# Patient Record
Sex: FEMALE | Race: WHITE | Employment: OTHER | URBAN - METROPOLITAN AREA
[De-identification: names, ages, dates, MRNs, and addresses within clinical notes are randomized per-mention and may not be internally consistent; named-entity substitution may affect disease eponyms.]

---

## 2022-07-14 ENCOUNTER — HOSPITAL ENCOUNTER (EMERGENCY)
Age: 72
Discharge: HOME | End: 2022-07-15
Payer: MEDICARE

## 2022-07-14 DIAGNOSIS — R73.9 HYPERGLYCEMIA: Primary | ICD-10-CM

## 2022-07-14 DIAGNOSIS — N39.0 URINARY TRACT INFECTION WITHOUT HEMATURIA, SITE UNSPECIFIED: ICD-10-CM

## 2022-07-14 LAB
A/G RATIO: 1.3 (ref 1.1–2.2)
ALBUMIN SERPL-MCNC: 4.5 G/DL (ref 3.4–5)
ALP BLD-CCNC: 124 U/L (ref 40–129)
ALT SERPL-CCNC: 13 U/L (ref 10–40)
ANION GAP SERPL CALCULATED.3IONS-SCNC: 14 MMOL/L (ref 3–16)
AST SERPL-CCNC: 17 U/L (ref 15–37)
BACTERIA: ABNORMAL /HPF
BASOPHILS ABSOLUTE: 0 K/UL (ref 0–0.2)
BASOPHILS RELATIVE PERCENT: 0.4 %
BILIRUB SERPL-MCNC: <0.2 MG/DL (ref 0–1)
BILIRUBIN URINE: NEGATIVE
BLOOD, URINE: NEGATIVE
BUN BLDV-MCNC: 36 MG/DL (ref 7–20)
CALCIUM SERPL-MCNC: 9.8 MG/DL (ref 8.3–10.6)
CHLORIDE BLD-SCNC: 93 MMOL/L (ref 99–110)
CLARITY: CLEAR
CO2: 22 MMOL/L (ref 21–32)
COLOR: YELLOW
CREAT SERPL-MCNC: 1.5 MG/DL (ref 0.6–1.2)
EOSINOPHILS ABSOLUTE: 0.2 K/UL (ref 0–0.6)
EOSINOPHILS RELATIVE PERCENT: 1.9 %
EPITHELIAL CELLS, UA: ABNORMAL /HPF (ref 0–5)
GFR AFRICAN AMERICAN: 41
GFR NON-AFRICAN AMERICAN: 34
GLUCOSE BLD-MCNC: 253 MG/DL (ref 70–99)
GLUCOSE BLD-MCNC: 330 MG/DL (ref 70–99)
GLUCOSE BLD-MCNC: 367 MG/DL (ref 70–99)
GLUCOSE URINE: 500 MG/DL
HCT VFR BLD CALC: 32.1 % (ref 36–48)
HEMOGLOBIN: 10.8 G/DL (ref 12–16)
KETONES, URINE: NEGATIVE MG/DL
LEUKOCYTE ESTERASE, URINE: ABNORMAL
LYMPHOCYTES ABSOLUTE: 3.9 K/UL (ref 1–5.1)
LYMPHOCYTES RELATIVE PERCENT: 39.1 %
MCH RBC QN AUTO: 28.4 PG (ref 26–34)
MCHC RBC AUTO-ENTMCNC: 33.7 G/DL (ref 31–36)
MCV RBC AUTO: 84.4 FL (ref 80–100)
MICROSCOPIC EXAMINATION: YES
MONOCYTES ABSOLUTE: 0.6 K/UL (ref 0–1.3)
MONOCYTES RELATIVE PERCENT: 6 %
NEUTROPHILS ABSOLUTE: 5.3 K/UL (ref 1.7–7.7)
NEUTROPHILS RELATIVE PERCENT: 52.6 %
NITRITE, URINE: POSITIVE
PDW BLD-RTO: 13 % (ref 12.4–15.4)
PERFORMED ON: ABNORMAL
PERFORMED ON: ABNORMAL
PH UA: 6 (ref 5–8)
PLATELET # BLD: 297 K/UL (ref 135–450)
PMV BLD AUTO: 8.8 FL (ref 5–10.5)
POTASSIUM SERPL-SCNC: 4.8 MMOL/L (ref 3.5–5.1)
PROTEIN UA: NEGATIVE MG/DL
RBC # BLD: 3.81 M/UL (ref 4–5.2)
RBC UA: ABNORMAL /HPF (ref 0–4)
SODIUM BLD-SCNC: 129 MMOL/L (ref 136–145)
SPECIFIC GRAVITY UA: 1.02 (ref 1–1.03)
TOTAL PROTEIN: 7.9 G/DL (ref 6.4–8.2)
URINE TYPE: ABNORMAL
UROBILINOGEN, URINE: 0.2 E.U./DL
WBC # BLD: 10 K/UL (ref 4–11)
WBC UA: ABNORMAL /HPF (ref 0–5)

## 2022-07-14 PROCEDURE — 85025 COMPLETE CBC W/AUTO DIFF WBC: CPT

## 2022-07-14 PROCEDURE — 81001 URINALYSIS AUTO W/SCOPE: CPT

## 2022-07-14 PROCEDURE — 96365 THER/PROPH/DIAG IV INF INIT: CPT

## 2022-07-14 PROCEDURE — 80053 COMPREHEN METABOLIC PANEL: CPT

## 2022-07-14 PROCEDURE — 99284 EMERGENCY DEPT VISIT MOD MDM: CPT

## 2022-07-14 PROCEDURE — 2580000003 HC RX 258: Performed by: NURSE PRACTITIONER

## 2022-07-14 PROCEDURE — 96372 THER/PROPH/DIAG INJ SC/IM: CPT

## 2022-07-14 RX ORDER — 0.9 % SODIUM CHLORIDE 0.9 %
1000 INTRAVENOUS SOLUTION INTRAVENOUS ONCE
Status: COMPLETED | OUTPATIENT
Start: 2022-07-14 | End: 2022-07-14

## 2022-07-14 RX ORDER — LISINOPRIL 40 MG/1
TABLET ORAL
COMMUNITY
Start: 2022-04-25

## 2022-07-14 RX ORDER — DULAGLUTIDE 3 MG/.5ML
3 INJECTION, SOLUTION SUBCUTANEOUS
COMMUNITY
Start: 2022-07-14

## 2022-07-14 RX ORDER — FUROSEMIDE 40 MG/1
20 TABLET ORAL SEE ADMIN INSTRUCTIONS
COMMUNITY
Start: 2022-06-01

## 2022-07-14 RX ORDER — GLYBURIDE 5 MG/1
TABLET ORAL
COMMUNITY
Start: 2022-05-23

## 2022-07-14 RX ORDER — GABAPENTIN 100 MG/1
CAPSULE ORAL
COMMUNITY
Start: 2022-06-25

## 2022-07-14 RX ORDER — CHLORTHALIDONE 25 MG/1
TABLET ORAL
COMMUNITY
Start: 2022-06-01

## 2022-07-14 RX ORDER — ROSUVASTATIN CALCIUM 5 MG/1
TABLET, COATED ORAL
Status: ON HOLD | COMMUNITY
Start: 2022-06-01 | End: 2022-10-06 | Stop reason: HOSPADM

## 2022-07-14 RX ORDER — CEPHALEXIN 500 MG/1
500 CAPSULE ORAL 4 TIMES DAILY
Qty: 28 CAPSULE | Refills: 0 | Status: SHIPPED | OUTPATIENT
Start: 2022-07-14 | End: 2022-07-21

## 2022-07-14 RX ORDER — INSULIN LISPRO 100 [IU]/ML
5 INJECTION, SOLUTION INTRAVENOUS; SUBCUTANEOUS ONCE
Status: COMPLETED | OUTPATIENT
Start: 2022-07-14 | End: 2022-07-15

## 2022-07-14 RX ADMIN — SODIUM CHLORIDE 1000 ML: 9 INJECTION, SOLUTION INTRAVENOUS at 21:45

## 2022-07-14 ASSESSMENT — PAIN - FUNCTIONAL ASSESSMENT: PAIN_FUNCTIONAL_ASSESSMENT: NONE - DENIES PAIN

## 2022-07-15 VITALS
BODY MASS INDEX: 23.85 KG/M2 | TEMPERATURE: 98.4 F | SYSTOLIC BLOOD PRESSURE: 144 MMHG | WEIGHT: 161 LBS | RESPIRATION RATE: 10 BRPM | OXYGEN SATURATION: 99 % | HEIGHT: 69 IN | DIASTOLIC BLOOD PRESSURE: 60 MMHG | HEART RATE: 62 BPM

## 2022-07-15 LAB
GLUCOSE BLD-MCNC: 279 MG/DL (ref 70–99)
PERFORMED ON: ABNORMAL

## 2022-07-15 PROCEDURE — 6360000002 HC RX W HCPCS: Performed by: NURSE PRACTITIONER

## 2022-07-15 PROCEDURE — 6370000000 HC RX 637 (ALT 250 FOR IP): Performed by: NURSE PRACTITIONER

## 2022-07-15 PROCEDURE — 2580000003 HC RX 258: Performed by: NURSE PRACTITIONER

## 2022-07-15 RX ADMIN — CEFTRIAXONE SODIUM 1000 MG: 1 INJECTION, POWDER, FOR SOLUTION INTRAMUSCULAR; INTRAVENOUS at 00:04

## 2022-07-15 RX ADMIN — INSULIN LISPRO 5 UNITS: 100 INJECTION, SOLUTION INTRAVENOUS; SUBCUTANEOUS at 00:04

## 2022-07-15 ASSESSMENT — PAIN - FUNCTIONAL ASSESSMENT: PAIN_FUNCTIONAL_ASSESSMENT: NONE - DENIES PAIN

## 2022-07-16 NOTE — ED PROVIDER NOTES
[address] [place] [address]  ED  EMERGENCY DEPARTMENT ENCOUNTER      This patient was not seen and evaluated by the attending physician. Pt Name: Zaina Pop  MRN: [id number]  [place] 1950  Date of evaluation: 7/14/2022  Provider: BRAEDEN Hayden CNP-C  PCP: No primary care provider on file. History provided by the patient. CHIEFCOMPLAINT:     Chief Complaint   Patient presents with    Hyperglycemia     states was in the 500s       HISTORY OF PRESENT ILLNESS:      Zaina Pop is a 67 y.o. female who presents to [address] [place] [address]  ED with complaints of elevated glucose. Patient states that she was having laboratory studies done as an outpatient when they called her and told her that her glucose levels were high, patient has a history of type 2 diabetes, states that she does not really ever check her glucose but this is as high as it ever been. She states that she otherwise feels fine, denies any chest pain or difficulty breathing, denies any fevers, she is here for further evaluation. LOCATION:-  QUALITY:-  SEVERITY:-  DURATION:-  MODIFYING FACTORS:-    Nursing Notes were reviewed     REVIEW OF SYSTEMS:     Review of Systems  All systems, a total of 10, are reviewed and negative except for those that were just noted in history present illness. PAST MEDICAL HISTORY:     Past Medical History:   Diagnosis Date    Chronic kidney disease     Diabetes mellitus ([address]     Hyperlipidemia     Hypertension     Neuropathy          SURGICAL HISTORY:    History reviewed. No pertinent surgical history.       CURRENT MEDICATIONS:       Discharge Medication List as of 7/15/2022 12:21 AM        CONTINUE these medications which have NOT CHANGED    Details   metFORMIN (GLUCOPHAGE) 500 MG tablet Take 500 mg by mouth 2 times dailyHistorical Med      lisinopril (PRINIVIL;ZESTRIL) 40 MG tablet Take 1 tablet by mouth once dailyHistorical Med      Dulaglutide (TRULICITY) 3 KP/2.7MP SOPN Inject 3 mg into the skin every 7 daysHistorical Med      gabapentin (NEURONTIN) 100 MG capsule TAKE 2 CAPSULES BY MOUTH THREE TIMES DAILYHistorical Med      chlorthalidone (HYGROTON) 25 MG tablet TAKE 1 TABLET BY MOUTH ONCE DAILYHistorical Med      furosemide (LASIX) 40 MG tablet TAKE 1/2 (ONE-HALF) TABLET BY MOUTH ONCE DAILY ON MONDAY, WEDNESDAY AND FRIDAY AS NEEDED FOR SWELLINGHistorical Med      glyBURIDE (DIABETA) 5 MG tablet TAKE 2 TABLETS BY MOUTH TWICE DAILY WITH MEALSHistorical Med      rosuvastatin (CRESTOR) 5 MG tablet TAKE 1 TABLET BY MOUTH ONCE DAILYHistorical Med               ALLERGIES:    Latex, Lovastatin, and Penicillins    FAMILY HISTORY:     History reviewed. No pertinent family history. SOCIAL HISTORY:     Social History     Socioeconomic History    Marital status:      Spouse name: None    Number of children: None    Years of education: None    Highest education level: None   Tobacco Use    Smoking status: Never    Smokeless tobacco: Never   Substance and Sexual Activity    Alcohol use: Not Currently    Drug use: Never       SCREENINGS:             PHYSICAL EXAM:       ED Triage Vitals   BP Temp Temp Source Heart Rate Resp SpO2 Height Weight   07/14/22 1912 07/14/22 1911 07/14/22 1911 07/14/22 1911 07/14/22 1911 07/14/22 1911 07/14/22 1911 07/14/22 1911   (!) 173/82 98.4 °F (36.9 °C) Oral 69 16 99 % 5' 9\" (1.753 m) 161 lb (73 kg)       Physical Exam    CONSTITUTIONAL: Awake and alert. Cooperative. Well-developed. Well-nourished. Vitals:    07/14/22 2300 07/14/22 2330 07/14/22 2345 07/15/22 0000   BP: (!) 148/58 (!) 141/87  (!) 144/60   Pulse: 66 64 63 62   Resp: 14 15 11 10   Temp:       TempSrc:       SpO2: 100% 100% 100% 99%   Weight:       Height:         HENT: Normocephalic. Atraumatic. External ears normal, without discharge. TMs clear bilaterally. Nonasal discharge. Oropharynx clear, no erythema.  Mucous membranes moist.  EYES: Conjunctiva non-injected, nolid abnormalities noted. No scleral icterus. PERRL. EOM's grossly intact. Anterior chambers clear. NECK: Supple. Normal ROM. No meningismus. No thyroid tenderness or swelling noted. CARDIOVASCULAR: RRR. No Murmer. No carotid bruits. PULMONARY/CHEST WALL: Effort normal. No tachypnea. Lungs clear to ausculation. ABDOMEN: Normal BS. Soft. Nondistended. No tenderness to palpation. No guarding. No hernias noted. No splenomegaly. Back: Spine is midline. No ecchymosis. No crepituson palpation. No obvious subluxation of vertebral column. No saddle anesthesia or evidence of cauda equina. /ANORECTAL: Not assessed  MUSKULOSKELETAL: Normal ROM. No acute deformities. No edema. No tenderness to palpate. SKIN: Warm and dry. NEUROLOGICAL:  GCS 15. CN II-XII grossly intact. Strength is 5/5 in all extremities and sensation is intact. PSYCHIATRIC: Normal affect, normal insight and judgement. Alert and oriented x 3.         DIAGNOSTIC RESULTS:     LABS:    Results for orders placed or performed during the hospital encounter of 07/14/22   Comprehensive Metabolic Panel   Result Value Ref Range    Sodium 129 (L) 136 - 145 mmol/L    Potassium 4.8 3.5 - 5.1 mmol/L    Chloride 93 (L) 99 - 110 mmol/L    CO2 22 21 - 32 mmol/L    Anion Gap 14 3 - 16    Glucose 367 (H) 70 - 99 mg/dL    BUN 36 (H) 7 - 20 mg/dL    CREATININE 1.5 (H) 0.6 - 1.2 mg/dL    GFR Non- 34 (A) >60    GFR  41 (A) >60    Calcium 9.8 8.3 - 10.6 mg/dL    Total Protein 7.9 6.4 - 8.2 g/dL    Albumin 4.5 3.4 - 5.0 g/dL    Albumin/Globulin Ratio 1.3 1.1 - 2.2    Total Bilirubin <0.2 0.0 - 1.0 mg/dL    Alkaline Phosphatase 124 40 - 129 U/L    ALT 13 10 - 40 U/L    AST 17 15 - 37 U/L   CBC with Auto Differential   Result Value Ref Range    WBC 10.0 4.0 - 11.0 K/uL    RBC 3.81 (L) 4.00 - 5.20 M/uL    Hemoglobin 10.8 (L) 12.0 - 16.0 g/dL    Hematocrit 32.1 (L) 36.0 - 48.0 %    MCV 84.4 80.0 - 100.0 fL    MCH 28.4 26.0 - 34.0 pg    MCHC 33.7 31.0 - 36.0 g/dL    RDW 13.0 12.4 - 15.4 %    Platelets 542 300 - 396 K/uL    MPV 8.8 5.0 - 10.5 fL    Neutrophils % 52.6 %    Lymphocytes % 39.1 %    Monocytes % 6.0 %    Eosinophils % 1.9 %    Basophils % 0.4 %    Neutrophils Absolute 5.3 1.7 - 7.7 K/uL    Lymphocytes Absolute 3.9 1.0 - 5.1 K/uL    Monocytes Absolute 0.6 0.0 - 1.3 K/uL    Eosinophils Absolute 0.2 0.0 - 0.6 K/uL    Basophils Absolute 0.0 0.0 - 0.2 K/uL   Urinalysis   Result Value Ref Range    Color, UA Yellow Straw/Yellow    Clarity, UA Clear Clear    Glucose, Ur 500 (A) Negative mg/dL    Bilirubin Urine Negative Negative    Ketones, Urine Negative Negative mg/dL    Specific Gravity, UA 1.020 1.005 - 1.030    Blood, Urine Negative Negative    pH, UA 6.0 5.0 - 8.0    Protein, UA Negative Negative mg/dL    Urobilinogen, Urine 0.2 <2.0 E.U./dL    Nitrite, Urine POSITIVE (A) Negative    Leukocyte Esterase, Urine SMALL (A) Negative    Microscopic Examination YES     Urine Type NotGiven    Microscopic Urinalysis   Result Value Ref Range    WBC, UA 21-50 (A) 0 - 5 /HPF    RBC, UA 11-20 (A) 0 - 4 /HPF    Epithelial Cells, UA 11-20 (A) 0 - 5 /HPF    Bacteria, UA 2+ (A) None Seen /HPF   POCT Glucose   Result Value Ref Range    POC Glucose 330 (H) 70 - 99 mg/dl    Performed on ACCU-CHEK    POCT Glucose   Result Value Ref Range    POC Glucose 253 (H) 70 - 99 mg/dl    Performed on ACCU-CHEK    POCT Glucose   Result Value Ref Range    POC Glucose 279 (H) 70 - 99 mg/dl    Performed on ACCU-CHEK          RADIOLOGY:  All x-ray studies are viewed/reviewed by me. Formal interpretations per the radiologist are as follows: No orders to display           EKG:  See EKG interpretation by an attending physician.       PROCEDURES:   N/A    CRITICAL CARE TIME:   N/A    CONSULTS:  None      EMERGENCY DEPARTMENT COURSE andDIFFERENTIAL DIAGNOSIS/MDM:   Vitals:    Vitals:    07/14/22 2300 07/14/22 2330 07/14/22 2345 07/15/22 0000   BP: (!) 148/58 (!) 141/87  (!) 144/60 Pulse: 66 64 63 62   Resp: 14 15 11 10   Temp:       TempSrc:       SpO2: 100% 100% 100% 99%   Weight:       Height:           Patient wasgiven the following medications:  Medications   0.9 % sodium chloride bolus (0 mLs IntraVENous Stopped 7/14/22 2251)   insulin lispro (HUMALOG) injection vial 5 Units (5 Units SubCUTAneous Given 7/15/22 0004)   cefTRIAXone (ROCEPHIN) 1000 mg IVPB in 50 mL D5W minibag (0 mg IntraVENous Stopped 7/15/22 0036)         Patient was evaluated independently by myself with the attending physician available for consultation. Patient presented to the emergency department today with complaints of elevated blood glucose. Patient was hyperglycemic at 367, patient was given IV fluids, has a history of some renal dysfunction. Creatinine level 1.5. Patient urinalysis was concerning for acute infection, which could account for the patient's elevated glucose. Patient given small amount of insulin, instructed to follow-up with PCP, no evidence of DKA, I did not feel the patient required further intervention, she was discharged home in good condition with instructions to monitor glucose levels. Patient laboratory studies, radiographic imaging, and assessment were all discussed with the patient and/orpatient family. There was shared decision-making between myself as well as the patient and/or their surrogate and we are all in agreement with discharge home. There was an opportunity for questions and all questions were answered tothe best of my ability and to the satisfaction of the patient and/or patient family. FINAL IMPRESSION:      1. Hyperglycemia    2.  Urinary tract infection without hematuria, site unspecified          DISPOSITION/PLAN:   DISPOSITION Decision To Discharge      PATIENT REFERRED TO:  [facility]  ED  [address]  Go to   If symptoms worsen      DISCHARGE MEDICATIONS:  Discharge Medication List as of 7/15/2022 12:21 AM        START taking these medications    Details   cephALEXin (KEFLEX) 500 MG capsule Take 1 capsule by mouth 4 times daily for 7 days, Disp-28 capsule, R-0Normal                        (Please note thatportions of this note were completed with a voice recognition program.  Efforts were made to edit the dictations, but occasionally words are mis-transcribed.)    BRAEDEN Álvarez CNP-C (electronicallysigned)        BRAEDEN Álvarez CNP  07/16/22 3154

## 2022-09-14 ENCOUNTER — APPOINTMENT (OUTPATIENT)
Dept: CT IMAGING | Age: 72
DRG: 853 | End: 2022-09-14
Payer: MEDICARE

## 2022-09-14 ENCOUNTER — APPOINTMENT (OUTPATIENT)
Dept: GENERAL RADIOLOGY | Age: 72
DRG: 853 | End: 2022-09-14
Payer: MEDICARE

## 2022-09-14 ENCOUNTER — HOSPITAL ENCOUNTER (INPATIENT)
Age: 72
LOS: 22 days | Discharge: HOSPICE | DRG: 853 | End: 2022-10-06
Attending: EMERGENCY MEDICINE | Admitting: INTERNAL MEDICINE
Payer: MEDICARE

## 2022-09-14 DIAGNOSIS — R10.84 GENERALIZED ABDOMINAL PAIN: ICD-10-CM

## 2022-09-14 DIAGNOSIS — N30.01 ACUTE CYSTITIS WITH HEMATURIA: ICD-10-CM

## 2022-09-14 DIAGNOSIS — N17.9 AKI (ACUTE KIDNEY INJURY) (HCC): ICD-10-CM

## 2022-09-14 DIAGNOSIS — E87.20 LACTIC ACIDOSIS: Primary | ICD-10-CM

## 2022-09-14 DIAGNOSIS — A41.9 SEPTICEMIA (HCC): ICD-10-CM

## 2022-09-14 DIAGNOSIS — K52.89 STERCORAL COLITIS: ICD-10-CM

## 2022-09-14 DIAGNOSIS — R11.2 NON-INTRACTABLE VOMITING WITH NAUSEA, UNSPECIFIED VOMITING TYPE: ICD-10-CM

## 2022-09-14 DIAGNOSIS — K63.1 PERFORATED BOWEL (HCC): ICD-10-CM

## 2022-09-14 PROBLEM — D72.829 LEUKOCYTOSIS: Status: ACTIVE | Noted: 2022-09-14

## 2022-09-14 PROBLEM — R82.81 PYURIA: Status: ACTIVE | Noted: 2022-09-14

## 2022-09-14 PROBLEM — I95.9 HYPOTENSION: Status: ACTIVE | Noted: 2022-09-14

## 2022-09-14 PROBLEM — K52.9 ENTERITIS: Status: ACTIVE | Noted: 2022-09-14

## 2022-09-14 PROBLEM — R55 SYNCOPE: Status: ACTIVE | Noted: 2022-09-14

## 2022-09-14 PROBLEM — E87.1 HYPONATREMIA: Status: ACTIVE | Noted: 2022-09-14

## 2022-09-14 PROBLEM — R10.9 ABDOMINAL PAIN: Status: ACTIVE | Noted: 2022-09-14

## 2022-09-14 PROBLEM — R77.8 ELEVATED TROPONIN: Status: ACTIVE | Noted: 2022-09-14

## 2022-09-14 PROBLEM — E11.10 DKA (DIABETIC KETOACIDOSIS) (HCC): Status: ACTIVE | Noted: 2022-09-14

## 2022-09-14 PROBLEM — R65.21 SEPTIC SHOCK (HCC): Status: ACTIVE | Noted: 2022-09-14

## 2022-09-14 LAB
A/G RATIO: 1.3 (ref 1.1–2.2)
A/G RATIO: 1.4 (ref 1.1–2.2)
ALBUMIN SERPL-MCNC: 3.3 G/DL (ref 3.4–5)
ALBUMIN SERPL-MCNC: 4.2 G/DL (ref 3.4–5)
ALP BLD-CCNC: 142 U/L (ref 40–129)
ALP BLD-CCNC: 274 U/L (ref 40–129)
ALT SERPL-CCNC: 12 U/L (ref 10–40)
ALT SERPL-CCNC: 13 U/L (ref 10–40)
ANION GAP SERPL CALCULATED.3IONS-SCNC: 21 MMOL/L (ref 3–16)
ANION GAP SERPL CALCULATED.3IONS-SCNC: 29 MMOL/L (ref 3–16)
AST SERPL-CCNC: 32 U/L (ref 15–37)
AST SERPL-CCNC: 35 U/L (ref 15–37)
BACTERIA: ABNORMAL /HPF
BASE EXCESS VENOUS: -17.8 MMOL/L (ref -3–3)
BASOPHILS ABSOLUTE: 0 K/UL (ref 0–0.2)
BASOPHILS RELATIVE PERCENT: 0.2 %
BILIRUB SERPL-MCNC: 0.3 MG/DL (ref 0–1)
BILIRUB SERPL-MCNC: 0.4 MG/DL (ref 0–1)
BILIRUBIN URINE: NEGATIVE
BLOOD, URINE: NEGATIVE
BUN BLDV-MCNC: 32 MG/DL (ref 7–20)
BUN BLDV-MCNC: 33 MG/DL (ref 7–20)
CALCIUM SERPL-MCNC: 10.2 MG/DL (ref 8.3–10.6)
CALCIUM SERPL-MCNC: 8.3 MG/DL (ref 8.3–10.6)
CARBOXYHEMOGLOBIN: 1.7 % (ref 0–1.5)
CHLORIDE BLD-SCNC: 82 MMOL/L (ref 99–110)
CHLORIDE BLD-SCNC: 90 MMOL/L (ref 99–110)
CLARITY: CLEAR
CO2: 10 MMOL/L (ref 21–32)
CO2: 11 MMOL/L (ref 21–32)
COLOR: YELLOW
CREAT SERPL-MCNC: 1.7 MG/DL (ref 0.6–1.2)
CREAT SERPL-MCNC: 1.7 MG/DL (ref 0.6–1.2)
EKG ATRIAL RATE: 90 BPM
EKG DIAGNOSIS: NORMAL
EKG P AXIS: 52 DEGREES
EKG P-R INTERVAL: 192 MS
EKG Q-T INTERVAL: 432 MS
EKG QRS DURATION: 158 MS
EKG QTC CALCULATION (BAZETT): 528 MS
EKG R AXIS: -28 DEGREES
EKG T AXIS: 100 DEGREES
EKG VENTRICULAR RATE: 90 BPM
EOSINOPHILS ABSOLUTE: 0 K/UL (ref 0–0.6)
EOSINOPHILS RELATIVE PERCENT: 0 %
GFR AFRICAN AMERICAN: 36
GFR AFRICAN AMERICAN: 36
GFR NON-AFRICAN AMERICAN: 30
GFR NON-AFRICAN AMERICAN: 30
GLUCOSE BLD-MCNC: 235 MG/DL (ref 70–99)
GLUCOSE BLD-MCNC: 334 MG/DL (ref 70–99)
GLUCOSE BLD-MCNC: 347 MG/DL (ref 70–99)
GLUCOSE BLD-MCNC: 358 MG/DL (ref 70–99)
GLUCOSE BLD-MCNC: 379 MG/DL (ref 70–99)
GLUCOSE URINE: NEGATIVE MG/DL
HCO3 VENOUS: 10.6 MMOL/L (ref 23–29)
HCT VFR BLD CALC: 36.7 % (ref 36–48)
HEMOGLOBIN: 12.1 G/DL (ref 12–16)
KETONES, URINE: ABNORMAL MG/DL
LACTIC ACID, SEPSIS: 11.3 MMOL/L (ref 0.4–1.9)
LACTIC ACID, SEPSIS: 12.6 MMOL/L (ref 0.4–1.9)
LACTIC ACID, SEPSIS: 7.6 MMOL/L (ref 0.4–1.9)
LEUKOCYTE ESTERASE, URINE: ABNORMAL
LIPASE: 36 U/L (ref 13–60)
LYMPHOCYTES ABSOLUTE: 2.5 K/UL (ref 1–5.1)
LYMPHOCYTES RELATIVE PERCENT: 10.1 %
MCH RBC QN AUTO: 27.9 PG (ref 26–34)
MCHC RBC AUTO-ENTMCNC: 32.9 G/DL (ref 31–36)
MCV RBC AUTO: 84.9 FL (ref 80–100)
METHEMOGLOBIN VENOUS: 0.4 %
MICROSCOPIC EXAMINATION: YES
MONOCYTES ABSOLUTE: 1.2 K/UL (ref 0–1.3)
MONOCYTES RELATIVE PERCENT: 4.8 %
NEUTROPHILS ABSOLUTE: 21 K/UL (ref 1.7–7.7)
NEUTROPHILS RELATIVE PERCENT: 84.9 %
NITRITE, URINE: NEGATIVE
O2 SAT, VEN: 87 %
O2 THERAPY: ABNORMAL
PCO2, VEN: 33.7 MMHG (ref 40–50)
PDW BLD-RTO: 12.9 % (ref 12.4–15.4)
PERFORMED ON: ABNORMAL
PH UA: 6 (ref 5–8)
PH VENOUS: 7.11 (ref 7.35–7.45)
PLATELET # BLD: 259 K/UL (ref 135–450)
PMV BLD AUTO: 8.2 FL (ref 5–10.5)
PO2, VEN: 65.7 MMHG (ref 25–40)
POTASSIUM REFLEX MAGNESIUM: 4.4 MMOL/L (ref 3.5–5.1)
POTASSIUM SERPL-SCNC: 4.2 MMOL/L (ref 3.5–5.1)
PROCALCITONIN: 0.06 NG/ML (ref 0–0.15)
PROTEIN UA: NEGATIVE MG/DL
RBC # BLD: 4.32 M/UL (ref 4–5.2)
RBC UA: ABNORMAL /HPF (ref 0–4)
SODIUM BLD-SCNC: 121 MMOL/L (ref 136–145)
SODIUM BLD-SCNC: 122 MMOL/L (ref 136–145)
SPECIFIC GRAVITY UA: <=1.005 (ref 1–1.03)
SPECIMEN STATUS: NORMAL
TCO2 CALC VENOUS: 12 MMOL/L
TOTAL PROTEIN: 5.9 G/DL (ref 6.4–8.2)
TOTAL PROTEIN: 7.2 G/DL (ref 6.4–8.2)
TROPONIN: 0.07 NG/ML
TROPONIN: 0.08 NG/ML
URINE TYPE: ABNORMAL
UROBILINOGEN, URINE: 0.2 E.U./DL
WBC # BLD: 24.7 K/UL (ref 4–11)
WBC UA: ABNORMAL /HPF (ref 0–5)

## 2022-09-14 PROCEDURE — 96366 THER/PROPH/DIAG IV INF ADDON: CPT

## 2022-09-14 PROCEDURE — 82803 BLOOD GASES ANY COMBINATION: CPT

## 2022-09-14 PROCEDURE — 99291 CRITICAL CARE FIRST HOUR: CPT | Performed by: INTERNAL MEDICINE

## 2022-09-14 PROCEDURE — 2580000003 HC RX 258: Performed by: INTERNAL MEDICINE

## 2022-09-14 PROCEDURE — 85025 COMPLETE CBC W/AUTO DIFF WBC: CPT

## 2022-09-14 PROCEDURE — 37799 UNLISTED PX VASCULAR SURGERY: CPT

## 2022-09-14 PROCEDURE — 99285 EMERGENCY DEPT VISIT HI MDM: CPT

## 2022-09-14 PROCEDURE — 83605 ASSAY OF LACTIC ACID: CPT

## 2022-09-14 PROCEDURE — 83690 ASSAY OF LIPASE: CPT

## 2022-09-14 PROCEDURE — 36556 INSERT NON-TUNNEL CV CATH: CPT

## 2022-09-14 PROCEDURE — 96365 THER/PROPH/DIAG IV INF INIT: CPT

## 2022-09-14 PROCEDURE — 6360000002 HC RX W HCPCS: Performed by: INTERNAL MEDICINE

## 2022-09-14 PROCEDURE — 84484 ASSAY OF TROPONIN QUANT: CPT

## 2022-09-14 PROCEDURE — 6360000002 HC RX W HCPCS: Performed by: EMERGENCY MEDICINE

## 2022-09-14 PROCEDURE — 83036 HEMOGLOBIN GLYCOSYLATED A1C: CPT

## 2022-09-14 PROCEDURE — 2500000003 HC RX 250 WO HCPCS: Performed by: INTERNAL MEDICINE

## 2022-09-14 PROCEDURE — 82010 KETONE BODYS QUAN: CPT

## 2022-09-14 PROCEDURE — 84145 PROCALCITONIN (PCT): CPT

## 2022-09-14 PROCEDURE — 93005 ELECTROCARDIOGRAM TRACING: CPT | Performed by: EMERGENCY MEDICINE

## 2022-09-14 PROCEDURE — 2500000003 HC RX 250 WO HCPCS: Performed by: EMERGENCY MEDICINE

## 2022-09-14 PROCEDURE — 87449 NOS EACH ORGANISM AG IA: CPT

## 2022-09-14 PROCEDURE — 81001 URINALYSIS AUTO W/SCOPE: CPT

## 2022-09-14 PROCEDURE — 80053 COMPREHEN METABOLIC PANEL: CPT

## 2022-09-14 PROCEDURE — 82330 ASSAY OF CALCIUM: CPT

## 2022-09-14 PROCEDURE — 6370000000 HC RX 637 (ALT 250 FOR IP): Performed by: INTERNAL MEDICINE

## 2022-09-14 PROCEDURE — 87324 CLOSTRIDIUM AG IA: CPT

## 2022-09-14 PROCEDURE — 2000000000 HC ICU R&B

## 2022-09-14 PROCEDURE — 96375 TX/PRO/DX INJ NEW DRUG ADDON: CPT

## 2022-09-14 PROCEDURE — 71045 X-RAY EXAM CHEST 1 VIEW: CPT

## 2022-09-14 PROCEDURE — 2580000003 HC RX 258: Performed by: EMERGENCY MEDICINE

## 2022-09-14 PROCEDURE — 74176 CT ABD & PELVIS W/O CONTRAST: CPT

## 2022-09-14 PROCEDURE — 36620 INSERTION CATHETER ARTERY: CPT

## 2022-09-14 PROCEDURE — C9113 INJ PANTOPRAZOLE SODIUM, VIA: HCPCS | Performed by: INTERNAL MEDICINE

## 2022-09-14 PROCEDURE — 93010 ELECTROCARDIOGRAM REPORT: CPT | Performed by: INTERNAL MEDICINE

## 2022-09-14 PROCEDURE — 6360000002 HC RX W HCPCS

## 2022-09-14 PROCEDURE — 02HV33Z INSERTION OF INFUSION DEVICE INTO SUPERIOR VENA CAVA, PERCUTANEOUS APPROACH: ICD-10-PCS | Performed by: EMERGENCY MEDICINE

## 2022-09-14 RX ORDER — 0.9 % SODIUM CHLORIDE 0.9 %
30 INTRAVENOUS SOLUTION INTRAVENOUS ONCE
Status: DISCONTINUED | OUTPATIENT
Start: 2022-09-14 | End: 2022-09-14

## 2022-09-14 RX ORDER — METRONIDAZOLE 500 MG/100ML
500 INJECTION, SOLUTION INTRAVENOUS EVERY 8 HOURS
Status: DISCONTINUED | OUTPATIENT
Start: 2022-09-14 | End: 2022-09-16

## 2022-09-14 RX ORDER — ENOXAPARIN SODIUM 100 MG/ML
40 INJECTION SUBCUTANEOUS DAILY
Status: DISCONTINUED | OUTPATIENT
Start: 2022-09-15 | End: 2022-09-15

## 2022-09-14 RX ORDER — POLYETHYLENE GLYCOL 3350 17 G/17G
17 POWDER, FOR SOLUTION ORAL DAILY PRN
Status: DISCONTINUED | OUTPATIENT
Start: 2022-09-14 | End: 2022-10-06 | Stop reason: HOSPADM

## 2022-09-14 RX ORDER — INSULIN GLARGINE 100 [IU]/ML
15 INJECTION, SOLUTION SUBCUTANEOUS NIGHTLY
Status: DISCONTINUED | OUTPATIENT
Start: 2022-09-14 | End: 2022-09-19

## 2022-09-14 RX ORDER — INSULIN LISPRO 100 [IU]/ML
0-16 INJECTION, SOLUTION INTRAVENOUS; SUBCUTANEOUS
Status: DISCONTINUED | OUTPATIENT
Start: 2022-09-14 | End: 2022-09-14

## 2022-09-14 RX ORDER — INSULIN LISPRO 100 [IU]/ML
0-4 INJECTION, SOLUTION INTRAVENOUS; SUBCUTANEOUS NIGHTLY
Status: DISCONTINUED | OUTPATIENT
Start: 2022-09-14 | End: 2022-09-14

## 2022-09-14 RX ORDER — DEXTROSE MONOHYDRATE 100 MG/ML
INJECTION, SOLUTION INTRAVENOUS CONTINUOUS PRN
Status: DISCONTINUED | OUTPATIENT
Start: 2022-09-14 | End: 2022-10-06 | Stop reason: HOSPADM

## 2022-09-14 RX ORDER — SODIUM CHLORIDE 0.9 % (FLUSH) 0.9 %
5-40 SYRINGE (ML) INJECTION EVERY 12 HOURS SCHEDULED
Status: DISCONTINUED | OUTPATIENT
Start: 2022-09-14 | End: 2022-09-19 | Stop reason: SDUPTHER

## 2022-09-14 RX ORDER — SODIUM CHLORIDE 9 MG/ML
1000 INJECTION, SOLUTION INTRAVENOUS CONTINUOUS
Status: DISCONTINUED | OUTPATIENT
Start: 2022-09-14 | End: 2022-09-14

## 2022-09-14 RX ORDER — SODIUM CHLORIDE 9 MG/ML
INJECTION, SOLUTION INTRAVENOUS CONTINUOUS
Status: DISCONTINUED | OUTPATIENT
Start: 2022-09-14 | End: 2022-09-14

## 2022-09-14 RX ORDER — SODIUM CHLORIDE 9 MG/ML
1000 INJECTION, SOLUTION INTRAVENOUS ONCE
Status: COMPLETED | OUTPATIENT
Start: 2022-09-14 | End: 2022-09-14

## 2022-09-14 RX ORDER — ONDANSETRON 2 MG/ML
4 INJECTION INTRAMUSCULAR; INTRAVENOUS EVERY 4 HOURS PRN
Status: DISCONTINUED | OUTPATIENT
Start: 2022-09-14 | End: 2022-09-15

## 2022-09-14 RX ORDER — SODIUM CHLORIDE 0.9 % (FLUSH) 0.9 %
5-40 SYRINGE (ML) INJECTION PRN
Status: DISCONTINUED | OUTPATIENT
Start: 2022-09-14 | End: 2022-10-06 | Stop reason: HOSPADM

## 2022-09-14 RX ORDER — ONDANSETRON 2 MG/ML
4 INJECTION INTRAMUSCULAR; INTRAVENOUS EVERY 6 HOURS PRN
Status: DISCONTINUED | OUTPATIENT
Start: 2022-09-14 | End: 2022-09-15

## 2022-09-14 RX ORDER — PANTOPRAZOLE SODIUM 40 MG/10ML
40 INJECTION, POWDER, LYOPHILIZED, FOR SOLUTION INTRAVENOUS DAILY
Status: DISCONTINUED | OUTPATIENT
Start: 2022-09-14 | End: 2022-10-06 | Stop reason: HOSPADM

## 2022-09-14 RX ORDER — ACETAMINOPHEN 325 MG/1
650 TABLET ORAL EVERY 6 HOURS PRN
Status: DISCONTINUED | OUTPATIENT
Start: 2022-09-14 | End: 2022-09-25

## 2022-09-14 RX ORDER — ONDANSETRON 4 MG/1
4 TABLET, ORALLY DISINTEGRATING ORAL EVERY 8 HOURS PRN
Status: DISCONTINUED | OUTPATIENT
Start: 2022-09-14 | End: 2022-09-15

## 2022-09-14 RX ORDER — INSULIN LISPRO 100 [IU]/ML
0-4 INJECTION, SOLUTION INTRAVENOUS; SUBCUTANEOUS EVERY 4 HOURS
Status: DISCONTINUED | OUTPATIENT
Start: 2022-09-15 | End: 2022-09-21

## 2022-09-14 RX ORDER — ACETAMINOPHEN 650 MG/1
650 SUPPOSITORY RECTAL EVERY 6 HOURS PRN
Status: DISCONTINUED | OUTPATIENT
Start: 2022-09-14 | End: 2022-09-25

## 2022-09-14 RX ORDER — PROMETHAZINE HYDROCHLORIDE 25 MG/ML
12.5 INJECTION, SOLUTION INTRAMUSCULAR; INTRAVENOUS EVERY 4 HOURS PRN
Status: DISCONTINUED | OUTPATIENT
Start: 2022-09-14 | End: 2022-09-15

## 2022-09-14 RX ORDER — SODIUM CHLORIDE 9 MG/ML
INJECTION, SOLUTION INTRAVENOUS PRN
Status: DISCONTINUED | OUTPATIENT
Start: 2022-09-14 | End: 2022-10-06 | Stop reason: HOSPADM

## 2022-09-14 RX ORDER — ONDANSETRON 2 MG/ML
INJECTION INTRAMUSCULAR; INTRAVENOUS
Status: COMPLETED
Start: 2022-09-14 | End: 2022-09-14

## 2022-09-14 RX ORDER — ONDANSETRON 2 MG/ML
4 INJECTION INTRAMUSCULAR; INTRAVENOUS ONCE
Status: COMPLETED | OUTPATIENT
Start: 2022-09-14 | End: 2022-09-14

## 2022-09-14 RX ADMIN — DEXTROSE MONOHYDRATE 5 MCG/MIN: 50 INJECTION, SOLUTION INTRAVENOUS at 13:24

## 2022-09-14 RX ADMIN — INSULIN LISPRO 12 UNITS: 100 INJECTION, SOLUTION INTRAVENOUS; SUBCUTANEOUS at 18:29

## 2022-09-14 RX ADMIN — PIPERACILLIN AND TAZOBACTAM 3375 MG: 3; .375 INJECTION, POWDER, FOR SOLUTION INTRAVENOUS at 14:22

## 2022-09-14 RX ADMIN — SODIUM BICARBONATE: 84 INJECTION, SOLUTION INTRAVENOUS at 22:53

## 2022-09-14 RX ADMIN — PIPERACILLIN AND TAZOBACTAM 3375 MG: 3; .375 INJECTION, POWDER, LYOPHILIZED, FOR SOLUTION INTRAVENOUS at 21:18

## 2022-09-14 RX ADMIN — PANTOPRAZOLE SODIUM 40 MG: 40 INJECTION, POWDER, FOR SOLUTION INTRAVENOUS at 18:29

## 2022-09-14 RX ADMIN — VASOPRESSIN 0.04 UNITS/MIN: 20 INJECTION INTRAVENOUS at 16:47

## 2022-09-14 RX ADMIN — VASOPRESSIN 0.03 UNITS/MIN: 20 INJECTION INTRAVENOUS at 22:56

## 2022-09-14 RX ADMIN — SODIUM BICARBONATE 50 MEQ: 84 INJECTION INTRAVENOUS at 22:18

## 2022-09-14 RX ADMIN — METRONIDAZOLE 500 MG: 500 INJECTION, SOLUTION INTRAVENOUS at 18:06

## 2022-09-14 RX ADMIN — SODIUM CHLORIDE 1000 ML: 9 INJECTION, SOLUTION INTRAVENOUS at 12:30

## 2022-09-14 RX ADMIN — Medication 10 ML: at 21:22

## 2022-09-14 RX ADMIN — ONDANSETRON 4 MG: 2 INJECTION INTRAMUSCULAR; INTRAVENOUS at 22:34

## 2022-09-14 RX ADMIN — METRONIDAZOLE 500 MG: 500 INJECTION, SOLUTION INTRAVENOUS at 23:54

## 2022-09-14 RX ADMIN — ONDANSETRON 4 MG: 2 INJECTION INTRAMUSCULAR; INTRAVENOUS at 18:26

## 2022-09-14 RX ADMIN — SODIUM CHLORIDE 1000 ML: 9 INJECTION, SOLUTION INTRAVENOUS at 12:46

## 2022-09-14 RX ADMIN — INSULIN GLARGINE 15 UNITS: 100 INJECTION, SOLUTION SUBCUTANEOUS at 21:23

## 2022-09-14 RX ADMIN — SODIUM BICARBONATE 50 MEQ: 84 INJECTION, SOLUTION INTRAVENOUS at 18:28

## 2022-09-14 RX ADMIN — ONDANSETRON 4 MG: 2 INJECTION INTRAMUSCULAR; INTRAVENOUS at 15:00

## 2022-09-14 RX ADMIN — DEXTROSE MONOHYDRATE 32 MCG/MIN: 50 INJECTION, SOLUTION INTRAVENOUS at 21:44

## 2022-09-14 RX ADMIN — SODIUM CHLORIDE: 9 INJECTION, SOLUTION INTRAVENOUS at 18:32

## 2022-09-14 ASSESSMENT — PAIN - FUNCTIONAL ASSESSMENT: PAIN_FUNCTIONAL_ASSESSMENT: 0-10

## 2022-09-14 ASSESSMENT — PAIN SCALES - GENERAL
PAINLEVEL_OUTOF10: 6
PAINLEVEL_OUTOF10: 7

## 2022-09-14 ASSESSMENT — LIFESTYLE VARIABLES: HOW OFTEN DO YOU HAVE A DRINK CONTAINING ALCOHOL: NEVER

## 2022-09-14 ASSESSMENT — PAIN DESCRIPTION - LOCATION: LOCATION: ABDOMEN

## 2022-09-14 NOTE — CARE COORDINATION
CASE MANAGEMENT INITIAL ASSESSMENT      Reviewed chart and completed assessment with patient and   Family present:   Explained Case Management role/services. yes    Primary contact information:, [address] :   Primary Decision Maker: Cece Guzman Spouse - [phone]          Can this person be reached and be able to respond quickly, such as within a few minutes or hours? Yes      Admit date/status:9/14/22  Diagnosis:abd. pain   Is this a Readmission?:  No      Insurance:medicare   Precert required for SNF: No       3 night stay required: waived due to [address] arrangements, Adls, care needs, prior to admission:from home with  and granddaughter, independent in ADLs, works part time usually    [address] at home:  Walker__Cane__RTS__ BSC__Shower Chair__  02__ HHN__ CPAP__  [facility] ED Bed__ W/C___ Other_____    Services in the home and/or outpatient, prior to admission:none    Current PCP:Dr. Leon                                Medications:yes Prescription coverage? No Will pt require financial assistance with medications No     Transportation needs: car     Dialysis Facility (if applicable)   Name:  Address:  Dialysis Schedule:  Phone:  Fax:    PT/OT recs:    Hospital Exemption Notification (HEN):    Barriers to discharge:medical complications    Plan/comments:Referred to patient for d/c planning. Spoke to patient and . Patient is a 67year old female admitted for abdominal pain. Patient lives at home with  and granddaughter. Patient is usually independent in ADLs. Patient usually works part time at [employer]. Case management to follow for d/c needs.   Electronically signed by GEORGE Moise on 9/14/2022 at 4:21 PM     ECOC on chart for MD signature

## 2022-09-14 NOTE — ED NOTES
Pt arouses to verbal stimuli, requesting some water for her dry mouth, having abd pain at 7/10     Claudette Grim, RN  09/14/22 8456

## 2022-09-14 NOTE — ED PROVIDER NOTES
CHIEF COMPLAINT  Abdominal Pain (Constipated past 2 days, had a large BM at home, medics found patient on floor in hallway at home, pt was hypotensive in the 50's, blood sugar was 60, oral glucose , and fluids, given by medics, )      [facility]  Yesi Duran is a 67 y.o. female with a history of CKD, diabetes, hyperlipidemia, hypertension, and neuropathy who presents to the ED complaining of abdominal pain/syncope. By EMS report, patient has had constipation over the last several days. .  Patient reportedly had a large bowel movement and shortly thereafter had a syncopal/near syncopal event where she was helped to the floor. EMS was called and arrived to find patient with blood pressure of 50/30.  300 cc of normal saline were infused during truck ride to the ED. Blood pressure shortly after arrival was 134/94. Patient reports lower abdominal discomfort. No additional complaints. No other complaints, modifying factors or associated symptoms. I have reviewed the following from the nursing documentation. Past Medical History:   Diagnosis Date    Chronic kidney disease     Diabetes mellitus ([address]     Hyperlipidemia     Hypertension     Neuropathy      History reviewed. No pertinent surgical history. History reviewed. No pertinent family history.   Social History     Socioeconomic History    Marital status:      Spouse name: Not on file    Number of children: Not on file    Years of education: Not on file    Highest education level: Not on file   Occupational History    Not on file   Tobacco Use    Smoking status: Never    Smokeless tobacco: Never   Substance and Sexual Activity    Alcohol use: Not Currently    Drug use: Never    Sexual activity: Not on file   Other Topics Concern    Not on file   Social History Narrative    Not on file     Social Determinants of Health     Financial Resource Strain: Not on file   Food Insecurity: Not on file   Transportation Needs: Not on file Physical Activity: Not on file   Stress: Not on file   Social Connections: Not on file   Intimate Partner Violence: Not on file   Housing Stability: Not on file     Current Facility-Administered Medications   Medication Dose Route Frequency Provider Last Rate Last Admin    norepinephrine (LEVOPHED) 16 mg in dextrose 5 % 250 mL infusion  1-100 mcg/min IntraVENous Continuous Allyne Mars, DO 28.1 mL/hr at 09/14/22 1402 30 mcg/min at 09/14/22 1402    0.9 % sodium chloride infusion  1,000 mL IntraVENous Continuous Allyne Mars,  mL/hr at 09/14/22 1246 1,000 mL at 09/14/22 1246    piperacillin-tazobactam (ZOSYN) 3,375 mg in dextrose 5 % 50 mL IVPB (mini-bag)  3,375 mg IntraVENous Once Allyne Mars, DO         Current Outpatient Medications   Medication Sig Dispense Refill    metFORMIN (GLUCOPHAGE) 500 MG tablet Take 500 mg by mouth 2 times daily      gabapentin (NEURONTIN) 100 MG capsule TAKE 2 CAPSULES BY MOUTH THREE TIMES DAILY      lisinopril (PRINIVIL;ZESTRIL) 40 MG tablet Take 1 tablet by mouth once daily      Dulaglutide (TRULICITY) 3 BI/1.2EQ SOPN Inject 3 mg into the skin every 7 days      chlorthalidone (HYGROTON) 25 MG tablet TAKE 1 TABLET BY MOUTH ONCE DAILY      furosemide (LASIX) 40 MG tablet TAKE 1/2 (ONE-HALF) TABLET BY MOUTH ONCE DAILY ON MONDAY, WEDNESDAY AND FRIDAY AS NEEDED FOR SWELLING      glyBURIDE (DIABETA) 5 MG tablet TAKE 2 TABLETS BY MOUTH TWICE DAILY WITH MEALS      rosuvastatin (CRESTOR) 5 MG tablet TAKE 1 TABLET BY MOUTH ONCE DAILY       Allergies   Allergen Reactions    Latex     Lovastatin      Indigestion and Feels bad  Indigestion and Feels bad      Penicillins        REVIEW OF SYSTEMS  10 systems reviewed, pertinent positives per HPI otherwise noted to be negative. PHYSICAL EXAM  BP (!) 78/54   Pulse 93   Temp 97.4 °F (36.3 °C) (Oral)   Resp 18   Ht 5' 9\" (1.753 m)   Wt 165 lb (74.8 kg)   SpO2 99%   BMI 24.37 kg/m²   GENERAL APPEARANCE: Somnolent but arousable. Cooperative. Acute distress. HEAD: Normocephalic. Atraumatic. EYES: PERRL. EOM's grossly intact. ENT: Mucous membranes are moist.   NECK: Supple, trachea midline. HEART: RRR. Normal S1, S2. No murmurs, rubs or gallops. LUNGS: Respirations unlabored. CTAB. Good air exchange. No wheezes, rales, or rhonchi. Speaking comfortably in full sentences. ABDOMEN: Soft. Non-distended. Diffuse lower abdominal tenderness to palpation. No guarding or rebound. Normal Bowel sounds. EXTREMITIES: No peripheral edema. MAEE. No acute deformities. SKIN: Pale warm and dry. No acute rashes. NEUROLOGICAL: Alert and oriented X 3. CN II-XII intact. No gross facial drooping. Strength 5/5, sensation intact. No pronator drift. Normal coordination. Gait not tested. PSYCHIATRIC: Normal mood and affect. LABS  I have reviewed all labs for this visit.    Results for orders placed or performed during the hospital encounter of 09/14/22   Lactate, Sepsis   Result Value Ref Range    Lactic Acid, Sepsis 12.6 (HH) 0.4 - 1.9 mmol/L   Procalcitonin   Result Value Ref Range    Procalcitonin 0.06 0.00 - 0.15 ng/mL   Lipase   Result Value Ref Range    Lipase 36.0 13.0 - 60.0 U/L   Comprehensive Metabolic Panel   Result Value Ref Range    Sodium 121 (L) 136 - 145 mmol/L    Potassium 4.2 3.5 - 5.1 mmol/L    Chloride 82 (L) 99 - 110 mmol/L    CO2 10 (LL) 21 - 32 mmol/L    Anion Gap 29 (H) 3 - 16    Glucose 358 (H) 70 - 99 mg/dL    BUN 32 (H) 7 - 20 mg/dL    Creatinine 1.7 (H) 0.6 - 1.2 mg/dL    GFR Non-African American 30 (A) >60    GFR  36 (A) >60    Calcium 10.2 8.3 - 10.6 mg/dL    Total Protein 7.2 6.4 - 8.2 g/dL    Albumin 4.2 3.4 - 5.0 g/dL    Albumin/Globulin Ratio 1.4 1.1 - 2.2    Total Bilirubin 0.3 0.0 - 1.0 mg/dL    Alkaline Phosphatase 142 (H) 40 - 129 U/L    ALT 12 10 - 40 U/L    AST 32 15 - 37 U/L   Urinalysis   Result Value Ref Range    Color, UA Yellow Straw/Yellow    Clarity, UA Clear Clear    Glucose, Ur Negative Negative mg/dL    Bilirubin Urine Negative Negative    Ketones, Urine TRACE (A) Negative mg/dL    Specific Gravity, UA <=1.005 1.005 - 1.030    Blood, Urine Negative Negative    pH, UA 6.0 5.0 - 8.0    Protein, UA Negative Negative mg/dL    Urobilinogen, Urine 0.2 <2.0 E.U./dL    Nitrite, Urine Negative Negative    Leukocyte Esterase, Urine SMALL (A) Negative    Microscopic Examination YES     Urine Type NotGiven    Troponin   Result Value Ref Range    Troponin 0.07 (H) <0.01 ng/mL   Microscopic Urinalysis   Result Value Ref Range    WBC, UA 21-50 (A) 0 - 5 /HPF    RBC, UA None seen 0 - 4 /HPF    Bacteria, UA Rare (A) None Seen /HPF   POCT Glucose   Result Value Ref Range    POC Glucose 334 (H) 70 - 99 mg/dl    Performed on [employer]    EKG 12 Lead   Result Value Ref Range    Ventricular Rate 90 BPM    Atrial Rate 90 BPM    P-R Interval 192 ms    QRS Duration 158 ms    Q-T Interval 432 ms    QTc Calculation (Bazett) 528 ms    P Axis 52 degrees    R Axis -28 degrees    T Axis 100 degrees    Diagnosis       Sinus rhythm with Premature atrial complexesLeft bundle branch blockAbnormal ECGNo previous ECGs available       EKG  The Ekg interpreted by myself    Sinus rhythm with PACs. Rate of 90. Normal axis. . Left bundle branch block noted. No previous EKG. RADIOLOGY  X-RAYS:  I have reviewed radiologic plain film image(s). ALL OTHER NON-PLAIN FILM IMAGES SUCH AS CT, ULTRASOUND AND MRI HAVE BEEN READ BY THE RADIOLOGIST. XR CHEST PORTABLE   Final Result   Placement of a right internal jugular central venous catheter without evident   complication. The tip is at approximately the cavoatrial junction. No acute findings in the chest.         CT ABDOMEN PELVIS WO CONTRAST Additional Contrast? None    (Results Pending)              Rechecks: Physical assessment performed. 1235: Patient hypotensive. Normal saline bolus initiated. 1330: Blood pressure 55/38.   Continuing saline infusion. 1400: SBP 78. Levophed infusing. Critical Care: I personally saw the patient and independently provided 45 minutes of non-concurrent critical care out of the total shared critical care time provided. Is this patient to be included in the SEP-1 Core Measure due to severe sepsis or septic shock? Yes   SEP-1 CORE MEASURE DATA      Sepsis Criteria   Severe Sepsis Criteria   Septic Shock Criteria     Must be confirmed or suspected to move forward with diagnosis of sepsis. Must meet 2:    [] Temperature > 100.9 F (38.3 C)        or < 96.8 F (36 C)  [] HR > 90  [x] RR > 20  [x] WBC > 12 or < 4 or 10% bands      AND:      [x] Infection Confirmed or        Suspected. Must meet 1:    [] Lactate > 2       or   [] Signs of Organ Dysfunction:    - SBP < 90 or MAP < 65  - Altered mental status  - Creatinine > 2 or increased from      baseline  - Urine Output < 0.5 ml/kg/hr  - Bilirubin > 2  - INR > 1.5 (not anticoagulated)  - Platelets < 755,638  - Acute Respiratory Failure as     evidenced by new need for NIPPV     or mechanical ventilation      [] No criteria met for Severe Sepsis. Must meet 1:    [x] Lactate > 4        or   [] SBP < 90 or MAP < 65 for at        least two readings in the first        hour after fluid bolus        administration      [x] Vasopressors initiated (if hypotension persists after fluid resuscitation)        [] No criteria met for Septic Shock.    Patient Vitals for the past 6 hrs:   BP Temp Pulse Resp SpO2 Height Weight Weight Method Percent Weight Change   09/14/22 1148 (!) 134/94 97.4 °F (36.3 °C) 90 15 100 % -- 165 lb (74.8 kg) Stated 0   09/14/22 1209 -- -- -- -- -- 5' 9\" (1.753 m) 165 lb (74.8 kg) Stated 0   09/14/22 1215 (!) 57/28 -- 84 21 97 % -- -- -- --   09/14/22 1230 (!) 64/40 -- 75 22 99 % -- -- -- --   09/14/22 1245 83/62 -- 91 19 100 % -- -- -- --   09/14/22 1300 (!) 55/40 -- 86 16 99 % -- -- -- --   09/14/22 1310 -- -- 88 16 98 % -- -- -- -- 09/14/22 1320 (!) 58/30 -- 84 18 99 % -- -- -- --   09/14/22 1330 (!) 55/38 -- 81 17 100 % -- -- -- --   09/14/22 1355 (!) 65/50 -- 87 15 99 % -- -- -- --   09/14/22 1400 (!) 78/54 -- 93 18 99 % -- -- -- --   09/14/22 1430 (!) 75/50 -- (!) 109 26 98 % -- -- -- --   09/14/22 1440 (!) 76/52 -- (!) 107 26 99 % -- -- -- --   09/14/22 1450 (!) 64/49 -- (!) 104 17 98 % -- -- -- --   09/14/22 1500 -- (!) 96 °F (35.6 °C) 100 23 99 % -- -- -- --   09/14/22 1535 107/81 -- 99 18 98 % -- -- -- --   09/14/22 1540 (!) 84/40 -- 100 17 98 % -- -- -- --      Recent Labs     09/14/22  1150 09/14/22  1337 09/14/22  1514   WBC  --  24.7*  --    CREATININE 1.7*  --  1.7*   BILITOT 0.3  --  0.4   PLT  --  259  --          Time: Identified: 1426: Septic shock identified. Fluid Resuscitation Rational: Patient was provided 30 mL/kg of normal saline. Repeat lactate level: improving    Reassessment Exam:       1500: Patient's capillary refill has improved somewhat. Patient Name: Yon Pearce  Medical Record Number: mitral regurgitation          Central Line Placement Procedure Note  Indication: central venous monitoring and centrally administered medications    Consent: The patient provided verbal consent for this procedure. Procedure: The patient was positioned appropriately and the skin over the right internal jugular vein was prepped with betadine and draped in a sterile fashion. Local anesthesia was obtained by infiltration using 1% Lidocaine without epinephrine. A large bore needle was used to identify the vein. A guide wire was then inserted into the vein through the needle. A triple lumen catheter was then inserted into the vessel over the guide wire using the Seldinger technique. All ports showed good, free flowing blood return and were flushed with saline solution. The catheter was then securely fastened to the skin with sutures and covered with a sterile dressing.   A post procedure X-ray was ordered and showed no evidence of pneumothorax. The patient tolerated the procedure well. Complications: None       ED COURSE/MDM  Patient seen and evaluated. Old records reviewed. Labs and imaging reviewed and results discussed with patient. Patient was given broad-spectrum antibiotics, normal saline, and multiple pressors. Central line was placed. Improvement of symptoms throughout patient's stay in the emergency department. Labs reveal significant leukocytosis with elevated lactic and concern for UTI. Mild DEJAN noted. Imaging shows nonspecific enteritis. Patient will be admitted for further evaluation and treatment. . Patient was reassessed as noted above . [facility] Plan of care discussed with patient and family. Patient and family in agreement with plan. Patient was given scripts for the following medications. I counseled patient how to take these medications. New Prescriptions    No medications on file       CLINICAL IMPRESSION  1. Generalized abdominal pain    2. Non-intractable vomiting with nausea, unspecified vomiting type    3. Acute cystitis with hematuria    4. Septicemia ([facility] [address]        Blood pressure (!) 78/54, pulse 93, temperature 97.4 °F (36.3 °C), temperature source Oral, resp. rate 18, height 5' 9\" (1.753 m), weight 165 lb (74.8 kg), SpO2 99 %. Sarthak Rodriguez was admitted in critical condition.        Brenda Encinas,   09/14/22 1600

## 2022-09-14 NOTE — [facility] REMOTE MONITORING
Attempted to contact primary RN regarding this patient. Left message with ED Regan Perry.      Thanks,  Viviane Bertrand RN, [address] [place] [address]  [phone]

## 2022-09-14 NOTE — ED NOTES
Patient noted to be hypotensive after receiving 30ml/kg MD aware. Central line placed and confirmed by Dr. Neris Madison.       Megan Gaffney RN  09/14/22 2891

## 2022-09-14 NOTE — CONSULTS
patient's systolic blood pressure was 52 mmHg when seen, patient's blood sugars were still on the higher side now which was lower as per EMT, patient was given dextrose 50 IV push in the EMS as per documentation, patient was having sinus rhythm on the monitor which was ranging from normal sinus rhythm to sinus tachycardia, patient was given another fluid bolus and also vasopressin infusion was started on the patient, no other pertinent review of system of concern       Patient Active Problem List    Diagnosis Date Noted    DKA (diabetic ketoacidosis) ([facility] 09/14/2022    Hypotension 09/14/2022    Syncope 09/14/2022    Hyponatremia 09/14/2022    DEJAN (acute kidney injury) ([facility] 09/14/2022    Abdominal pain 09/14/2022    Enteritis 09/14/2022    Septic shock ([facility] 09/14/2022    Elevated troponin 09/14/2022    Leukocytosis 09/14/2022    Pyuria 09/14/2022    Lactic acidosis 09/14/2022    DM (diabetes mellitus), secondary uncontrolled ([facility] 09/14/2022       Past Medical History:   Diagnosis Date    Chronic kidney disease     Diabetes mellitus ([facility]     Hyperlipidemia     Hypertension     Neuropathy         History reviewed. No pertinent surgical history. History reviewed. No pertinent family history. Social History     Tobacco Use    Smoking status: Never    Smokeless tobacco: Never   Substance Use Topics    Alcohol use: Not Currently        Allergies   Allergen Reactions    Latex     Lovastatin      Indigestion and Feels bad  Indigestion and Feels bad      Penicillins                Physical Exam:  Blood pressure (!) 63/54, pulse (!) 101, temperature 97 °F (36.1 °C), temperature source Bladder, resp. rate 18, height 5' 9\" (1.753 m), weight 165 lb (74.8 kg), SpO2 98 %.'   Constitutional:  No acute distress. HENT:  Oropharynx is clear and moist. No thyromegaly. Eyes:  Conjunctivae are normal. Pupils equal, round, and reactive to light. No scleral icterus. Neck: . No tracheal deviation present.  No obvious thyroid mass.   Cardiovascular: Sinus tachycardia normal heart sounds. No right ventricular heave. No lower extremity edema. Pulmonary/Chest: No wheezes. No rales. Chest wall is not dull to percussion. No accessory muscle usage or stridor. Abdominal: Soft. Bowel sounds present. No distension or hernia. No lower abdominal tenderness. With mild rebound present  Musculoskeletal: No cyanosis. No clubbing. No obvious joint deformity. Lymphadenopathy: No cervical or supraclavicular adenopathy. Skin: Skin is warm and dry. No rash or nodules on the exposed extremities. Neurologic: Lethargic but communicative to the simple questions        Results:  CBC:   Recent Labs     09/14/22  1337   WBC 24.7*   HGB 12.1   HCT 36.7   MCV 84.9        BMP:   Recent Labs     09/14/22  1150 09/14/22  1514   * 122*   K 4.2 4.4   CL 82* 90*   CO2 10* 11*   BUN 32* 33*   CREATININE 1.7* 1.7*     LIVER PROFILE:   Recent Labs     09/14/22  1150 09/14/22  1514   AST 32 35   ALT 12 13   LIPASE 36.0  --    BILITOT 0.3 0.4   ALKPHOS 142* 274*     PT/INR: No results for input(s): PROTIME, INR in the last 72 hours. APTT: No results for input(s): APTT in the last 72 hours. UA:  Recent Labs     09/14/22  1208   COLORU Yellow   PHUR 6.0   WBCUA 21-50*   RBCUA None seen   BACTERIA Rare*   CLARITYU Clear   SPECGRAV <=1.005   LEUKOCYTESUR SMALL*   UROBILINOGEN 0.2   BILIRUBINUR Negative   BLOODU Negative   GLUCOSEU Negative       Imaging:  I have reviewed radiology images personally. CT ABDOMEN PELVIS WO CONTRAST Additional Contrast? None   Final Result   1. Acute enteritis involving the duodenum and jejunal loops with thickening   of the loops and edema. No evidence of bowel obstruction. 2. Constipation with significant stool impaction in the rectum. 3. Mild ascites mostly around the liver and spleen. 4. Adequate position of Osorio catheter in the urinary bladder.          XR CHEST PORTABLE   Final Result   Placement of a right internal jugular central venous catheter without evident   complication. The tip is at approximately the cavoatrial junction. No acute findings in the chest.           CT ABDOMEN PELVIS WO CONTRAST Additional Contrast? None    Result Date: 9/14/2022  EXAMINATION: CT OF THE ABDOMEN AND PELVIS WITHOUT CONTRAST, 9/14/2022 2:46 pm TECHNIQUE: CT of the abdomen and pelvis was performed without the administration of intravenous contrast. Multiplanar reformatted images are provided for review. Automated exposure control, iterative reconstruction, and/or weight based adjustment of the mA/kV was utilized to reduce the radiation dose to as low as reasonably achievable. COMPARISON: None HISTORY: ORDERING SYSTEM PROVIDED HISTORY:  Abdominal pain/ near syncope TECHNOLOGIST PROVIDED HISTORY: Additional Contrast?  None Reason for Exam:  Abdominal pain/ near syncope Decision Support Exception - unselect if not a suspected or confirmed emergency medical condition->Emergency Medical Condition (MA) Reason for Exam:  Vomiting and pain FINDINGS: Lower Chest: No active disease. Organs: The liver appears unremarkable. Status post cholecystectomy. Pancreas and spleen, adrenals, kidneys, aorta and IVC appear stable. GI/Bowel: There is evidence of thickening of the duodenum as well as jejunal loops with perijejunal inflammatory changes. These changes are compatible with acute enteritis. No evidence of bowel obstruction or perforation. Evidence of constipation and significant stool impaction in the rectum. Pelvis: Urinary bladder contains Osorio catheter. The uterus and adnexa demonstrate no acute abnormality. Peritoneum/Retroperitoneum: No evidence of retroperitoneal lymphadenopathy or acute peritoneal findings. Mild ascites mostly around the liver and spleen. Bones/Soft Tissues: No acute abnormality. Osteopenia. Moderate multilevel degenerative disc disease.      1. Acute enteritis involving the duodenum and jejunal loops with thickening of the loops and edema. No evidence of bowel obstruction. 2. Constipation with significant stool impaction in the rectum. 3. Mild ascites mostly around the liver and spleen. 4. Adequate position of Osorio catheter in the urinary bladder. XR CHEST PORTABLE    Result Date: 9/14/2022  EXAMINATION: ONE XRAY VIEW OF THE CHEST 9/14/2022 10:22 am COMPARISON: None. HISTORY: ORDERING SYSTEM PROVIDED HISTORY: confirm central line TECHNOLOGIST PROVIDED HISTORY: Reason for exam:->confirm central line Reason for Exam: confirm central line FINDINGS: A right internal jugular venous catheter is been placed with the tip at the cavoatrial junction. No pneumothorax is identified. The lungs and costophrenic angles are clear. The cardiomediastinal silhouette and pulmonary vessels appear normal.     Placement of a right internal jugular central venous catheter without evident complication. The tip is at approximately the cavoatrial junction. No acute findings in the chest.        Latest Reference Range & Units 9/14/22 13:50 9/14/22 15:14   Sodium 136 - 145 mmol/L  122 (L)   Potassium 3.5 - 5.1 mmol/L  4.4   Chloride 99 - 110 mmol/L  90 (L)   CO2 21 - 32 mmol/L  11 (LL)   BUN,BUNPL 7 - 20 mg/dL  33 (H)   Creatinine 0.6 - 1.2 mg/dL  1.7 (H)   Anion Gap 3 - 16   21 (H)   GFR Non- >60   30 ! GFR  >60   36 !    Lactic Acid, Sepsis 0.4 - 1.9 mmol/L 7.6 (HH)    Glucose, Random 70 - 99 mg/dL  379 (H)   CALCIUM, SERUM, 169632 8.3 - 10.6 mg/dL  8.3   Total Protein 6.4 - 8.2 g/dL  5.9 (L)   Albumin 3.4 - 5.0 g/dL  3.3 (L)   Albumin/Globulin Ratio 1.1 - 2.2   1.3   Alk Phos 40 - 129 U/L  274 (H)   ALT 10 - 40 U/L  13   AST 15 - 37 U/L  35   Bilirubin 0.0 - 1.0 mg/dL  0.4      Latest Reference Range & Units 7/14/22 19:20 9/14/22 13:37   WBC 4.0 - 11.0 K/uL 10.0 24.7 (H)   RBC 4.00 - 5.20 M/uL 3.81 (L) 4.32   Hemoglobin Quant 12.0 - 16.0 g/dL 10.8 (L) 12.1   Hematocrit 36.0 - 48.0 % 32.1 (L) 36.7 MCV 80.0 - 100.0 fL 84.4 84.9   MCH 26.0 - 34.0 pg 28.4 27.9   MCHC 31.0 - 36.0 g/dL 33.7 32.9   MPV 5.0 - 10.5 fL 8.8 8.2   RDW 12.4 - 15.4 % 13.0 12.9   Platelet Count 584 - 450 K/uL 297 259   Neutrophils % % 52.6 84.9   Lymphocyte % % 39.1 10.1   Monocytes % % 6.0 4.8   Eosinophils % % 1.9 0.0   Basophils % % 0.4 0.2   Neutrophils Absolute 1.7 - 7.7 K/uL 5.3 21.0 (H)   Lymphocytes Absolute 1.0 - 5.1 K/uL 3.9 2.5   Monocytes Absolute 0.0 - 1.3 K/uL 0.6 1.2   Eosinophils Absolute 0.0 - 0.6 K/uL 0.2 0.0   Basophils Absolute 0.0 - 0.2 K/uL 0.0 0.0         Echocardiogram:2019-Summary:   Overall left ventricular ejection fraction is estimated to be 60-65%. The left ventricular wall motion is normal.   The left atrium is mildly dilated. There is mild mitral annular calcification present. Latest Reference Range & Units 9/14/22 12:08   Color, UA Straw/Yellow  Yellow   Clarity, UA Clear  Clear   Glucose, UA Negative mg/dL Negative   Bilirubin, Urine Negative  Negative   Ketones, Urine Negative mg/dL TRACE ! Specific Gravity, UA 1.005 - 1.030  <=1.005   Blood, Urine Negative  Negative   pH, UA 5.0 - 8.0  6.0   Protein, UA Negative mg/dL Negative   Urobilinogen, Urine <2.0 E.U./dL 0.2   Nitrite, Urine Negative  Negative   Leukocyte Esterase, Urine Negative  SMALL ! Urine Type  NotGiven   WBC, UA 0 - 5 /HPF 21-50 ! RBC, UA 0 - 4 /HPF None seen   Bacteria, UA None Seen /HPF Rare !    Microscopic Examination  YES      Latest Reference Range & Units 9/14/22 15:14   O2 Therapy  Unknown   pH, Cedric 7.350 - 7.450  7.114 (LL)   pCO2, Cedric 40.0 - 50.0 mmHg 33.7 (L)   pO2, Cedric 25.0 - 40.0 mmHg 65.7 (H)   HCO3, Venous 23.0 - 29.0 mmol/L 10.6 (L)   TC02 (Calc), Cedric Not Established mmol/L 12   Base Excess, Cedric -3.0 - 3.0 mmol/L -17.8 (L)   MetHgb, Cedric <1.5 % 0.4   O2 Sat, Cedric Not Established % 87         Assessment:  Principal Problem:    Enteritis  Active Problems:    Syncope    Hyponatremia    DEJAN (acute kidney injury) ([facility]    Abdominal pain    Septic shock (HCC)    Elevated troponin    Leukocytosis    Pyuria    Lactic acidosis    DM (diabetes mellitus), secondary uncontrolled ([facility]  Resolved Problems:    * No resolved hospital problems.  *          Plan:   Oxygen supplementation to keep saturation more than 92%  Please titrate the oxygen as per the above parameters  Pulmonary toilet  Patient was given fluid boluses in the ER as per sepsis protocol  Patient was also given 1 dose of IV Zosyn in the ER  Patient has been recently treated as an outpatient with antibiotics for UTI as per information available  There is a distinct possibility that patient may have C. difficile colitis  Stool for C. difficile antigen ordered  Patient to be started on IV Flagyl empirically and depending on patient stool for C. difficile patient may require p.o. vancomycin once patient is more alert and communicative and less lethargic  Patient's lactic acid levels to be trended  IV pressors to keep mean arterial pressure more than 65 mm Hg  Patient blood pressure was not controlled with only Levophed infusion and for that reason vasopressin was added to the regimen  Monitor input output and BMP  Correct electrolytes on whenever necessary basis  Were informed that patient was hypoglycemic prior to come to the hospital and patient had to be given dextrose in the ambulance but patient's blood sugars are elevated on a persistent basis  Patient also has some ketonuria along with an anion gap metabolic acidosis which can be secondary to sepsis and lactic acidosis but also there may be a competent of hyperglycemia, causing that to happen  Beta hydroxy butyrate ordered on a stat basis to assess if patient needs to be given insulin infusion or not  In the meantime patient to be started on Lantus insulin along with blood glucose monitoring  BGM with SSI  Monitor for any worsening hypoglycemia  Patient also has pyuria at this time and patient has been started on Zosyn empirically by the admitting provider-titration as per clinical status and cultures  Monitor for any worsening mentation  Trend the WBC count and sodium levels  PUD and DVT prophylaxis     Case d/w ICU team     Patient's clinical status is precarious and critical with potential for further decompensation       Critical care time spent -40 minutes(exclusive of any procedures)        Electronically signed by:  Michelle Apple MD    9/14/2022    6:20 PM.

## 2022-09-15 ENCOUNTER — APPOINTMENT (OUTPATIENT)
Dept: GENERAL RADIOLOGY | Age: 72
DRG: 853 | End: 2022-09-15
Payer: MEDICARE

## 2022-09-15 ENCOUNTER — APPOINTMENT (OUTPATIENT)
Dept: CT IMAGING | Age: 72
DRG: 853 | End: 2022-09-15
Payer: MEDICARE

## 2022-09-15 ENCOUNTER — ANESTHESIA EVENT (OUTPATIENT)
Dept: OPERATING ROOM | Age: 72
DRG: 853 | End: 2022-09-15
Payer: MEDICARE

## 2022-09-15 ENCOUNTER — ANESTHESIA (OUTPATIENT)
Dept: OPERATING ROOM | Age: 72
DRG: 853 | End: 2022-09-15
Payer: MEDICARE

## 2022-09-15 LAB
A/G RATIO: 1.3 (ref 1.1–2.2)
ABO/RH: NORMAL
ACANTHOCYTES: ABNORMAL
ALBUMIN SERPL-MCNC: 2.4 G/DL (ref 3.4–5)
ALBUMIN SERPL-MCNC: 3.2 G/DL (ref 3.4–5)
ALP BLD-CCNC: 383 U/L (ref 40–129)
ALP BLD-CCNC: 566 U/L (ref 40–129)
ALT SERPL-CCNC: 16 U/L (ref 10–40)
ALT SERPL-CCNC: 18 U/L (ref 10–40)
ANION GAP SERPL CALCULATED.3IONS-SCNC: 24 MMOL/L (ref 3–16)
ANION GAP SERPL CALCULATED.3IONS-SCNC: 28 MMOL/L (ref 3–16)
ANION GAP SERPL CALCULATED.3IONS-SCNC: 28 MMOL/L (ref 3–16)
ANTIBODY SCREEN: NORMAL
AST SERPL-CCNC: 43 U/L (ref 15–37)
AST SERPL-CCNC: 50 U/L (ref 15–37)
BANDED NEUTROPHILS RELATIVE PERCENT: 34 % (ref 0–7)
BANDED NEUTROPHILS RELATIVE PERCENT: 4 % (ref 0–7)
BASE EXCESS ARTERIAL: -11.9 MMOL/L (ref -3–3)
BASE EXCESS ARTERIAL: -12 (ref -3–3)
BASE EXCESS ARTERIAL: -17 (ref -3–3)
BASE EXCESS ARTERIAL: -17.7 MMOL/L (ref -3–3)
BASE EXCESS ARTERIAL: -6 MMOL/L (ref -3–3)
BASE EXCESS ARTERIAL: -8.4 MMOL/L (ref -3–3)
BASOPHILS ABSOLUTE: 0 K/UL (ref 0–0.2)
BASOPHILS ABSOLUTE: 0 K/UL (ref 0–0.2)
BASOPHILS RELATIVE PERCENT: 0 %
BASOPHILS RELATIVE PERCENT: 0 %
BETA-HYDROXYBUTYRATE: 0.9 MMOL/L (ref 0–0.27)
BETA-HYDROXYBUTYRATE: 1.1 MMOL/L (ref 0–0.27)
BILIRUB SERPL-MCNC: 0.3 MG/DL (ref 0–1)
BILIRUB SERPL-MCNC: 0.3 MG/DL (ref 0–1)
BILIRUBIN DIRECT: <0.2 MG/DL (ref 0–0.3)
BILIRUBIN, INDIRECT: ABNORMAL MG/DL (ref 0–1)
BUN BLDV-MCNC: 27 MG/DL (ref 7–20)
BUN BLDV-MCNC: 34 MG/DL (ref 7–20)
BUN BLDV-MCNC: 35 MG/DL (ref 7–20)
BURR CELLS: ABNORMAL
C DIFF TOXIN/ANTIGEN: NORMAL
CALCIUM IONIZED: 1.01 MMOL/L (ref 1.12–1.32)
CALCIUM IONIZED: 1.03 MMOL/L (ref 1.12–1.32)
CALCIUM IONIZED: 1.05 MMOL/L (ref 1.12–1.32)
CALCIUM IONIZED: 1.1 MMOL/L (ref 1.12–1.32)
CALCIUM SERPL-MCNC: 8 MG/DL (ref 8.3–10.6)
CALCIUM SERPL-MCNC: 8 MG/DL (ref 8.3–10.6)
CALCIUM SERPL-MCNC: 9.1 MG/DL (ref 8.3–10.6)
CARBOXYHEMOGLOBIN ARTERIAL: 0.3 % (ref 0–1.5)
CARBOXYHEMOGLOBIN ARTERIAL: 0.3 % (ref 0–1.5)
CARBOXYHEMOGLOBIN ARTERIAL: 0.6 % (ref 0–1.5)
CARBOXYHEMOGLOBIN ARTERIAL: 0.8 % (ref 0–1.5)
CHLORIDE BLD-SCNC: 90 MMOL/L (ref 99–110)
CHLORIDE BLD-SCNC: 90 MMOL/L (ref 99–110)
CHLORIDE BLD-SCNC: 92 MMOL/L (ref 99–110)
CO2: 15 MMOL/L (ref 21–32)
CO2: 8 MMOL/L (ref 21–32)
CO2: 8 MMOL/L (ref 21–32)
CREAT SERPL-MCNC: 1.7 MG/DL (ref 0.6–1.2)
CREAT SERPL-MCNC: 2.1 MG/DL (ref 0.6–1.2)
CREAT SERPL-MCNC: 2.1 MG/DL (ref 0.6–1.2)
EOSINOPHILS ABSOLUTE: 0 K/UL (ref 0–0.6)
EOSINOPHILS ABSOLUTE: 0 K/UL (ref 0–0.6)
EOSINOPHILS RELATIVE PERCENT: 0 %
EOSINOPHILS RELATIVE PERCENT: 0 %
ESTIMATED AVERAGE GLUCOSE: 217.3 MG/DL
GFR AFRICAN AMERICAN: 28
GFR AFRICAN AMERICAN: 28
GFR AFRICAN AMERICAN: 36
GFR NON-AFRICAN AMERICAN: 23
GFR NON-AFRICAN AMERICAN: 23
GFR NON-AFRICAN AMERICAN: 30
GLUCOSE BLD-MCNC: 103 MG/DL (ref 70–99)
GLUCOSE BLD-MCNC: 104 MG/DL (ref 70–99)
GLUCOSE BLD-MCNC: 104 MG/DL (ref 70–99)
GLUCOSE BLD-MCNC: 107 MG/DL (ref 70–99)
GLUCOSE BLD-MCNC: 131 MG/DL (ref 70–99)
GLUCOSE BLD-MCNC: 137 MG/DL (ref 70–99)
GLUCOSE BLD-MCNC: 170 MG/DL (ref 70–99)
GLUCOSE BLD-MCNC: 201 MG/DL (ref 70–99)
GLUCOSE BLD-MCNC: 57 MG/DL (ref 70–99)
GLUCOSE BLD-MCNC: 70 MG/DL (ref 70–99)
GLUCOSE BLD-MCNC: 74 MG/DL (ref 70–99)
GLUCOSE BLD-MCNC: 76 MG/DL (ref 70–99)
GLUCOSE BLD-MCNC: 97 MG/DL (ref 70–99)
HBA1C MFR BLD: 9.2 %
HCO3 ARTERIAL: 12.5 MMOL/L (ref 21–29)
HCO3 ARTERIAL: 15.2 MMOL/L (ref 21–29)
HCO3 ARTERIAL: 16.3 MMOL/L (ref 21–29)
HCO3 ARTERIAL: 18.4 MMOL/L (ref 21–29)
HCO3 ARTERIAL: 9.4 MMOL/L (ref 21–29)
HCO3 ARTERIAL: 9.9 MMOL/L (ref 21–29)
HCT VFR BLD CALC: 30.5 % (ref 36–48)
HCT VFR BLD CALC: 36 % (ref 36–48)
HEMATOLOGY PATH CONSULT: ABNORMAL
HEMOGLOBIN, ART, EXTENDED: 10.9 G/DL (ref 12–16)
HEMOGLOBIN, ART, EXTENDED: 10.9 G/DL (ref 12–16)
HEMOGLOBIN, ART, EXTENDED: 11.5 G/DL (ref 12–16)
HEMOGLOBIN, ART, EXTENDED: 13.1 G/DL (ref 12–16)
HEMOGLOBIN: 10.2 GM/DL (ref 12–16)
HEMOGLOBIN: 11.7 G/DL (ref 12–16)
HEMOGLOBIN: 8.8 GM/DL (ref 12–16)
HEMOGLOBIN: 9.9 G/DL (ref 12–16)
INR BLD: 1.78 (ref 0.87–1.14)
LACTATE: 13.86 MMOL/L (ref 0.4–2)
LACTATE: 17.17 MMOL/L (ref 0.4–2)
LACTIC ACID: 13.7 MMOL/L (ref 0.4–2)
LACTIC ACID: 14.1 MMOL/L (ref 0.4–2)
LACTIC ACID: 16.1 MMOL/L (ref 0.4–2)
LACTIC ACID: 18 MMOL/L (ref 0.4–2)
LACTIC ACID: 19.3 MMOL/L (ref 0.4–2)
LYMPHOCYTES ABSOLUTE: 1 K/UL (ref 1–5.1)
LYMPHOCYTES ABSOLUTE: 2.7 K/UL (ref 1–5.1)
LYMPHOCYTES RELATIVE PERCENT: 5 %
LYMPHOCYTES RELATIVE PERCENT: 7 %
MAGNESIUM: 1.5 MG/DL (ref 1.8–2.4)
MAGNESIUM: 1.8 MG/DL (ref 1.8–2.4)
MCH RBC QN AUTO: 27.3 PG (ref 26–34)
MCH RBC QN AUTO: 27.9 PG (ref 26–34)
MCHC RBC AUTO-ENTMCNC: 32.3 G/DL (ref 31–36)
MCHC RBC AUTO-ENTMCNC: 32.5 G/DL (ref 31–36)
MCV RBC AUTO: 84.8 FL (ref 80–100)
MCV RBC AUTO: 85.9 FL (ref 80–100)
METAMYELOCYTES RELATIVE PERCENT: 7 %
METHEMOGLOBIN ARTERIAL: 0.1 %
METHEMOGLOBIN ARTERIAL: 0.4 %
METHEMOGLOBIN ARTERIAL: 0.5 %
METHEMOGLOBIN ARTERIAL: 0.6 %
MONOCYTES ABSOLUTE: 1.6 K/UL (ref 0–1.3)
MONOCYTES ABSOLUTE: 2 K/UL (ref 0–1.3)
MONOCYTES RELATIVE PERCENT: 10 %
MONOCYTES RELATIVE PERCENT: 4 %
MYELOCYTE PERCENT: 4 %
NEUTROPHILS ABSOLUTE: 17.3 K/UL (ref 1.7–7.7)
NEUTROPHILS ABSOLUTE: 34.6 K/UL (ref 1.7–7.7)
NEUTROPHILS RELATIVE PERCENT: 40 %
NEUTROPHILS RELATIVE PERCENT: 85 %
O2 SAT, ARTERIAL: 100 % (ref 93–100)
O2 SAT, ARTERIAL: 96 % (ref 93–100)
O2 SAT, ARTERIAL: 96.4 %
O2 SAT, ARTERIAL: 97.6 %
O2 SAT, ARTERIAL: 97.7 %
O2 SAT, ARTERIAL: 99.3 %
O2 THERAPY: ABNORMAL
PCO2 ARTERIAL: 21.2 MM HG (ref 35–45)
PCO2 ARTERIAL: 24.8 MMHG (ref 35–45)
PCO2 ARTERIAL: 25.8 MMHG (ref 35–45)
PCO2 ARTERIAL: 26.5 MMHG (ref 35–45)
PCO2 ARTERIAL: 32.4 MMHG (ref 35–45)
PCO2 ARTERIAL: 42.7 MM HG (ref 35–45)
PDW BLD-RTO: 13.1 % (ref 12.4–15.4)
PDW BLD-RTO: 13.6 % (ref 12.4–15.4)
PERFORMED ON: ABNORMAL
PERFORMED ON: NORMAL
PH ARTERIAL: 7.17 (ref 7.35–7.45)
PH ARTERIAL: 7.19 (ref 7.35–7.45)
PH ARTERIAL: 7.28 (ref 7.35–7.45)
PH ARTERIAL: 7.32 (ref 7.35–7.45)
PH ARTERIAL: 7.37 (ref 7.35–7.45)
PH ARTERIAL: 7.39 (ref 7.35–7.45)
PH VENOUS: 7.17 (ref 7.35–7.45)
PH VENOUS: 7.33 (ref 7.35–7.45)
PHOSPHORUS: 4.8 MG/DL (ref 2.5–4.9)
PLATELET # BLD: 146 K/UL (ref 135–450)
PLATELET # BLD: 248 K/UL (ref 135–450)
PLATELET SLIDE REVIEW: ADEQUATE
PMV BLD AUTO: 7.6 FL (ref 5–10.5)
PMV BLD AUTO: 7.8 FL (ref 5–10.5)
PO2 ARTERIAL: 102.6 MMHG (ref 75–108)
PO2 ARTERIAL: 103.4 MMHG (ref 75–108)
PO2 ARTERIAL: 297.9 MM HG (ref 75–108)
PO2 ARTERIAL: 336.3 MMHG (ref 75–108)
PO2 ARTERIAL: 89.1 MM HG (ref 75–108)
PO2 ARTERIAL: 97.8 MMHG (ref 75–108)
POC HEMATOCRIT: 26 % (ref 36–48)
POC HEMATOCRIT: 30 % (ref 36–48)
POC POTASSIUM: 4.1 MMOL/L (ref 3.5–5.1)
POC POTASSIUM: 4.5 MMOL/L (ref 3.5–5.1)
POC SAMPLE TYPE: ABNORMAL
POC SAMPLE TYPE: ABNORMAL
POC SODIUM: 129 MMOL/L (ref 136–145)
POC SODIUM: 136 MMOL/L (ref 136–145)
POTASSIUM SERPL-SCNC: 4.2 MMOL/L (ref 3.5–5.1)
POTASSIUM SERPL-SCNC: 4.8 MMOL/L (ref 3.5–5.1)
POTASSIUM SERPL-SCNC: 4.9 MMOL/L (ref 3.5–5.1)
PROTHROMBIN TIME: 20.6 SEC (ref 11.7–14.5)
RBC # BLD: 3.6 M/UL (ref 4–5.2)
RBC # BLD: 4.2 M/UL (ref 4–5.2)
SLIDE REVIEW: ABNORMAL
SLIDE REVIEW: ABNORMAL
SODIUM BLD-SCNC: 126 MMOL/L (ref 136–145)
SODIUM BLD-SCNC: 126 MMOL/L (ref 136–145)
SODIUM BLD-SCNC: 131 MMOL/L (ref 136–145)
TCO2 ARTERIAL: 10.2 MMOL/L
TCO2 ARTERIAL: 11 MMOL/L
TCO2 ARTERIAL: 13.3 MMOL/L
TCO2 ARTERIAL: 16 MMOL/L
TCO2 ARTERIAL: 18 MMOL/L
TCO2 ARTERIAL: 19.3 MMOL/L
TOTAL PROTEIN: 4.2 G/DL (ref 6.4–8.2)
TOTAL PROTEIN: 5.6 G/DL (ref 6.4–8.2)
VACUOLATED NEUTROPHILS: PRESENT
VACUOLATED NEUTROPHILS: PRESENT
WBC # BLD: 20.3 K/UL (ref 4–11)
WBC # BLD: 38.9 K/UL (ref 4–11)

## 2022-09-15 PROCEDURE — 90945 DIALYSIS ONE EVALUATION: CPT

## 2022-09-15 PROCEDURE — 82803 BLOOD GASES ANY COMBINATION: CPT

## 2022-09-15 PROCEDURE — 88307 TISSUE EXAM BY PATHOLOGIST: CPT

## 2022-09-15 PROCEDURE — 86850 RBC ANTIBODY SCREEN: CPT

## 2022-09-15 PROCEDURE — 99223 1ST HOSP IP/OBS HIGH 75: CPT | Performed by: SURGERY

## 2022-09-15 PROCEDURE — A4217 STERILE WATER/SALINE, 500 ML: HCPCS | Performed by: SURGERY

## 2022-09-15 PROCEDURE — 2500000003 HC RX 250 WO HCPCS: Performed by: STUDENT IN AN ORGANIZED HEALTH CARE EDUCATION/TRAINING PROGRAM

## 2022-09-15 PROCEDURE — 2500000003 HC RX 250 WO HCPCS

## 2022-09-15 PROCEDURE — 94761 N-INVAS EAR/PLS OXIMETRY MLT: CPT

## 2022-09-15 PROCEDURE — 2500000003 HC RX 250 WO HCPCS: Performed by: NURSE PRACTITIONER

## 2022-09-15 PROCEDURE — 85014 HEMATOCRIT: CPT

## 2022-09-15 PROCEDURE — 6370000000 HC RX 637 (ALT 250 FOR IP): Performed by: INTERNAL MEDICINE

## 2022-09-15 PROCEDURE — 94002 VENT MGMT INPAT INIT DAY: CPT

## 2022-09-15 PROCEDURE — 86900 BLOOD TYPING SEROLOGIC ABO: CPT

## 2022-09-15 PROCEDURE — 2580000003 HC RX 258: Performed by: INTERNAL MEDICINE

## 2022-09-15 PROCEDURE — 6360000002 HC RX W HCPCS: Performed by: INTERNAL MEDICINE

## 2022-09-15 PROCEDURE — 82947 ASSAY GLUCOSE BLOOD QUANT: CPT

## 2022-09-15 PROCEDURE — 2500000003 HC RX 250 WO HCPCS: Performed by: INTERNAL MEDICINE

## 2022-09-15 PROCEDURE — 88342 IMHCHEM/IMCYTCHM 1ST ANTB: CPT

## 2022-09-15 PROCEDURE — 0BH18EZ INSERTION OF ENDOTRACHEAL AIRWAY INTO TRACHEA, VIA NATURAL OR ARTIFICIAL OPENING ENDOSCOPIC: ICD-10-PCS | Performed by: SURGERY

## 2022-09-15 PROCEDURE — 85025 COMPLETE CBC W/AUTO DIFF WBC: CPT

## 2022-09-15 PROCEDURE — 6360000002 HC RX W HCPCS: Performed by: NURSE PRACTITIONER

## 2022-09-15 PROCEDURE — 37799 UNLISTED PX VASCULAR SURGERY: CPT

## 2022-09-15 PROCEDURE — 6360000002 HC RX W HCPCS

## 2022-09-15 PROCEDURE — 6360000002 HC RX W HCPCS: Performed by: STUDENT IN AN ORGANIZED HEALTH CARE EDUCATION/TRAINING PROGRAM

## 2022-09-15 PROCEDURE — 36556 INSERT NON-TUNNEL CV CATH: CPT | Performed by: NURSE PRACTITIONER

## 2022-09-15 PROCEDURE — 83735 ASSAY OF MAGNESIUM: CPT

## 2022-09-15 PROCEDURE — 05HM33Z INSERTION OF INFUSION DEVICE INTO RIGHT INTERNAL JUGULAR VEIN, PERCUTANEOUS APPROACH: ICD-10-PCS | Performed by: INTERNAL MEDICINE

## 2022-09-15 PROCEDURE — 0WJG4ZZ INSPECTION OF PERITONEAL CAVITY, PERCUTANEOUS ENDOSCOPIC APPROACH: ICD-10-PCS | Performed by: SURGERY

## 2022-09-15 PROCEDURE — 2580000003 HC RX 258: Performed by: SURGERY

## 2022-09-15 PROCEDURE — 74176 CT ABD & PELVIS W/O CONTRAST: CPT

## 2022-09-15 PROCEDURE — 2580000003 HC RX 258

## 2022-09-15 PROCEDURE — 87506 IADNA-DNA/RNA PROBE TQ 6-11: CPT

## 2022-09-15 PROCEDURE — 2700000000 HC OXYGEN THERAPY PER DAY

## 2022-09-15 PROCEDURE — 99291 CRITICAL CARE FIRST HOUR: CPT | Performed by: INTERNAL MEDICINE

## 2022-09-15 PROCEDURE — 2580000003 HC RX 258: Performed by: STUDENT IN AN ORGANIZED HEALTH CARE EDUCATION/TRAINING PROGRAM

## 2022-09-15 PROCEDURE — 0D1M0Z4 BYPASS DESCENDING COLON TO CUTANEOUS, OPEN APPROACH: ICD-10-PCS | Performed by: SURGERY

## 2022-09-15 PROCEDURE — 86901 BLOOD TYPING SEROLOGIC RH(D): CPT

## 2022-09-15 PROCEDURE — 3700000000 HC ANESTHESIA ATTENDED CARE: Performed by: SURGERY

## 2022-09-15 PROCEDURE — 36556 INSERT NON-TUNNEL CV CATH: CPT

## 2022-09-15 PROCEDURE — 82330 ASSAY OF CALCIUM: CPT

## 2022-09-15 PROCEDURE — 5A1D90Z PERFORMANCE OF URINARY FILTRATION, CONTINUOUS, GREATER THAN 18 HOURS PER DAY: ICD-10-PCS | Performed by: INTERNAL MEDICINE

## 2022-09-15 PROCEDURE — 2720000010 HC SURG SUPPLY STERILE: Performed by: SURGERY

## 2022-09-15 PROCEDURE — 0DTN0ZZ RESECTION OF SIGMOID COLON, OPEN APPROACH: ICD-10-PCS | Performed by: SURGERY

## 2022-09-15 PROCEDURE — 3600000004 HC SURGERY LEVEL 4 BASE: Performed by: SURGERY

## 2022-09-15 PROCEDURE — 71045 X-RAY EXAM CHEST 1 VIEW: CPT

## 2022-09-15 PROCEDURE — 88305 TISSUE EXAM BY PATHOLOGIST: CPT

## 2022-09-15 PROCEDURE — P9047 ALBUMIN (HUMAN), 25%, 50ML: HCPCS

## 2022-09-15 PROCEDURE — 85610 PROTHROMBIN TIME: CPT

## 2022-09-15 PROCEDURE — 2500000003 HC RX 250 WO HCPCS: Performed by: EMERGENCY MEDICINE

## 2022-09-15 PROCEDURE — C9113 INJ PANTOPRAZOLE SODIUM, VIA: HCPCS | Performed by: INTERNAL MEDICINE

## 2022-09-15 PROCEDURE — 84295 ASSAY OF SERUM SODIUM: CPT

## 2022-09-15 PROCEDURE — 6360000002 HC RX W HCPCS: Performed by: NURSE ANESTHETIST, CERTIFIED REGISTERED

## 2022-09-15 PROCEDURE — 2000000000 HC ICU R&B

## 2022-09-15 PROCEDURE — 83605 ASSAY OF LACTIC ACID: CPT

## 2022-09-15 PROCEDURE — 5A1955Z RESPIRATORY VENTILATION, GREATER THAN 96 CONSECUTIVE HOURS: ICD-10-PCS | Performed by: INTERNAL MEDICINE

## 2022-09-15 PROCEDURE — 3600000014 HC SURGERY LEVEL 4 ADDTL 15MIN: Performed by: SURGERY

## 2022-09-15 PROCEDURE — 2580000003 HC RX 258: Performed by: NURSE PRACTITIONER

## 2022-09-15 PROCEDURE — 6370000000 HC RX 637 (ALT 250 FOR IP): Performed by: STUDENT IN AN ORGANIZED HEALTH CARE EDUCATION/TRAINING PROGRAM

## 2022-09-15 PROCEDURE — 2580000003 HC RX 258: Performed by: EMERGENCY MEDICINE

## 2022-09-15 PROCEDURE — 2500000003 HC RX 250 WO HCPCS: Performed by: NURSE ANESTHETIST, CERTIFIED REGISTERED

## 2022-09-15 PROCEDURE — 3700000001 HC ADD 15 MINUTES (ANESTHESIA): Performed by: SURGERY

## 2022-09-15 PROCEDURE — 2709999900 HC NON-CHARGEABLE SUPPLY: Performed by: SURGERY

## 2022-09-15 PROCEDURE — C1765 ADHESION BARRIER: HCPCS | Performed by: SURGERY

## 2022-09-15 PROCEDURE — 84132 ASSAY OF SERUM POTASSIUM: CPT

## 2022-09-15 PROCEDURE — 44143 PARTIAL REMOVAL OF COLON: CPT | Performed by: SURGERY

## 2022-09-15 PROCEDURE — 80053 COMPREHEN METABOLIC PANEL: CPT

## 2022-09-15 PROCEDURE — 02HV33Z INSERTION OF INFUSION DEVICE INTO SUPERIOR VENA CAVA, PERCUTANEOUS APPROACH: ICD-10-PCS | Performed by: INTERNAL MEDICINE

## 2022-09-15 RX ORDER — ONDANSETRON 2 MG/ML
4 INJECTION INTRAMUSCULAR; INTRAVENOUS EVERY 10 MIN PRN
Status: DISCONTINUED | OUTPATIENT
Start: 2022-09-15 | End: 2022-10-06 | Stop reason: HOSPADM

## 2022-09-15 RX ORDER — SODIUM CHLORIDE, SODIUM LACTATE, POTASSIUM CHLORIDE, CALCIUM CHLORIDE 600; 310; 30; 20 MG/100ML; MG/100ML; MG/100ML; MG/100ML
INJECTION, SOLUTION INTRAVENOUS CONTINUOUS PRN
Status: DISCONTINUED | OUTPATIENT
Start: 2022-09-15 | End: 2022-09-15 | Stop reason: SDUPTHER

## 2022-09-15 RX ORDER — DIPHENHYDRAMINE HYDROCHLORIDE 50 MG/ML
12.5 INJECTION INTRAMUSCULAR; INTRAVENOUS
Status: ACTIVE | OUTPATIENT
Start: 2022-09-15 | End: 2022-09-15

## 2022-09-15 RX ORDER — ROCURONIUM BROMIDE 10 MG/ML
INJECTION, SOLUTION INTRAVENOUS PRN
Status: DISCONTINUED | OUTPATIENT
Start: 2022-09-15 | End: 2022-09-15 | Stop reason: SDUPTHER

## 2022-09-15 RX ORDER — PROCHLORPERAZINE EDISYLATE 5 MG/ML
5 INJECTION INTRAMUSCULAR; INTRAVENOUS EVERY 6 HOURS PRN
Status: DISCONTINUED | OUTPATIENT
Start: 2022-09-15 | End: 2022-10-06 | Stop reason: HOSPADM

## 2022-09-15 RX ORDER — MAGNESIUM HYDROXIDE 1200 MG/15ML
LIQUID ORAL CONTINUOUS PRN
Status: COMPLETED | OUTPATIENT
Start: 2022-09-15 | End: 2022-09-15

## 2022-09-15 RX ORDER — ASPIRIN 81 MG/1
81 TABLET, CHEWABLE ORAL DAILY
Status: ON HOLD | COMMUNITY
End: 2022-10-06 | Stop reason: HOSPADM

## 2022-09-15 RX ORDER — MAGNESIUM SULFATE 1 G/100ML
1000 INJECTION INTRAVENOUS PRN
Status: DISCONTINUED | OUTPATIENT
Start: 2022-09-15 | End: 2022-10-06 | Stop reason: HOSPADM

## 2022-09-15 RX ORDER — ENOXAPARIN SODIUM 100 MG/ML
30 INJECTION SUBCUTANEOUS DAILY
Status: DISCONTINUED | OUTPATIENT
Start: 2022-09-16 | End: 2022-09-15

## 2022-09-15 RX ORDER — PROPOFOL 10 MG/ML
INJECTION, EMULSION INTRAVENOUS CONTINUOUS PRN
Status: DISCONTINUED | OUTPATIENT
Start: 2022-09-15 | End: 2022-09-15 | Stop reason: SDUPTHER

## 2022-09-15 RX ORDER — MIDAZOLAM HYDROCHLORIDE 1 MG/ML
2 INJECTION INTRAMUSCULAR; INTRAVENOUS ONCE
Status: COMPLETED | OUTPATIENT
Start: 2022-09-15 | End: 2022-09-15

## 2022-09-15 RX ORDER — HYDROMORPHONE HCL 110MG/55ML
0.25 PATIENT CONTROLLED ANALGESIA SYRINGE INTRAVENOUS EVERY 5 MIN PRN
Status: DISCONTINUED | OUTPATIENT
Start: 2022-09-15 | End: 2022-09-24

## 2022-09-15 RX ORDER — HYDRALAZINE HYDROCHLORIDE 20 MG/ML
5 INJECTION INTRAMUSCULAR; INTRAVENOUS
Status: COMPLETED | OUTPATIENT
Start: 2022-09-15 | End: 2022-09-17

## 2022-09-15 RX ORDER — ONDANSETRON 2 MG/ML
INJECTION INTRAMUSCULAR; INTRAVENOUS PRN
Status: DISCONTINUED | OUTPATIENT
Start: 2022-09-15 | End: 2022-09-15 | Stop reason: SDUPTHER

## 2022-09-15 RX ORDER — SODIUM CHLORIDE, SODIUM LACTATE, POTASSIUM CHLORIDE, AND CALCIUM CHLORIDE .6; .31; .03; .02 G/100ML; G/100ML; G/100ML; G/100ML
2000 INJECTION, SOLUTION INTRAVENOUS ONCE
Status: COMPLETED | OUTPATIENT
Start: 2022-09-15 | End: 2022-09-15

## 2022-09-15 RX ORDER — PHENYLEPHRINE HCL IN 0.9% NACL 1 MG/10 ML
SYRINGE (ML) INTRAVENOUS PRN
Status: DISCONTINUED | OUTPATIENT
Start: 2022-09-15 | End: 2022-09-15 | Stop reason: SDUPTHER

## 2022-09-15 RX ORDER — SODIUM CHLORIDE 9 MG/ML
25 INJECTION, SOLUTION INTRAVENOUS PRN
Status: DISCONTINUED | OUTPATIENT
Start: 2022-09-15 | End: 2022-10-06 | Stop reason: HOSPADM

## 2022-09-15 RX ORDER — SODIUM CHLORIDE 0.9 % (FLUSH) 0.9 %
5-40 SYRINGE (ML) INJECTION EVERY 12 HOURS SCHEDULED
Status: DISCONTINUED | OUTPATIENT
Start: 2022-09-15 | End: 2022-09-19 | Stop reason: SDUPTHER

## 2022-09-15 RX ORDER — OXYCODONE HYDROCHLORIDE 5 MG/1
10 TABLET ORAL PRN
Status: ACTIVE | OUTPATIENT
Start: 2022-09-15 | End: 2022-09-15

## 2022-09-15 RX ORDER — SODIUM CHLORIDE 0.9 % (FLUSH) 0.9 %
5-40 SYRINGE (ML) INJECTION PRN
Status: DISCONTINUED | OUTPATIENT
Start: 2022-09-15 | End: 2022-10-06 | Stop reason: HOSPADM

## 2022-09-15 RX ORDER — PROPOFOL 10 MG/ML
5-50 INJECTION, EMULSION INTRAVENOUS CONTINUOUS
Status: DISCONTINUED | OUTPATIENT
Start: 2022-09-15 | End: 2022-09-17

## 2022-09-15 RX ORDER — MIDAZOLAM HYDROCHLORIDE 1 MG/ML
INJECTION INTRAMUSCULAR; INTRAVENOUS
Status: COMPLETED
Start: 2022-09-15 | End: 2022-09-15

## 2022-09-15 RX ORDER — INSULIN GLARGINE 100 [IU]/ML
15 INJECTION, SOLUTION SUBCUTANEOUS NIGHTLY
COMMUNITY

## 2022-09-15 RX ORDER — SODIUM CHLORIDE, SODIUM LACTATE, POTASSIUM CHLORIDE, AND CALCIUM CHLORIDE .6; .31; .03; .02 G/100ML; G/100ML; G/100ML; G/100ML
1000 INJECTION, SOLUTION INTRAVENOUS ONCE
Status: COMPLETED | OUTPATIENT
Start: 2022-09-15 | End: 2022-09-15

## 2022-09-15 RX ORDER — CALCIUM CHLORIDE 100 MG/ML
INJECTION INTRAVENOUS; INTRAVENTRICULAR PRN
Status: DISCONTINUED | OUTPATIENT
Start: 2022-09-15 | End: 2022-09-15 | Stop reason: SDUPTHER

## 2022-09-15 RX ORDER — FENTANYL CITRATE 50 UG/ML
25 INJECTION, SOLUTION INTRAMUSCULAR; INTRAVENOUS
Status: DISCONTINUED | OUTPATIENT
Start: 2022-09-15 | End: 2022-09-24

## 2022-09-15 RX ORDER — SODIUM CHLORIDE 9 MG/ML
INJECTION, SOLUTION INTRAVENOUS CONTINUOUS PRN
Status: DISCONTINUED | OUTPATIENT
Start: 2022-09-15 | End: 2022-09-15 | Stop reason: SDUPTHER

## 2022-09-15 RX ORDER — POTASSIUM CHLORIDE 29.8 MG/ML
20 INJECTION INTRAVENOUS PRN
Status: DISCONTINUED | OUTPATIENT
Start: 2022-09-15 | End: 2022-10-06 | Stop reason: HOSPADM

## 2022-09-15 RX ORDER — CALCIUM GLUCONATE 20 MG/ML
1000 INJECTION, SOLUTION INTRAVENOUS PRN
Status: DISCONTINUED | OUTPATIENT
Start: 2022-09-15 | End: 2022-09-24

## 2022-09-15 RX ORDER — ALBUMIN (HUMAN) 12.5 G/50ML
SOLUTION INTRAVENOUS PRN
Status: DISCONTINUED | OUTPATIENT
Start: 2022-09-15 | End: 2022-09-15 | Stop reason: SDUPTHER

## 2022-09-15 RX ORDER — MIDAZOLAM HYDROCHLORIDE 1 MG/ML
1 INJECTION INTRAMUSCULAR; INTRAVENOUS EVERY 5 MIN PRN
Status: DISCONTINUED | OUTPATIENT
Start: 2022-09-15 | End: 2022-09-21

## 2022-09-15 RX ORDER — HEPARIN SODIUM 5000 [USP'U]/ML
5000 INJECTION, SOLUTION INTRAVENOUS; SUBCUTANEOUS EVERY 8 HOURS SCHEDULED
Status: DISCONTINUED | OUTPATIENT
Start: 2022-09-16 | End: 2022-09-18

## 2022-09-15 RX ORDER — OXYCODONE HYDROCHLORIDE 5 MG/1
5 TABLET ORAL PRN
Status: ACTIVE | OUTPATIENT
Start: 2022-09-15 | End: 2022-09-15

## 2022-09-15 RX ORDER — HYDROMORPHONE HCL 110MG/55ML
0.5 PATIENT CONTROLLED ANALGESIA SYRINGE INTRAVENOUS EVERY 5 MIN PRN
Status: DISCONTINUED | OUTPATIENT
Start: 2022-09-15 | End: 2022-09-24

## 2022-09-15 RX ORDER — NITROFURANTOIN 25; 75 MG/1; MG/1
100 CAPSULE ORAL 2 TIMES DAILY
Status: ON HOLD | COMMUNITY
Start: 2022-09-13 | End: 2022-10-06 | Stop reason: HOSPADM

## 2022-09-15 RX ADMIN — DEXTROSE MONOHYDRATE 30 MCG/MIN: 50 INJECTION, SOLUTION INTRAVENOUS at 14:21

## 2022-09-15 RX ADMIN — DEXTROSE MONOHYDRATE 40 MCG/MIN: 50 INJECTION, SOLUTION INTRAVENOUS at 05:18

## 2022-09-15 RX ADMIN — VASOPRESSIN 0.03 UNITS/MIN: 20 INJECTION INTRAVENOUS at 12:34

## 2022-09-15 RX ADMIN — SODIUM CHLORIDE: 9 INJECTION, SOLUTION INTRAVENOUS at 19:31

## 2022-09-15 RX ADMIN — HYDROCORTISONE SODIUM SUCCINATE 50 MG: 100 INJECTION, POWDER, FOR SOLUTION INTRAMUSCULAR; INTRAVENOUS at 20:59

## 2022-09-15 RX ADMIN — SODIUM BICARBONATE: 84 INJECTION, SOLUTION INTRAVENOUS at 10:18

## 2022-09-15 RX ADMIN — ALBUMIN (HUMAN) 12.5 G: 0.25 INJECTION, SOLUTION INTRAVENOUS at 17:49

## 2022-09-15 RX ADMIN — Medication 10 ML: at 20:59

## 2022-09-15 RX ADMIN — SODIUM CHLORIDE, POTASSIUM CHLORIDE, SODIUM LACTATE AND CALCIUM CHLORIDE 2000 ML: 600; 310; 30; 20 INJECTION, SOLUTION INTRAVENOUS at 00:52

## 2022-09-15 RX ADMIN — SODIUM CHLORIDE: 9 INJECTION, SOLUTION INTRAVENOUS at 18:39

## 2022-09-15 RX ADMIN — MUPIROCIN: 20 OINTMENT TOPICAL at 21:54

## 2022-09-15 RX ADMIN — PIPERACILLIN AND TAZOBACTAM 3375 MG: 3; .375 INJECTION, POWDER, LYOPHILIZED, FOR SOLUTION INTRAVENOUS at 14:23

## 2022-09-15 RX ADMIN — MIDAZOLAM HYDROCHLORIDE 2 MG: 1 INJECTION INTRAMUSCULAR; INTRAVENOUS at 11:38

## 2022-09-15 RX ADMIN — PROPOFOL 30 MCG/KG/MIN: 10 INJECTION, EMULSION INTRAVENOUS at 20:15

## 2022-09-15 RX ADMIN — PANTOPRAZOLE SODIUM 40 MG: 40 INJECTION, POWDER, FOR SOLUTION INTRAVENOUS at 08:09

## 2022-09-15 RX ADMIN — SODIUM BICARBONATE 100 MEQ: 84 INJECTION, SOLUTION INTRAVENOUS at 10:56

## 2022-09-15 RX ADMIN — Medication 1000 ML/HR: at 23:00

## 2022-09-15 RX ADMIN — MIDAZOLAM HYDROCHLORIDE 2 MG: 1 INJECTION INTRAMUSCULAR; INTRAVENOUS at 11:10

## 2022-09-15 RX ADMIN — ROCURONIUM BROMIDE 20 MG: 10 SOLUTION INTRAVENOUS at 17:39

## 2022-09-15 RX ADMIN — HYDROCORTISONE SODIUM SUCCINATE 50 MG: 100 INJECTION, POWDER, FOR SOLUTION INTRAMUSCULAR; INTRAVENOUS at 02:08

## 2022-09-15 RX ADMIN — ROCURONIUM BROMIDE 40 MG: 10 SOLUTION INTRAVENOUS at 17:17

## 2022-09-15 RX ADMIN — SODIUM BICARBONATE: 84 INJECTION, SOLUTION INTRAVENOUS at 08:07

## 2022-09-15 RX ADMIN — Medication 1000 ML/HR: at 13:06

## 2022-09-15 RX ADMIN — PIPERACILLIN AND TAZOBACTAM 3375 MG: 3; .375 INJECTION, POWDER, LYOPHILIZED, FOR SOLUTION INTRAVENOUS at 21:06

## 2022-09-15 RX ADMIN — Medication 100 MCG: at 17:43

## 2022-09-15 RX ADMIN — SODIUM CHLORIDE, POTASSIUM CHLORIDE, SODIUM LACTATE AND CALCIUM CHLORIDE 1000 ML: 600; 310; 30; 20 INJECTION, SOLUTION INTRAVENOUS at 06:21

## 2022-09-15 RX ADMIN — METRONIDAZOLE 500 MG: 500 INJECTION, SOLUTION INTRAVENOUS at 10:19

## 2022-09-15 RX ADMIN — MIDAZOLAM 2 MG: 1 INJECTION INTRAMUSCULAR; INTRAVENOUS at 11:38

## 2022-09-15 RX ADMIN — ROCURONIUM BROMIDE 10 MG: 10 SOLUTION INTRAVENOUS at 18:48

## 2022-09-15 RX ADMIN — PROCHLORPERAZINE EDISYLATE 5 MG: 5 INJECTION INTRAMUSCULAR; INTRAVENOUS at 14:16

## 2022-09-15 RX ADMIN — VASOPRESSIN 0.03 UNITS/MIN: 20 INJECTION INTRAVENOUS at 00:04

## 2022-09-15 RX ADMIN — Medication 10 ML: at 08:10

## 2022-09-15 RX ADMIN — SODIUM BICARBONATE 50 MEQ: 84 INJECTION, SOLUTION INTRAVENOUS at 19:03

## 2022-09-15 RX ADMIN — Medication 100 MEQ: at 10:56

## 2022-09-15 RX ADMIN — SODIUM CHLORIDE, SODIUM LACTATE, POTASSIUM CHLORIDE, AND CALCIUM CHLORIDE: .6; .31; .03; .02 INJECTION, SOLUTION INTRAVENOUS at 16:41

## 2022-09-15 RX ADMIN — ROCURONIUM BROMIDE 10 MG: 10 SOLUTION INTRAVENOUS at 19:50

## 2022-09-15 RX ADMIN — PROPOFOL 50 MCG/KG/MIN: 10 INJECTION, EMULSION INTRAVENOUS at 19:50

## 2022-09-15 RX ADMIN — ENOXAPARIN SODIUM 40 MG: 100 INJECTION SUBCUTANEOUS at 08:09

## 2022-09-15 RX ADMIN — SODIUM BICARBONATE 100 MEQ: 84 INJECTION, SOLUTION INTRAVENOUS at 05:52

## 2022-09-15 RX ADMIN — Medication 100 MCG: at 17:21

## 2022-09-15 RX ADMIN — Medication 200 MCG: at 17:17

## 2022-09-15 RX ADMIN — ROCURONIUM BROMIDE 10 MG: 10 SOLUTION INTRAVENOUS at 19:08

## 2022-09-15 RX ADMIN — ONDANSETRON 4 MG: 2 INJECTION INTRAMUSCULAR; INTRAVENOUS at 19:13

## 2022-09-15 RX ADMIN — INSULIN GLARGINE 15 UNITS: 100 INJECTION, SOLUTION SUBCUTANEOUS at 21:53

## 2022-09-15 RX ADMIN — MUPIROCIN: 20 OINTMENT TOPICAL at 08:25

## 2022-09-15 RX ADMIN — Medication: at 13:06

## 2022-09-15 RX ADMIN — MIDAZOLAM 2 MG: 1 INJECTION INTRAMUSCULAR; INTRAVENOUS at 11:10

## 2022-09-15 RX ADMIN — HYDROCORTISONE SODIUM SUCCINATE 50 MG: 100 INJECTION, POWDER, FOR SOLUTION INTRAMUSCULAR; INTRAVENOUS at 08:09

## 2022-09-15 RX ADMIN — PIPERACILLIN AND TAZOBACTAM 3375 MG: 3; .375 INJECTION, POWDER, LYOPHILIZED, FOR SOLUTION INTRAVENOUS at 05:19

## 2022-09-15 RX ADMIN — CALCIUM CHLORIDE 1 G: 100 INJECTION, SOLUTION INTRAVENOUS at 19:15

## 2022-09-15 RX ADMIN — DEXTROSE MONOHYDRATE 125 ML: 100 INJECTION, SOLUTION INTRAVENOUS at 08:27

## 2022-09-15 RX ADMIN — SODIUM CHLORIDE: 9 INJECTION, SOLUTION INTRAVENOUS at 17:58

## 2022-09-15 RX ADMIN — CALCIUM GLUCONATE 1000 MG: 20 INJECTION, SOLUTION INTRAVENOUS at 22:42

## 2022-09-15 RX ADMIN — CALCIUM CHLORIDE 1 G: 100 INJECTION, SOLUTION INTRAVENOUS at 17:31

## 2022-09-15 RX ADMIN — HYDROCORTISONE SODIUM SUCCINATE 50 MG: 100 INJECTION, POWDER, FOR SOLUTION INTRAMUSCULAR; INTRAVENOUS at 14:16

## 2022-09-15 RX ADMIN — Medication 1500 ML/HR: at 13:06

## 2022-09-15 RX ADMIN — SODIUM BICARBONATE: 84 INJECTION, SOLUTION INTRAVENOUS at 21:33

## 2022-09-15 RX ADMIN — Medication 100 MCG: at 17:31

## 2022-09-15 RX ADMIN — DEXTROSE MONOHYDRATE 125 ML: 100 INJECTION, SOLUTION INTRAVENOUS at 08:59

## 2022-09-15 RX ADMIN — SODIUM CHLORIDE, SODIUM LACTATE, POTASSIUM CHLORIDE, AND CALCIUM CHLORIDE: .6; .31; .03; .02 INJECTION, SOLUTION INTRAVENOUS at 19:01

## 2022-09-15 ASSESSMENT — PULMONARY FUNCTION TESTS
PIF_VALUE: 19
PIF_VALUE: 20
PIF_VALUE: 19
PIF_VALUE: 21
PIF_VALUE: 22
PIF_VALUE: 20

## 2022-09-15 NOTE — BRIEF OP NOTE
Brief Postoperative Note      Patient: Gerald Baer  YOB: 1950  MRN: [id number]    Date of Procedure: 9/15/2022    Pre-Op Diagnosis: Stercoral Colitis    Post-Op Diagnosis:  Ischemic Colitis        Procedure(s):  DIAGNOSTIC LAPAROSCOPY, EXPLORATORY LAPAROTOMY, SIGMOID COLECTOMY AND COLOSTOMY    Surgeon(s):  MD Anastacio Warner DO    Assistant:  Surgical Assistant: Homer Carney    Anesthesia: General    Estimated Blood Loss (mL): 30    Complications: None    Specimens:   ID Type Source Tests Collected by Time Destination   A : SIGMOID COLON, STITCH AT PROXIMAL MARGIN Tissue Colon SURGICAL PATHOLOGY Larisa Ospina MD 9/15/2022 1835    B : LEFT PELVIC PERITONEAL IMPLANT Specimen Abdomen SURGICAL PATHOLOGY Larisa Ospina MD 9/15/2022 1842        Implants: None      Drains:   Closed/Suction Drain Right Abdomen Bulb (Active)       Colostomy LLQ (Active)       Urinary Catheter 09/14/22 Osorio (Active)   $ Urethral catheter insertion $ Not inserted for procedure 09/14/22 1335   Catheter Indications Need for fluid volume management of the critically ill patient in a critical care setting 09/15/22 1600   Site Assessment Pink; No urethral drainage 09/15/22 1600   Urine Color Yellow 09/15/22 1600   Urine Appearance Clear 09/15/22 1600   Collection Container Standard 09/15/22 1600   Securement Method Securing device (Describe) 09/15/22 1600   Catheter Care Completed Yes 09/14/22 2000   Catheter Best Practices  Drainage tube clipped to bed;Catheter secured to thigh; Tamper seal intact; Bag below bladder;Bag not on floor; Lack of dependent loop in tubing;Drainage bag less than half full 09/15/22 1600   Status Draining 09/15/22 1600   Output (mL) 40 mL 09/15/22 1656       [REMOVED] Rectal Tube (Removed)   Site Assessment Clean, dry & intact 09/15/22 1600   Stool Appearance Loose 09/15/22 1600   Stool Color Brown 09/15/22 1600   Stool Amount Medium 09/15/22 1600   Rectal Tube Output (mL) 50 ml 09/15/22 1600       Findings: Ischemic sigmoid colon with diffuse inflammatory ascites. Sigmoid colectomy, stapled with contour stapler. Rectal stump marked with prolene suture x2. Midline fascia closed. Colostomy in left lower quadrant, able to pass 2 fingers through fascia. Left pelvic sidewall implant resected and sent for pathology.     Electronically signed by Jacquelin Malin DO on 9/15/2022 at 7:53 PM

## 2022-09-15 NOTE — PROGRESS NOTES
Multidisciplinary rounds completed on pt with Dr. Glen Samson. Plan of care reviewed and updated. Critical Lactic of 19.3 relayed to MD. See orders for gen surg consult and stat CT. Call made to spouse Marshall Awan and briefly updated with changed.  Dirk plans to be at bedside this AM.

## 2022-09-15 NOTE — CONSULTS
Pharmacy Consult: Renal Dose Medications    CRRT initiated 9/15/22    Piperacillin/Tazobactam 3375mg IVPB q8h over 4 hours. Discontinue Enoxaparin. Heparin 5000 units subcut q8h. Fernandez Sow.  Hina Reyes, [facility]   9/15/2022 12:20 PM

## 2022-09-15 NOTE — CONSULTS
Interval History and plan:      Acidosis  pH latest was normalized with bicarbonate drip  Continues to be on bicarbonate drip  On two vasopressors  WBC very high at 38.9  CT scan shows colitis     Plan:    Agree with plan to rule out ischemic bowel  Also contribution from metformin is a possibility  pH is currently under control however likely that she will need CRRT started today  Will continue to follow closely                     Assessment :     Acidosis  Severe  Requiring 2 vasopressors  Blood pressure okay with the 2 vasopressors but lactic is a still going up to 19 concerning for ischemia   /Possible metformin induced lactic acidosis       CKD Stage IIIb with DEJAN  Creatinine 2.1 on consult  Creatinine 1.6 on 7/22  Likely due to diabetes, hypertension  Renal imaging-normal in the past      hypotension  BP: ()/(42-62)  Heart Rate:  []   BP goal inpatient [phone] systolic inpatient  Pressures at the time of consult  Normally hypertensive    Due the potential for life-threatening deterioration due to patient's acidosis I spent 45 minutes providing critical care for this individual, excluding procedures on 9/15/22 . [facility] would like to thank Jarad Canales MD   for opportunity to serve this patient      Please call with questions at-   24 Hrs Answering service [phone] or  7 am- 5 pm via Perfect serve or cell phone  Jorge Oconnell MD          CC/reason for consult :       DEJAN     HPI :     Anjel Cabrera is a 67 y.o. female presented to   the hospital on 9/14/2022 with lactic acidosis. She is known to have diabetes and on metformin to 2 days ago  Has constipation and with multiple bowel regimen has had good bowel movement yesterday following that she has syncope.   EMS was called and was found to have blood pressure of 50 systolic given IV fluids brought to the emergency room blood pressure was normal initially but later developed hypotension got 3 L of fluid and now on 2 vasopressors and in ICU. She also has severe acidosis with very high lactate. CT scan was normal initially. We are consulted for DEJAN on CKD and related issues. On CKD and also severe lactic acidosis    ROS:     Seen with-  No family  Discussed with RN discussed with Dr. Jennifer Arroyo and also NP for the ICU on 9/15/2020    positives in bold   Constitutional:  fever, chills, weakness, weight change, fatigue  Skin:  rash, pruritus, hair loss, bruising, dry skin, petechiae  Head, Face, Neck   headaches, swelling,  cervical adenopathy  Respiratory: shortness of breath, cough, or wheezing  Cardiovascular: chest pain, palpitations, dizzy, edema  Gastrointestinal: nausea, vomiting, diarrhea, constipation,belly pain    Yellow skin, blood in stool  Musculoskeletal:  back pain, muscle weakness, gait problems,       joint pain or swelling. Genitourinary:  dysuria, poor urine flow, flank pain, blood in urine  Neurologic:  vertigo, TIA'S, syncope, seizures, focal weakness  Psychosocial:  insomnia, anxiety, or depression. Additional positive findings:                    All other remaining systems are negative or unable to obtain        PMH/PSH/SH/Family History:     Past Medical History:   Diagnosis Date    Chronic kidney disease     Diabetes mellitus ([facility]     Hyperlipidemia     Hypertension     Neuropathy        History reviewed. No pertinent surgical history. reports that she has never smoked. She has never used smokeless tobacco. She reports that she does not currently use alcohol. She reports that she does not use drugs. family history is not on file.          Medication:     Current Facility-Administered Medications: hydrocortisone sodium succinate PF (SOLU-CORTEF) injection 50 mg, 50 mg, IntraVENous, Q6H  mupirocin (BACTROBAN) 2 % ointment, , Nasal, BID  [START ON 9/16/2022] enoxaparin Sodium (LOVENOX) injection 30 mg, 30 mg, SubCUTAneous, Daily  prochlorperazine (COMPAZINE) injection 5 mg, 5 mg, IntraVENous, Q6H PRN  sodium bicarbonate 150 mEq in dextrose 5 % 1,000 mL infusion, , IntraVENous, Continuous  norepinephrine (LEVOPHED) 16 mg in dextrose 5 % 250 mL infusion, 1-100 mcg/min, IntraVENous, Continuous  piperacillin-tazobactam (ZOSYN) 3,375 mg in dextrose 5 % 50 mL IVPB extended infusion (mini-bag), 3,375 mg, IntraVENous, Q8H  glucose chewable tablet 16 g, 4 tablet, Oral, PRN  dextrose bolus 10% 125 mL, 125 mL, IntraVENous, PRN **OR** dextrose bolus 10% 250 mL, 250 mL, IntraVENous, PRN  glucagon (rDNA) injection 1 mg, 1 mg, SubCUTAneous, PRN  dextrose 10 % infusion, , IntraVENous, Continuous PRN  sodium chloride flush 0.9 % injection 5-40 mL, 5-40 mL, IntraVENous, 2 times per day  sodium chloride flush 0.9 % injection 5-40 mL, 5-40 mL, IntraVENous, PRN  0.9 % sodium chloride infusion, , IntraVENous, PRN  polyethylene glycol (GLYCOLAX) packet 17 g, 17 g, Oral, Daily PRN  acetaminophen (TYLENOL) tablet 650 mg, 650 mg, Oral, Q6H PRN **OR** acetaminophen (TYLENOL) suppository 650 mg, 650 mg, Rectal, Q6H PRN  metronidazole (FLAGYL) 500 mg in 0.9% NaCl 100 mL IVPB premix, 500 mg, IntraVENous, Q8H  pantoprazole (PROTONIX) injection 40 mg, 40 mg, IntraVENous, Daily  insulin glargine (LANTUS) injection vial 15 Units, 15 Units, SubCUTAneous, Nightly  vasopressin 20 Units in dextrose 5 % 100 mL infusion, 0.03 Units/min, IntraVENous, Titrated  insulin lispro (HUMALOG) injection vial 0-4 Units, 0-4 Units, SubCUTAneous, Q4H       Vitals :     Vitals:    09/15/22 0900   BP: (!) 97/51   Pulse: 94   Resp: 17   Temp:    SpO2: 97%       I & O :       Intake/Output Summary (Last 24 hours) at 9/15/2022 1017  Last data filed at 9/15/2022 0800  Gross per 24 hour   Intake 7246.08 ml   Output 838 ml   Net 6408.08 ml        Physical Examination :     General appearance: Anxious- no, distressed- no, in good spirits-  No,lethartgic  HEENT: Lips- normal, teeth- ok , oral mucosa- moist  Neck : Mass- no, appears symmetrical, JVD- not visible  Respiratory: Respiratory effort-  normal, wheeze- no, crackles -   Cardiovascular:  Ausculation- No M/R/G, Edema none  Abdomen: visible mass- no, distention- no, scar- no, tenderness- no                            hepatosplenomegaly-  no  Musculoskeletal:  clubbing no,cyanosis- no , digital ischemia- no                           muscle strength- grossly normal , tone - grossly normal  Skin: rashes- no , ulcers- no, induration- no, tightening - no  Psychiatric:  Judgement and insight- normal           AAO X 3  Additional finding:      LABS:     Recent Labs     09/14/22  1337 09/15/22  0421   WBC 24.7* 38.9*   HGB 12.1 11.7*   HCT 36.7 36.0    248     Recent Labs     09/14/22  1514 09/14/22  2354 09/15/22  0421   * 126* 126*   K 4.4 4.8 4.9   CL 90* 90* 90*   CO2 11* 8* 8*   BUN 33* 35* 34*   CREATININE 1.7* 2.1* 2.1*   GLUCOSE 379* 201* 104*   MG  --   --  1.80

## 2022-09-15 NOTE — PROGRESS NOTES
Shift: [id number]     Admitting diagnosis: Sepsis, enteritis    Presentation to hospital: HOTN, syncopal episode in bathroom @ home. BS 60 EMS    Surgery: no     Nursing assessment at handoff  stable    Emergency Contact/POA: Franny Oneil, spouse [phone]  Family updated: no    Most recent vitals: BP (!) 89/56   Pulse (!) 102   Temp 97.1 °F (36.2 °C)   Resp 16   Ht 5' 9\" (1.753 m)   Wt 165 lb (74.8 kg)   SpO2 98%   BMI 24.37 kg/m²      Rhythm: Normal Sinus Rhythm HR 90 and Sinus Tachycardia 100s     NC/HFNC-  RA  Respiratory support: - No ventilator support    Vent days: Day 0    Increased O2 requirements: no    Admission weight Weight: 165 lb (74.8 kg)  Today's weight   Wt Readings from Last 1 Encounters:   09/14/22 165 lb (74.8 kg)         UOP >30ml/hr: no     Osorio need assessed each shift: yes, hemodynamically unstable    Restraints: no  Order current and documentation up to date?     Lines/Drains  LDA Insertion Date Discontinued Date Dressing Changes   PIV  9/14     TLC  9/14     Arterial  9/14     Osorio  9/14     Vas Cath      ETT       Surgical drains        Night Shift Hospitalist Interventions    Problem(Brief) Date Time Intervention Physician contacted   Increasing lactic, pressor requirements 9/14  2L bolus, 1 amp bicarb, bicarb gtt Rogers                                        Drip rates at handoff:    norepinephrine 40 mcg/min (09/15/22 1286)    dextrose      sodium chloride      vasopressin (Septic Shock) infusion 0.03 Units/min (09/15/22 4628)    IV infusion builder 150 mL/hr at 09/14/22 2100 [facility] Course Daily Updates:  Admit Day# 1  - RIJ CVC placed, levo & vaso infusion   -pH 7.1, 1 amp bicarb, NS @ 125  -R radial Sandee placed  -lactic 12.6 down 7.6    Admit Night #1 9/14/22  -Cdiff sample sent  -Lactic 11.3, 14.1  -critical CO2 8  -NS @ 125 changed to bicarb gtt @ 150  -1 amp bicarb given  -2L LR bolus  -2 amps bicarb per nephro, give 2 more amps if pH <7.2  -1L LR bolus  -urine output increasing, but less than <30 ml/hr        Lab Data:   CBC:   Recent Labs     09/14/22  1337 09/15/22  0421   WBC 24.7* 38.9*   HGB 12.1 11.7*   HCT 36.7 36.0   MCV 84.9 85.9    248     BMP:    Recent Labs     09/14/22  2354 09/15/22  0421   * 126*   K 4.8 4.9   CO2 8* 8*   BUN 35* 34*   CREATININE 2.1* 2.1*     LIVR:   Recent Labs     09/14/22  1514 09/15/22  0421   AST 35 50*   ALT 13 18     PT/INR:   Recent Labs     09/14/22  1514 09/15/22  0421   PROT 5.9* 5.6*   INR  --  1.78*     APTT: No results for input(s): APTT in the last 72 hours.   ABG:   Recent Labs     09/14/22  2354 09/15/22  0555   PHART 7.166* 7.387   JUY6LGC 26.5* 25.8*   PO2ART 97.8 102.6     Consults (if GI or Nephrology- which group?)-  nephro consult Mikala Lobo

## 2022-09-15 NOTE — H&P
Hospital Medicine History & Physical      PCP: Mani Pinto DO, DO    Date of Admission: 9/14/2022    Date of Service: Pt seen/examined on 15 Sept and Admitted to Inpatient with expected LOS greater than two midnights due to medical therapy. Chief Complaint:  Abdominal Pain      History Of Present Illness:        67 y.o. female who presented to [facility] with a several hx of constipation ultimately relieved prior to admission by large BM but subsequently had syncopal episode. Patient was found to be hypotensive and hypoglycemic, poorly responsive to initial tx. Suspicious developed for occult bowel perforation and General Surgery consulted. Past Medical History:          Diagnosis Date    Chronic kidney disease     Diabetes mellitus ([facility] [address]     Hyperlipidemia     Hypertension     Neuropathy        Past Surgical History:      History reviewed. No pertinent surgical history. Medications Prior to Admission:      Prior to Admission medications    Medication Sig Start Date End Date Taking?  Authorizing Provider   insulin glargine (LANTUS SOLOSTAR) 100 UNIT/ML injection pen Inject 15 Units into the skin nightly INJECT 15 UNITS SUBCUTANEOUSLY AT BEDTIME (MAY USE UP TO 50 UNITS DAILY IF DIRECTED BY DOCTOR)   Yes Historical Provider, MD   nitrofurantoin, macrocrystal-monohydrate, (MACROBID) 100 MG capsule Take 100 mg by mouth 2 times daily X 5 days 9/13/22 9/17/22 Yes Historical Provider, MD   aspirin 81 MG chewable tablet Take 81 mg by mouth daily   Yes Historical Provider, MD   metFORMIN (GLUCOPHAGE) 850 MG tablet Take 850 mg by mouth 2 times daily (with meals) 10/28/21  Yes Historical Provider, MD   gabapentin (NEURONTIN) 100 MG capsule TAKE 2 CAPSULES BY MOUTH THREE TIMES DAILY 6/25/22  Yes Historical Provider, MD   lisinopril (PRINIVIL;ZESTRIL) 40 MG tablet Take 1 tablet by mouth once daily 4/25/22  Yes Historical Provider, MD   Dulaglutide (TRULICITY) 3 BL/4.0TU SOPN Inject 3 mg into the skin every 7 days 7/14/22  Yes Historical Provider, MD   chlorthalidone (HYGROTON) 25 MG tablet TAKE 1 TABLET BY MOUTH ONCE DAILY 6/1/22  Yes Historical Provider, MD   furosemide (LASIX) 40 MG tablet Take 20 mg by mouth See Admin Instructions TAKE 1/2 (ONE-HALF) TABLET BY MOUTH ONCE DAILY ON MONDAY, WEDNESDAY, AND FRIDAY AS NEEDED FOR SWELLING 6/1/22  Yes Historical Provider, MD   glyBURIDE (DIABETA) 5 MG tablet TAKE 2 TABLETS BY MOUTH TWICE DAILY WITH MEALS 5/23/22  Yes Historical Provider, MD   rosuvastatin (CRESTOR) 5 MG tablet TAKE 1 TABLET BY MOUTH ONCE DAILY 6/1/22  Yes Historical Provider, MD       Allergies:  Latex, Lovastatin, and Penicillins    Social History:      The patient currently lives independently    TOBACCO:   reports that she has never smoked. She has never used smokeless tobacco.  ETOH:   reports that she does not currently use alcohol. Family History:      Reviewed in detail and negative for DM, CAD, Cancer, CVA. Positive as follows:    History reviewed. No pertinent family history. REVIEW OF SYSTEMS:   Pertinent positives as noted in the HPI. All other systems reviewed and negative. PHYSICAL EXAM:    BP (!) 97/51   Pulse 94   Temp 97.6 °F (36.4 °C) (Bladder)   Resp 17   Ht 5' 9\" (1.753 m)   Wt 165 lb (74.8 kg)   SpO2 97%   BMI 24.37 kg/m²     General appearance:  No apparent distress, appears stated age and cooperative. HEENT:  Normal cephalic, atraumatic without obvious deformity. Pupils equal, round, and reactive to light. Extra ocular muscles intact. Conjunctivae/corneas clear. Neck: Supple, with full range of motion. No jugular venous distention. Trachea midline. Respiratory:  Normal respiratory effort. Clear to auscultation, bilaterally without Rales/Wheezes/Rhonchi. Cardiovascular:  Regular rate and rhythm with normal S1/S2 without murmurs, rubs or gallops. Abdomen: TTP w/ hypoactive bowel sounds. Musculoskeletal:  No clubbing, cyanosis or edema bilaterally. Full range of motion without deformity. Skin: Skin color, texture, turgor normal.  No rashes or lesions. Neurologic:  Neurovascularly intact without any focal sensory/motor deficits.  Cranial nerves: II-XII intact, grossly non-focal.  Psychiatric:  Alert and oriented, thought content appropriate, normal insight  Capillary Refill: Brisk,< 3 seconds   Peripheral Pulses: +2 palpable, equal bilaterally       CXR:  I have reviewed the CXR with the following interpretation: No acute ASD  EKG:  I have reviewed the EKG with the following interpretation: No acute ischemic changes       Labs:     Recent Labs     09/14/22  1337 09/15/22  0421   WBC 24.7* 38.9*   HGB 12.1 11.7*   HCT 36.7 36.0    248     Recent Labs     09/14/22  1514 09/14/22  2354 09/15/22  0421   * 126* 126*   K 4.4 4.8 4.9   CL 90* 90* 90*   CO2 11* 8* 8*   BUN 33* 35* 34*   CREATININE 1.7* 2.1* 2.1*   CALCIUM 8.3 9.1 8.0*     Recent Labs     09/14/22  1150 09/14/22  1514 09/15/22  0421   AST 32 35 50*   ALT 12 13 18   BILITOT 0.3 0.4 0.3   ALKPHOS 142* 274* 566*     Recent Labs     09/15/22  0421   INR 1.78*     Recent Labs     09/14/22  1150 09/14/22  1514   TROPONINI 0.07* 0.08*       Urinalysis:      Lab Results   Component Value Date/Time    NITRU Negative 09/14/2022 12:08 PM    WBCUA 21-50 09/14/2022 12:08 PM    BACTERIA Rare 09/14/2022 12:08 PM    RBCUA None seen 09/14/2022 12:08 PM    BLOODU Negative 09/14/2022 12:08 PM    SPECGRAV <=1.005 09/14/2022 12:08 PM    GLUCOSEU Negative 09/14/2022 12:08 PM         Consults:    IP CONSULT TO HOSPITALIST  IP CONSULT TO NEPHROLOGY  IP CONSULT TO CRITICAL CARE      ASSESSMENT:    Active Hospital Problems    Diagnosis Date Noted    Syncope [R55] 09/14/2022     Priority: Medium    Hyponatremia [E87.1] 09/14/2022     Priority: Medium    Abdominal pain [R10.9] 09/14/2022     Priority: Medium    Enteritis [K52.9] 09/14/2022     Priority: Medium    Elevated troponin [R77.8] 09/14/2022     Priority: Medium    [facility] [N17.9] 09/14/2022     Priority: Medium    DM (diabetes mellitus), secondary uncontrolled ([facility] [E13.65] 09/14/2022     Priority: Medium       PLAN:      Enteritis - w/ possible bowel perforation. General Surgery consulted and appreciated w/ Ex-Lap/Colectomy/Colostomy planned. Sepsis - w/ Leukocytosis/Tachycardia/Fever/Elevated Lactate POArrival 2nd to above infection. Continue IVF as appropriate and monitor clinical response w/ ABX as written. ARF - w/ elevated BUN/Cr ratio c/w pre-renal azotemia. Given IVF hydration and follow serial labs. Reviewed and documented as above. Nephrology consulted and appreciated started on CRRT. HypoNatremia - etiology clinically unable to determine but likely hypovolemic. Follow serial labs on IVF. Reviewed and documented as above. Troponin elevation - of unclear clinical significance w/ etiology clinically unable to determine, w/out signs/sxs of active ischemia. Follow serial troponins. Reviewed and documented as above. DM2 - controlled on home oral antiGlycemics/Insulin - held/continued. Follow FSBS/SSI low regimen. Last HbA1c 9.2% dated this admission. Anticipate resuming/continuing home regimen at discharge. DVT Prophylaxis: LMWH  Diet: Diet NPO Exceptions are: Sips of Water with Meds  Code Status: Full Code      PT/OT Eval Status: not yet ordered. Dispo - Remains in ICU. Patient is likely to remain in-house at least for the foreseeable future pending clinical course, subspecialty recs and eventual PT/OT eval/recs. Ashley Lutz MD    Thank you Camila Gorman DO, DO for the opportunity to be involved in this patient's care. If you have any questions or concerns please feel free to contact me at [phone].

## 2022-09-15 NOTE — PLAN OF CARE
Problem: Skin/Tissue Integrity  Goal: Absence of new skin breakdown  Description: 1. Monitor for areas of redness and/or skin breakdown  2. Assess vascular access sites hourly  3. Every 4-6 hours minimum:  Change oxygen saturation probe site  4. Every 4-6 hours:  If on nasal continuous positive airway pressure, respiratory therapy assess nares and determine need for appliance change or resting period.   Outcome: Progressing     Problem: Pain  Goal: Verbalizes/displays adequate comfort level or baseline comfort level  Outcome: Not Progressing     Problem: Chronic Conditions and Co-morbidities  Goal: Patient's chronic conditions and co-morbidity symptoms are monitored and maintained or improved  Outcome: Not Progressing

## 2022-09-15 NOTE — CONSULTS
General Surgery   Resident Consult Note    Reason for Consult: Concern for colonic perforation    History of Present Illness:   Gerald Baer is a 67 y.o. female with Hx of HTN, HLD, DM, CKD, who presented to [facility] yesterday with abdominal pain and hypotension. Per  and daughter, she has been struggling with constipation, worse than usual, for the past three days. Yesterday she had a bowel movement, and immediately had a syncopal episode and EMS was called. She was noted to be profoundly hypotensive at that time and was transported to the hospital. Since admission, she has been admitted to the ICU and initiated on CRRT for DEJAN on CKD and elevated lactate. There is concern for metformin-induced lactic acidosis complicating this picture. The patient is now requiring pressor support with levo at 30 and as well as vasopressin. CT scan was performed yesterday, which was read as non-specific colitis with inflammatory changes in the right lower quadrant. General surgery was consulted to evaluate for possible perforation in the setting of worsening clinical status. Past Medical History:        Diagnosis Date    Chronic kidney disease     Diabetes mellitus ([facility] [address]     Hyperlipidemia     Hypertension     Neuropathy        Past Surgical History:    History reviewed. No pertinent surgical history. Allergies:  Latex, Lovastatin, and Penicillins    Medications:   Home Meds  No current facility-administered medications on file prior to encounter.      Current Outpatient Medications on File Prior to Encounter   Medication Sig Dispense Refill    insulin glargine (LANTUS SOLOSTAR) 100 UNIT/ML injection pen Inject 15 Units into the skin nightly INJECT 15 UNITS SUBCUTANEOUSLY AT BEDTIME (MAY USE UP TO 50 UNITS DAILY IF DIRECTED BY DOCTOR)      nitrofurantoin, macrocrystal-monohydrate, (MACROBID) 100 MG capsule Take 100 mg by mouth 2 times daily X 5 days      aspirin 81 MG chewable tablet Take 81 mg by mouth daily metFORMIN (GLUCOPHAGE) 850 MG tablet Take 850 mg by mouth 2 times daily (with meals)      gabapentin (NEURONTIN) 100 MG capsule TAKE 2 CAPSULES BY MOUTH THREE TIMES DAILY      lisinopril (PRINIVIL;ZESTRIL) 40 MG tablet Take 1 tablet by mouth once daily      Dulaglutide (TRULICITY) 3 NE/6.7KD SOPN Inject 3 mg into the skin every 7 days      chlorthalidone (HYGROTON) 25 MG tablet TAKE 1 TABLET BY MOUTH ONCE DAILY      furosemide (LASIX) 40 MG tablet Take 20 mg by mouth See Admin Instructions TAKE 1/2 (ONE-HALF) TABLET BY MOUTH ONCE DAILY ON MONDAY, WEDNESDAY, AND FRIDAY AS NEEDED FOR SWELLING      glyBURIDE (DIABETA) 5 MG tablet TAKE 2 TABLETS BY MOUTH TWICE DAILY WITH MEALS      rosuvastatin (CRESTOR) 5 MG tablet TAKE 1 TABLET BY MOUTH ONCE DAILY         Current Meds  hydrocortisone sodium succinate PF (SOLU-CORTEF) injection 50 mg, Q6H  mupirocin (BACTROBAN) 2 % ointment, BID  prochlorperazine (COMPAZINE) injection 5 mg, Q6H PRN  sodium bicarbonate 150 mEq in dextrose 5 % 1,000 mL infusion, Continuous  prismaSATE BGK 4/2.5 dialysis solution, Continuous  prismaSATE BGK 4/2.5 dialysis solution, Continuous  potassium chloride 20 mEq/50 mL IVPB (Central Line), PRN  magnesium sulfate 1000 mg in dextrose 5% 100 mL IVPB, PRN  sodium phosphate 6 mmol in sodium chloride 0.9 % 250 mL IVPB, PRN   Or  sodium phosphate 12 mmol in sodium chloride 0.9 % 250 mL IVPB, PRN   Or  sodium phosphate 18 mmol in sodium chloride 0.9 % 500 mL IVPB, PRN   Or  sodium phosphate 24 mmol in sodium chloride 0.9 % 500 mL IVPB, PRN  prismaSATE BGK 4/2.5 dialysis solution, Continuous  calcium gluconate 1000 mg in sodium chloride 50 mL, PRN   Or  calcium gluconate 2,000 mg in dextrose 5 % 100 mL IVPB, PRN   Or  calcium gluconate 3,000 mg in dextrose 5 % 100 mL IVPB, PRN   Or  calcium gluconate 4,000 mg in dextrose 5 % 100 mL IVPB, PRN  [START ON 9/16/2022] heparin (porcine) injection 5,000 Units, 3 times per day  norepinephrine (LEVOPHED) 16 mg in dextrose 5 % 250 mL infusion, Continuous  piperacillin-tazobactam (ZOSYN) 3,375 mg in dextrose 5 % 50 mL IVPB extended infusion (mini-bag), Q8H  glucose chewable tablet 16 g, PRN  dextrose bolus 10% 125 mL, PRN   Or  dextrose bolus 10% 250 mL, PRN  glucagon (rDNA) injection 1 mg, PRN  dextrose 10 % infusion, Continuous PRN  sodium chloride flush 0.9 % injection 5-40 mL, 2 times per day  sodium chloride flush 0.9 % injection 5-40 mL, PRN  0.9 % sodium chloride infusion, PRN  polyethylene glycol (GLYCOLAX) packet 17 g, Daily PRN  acetaminophen (TYLENOL) tablet 650 mg, Q6H PRN   Or  acetaminophen (TYLENOL) suppository 650 mg, Q6H PRN  metronidazole (FLAGYL) 500 mg in 0.9% NaCl 100 mL IVPB premix, Q8H  pantoprazole (PROTONIX) injection 40 mg, Daily  insulin glargine (LANTUS) injection vial 15 Units, Nightly  vasopressin 20 Units in dextrose 5 % 100 mL infusion, Titrated  insulin lispro (HUMALOG) injection vial 0-4 Units, Q4H        Family History:   History reviewed. No pertinent family history. Social History:   TOBACCO:   reports that she has never smoked. She has never used smokeless tobacco.  ETOH:   reports that she does not currently use alcohol. DRUGS:   reports no history of drug use. Review of Systems:     Constitutional: As above  HENT: Negative. Eyes: Negative. Respiratory: Negative. Cardiovascular: Negative. Gastrointestinal: as above  Genitourinary: Negative. Musculoskeletal: Negative. Skin: Negative. Endocrine: Negative. Allergic/Immunologic: Negative. Neurological: Negative. Hematological: Negative. Psychiatric/Behavioral: Negative.     Physical exam:    Vitals:    09/15/22 1400 09/15/22 1415 09/15/22 1430 09/15/22 1436   BP:       Pulse: 83 84 85 89   Resp: 20 18 18 20   Temp:       TempSrc:       SpO2: 100% 100% 100% 99%   Weight:       Height:           General appearance: elderly, ill-appearing female, pale, lying in bed, in no acute distress  Eyes: PERRL, no scleral icterus  Neck: trachea midline, no JVD  Chest/Lungs: normal effort, no adventitious breath sounds, on 2L NC  Cardiovascular: RRR  Abdomen: overall soft, distended, peritoneal, no visible masses or hernias  Skin: cool, pale, dry, no rashes  Extremities: no edema, no cyanosis  Neuro: awakens to voice, answers yes and no questions, confused, somnolent    Labs:    CBC:   Recent Labs     09/14/22  1337 09/15/22  0421 09/15/22  1037   WBC 24.7* 38.9*  --    HGB 12.1 11.7* 10.2*   HCT 36.7 36.0  --    MCV 84.9 85.9  --     248  --      BMP:   Recent Labs     09/14/22  1514 09/14/22  2354 09/15/22  0421   * 126* 126*   K 4.4 4.8 4.9   CL 90* 90* 90*   CO2 11* 8* 8*   BUN 33* 35* 34*   CREATININE 1.7* 2.1* 2.1*     PT/INR:   Recent Labs     09/15/22  0421   PROTIME 20.6*   INR 1.78*     APTT: No results for input(s): APTT in the last 72 hours. Liver Profile:   Lab Results   Component Value Date/Time    AST 50 09/15/2022 04:21 AM    ALT 18 09/15/2022 04:21 AM    BILITOT 0.3 09/15/2022 04:21 AM    ALKPHOS 766 09/15/2022 04:21 AM   No results found for: CHOL, HDL, TRIG  UA:   Lab Results   Component Value Date/Time    COLORU Yellow 09/14/2022 12:08 PM    PHUR 6.0 09/14/2022 12:08 PM    WBCUA 21-50 09/14/2022 12:08 PM    RBCUA None seen 09/14/2022 12:08 PM    BACTERIA Rare 09/14/2022 12:08 PM    CLARITYU Clear 09/14/2022 12:08 PM    SPECGRAV <=1.005 09/14/2022 12:08 PM    LEUKOCYTESUR SMALL 09/14/2022 12:08 PM    UROBILINOGEN 0.2 09/14/2022 12:08 PM    BILIRUBINUR Negative 09/14/2022 12:08 PM    BLOODU Negative 09/14/2022 12:08 PM    GLUCOSEU Negative 09/14/2022 12:08 PM       Imaging:   CT ABDOMEN PELVIS WO CONTRAST Additional Contrast? None   Final Result   1. Limited evaluation of the GI tract on this noncontrast exam.  Improved   mucosal changes of the duodenum and proximal jejunum that were described on   prior CT. 2. Severe inflammatory change in the right lower quadrant with etiology   uncertain.   This could correspond to enteritis of the distal ileum and   terminal ileum. Colitis may also be considered. Infectious or inflammatory   etiologies may be favored. 3. Dense stool and diffuse mucosal thickening of the distal colon which may   represent a pattern of colitis. 4. Small right pleural effusion with airspace changes in the right lower   lobe, new from prior exam.   5. Evidence of chronic liver disease with a small amount of ascites stable. CT ABDOMEN PELVIS WO CONTRAST Additional Contrast? None   Final Result   1. Acute enteritis involving the duodenum and jejunal loops with thickening   of the loops and edema. No evidence of bowel obstruction. 2. Constipation with significant stool impaction in the rectum. 3. Mild ascites mostly around the liver and spleen. 4. Adequate position of Osorio catheter in the urinary bladder. XR CHEST PORTABLE   Final Result   Placement of a right internal jugular central venous catheter without evident   complication. The tip is at approximately the cavoatrial junction. No acute findings in the chest.               Assessment/Plan: This is a 67 y.o. female with Hx of HTN, HLD, DM, CKD who presented to [facility] with abdominal pain and hypotension. In the ED she was noted to be profoundly hypotensive, with a leukocytosis to 24 and initial lactate of 12. Despite admission to the ICU and initiation of CRRT as well as multiple vasopressors, her clinical condition has failed to improve. Given CT findings of colitis, general surgery was consulted to evaluate for possible perforation. CT scan shows large rectal and distal colonic stool burden with inflammatory process stemming from area within the pelvis associated with this.      - Clinical picture is most consistent with perforated colon secondary to stercoral colitis  - recommend emergent operative intervention with exploratory laparotomy, possible bowel resection and ostomy formation  - discussed pathophysiology of above condition with family at bedside.  and daughters consent to surgical intervention. Indications, risks, benefits, and alternatives to surgery were discussed with the patient and family and informed consent was signed. - appreciate medical care per ICU and nephrology    Patient discussed and evaluated with Dr. Yuridia Shepherd DO  09/15/22  3:31 PM    Surgery Staff    I have examined this patient, and read and agree with the note by Janie Jonas DO from today; more than half of the total time was spent by me on the encounter. Will plan for urgent surgical exploration, beginning w/ diagnostic laparoscopy followed by likely laparotomy if bowel perforation is confirmed. Surgical options are described above by Dr Paris Bahena. I have discussed these issues in detail with patient's . He appears to understand, asks appropriate questions, and agrees to proceed.     Maral Pickett MD

## 2022-09-15 NOTE — PROCEDURES
Jason Olmedo is a 67 y.o. female patient. 1. Generalized abdominal pain    2. Non-intractable vomiting with nausea, unspecified vomiting type    3. Acute cystitis with hematuria    4. Septicemia [facility])      Past Medical History:   Diagnosis Date    Chronic kidney disease     Diabetes mellitus ([facility] [address]     Hyperlipidemia     Hypertension     Neuropathy      Blood pressure (!) 112/41, pulse 85, temperature 97.6 °F (36.4 °C), temperature source Bladder, resp. rate 20, height 5' 9\" (1.753 m), weight 165 lb (74.8 kg), SpO2 90 %. Central Line    Date/Time: 9/15/2022 1:51 PM  Performed by: BRAEDEN Ford CNP  Authorized by: BRAEDEN Ford CNP   Consent: Verbal consent obtained. Risks and benefits: risks, benefits and alternatives were discussed  Consent given by: spouse  Patient understanding: patient states understanding of the procedure being performed  Patient consent: the patient's understanding of the procedure matches consent given  Patient identity confirmed: verbally with patient and arm band  Indications: vascular access (Need for CRRT)  Anesthesia: local infiltration    Sedation:  Patient sedated: yes  Sedation type: moderate (conscious) sedation  Sedatives: midazolam  Vitals: Vital signs were monitored during sedation. Preparation: skin prepped with 2% chlorhexidine  Skin prep agent dried: skin prep agent completely dried prior to procedure  Sterile barriers: all five maximum sterile barriers used - cap, mask, sterile gown, sterile gloves, and large sterile sheet  Hand hygiene: hand hygiene performed prior to central venous catheter insertion  Location details: right femoral  Site selection rationale: existing right CVC, left IJ vessel poor target and patient hypotensive on 2 pressors  Procedural supplies: 24 cm trialysis catheter. Catheter size: 13Fr.   Ultrasound guidance: yes  Sterile ultrasound techniques: sterile gel and sterile probe covers were used  Number of attempts: 2  Successful placement: yes  Post-procedure: line sutured and dressing applied  Assessment: blood return through all ports  Patient tolerance: patient tolerated the procedure well with no immediate complications        BRAEDEN Butcher - VICENTE  9/15/2022

## 2022-09-15 NOTE — PROGRESS NOTES
Shift: [id number]    Admitting diagnosis: Sepsis, enteritis    Presentation to hospital: HOTN, syncopal episode in bathroom @ home. BS 60 EMS    Surgery: Yes; ex lap; bowel perf    Nursing assessment at handoff: In OR    Emergency Contact/POA: Candis Childers, spouse [phone]  Family updated: Yes    Most recent vitals: BP (!) 119/49   Pulse 94   Temp 98.1 °F (36.7 °C) (Bladder)   Resp 24   Ht 5' 9\" (1.753 m)   Wt 165 lb (74.8 kg)   SpO2 98%   BMI 24.37 kg/m²      Rhythm: Normal Sinus Rhythm HR 90 and Sinus Tachycardia 100s     NC/HFNC- 2L NC  Respiratory support: - No ventilator support    Vent days: Day 0    Increased O2 requirements: no    Admission weight Weight: 165 lb (74.8 kg)  Today's weight   Wt Readings from Last 1 Encounters:   09/14/22 165 lb (74.8 kg)         UOP >30ml/hr: no; nephrology aware    Osorio need assessed each shift: yes, hemodynamically unstable    Restraints: no  Order current and documentation up to date?     Lines/Drains  LDA Insertion Date Discontinued Date Dressing Changes   PIV  9/14     TLC  9/14     Arterial  9/14     Osorio  9/14     Vas Cath 9/15     ETT       Surgical drains        Night Shift Hospitalist Interventions    Problem(Brief) Date Time Intervention Physician contacted   Increasing lactic, pressor requirements 9/14  2L bolus, 1 amp bicarb, bicarb gtt Rogers                                        Drip rates at handoff:    sodium bicarbonate infusion 150 mL/hr at 09/15/22 1656    prismaSATE BGK 4/2.5 1,500 mL/hr (09/15/22 1306)    prismaSATE BGK 4/2.5 1,000 mL/hr (09/15/22 1306)    prismaSATE BGK 4/2.5 500 mL/hr at 09/15/22 1306    sodium chloride      norepinephrine 25 mcg/min (09/15/22 1830)    dextrose      sodium chloride      vasopressin (Septic Shock) infusion 0.03 Units/min (09/15/22 1705)       Hospital Course Daily Updates:  Admit Day# 1  - RIJ CVC placed, levo & vaso infusion   -pH 7.1, 1 amp bicarb, NS @ 125  -R radial Odin placed  -lactic 12.6 down 7.6    Admit Night #1 9/14/22  -Cdiff sample sent  -Lactic 11.3, 14.1  -critical CO2 8  -NS @ 125 changed to bicarb gtt @ 150  -1 amp bicarb given  -2L LR bolus  -2 amps bicarb per nephro, give 2 more amps if pH <7.2  -1L LR bolus  -urine output increasing, but less than <30 ml/hr    Admit Day #2 (9/15/2022)  -Cdiff Negative  -Lactic 19.3/18  -pH 7.27; 2 amps bicarb given  -BG 50s; x2 D10 boluses given, repeat BG 100s  -Repeat CT ABD; Severe inflammatory change in the right lower quadrant with etiology   uncertain. This could correspond to enteritis of the distal ileum and   terminal ileum. Colitis may also be considered. Infectious or inflammatory   etiologies may be favored     -Nephrology concerned for metformin contribution/DEJAN; Right fem vascath placed, CRRT started [id number]  -Consult General surgery; pt to OR at 1700 for concern for bowel perf      Lab Data:   CBC:   Recent Labs     09/14/22  1337 09/15/22  0421 09/15/22  1037 09/15/22  1855   WBC 24.7* 38.9*  --   --    HGB 12.1 11.7* 10.2* 8.8*   HCT 36.7 36.0  --   --    MCV 84.9 85.9  --   --     248  --   --        BMP:    Recent Labs     09/14/22  2354 09/15/22  0421   * 126*   K 4.8 4.9   CO2 8* 8*   BUN 35* 34*   CREATININE 2.1* 2.1*       LIVR:   Recent Labs     09/14/22  1514 09/15/22  0421   AST 35 50*   ALT 13 18       PT/INR:   Recent Labs     09/14/22  1514 09/15/22  0421   PROT 5.9* 5.6*   INR  --  1.78*       APTT: No results for input(s): APTT in the last 72 hours.   ABG:   Recent Labs     09/15/22  1338 09/15/22  1855   PHART 7.321* 7.189*   [id number] 24.8* 42.7   PO2ART 103.4 297.9*       Consults (if GI or Nephrology- which group?)-  nephro consult Bart Faria, General surgery/Dr. Rodriguez

## 2022-09-15 NOTE — PROGRESS NOTES
INPATIENT PULMONARY CRITICAL CARE PROGRESS NOTE      Reason for visit    septic shock     SUBJECTIVE: Patient when seen this morning continues to be critically ill on multiple pressors to maintain hemodynamics, patient was still complaining of abdominal pain, patient was afebrile, patient had sinus rhythm on the monitor, patient did not have any significant urine output overnight with cumulative fluid balance of +7.3 L, patient's blood sugars were dropping, patient was alert and communicative, no other pertinent review of system of concern             Physical Exam:  Blood pressure (!) 97/51, pulse 94, temperature 97.6 °F (36.4 °C), temperature source Bladder, resp. rate 17, height 5' 9\" (1.753 m), weight 165 lb (74.8 kg), SpO2 97 %.'     Constitutional:  No acute distress. HENT:  Oropharynx is clear and moist. No thyromegaly. Eyes:  Conjunctivae are normal. Pupils equal, round, and reactive to light. No scleral icterus. Neck: . No tracheal deviation present. No obvious thyroid mass. Cardiovascular: Sinus tachycardia normal heart sounds. No right ventricular heave. No lower extremity edema. Pulmonary/Chest: No wheezes. No rales. Chest wall is not dull to percussion. No accessory muscle usage or stridor. Abdominal: Soft. Bowel sounds present. No distension or hernia. lower abdominal tenderness. With mild rebound present  Musculoskeletal: No cyanosis. No clubbing. No obvious joint deformity. Lymphadenopathy: No cervical or supraclavicular adenopathy. Skin: Skin is warm and dry. No rash or nodules on the exposed extremities.   Just neurologic: Lethargic but communicative to the simple questions    Results:  CBC:   Recent Labs     09/14/22  1337 09/15/22  0421   WBC 24.7* 38.9*   HGB 12.1 11.7*   HCT 36.7 36.0   MCV 84.9 85.9    248     BMP:   Recent Labs     09/14/22  1514 09/14/22  2354 09/15/22  0421   * 126* 126*   K 4.4 4.8 4.9   CL 90* 90* 90*   CO2 11* 8* 8*   BUN 33* 35* 34* CREATININE 1.7* 2.1* 2.1*     LIVER PROFILE:   Recent Labs     09/14/22  1150 09/14/22  1514 09/15/22  0421   AST 32 35 50*   ALT 12 13 18   LIPASE 36.0  --   --    BILITOT 0.3 0.4 0.3   ALKPHOS 142* 274* 566*     PT/INR:   Recent Labs     09/15/22  0421   PROTIME 20.6*   INR 1.78*     APTT: No results for input(s): APTT in the last 72 hours. UA:  Recent Labs     09/14/22  1208   COLORU Yellow   PHUR 6.0   WBCUA 21-50*   RBCUA None seen   BACTERIA Rare*   CLARITYU Clear   SPECGRAV <=1.005   LEUKOCYTESUR SMALL*   UROBILINOGEN 0.2   BILIRUBINUR Negative   BLOODU Negative   GLUCOSEU Negative       Imaging:  I have reviewed radiology images personally. CT ABDOMEN PELVIS WO CONTRAST Additional Contrast? None   Final Result   1. Acute enteritis involving the duodenum and jejunal loops with thickening   of the loops and edema. No evidence of bowel obstruction. 2. Constipation with significant stool impaction in the rectum. 3. Mild ascites mostly around the liver and spleen. 4. Adequate position of Osorio catheter in the urinary bladder. XR CHEST PORTABLE   Final Result   Placement of a right internal jugular central venous catheter without evident   complication. The tip is at approximately the cavoatrial junction. No acute findings in the chest.         CT ABDOMEN PELVIS WO CONTRAST Additional Contrast? None    (Results Pending)     CT ABDOMEN PELVIS WO CONTRAST Additional Contrast? None    Result Date: 9/14/2022  EXAMINATION: CT OF THE ABDOMEN AND PELVIS WITHOUT CONTRAST, 9/14/2022 2:46 pm TECHNIQUE: CT of the abdomen and pelvis was performed without the administration of intravenous contrast. Multiplanar reformatted images are provided for review. Automated exposure control, iterative reconstruction, and/or weight based adjustment of the mA/kV was utilized to reduce the radiation dose to as low as reasonably achievable.  COMPARISON: None HISTORY: ORDERING SYSTEM PROVIDED HISTORY:  Abdominal pain/ near syncope TECHNOLOGIST PROVIDED HISTORY: Additional Contrast?  None Reason for Exam:  Abdominal pain/ near syncope Decision Support Exception - unselect if not a suspected or confirmed emergency medical condition->Emergency Medical Condition (MA) Reason for Exam:  Vomiting and pain FINDINGS: Lower Chest: No active disease. Organs: The liver appears unremarkable. Status post cholecystectomy. Pancreas and spleen, adrenals, kidneys, aorta and IVC appear stable. GI/Bowel: There is evidence of thickening of the duodenum as well as jejunal loops with perijejunal inflammatory changes. These changes are compatible with acute enteritis. No evidence of bowel obstruction or perforation. Evidence of constipation and significant stool impaction in the rectum. Pelvis: Urinary bladder contains Osorio catheter. The uterus and adnexa demonstrate no acute abnormality. Peritoneum/Retroperitoneum: No evidence of retroperitoneal lymphadenopathy or acute peritoneal findings. Mild ascites mostly around the liver and spleen. Bones/Soft Tissues: No acute abnormality. Osteopenia. Moderate multilevel degenerative disc disease. 1. Acute enteritis involving the duodenum and jejunal loops with thickening of the loops and edema. No evidence of bowel obstruction. 2. Constipation with significant stool impaction in the rectum. 3. Mild ascites mostly around the liver and spleen. 4. Adequate position of Osorio catheter in the urinary bladder. XR CHEST PORTABLE    Result Date: 9/14/2022  EXAMINATION: ONE XRAY VIEW OF THE CHEST 9/14/2022 10:22 am COMPARISON: None. HISTORY: ORDERING SYSTEM PROVIDED HISTORY: confirm central line TECHNOLOGIST PROVIDED HISTORY: Reason for exam:->confirm central line Reason for Exam: confirm central line FINDINGS: A right internal jugular venous catheter is been placed with the tip at the cavoatrial junction. No pneumothorax is identified. The lungs and costophrenic angles are clear.   The cardiomediastinal silhouette and pulmonary vessels appear normal.     Placement of a right internal jugular central venous catheter without evident complication. The tip is at approximately the cavoatrial junction. No acute findings in the chest.        Latest Reference Range & Units 9/15/22 10:37 9/15/22 13:38 9/15/22 14:24 9/15/22 15:37   Calcium, Ionized 1.12 - 1.32 mmol/L 1.01 (L)      Lactic Acid 0.4 - 2.0 mmol/L  18.0 (HH)     Lactate, ser/plas 0.40 - 2.00 mmol/L 17.17 (HH)      POC Glucose 70 - 99 mg/dl 103 (H)  76 74   POC Sodium 136 - 145 mmol/L 129 (L)      POC Potassium 3.5 - 5.1 mmol/L 4.5      POC Hematocrit 36.0 - 48.0 % 30.0 (L)           Latest Reference Range & Units 9/14/22 23:54 9/15/22 05:55 9/15/22 10:37   O2 Therapy  Unknown Unknown    Hemoglobin, Art, Extended 12.0 - 16.0 g/dL 13.1 10.9 (L)    pH, Arterial 7.350 - 7.450  7.166 (LL) 7.387 7.277 (L)   pCO2, Arterial 35.0 - 45.0 mm Hg 26.5 (L) 25.8 (L) 21.2 (L)   pO2, Arterial 75.0 - 108.0 mm Hg 97.8 102.6 89.1   HCO3, Arterial 21.0 - 29.0 mmol/L 9.4 (L) 15.2 (L) 9.9 (L)   TCO2 (calc), Art Not Established mmol/L 10.2 16.0 11   Base Excess, Arterial -3 - 3  -17.7 (L) -8.4 (L) -17 (L)   O2 Sat, Arterial 93 - 100 % 96.4 97.7 96   Methemoglobin, Arterial <1.5 % 0.1 0.5    Carboxyhgb, Arterial 0.0 - 1.5 % 0.8 0.3            Assessment:  Principal Problem:    Enteritis  Active Problems:    Syncope    Hyponatremia    DEJAN (acute kidney injury) ([facility])    Abdominal pain    Septic shock ([facility])    Elevated troponin    Leukocytosis    Pyuria    Lactic acidosis    DM (diabetes mellitus), secondary uncontrolled (HCC)  Resolved Problems:    * No resolved hospital problems.  *          Plan:   Oxygen supplementation to keep saturation more than 92%  Please titrate the oxygen as per the above parameters  Pulmonary toilet  Patient has worsening lactic acidosis with continued septic shock  Patient needs to be evaluated for any ischemic bowel/perforation given the clinical status and the data available  Patient needs to have a repeat CT of the abdomen which is being ordered  Patient has marked acidemia and may require renal replacement therapy  General surgery consult may be requested after the CT of the chest  Patient has been recently treated as an outpatient with antibiotics for UTI as per information available  There is a distinct possibility that patient may have C. difficile colitis  Stool for C. difficile antigen ordered  Patient to be started on IV Flagyl empirically and depending on patient stool for C. difficile patient may require p.o. vancomycin once patient is more alert and communicative and less lethargic  Patient's lactic acid levels to be trended  IV pressors to keep mean arterial pressure more than 65 mm Hg  Patient blood pressure was not controlled with only Levophed infusion and for that reason vasopressin was added to the regimen  Monitor input output and BMP  Correct electrolytes on whenever necessary basis  Were informed that patient was hypoglycemic prior to come to the hospital and patient had to be given dextrose in the ambulance but patient's blood sugars are elevated on a persistent basis  Patient also has some ketonuria along with an anion gap metabolic acidosis which can be secondary to sepsis and lactic acidosis but also there may be a competent of hyperglycemia, causing that to happen  Beta hydroxy butyrate ordered but the results are pending  Patient's blood sugars are dropping and Lantus insulin was discontinued  BGM with SSI  Monitor for any worsening hypoglycemia  Patient also has pyuria at this time and patient has been started on Zosyn empirically by the admitting provider-titration as per clinical status and cultures  Trend the WBC count and sodium levels which continue to be worse  PUD and DVT prophylaxis       Case d/w ICU team and family    Patient's clinical status is precarious and critical with potential for further decompensation Critical care time spent -35 minutes(exclusive of any procedures)         Electronically signed by:  Johana Case MD    9/15/2022    9:42 AM.

## 2022-09-15 NOTE — PROGRESS NOTES
Fatemeh Snell  Nocturnist / Cross-Cover Note  Dr. Shameka Harris MD    Date of Service:  09/15/22    SITUATION:  Patient remained hemodynamically unstable throughout the night. She did however continue to breathe spontaneously and was arousable to voice and would follow commands. Bedside echo was performed and c/w low RA pressure, volume depletion, fluid tolerance, and grossly normal cardiac function. She was given a 2L LR bolus to which she responded well. Urine output is picking up. A 3rd liter of LR was ordered around 6am.    She has developed a refractory lactic acidosis despite all the above measures and had to be started on a bicarbonate infusion in an effort to keep the arterial pH > 7.1. The acidosis seems to be progressing and CRRT might be needed. Norepinephine and vasopressin were continued throughout the night. Pressor requirements stabilized. Stress dose hydrocortisone was added empirically.

## 2022-09-15 NOTE — CONSULTS
Thanks for consulting [facility] for the care of [facility]. Consult was initially called to wrong nephrology group  Dr Sue Arana was called later on yesterday  On vasopressors  Making urine  Has persistent acidosis  On bicarb drip    May need CRRT  Will continue to monitor closely      Full consult will follow  Please call with questions at-  24 Hrs Answering service [phone]  Perfect serve, or cell phone 7 am - [address] [facility], [place]   [facility]  [facility]. [facility]

## 2022-09-15 NOTE — ANESTHESIA PRE PROCEDURE
Department of Anesthesiology  Preprocedure Note       Name:  Kit Hawkins   Age:  67 y.o.  :  1950                                          MRN:  [id number]         Date:  9/15/2022      Surgeon: Germain Farfan):  Ulysses Paganini, MD    Procedure: Procedure(s):  LAPAROTOMY EXPLORATORY COLECTOMY WITH COLOSTOMY    Medications prior to admission:   Prior to Admission medications    Medication Sig Start Date End Date Taking?  Authorizing Provider   insulin glargine (LANTUS SOLOSTAR) 100 UNIT/ML injection pen Inject 15 Units into the skin nightly INJECT 15 UNITS SUBCUTANEOUSLY AT BEDTIME (MAY USE UP TO 50 UNITS DAILY IF DIRECTED BY DOCTOR)   Yes Historical Provider, MD   nitrofurantoin, macrocrystal-monohydrate, (MACROBID) 100 MG capsule Take 100 mg by mouth 2 times daily X 5 days 22 Yes Historical Provider, MD   aspirin 81 MG chewable tablet Take 81 mg by mouth daily   Yes Historical Provider, MD   metFORMIN (GLUCOPHAGE) 850 MG tablet Take 850 mg by mouth 2 times daily (with meals) 10/28/21  Yes Historical Provider, MD   gabapentin (NEURONTIN) 100 MG capsule TAKE 2 CAPSULES BY MOUTH THREE TIMES DAILY 22  Yes Historical Provider, MD   lisinopril (PRINIVIL;ZESTRIL) 40 MG tablet Take 1 tablet by mouth once daily 22  Yes Historical Provider, MD   Dulaglutide (TRULICITY) 3 KO/2.2UW SOPN Inject 3 mg into the skin every 7 days 22  Yes Historical Provider, MD   chlorthalidone (HYGROTON) 25 MG tablet TAKE 1 TABLET BY MOUTH ONCE DAILY 22  Yes Historical Provider, MD   furosemide (LASIX) 40 MG tablet Take 20 mg by mouth See Admin Instructions TAKE 1/2 (ONE-HALF) TABLET BY MOUTH ONCE DAILY ON MONDAY, WEDNESDAY, AND FRIDAY AS NEEDED FOR SWELLING 22  Yes Historical Provider, MD   glyBURIDE (DIABETA) 5 MG tablet TAKE 2 TABLETS BY MOUTH TWICE DAILY WITH MEALS 22  Yes Historical Provider, MD   rosuvastatin (CRESTOR) 5 MG tablet TAKE 1 TABLET BY MOUTH ONCE DAILY 22  Yes Historical 2,000 mg in dextrose 5 % 100 mL IVPB  2,000 mg IntraVENous PRN Aranza Addison MD        Or    calcium gluconate 3,000 mg in dextrose 5 % 100 mL IVPB  3,000 mg IntraVENous PRN Aranza Addison MD        Or    calcium gluconate 4,000 mg in dextrose 5 % 100 mL IVPB  4,000 mg IntraVENous PRN MD Noemi Thornton [START ON 9/16/2022] heparin (porcine) injection 5,000 Units  5,000 Units SubCUTAneous 3 times per day Aranza Addison MD        norepinephrine (LEVOPHED) 16 mg in dextrose 5 % 250 mL infusion  1-100 mcg/min IntraVENous Continuous Amedeo Gonzalez, DO 28.1 mL/hr at 09/15/22 1459 30 mcg/min at 09/15/22 1459    piperacillin-tazobactam (ZOSYN) 3,375 mg in dextrose 5 % 50 mL IVPB extended infusion (mini-bag)  3,375 mg IntraVENous Q8H Rogelio Prater MD 12.5 mL/hr at 09/15/22 1459 Rate Verify at 09/15/22 1459    glucose chewable tablet 16 g  4 tablet Oral PRN Rogelio Prater MD        dextrose bolus 10% 125 mL  125 mL IntraVENous PRN Rogelio Prater MD   Stopped at 09/15/22 5544    Or    dextrose bolus 10% 250 mL  250 mL IntraVENous PRN Rogelio Prater MD        glucagon (rDNA) injection 1 mg  1 mg SubCUTAneous PRN Rogelio Prater MD        dextrose 10 % infusion   IntraVENous Continuous PRN Rogelio Prater MD        sodium chloride flush 0.9 % injection 5-40 mL  5-40 mL IntraVENous 2 times per day Rogelio Prater MD   10 mL at 09/15/22 0810    sodium chloride flush 0.9 % injection 5-40 mL  5-40 mL IntraVENous PRN Rogelio Prater MD        0.9 % sodium chloride infusion   IntraVENous PRN Rogelio Prater MD        polyethylene glycol [facility]) packet 17 g  17 g Oral Daily PRN Rogelio Prater MD        acetaminophen (TYLENOL) tablet 650 mg  650 mg Oral Q6H PRN Rogelio Prater MD        Or   Noemi Wheeler acetaminophen (TYLENOL) suppository 650 mg  650 mg Rectal Q6H PRN Rogelio Prater MD        metronidazole (FLAGYL) 500 mg in 0.9% NaCl 100 mL IVPB premix  500 mg IntraVENous Tia Leon MD   Stopped at 09/15/22 1123    pantoprazole (PROTONIX) injection 40 mg  40 mg IntraVENous Daily Radha Torres MD   40 mg at 09/15/22 0809    insulin glargine (LANTUS) injection vial 15 Units  15 Units SubCUTAneous Nightly Radha Torres MD   15 Units at 09/14/22 2123    vasopressin 20 Units in dextrose 5 % 100 mL infusion  0.03 Units/min IntraVENous Titrated Elisha Ferrer MD 9 mL/hr at 09/15/22 1459 0.03 Units/min at 09/15/22 1459    insulin lispro (HUMALOG) injection vial 0-4 Units  0-4 Units SubCUTAneous Q4H Elisha Ferrer MD           Allergies: Allergies   Allergen Reactions    Latex     Lovastatin      Indigestion and Feels bad  Indigestion and Feels bad      Penicillins        Problem List:    Patient Active Problem List   Diagnosis Code    DKA (diabetic ketoacidosis) ([facility] E11.10    Hypotension I95.9    Syncope R55    Hyponatremia E87.1    DEJAN (acute kidney injury) ([facility] N17.9    Abdominal pain R10.9    Enteritis K52.9    Septic shock ([facility] A41.9, R65.21    Elevated troponin R77.8    Leukocytosis D72.829    Pyuria R82.81    Lactic acidosis E87.2    DM (diabetes mellitus), secondary uncontrolled ([facility] E13.65       Past Medical History:        Diagnosis Date    Chronic kidney disease     Diabetes mellitus ([facility]     Hyperlipidemia     Hypertension     Neuropathy        Past Surgical History:  History reviewed. No pertinent surgical history.     Social History:    Social History     Tobacco Use    Smoking status: Never    Smokeless tobacco: Never   Substance Use Topics    Alcohol use: Not Currently                                Counseling given: Not Answered      Vital Signs (Current):   Vitals:    09/15/22 1400 09/15/22 1415 09/15/22 1430 09/15/22 1436   BP:       Pulse: 83 84 85 89   Resp: 20 18 18 20   Temp:       TempSrc:       SpO2: 100% 100% 100% 99%   Weight:       Height:                                                  BP Readings from Last 3 Encounters:   09/15/22 (!) 119/49 07/15/22 (!) 144/60       NPO Status:                                                                                 BMI:   Wt Readings from Last 3 Encounters:   09/14/22 165 lb (74.8 kg)   07/14/22 161 lb (73 kg)     Body mass index is 24.37 kg/m².     CBC:   Lab Results   Component Value Date/Time    WBC 38.9 09/15/2022 04:21 AM    RBC 4.20 09/15/2022 04:21 AM    HGB 10.2 09/15/2022 10:37 AM    HCT 36.0 09/15/2022 04:21 AM    MCV 85.9 09/15/2022 04:21 AM    RDW 13.1 09/15/2022 04:21 AM     09/15/2022 04:21 AM       CMP:   Lab Results   Component Value Date/Time     09/15/2022 04:21 AM    K 4.9 09/15/2022 04:21 AM    K 4.4 09/14/2022 03:14 PM    CL 90 09/15/2022 04:21 AM    CO2 8 09/15/2022 04:21 AM    BUN 34 09/15/2022 04:21 AM    CREATININE 2.1 09/15/2022 04:21 AM    GFRAA 28 09/15/2022 04:21 AM    AGRATIO 1.3 09/15/2022 04:21 AM    LABGLOM 23 09/15/2022 04:21 AM    GLUCOSE 104 09/15/2022 04:21 AM    PROT 5.6 09/15/2022 04:21 AM    CALCIUM 8.0 09/15/2022 04:21 AM    BILITOT 0.3 09/15/2022 04:21 AM    ALKPHOS 566 09/15/2022 04:21 AM    AST 50 09/15/2022 04:21 AM    ALT 18 09/15/2022 04:21 AM       POC Tests:   Recent Labs     09/15/22  1037 09/15/22  1424   POCGLU 103* 76   POCNA 129*  --    POCK 4.5  --    POCHCT 30.0*  --        Coags:   Lab Results   Component Value Date/Time    PROTIME 20.6 09/15/2022 04:21 AM    INR 1.78 09/15/2022 04:21 AM       HCG (If Applicable): No results found for: PREGTESTUR, PREGSERUM, HCG, HCGQUANT     ABGs:   Lab Results   Component Value Date/Time    PHART 7.321 09/15/2022 01:38 PM    PO2ART 103.4 09/15/2022 01:38 PM    XXY5HJT 24.8 09/15/2022 01:38 PM    GGX5CWI 12.5 09/15/2022 01:38 PM    BEART -11.9 09/15/2022 01:38 PM    R3HQISBX 97.6 09/15/2022 01:38 PM        Type & Screen (If Applicable):  No results found for: LABABO, LABRH    Drug/Infectious Status (If Applicable):  No results found for: HIV, HEPCAB    COVID-19 Screening (If Applicable): No results found for: COVID19        Anesthesia Evaluation  Patient summary reviewed and Nursing notes reviewed no history of anesthetic complications:   Airway: Mallampati: II  TM distance: >3 FB   Neck ROM: full  Mouth opening: > = 3 FB   Dental:          Pulmonary:Negative Pulmonary ROS                              Cardiovascular:    (+) hypertension:,                   Neuro/Psych:   (+) neuromuscular disease (neuropathy):,             GI/Hepatic/Renal:   (+) renal disease: CRI,      (-) GERD and liver disease       Endo/Other:    (+) DiabetesType II DM, using insulin, . Abdominal:             Vascular: negative vascular ROS. Other Findings:           Anesthesia Plan      general     ASA 3 - emergent     (I discussed with the patient the risks and benefits of PIV, general anesthesia, IV Narcotics, PACU. All questions were answered the patient agrees with the plan)  Induction: intravenous. MIPS: Prophylactic antiemetics administered. Anesthetic plan and risks discussed with patient and spouse. Plan discussed with CRNA.                     Tim Lindsay MD   9/15/2022

## 2022-09-16 ENCOUNTER — APPOINTMENT (OUTPATIENT)
Dept: GENERAL RADIOLOGY | Age: 72
DRG: 853 | End: 2022-09-16
Payer: MEDICARE

## 2022-09-16 PROBLEM — J95.2 ACUTE POSTOPERATIVE PULMONARY INSUFFICIENCY (HCC): Status: ACTIVE | Noted: 2022-09-16

## 2022-09-16 PROBLEM — Z98.890 S/P EXPLORATORY LAPAROTOMY: Status: ACTIVE | Noted: 2022-09-16

## 2022-09-16 PROBLEM — K55.9 ISCHEMIC COLITIS (HCC): Status: ACTIVE | Noted: 2022-09-16

## 2022-09-16 LAB
A/G RATIO: 1.2 (ref 1.1–2.2)
ALBUMIN SERPL-MCNC: 2.1 G/DL (ref 3.4–5)
ALBUMIN SERPL-MCNC: 2.3 G/DL (ref 3.4–5)
ALBUMIN SERPL-MCNC: 2.3 G/DL (ref 3.4–5)
ALBUMIN SERPL-MCNC: 2.6 G/DL (ref 3.4–5)
ALP BLD-CCNC: 339 U/L (ref 40–129)
ALT SERPL-CCNC: 16 U/L (ref 10–40)
ANION GAP SERPL CALCULATED.3IONS-SCNC: 12 MMOL/L (ref 3–16)
ANION GAP SERPL CALCULATED.3IONS-SCNC: 17 MMOL/L (ref 3–16)
ANION GAP SERPL CALCULATED.3IONS-SCNC: 17 MMOL/L (ref 3–16)
ANION GAP SERPL CALCULATED.3IONS-SCNC: 8 MMOL/L (ref 3–16)
AST SERPL-CCNC: 43 U/L (ref 15–37)
BANDED NEUTROPHILS RELATIVE PERCENT: 1 % (ref 0–7)
BASE EXCESS ARTERIAL: -0.6 MMOL/L (ref -3–3)
BASOPHILS ABSOLUTE: 0 K/UL (ref 0–0.2)
BASOPHILS RELATIVE PERCENT: 0 %
BILIRUB SERPL-MCNC: 0.3 MG/DL (ref 0–1)
BUN BLDV-MCNC: 13 MG/DL (ref 7–20)
BUN BLDV-MCNC: 15 MG/DL (ref 7–20)
BUN BLDV-MCNC: 19 MG/DL (ref 7–20)
BUN BLDV-MCNC: 19 MG/DL (ref 7–20)
CALCIUM IONIZED: 0.99 MMOL/L (ref 1.12–1.32)
CALCIUM IONIZED: 1 MMOL/L (ref 1.12–1.32)
CALCIUM IONIZED: 1.02 MMOL/L (ref 1.12–1.32)
CALCIUM SERPL-MCNC: 7.4 MG/DL (ref 8.3–10.6)
CALCIUM SERPL-MCNC: 7.8 MG/DL (ref 8.3–10.6)
CALCIUM SERPL-MCNC: 8.1 MG/DL (ref 8.3–10.6)
CALCIUM SERPL-MCNC: 8.1 MG/DL (ref 8.3–10.6)
CARBOXYHEMOGLOBIN ARTERIAL: 0.3 % (ref 0–1.5)
CHLORIDE BLD-SCNC: 100 MMOL/L (ref 99–110)
CHLORIDE BLD-SCNC: 94 MMOL/L (ref 99–110)
CHLORIDE BLD-SCNC: 94 MMOL/L (ref 99–110)
CHLORIDE BLD-SCNC: 97 MMOL/L (ref 99–110)
CO2: 20 MMOL/L (ref 21–32)
CO2: 20 MMOL/L (ref 21–32)
CO2: 23 MMOL/L (ref 21–32)
CO2: 26 MMOL/L (ref 21–32)
CREAT SERPL-MCNC: 1 MG/DL (ref 0.6–1.2)
CREAT SERPL-MCNC: 1 MG/DL (ref 0.6–1.2)
CREAT SERPL-MCNC: 1.2 MG/DL (ref 0.6–1.2)
CREAT SERPL-MCNC: 1.2 MG/DL (ref 0.6–1.2)
EOSINOPHILS ABSOLUTE: 0 K/UL (ref 0–0.6)
EOSINOPHILS RELATIVE PERCENT: 0 %
GFR AFRICAN AMERICAN: 53
GFR AFRICAN AMERICAN: 53
GFR AFRICAN AMERICAN: >60
GFR AFRICAN AMERICAN: >60
GFR NON-AFRICAN AMERICAN: 44
GFR NON-AFRICAN AMERICAN: 44
GFR NON-AFRICAN AMERICAN: 54
GFR NON-AFRICAN AMERICAN: 54
GI BACTERIAL PATHOGENS BY PCR: NORMAL
GLUCOSE BLD-MCNC: 100 MG/DL (ref 70–99)
GLUCOSE BLD-MCNC: 112 MG/DL (ref 70–99)
GLUCOSE BLD-MCNC: 130 MG/DL (ref 70–99)
GLUCOSE BLD-MCNC: 158 MG/DL (ref 70–99)
GLUCOSE BLD-MCNC: 160 MG/DL (ref 70–99)
GLUCOSE BLD-MCNC: 175 MG/DL (ref 70–99)
GLUCOSE BLD-MCNC: 187 MG/DL (ref 70–99)
GLUCOSE BLD-MCNC: 194 MG/DL (ref 70–99)
GLUCOSE BLD-MCNC: 210 MG/DL (ref 70–99)
GLUCOSE BLD-MCNC: 212 MG/DL (ref 70–99)
GLUCOSE BLD-MCNC: 90 MG/DL (ref 70–99)
HCO3 ARTERIAL: 22.7 MMOL/L (ref 21–29)
HCT VFR BLD CALC: 29.8 % (ref 36–48)
HEMOGLOBIN, ART, EXTENDED: 10.7 G/DL (ref 12–16)
HEMOGLOBIN: 10 G/DL (ref 12–16)
INR BLD: 1.54 (ref 0.87–1.14)
LACTIC ACID: 8.6 MMOL/L (ref 0.4–2)
LYMPHOCYTES ABSOLUTE: 0.8 K/UL (ref 1–5.1)
LYMPHOCYTES RELATIVE PERCENT: 4 %
MAGNESIUM: 2.1 MG/DL (ref 1.8–2.4)
MAGNESIUM: 2.2 MG/DL (ref 1.8–2.4)
MCH RBC QN AUTO: 27.8 PG (ref 26–34)
MCHC RBC AUTO-ENTMCNC: 33.5 G/DL (ref 31–36)
MCV RBC AUTO: 82.9 FL (ref 80–100)
METHEMOGLOBIN ARTERIAL: 0.2 %
MONOCYTES ABSOLUTE: 0 K/UL (ref 0–1.3)
MONOCYTES RELATIVE PERCENT: 0 %
NEUTROPHILS ABSOLUTE: 19.7 K/UL (ref 1.7–7.7)
NEUTROPHILS RELATIVE PERCENT: 95 %
O2 SAT, ARTERIAL: 96.3 %
O2 THERAPY: ABNORMAL
PCO2 ARTERIAL: 32.5 MMHG (ref 35–45)
PDW BLD-RTO: 13.8 % (ref 12.4–15.4)
PERFORMED ON: ABNORMAL
PERFORMED ON: NORMAL
PH ARTERIAL: 7.46 (ref 7.35–7.45)
PH VENOUS: 7.44 (ref 7.35–7.45)
PH VENOUS: 7.44 (ref 7.35–7.45)
PH VENOUS: 7.45 (ref 7.35–7.45)
PHOSPHORUS: 1.9 MG/DL (ref 2.5–4.9)
PHOSPHORUS: 2.1 MG/DL (ref 2.5–4.9)
PHOSPHORUS: 2.4 MG/DL (ref 2.5–4.9)
PHOSPHORUS: 2.4 MG/DL (ref 2.5–4.9)
PLATELET # BLD: 151 K/UL (ref 135–450)
PMV BLD AUTO: 8.1 FL (ref 5–10.5)
PO2 ARTERIAL: 84.2 MMHG (ref 75–108)
POTASSIUM SERPL-SCNC: 4.2 MMOL/L (ref 3.5–5.1)
POTASSIUM SERPL-SCNC: 4.3 MMOL/L (ref 3.5–5.1)
POTASSIUM SERPL-SCNC: 4.3 MMOL/L (ref 3.5–5.1)
POTASSIUM SERPL-SCNC: 4.4 MMOL/L (ref 3.5–5.1)
PROTHROMBIN TIME: 18.3 SEC (ref 11.7–14.5)
RBC # BLD: 3.59 M/UL (ref 4–5.2)
SLIDE REVIEW: ABNORMAL
SODIUM BLD-SCNC: 131 MMOL/L (ref 136–145)
SODIUM BLD-SCNC: 131 MMOL/L (ref 136–145)
SODIUM BLD-SCNC: 132 MMOL/L (ref 136–145)
SODIUM BLD-SCNC: 134 MMOL/L (ref 136–145)
TCO2 ARTERIAL: 23.7 MMOL/L
TOTAL PROTEIN: 4.3 G/DL (ref 6.4–8.2)
WBC # BLD: 20.5 K/UL (ref 4–11)

## 2022-09-16 PROCEDURE — 2500000003 HC RX 250 WO HCPCS: Performed by: STUDENT IN AN ORGANIZED HEALTH CARE EDUCATION/TRAINING PROGRAM

## 2022-09-16 PROCEDURE — 2580000003 HC RX 258: Performed by: STUDENT IN AN ORGANIZED HEALTH CARE EDUCATION/TRAINING PROGRAM

## 2022-09-16 PROCEDURE — 6360000002 HC RX W HCPCS: Performed by: STUDENT IN AN ORGANIZED HEALTH CARE EDUCATION/TRAINING PROGRAM

## 2022-09-16 PROCEDURE — 2000000000 HC ICU R&B

## 2022-09-16 PROCEDURE — 2580000003 HC RX 258: Performed by: ANESTHESIOLOGY

## 2022-09-16 PROCEDURE — 83605 ASSAY OF LACTIC ACID: CPT

## 2022-09-16 PROCEDURE — 85610 PROTHROMBIN TIME: CPT

## 2022-09-16 PROCEDURE — 83735 ASSAY OF MAGNESIUM: CPT

## 2022-09-16 PROCEDURE — 82803 BLOOD GASES ANY COMBINATION: CPT

## 2022-09-16 PROCEDURE — 84100 ASSAY OF PHOSPHORUS: CPT

## 2022-09-16 PROCEDURE — P9047 ALBUMIN (HUMAN), 25%, 50ML: HCPCS | Performed by: NURSE PRACTITIONER

## 2022-09-16 PROCEDURE — 90945 DIALYSIS ONE EVALUATION: CPT

## 2022-09-16 PROCEDURE — 74018 RADEX ABDOMEN 1 VIEW: CPT

## 2022-09-16 PROCEDURE — 80053 COMPREHEN METABOLIC PANEL: CPT

## 2022-09-16 PROCEDURE — 36556 INSERT NON-TUNNEL CV CATH: CPT

## 2022-09-16 PROCEDURE — 2580000003 HC RX 258: Performed by: INTERNAL MEDICINE

## 2022-09-16 PROCEDURE — 37799 UNLISTED PX VASCULAR SURGERY: CPT

## 2022-09-16 PROCEDURE — 6360000002 HC RX W HCPCS: Performed by: NURSE PRACTITIONER

## 2022-09-16 PROCEDURE — 2700000000 HC OXYGEN THERAPY PER DAY

## 2022-09-16 PROCEDURE — 99291 CRITICAL CARE FIRST HOUR: CPT | Performed by: INTERNAL MEDICINE

## 2022-09-16 PROCEDURE — 6370000000 HC RX 637 (ALT 250 FOR IP): Performed by: STUDENT IN AN ORGANIZED HEALTH CARE EDUCATION/TRAINING PROGRAM

## 2022-09-16 PROCEDURE — 82330 ASSAY OF CALCIUM: CPT

## 2022-09-16 PROCEDURE — 99024 POSTOP FOLLOW-UP VISIT: CPT | Performed by: SURGERY

## 2022-09-16 PROCEDURE — 85025 COMPLETE CBC W/AUTO DIFF WBC: CPT

## 2022-09-16 PROCEDURE — 94761 N-INVAS EAR/PLS OXIMETRY MLT: CPT

## 2022-09-16 PROCEDURE — 94003 VENT MGMT INPAT SUBQ DAY: CPT

## 2022-09-16 PROCEDURE — C9113 INJ PANTOPRAZOLE SODIUM, VIA: HCPCS | Performed by: STUDENT IN AN ORGANIZED HEALTH CARE EDUCATION/TRAINING PROGRAM

## 2022-09-16 RX ORDER — 0.9 % SODIUM CHLORIDE 0.9 %
1000 INTRAVENOUS SOLUTION INTRAVENOUS ONCE
Status: COMPLETED | OUTPATIENT
Start: 2022-09-16 | End: 2022-09-16

## 2022-09-16 RX ORDER — ALBUMIN (HUMAN) 12.5 G/50ML
25 SOLUTION INTRAVENOUS EVERY 6 HOURS
Status: COMPLETED | OUTPATIENT
Start: 2022-09-16 | End: 2022-09-17

## 2022-09-16 RX ADMIN — ALBUMIN (HUMAN) 25 G: 0.25 INJECTION, SOLUTION INTRAVENOUS at 14:18

## 2022-09-16 RX ADMIN — Medication: at 04:00

## 2022-09-16 RX ADMIN — PANTOPRAZOLE SODIUM 40 MG: 40 INJECTION, POWDER, FOR SOLUTION INTRAVENOUS at 08:16

## 2022-09-16 RX ADMIN — SODIUM CHLORIDE 1000 ML: 9 INJECTION, SOLUTION INTRAVENOUS at 10:24

## 2022-09-16 RX ADMIN — MUPIROCIN: 20 OINTMENT TOPICAL at 08:16

## 2022-09-16 RX ADMIN — Medication 1500 ML/HR: at 13:45

## 2022-09-16 RX ADMIN — HEPARIN SODIUM 5000 UNITS: 5000 INJECTION INTRAVENOUS; SUBCUTANEOUS at 05:52

## 2022-09-16 RX ADMIN — PIPERACILLIN AND TAZOBACTAM 3375 MG: 3; .375 INJECTION, POWDER, LYOPHILIZED, FOR SOLUTION INTRAVENOUS at 05:30

## 2022-09-16 RX ADMIN — Medication 1000 ML/HR: at 19:20

## 2022-09-16 RX ADMIN — HEPARIN SODIUM 5000 UNITS: 5000 INJECTION INTRAVENOUS; SUBCUTANEOUS at 14:08

## 2022-09-16 RX ADMIN — INSULIN GLARGINE 15 UNITS: 100 INJECTION, SOLUTION SUBCUTANEOUS at 20:14

## 2022-09-16 RX ADMIN — SODIUM BICARBONATE: 84 INJECTION, SOLUTION INTRAVENOUS at 05:31

## 2022-09-16 RX ADMIN — PROPOFOL 20 MCG/KG/MIN: 10 INJECTION, EMULSION INTRAVENOUS at 02:38

## 2022-09-16 RX ADMIN — ALBUMIN (HUMAN) 25 G: 0.25 INJECTION, SOLUTION INTRAVENOUS at 19:26

## 2022-09-16 RX ADMIN — CALCIUM GLUCONATE 1000 MG: 20 INJECTION, SOLUTION INTRAVENOUS at 18:54

## 2022-09-16 RX ADMIN — Medication 1000 ML/HR: at 09:00

## 2022-09-16 RX ADMIN — Medication 1500 ML/HR: at 03:43

## 2022-09-16 RX ADMIN — Medication 1000 ML/HR: at 03:55

## 2022-09-16 RX ADMIN — PIPERACILLIN AND TAZOBACTAM 3375 MG: 3; .375 INJECTION, POWDER, LYOPHILIZED, FOR SOLUTION INTRAVENOUS at 14:14

## 2022-09-16 RX ADMIN — HYDROCORTISONE SODIUM SUCCINATE 50 MG: 100 INJECTION, POWDER, FOR SOLUTION INTRAMUSCULAR; INTRAVENOUS at 02:06

## 2022-09-16 RX ADMIN — FENTANYL CITRATE 25 MCG/HR: 0.05 INJECTION, SOLUTION INTRAMUSCULAR; INTRAVENOUS at 00:25

## 2022-09-16 RX ADMIN — Medication 10 ML: at 19:29

## 2022-09-16 RX ADMIN — Medication 1500 ML/HR: at 10:33

## 2022-09-16 RX ADMIN — FENTANYL CITRATE 50 MCG/HR: 0.05 INJECTION, SOLUTION INTRAMUSCULAR; INTRAVENOUS at 17:53

## 2022-09-16 RX ADMIN — CALCIUM GLUCONATE 1000 MG: 20 INJECTION, SOLUTION INTRAVENOUS at 04:58

## 2022-09-16 RX ADMIN — VASOPRESSIN 0.03 UNITS/MIN: 20 INJECTION INTRAVENOUS at 00:24

## 2022-09-16 RX ADMIN — METRONIDAZOLE 500 MG: 500 INJECTION, SOLUTION INTRAVENOUS at 00:08

## 2022-09-16 RX ADMIN — HEPARIN SODIUM 5000 UNITS: 5000 INJECTION INTRAVENOUS; SUBCUTANEOUS at 21:44

## 2022-09-16 RX ADMIN — SODIUM PHOSPHATE, MONOBASIC, MONOHYDRATE 6 MMOL: 276; 142 INJECTION, SOLUTION INTRAVENOUS at 12:15

## 2022-09-16 RX ADMIN — Medication 1500 ML/HR: at 17:25

## 2022-09-16 RX ADMIN — CALCIUM GLUCONATE 1000 MG: 20 INJECTION, SOLUTION INTRAVENOUS at 11:54

## 2022-09-16 RX ADMIN — Medication 1500 ML/HR: at 20:51

## 2022-09-16 RX ADMIN — DEXTROSE MONOHYDRATE 25 MCG/MIN: 50 INJECTION, SOLUTION INTRAVENOUS at 00:10

## 2022-09-16 RX ADMIN — Medication 1500 ML/HR: at 00:23

## 2022-09-16 RX ADMIN — PROPOFOL 15 MCG/KG/MIN: 10 INJECTION, EMULSION INTRAVENOUS at 14:21

## 2022-09-16 RX ADMIN — HYDROCORTISONE SODIUM SUCCINATE 50 MG: 100 INJECTION, POWDER, FOR SOLUTION INTRAMUSCULAR; INTRAVENOUS at 08:16

## 2022-09-16 RX ADMIN — DEXTROSE MONOHYDRATE 20 MCG/MIN: 50 INJECTION, SOLUTION INTRAVENOUS at 11:09

## 2022-09-16 RX ADMIN — SODIUM PHOSPHATE, MONOBASIC, MONOHYDRATE 6 MMOL: 276; 142 INJECTION, SOLUTION INTRAVENOUS at 06:01

## 2022-09-16 RX ADMIN — PIPERACILLIN AND TAZOBACTAM 3375 MG: 3; .375 INJECTION, POWDER, LYOPHILIZED, FOR SOLUTION INTRAVENOUS at 22:06

## 2022-09-16 RX ADMIN — Medication 10 ML: at 08:17

## 2022-09-16 RX ADMIN — HYDROCORTISONE SODIUM SUCCINATE 50 MG: 100 INJECTION, POWDER, FOR SOLUTION INTRAMUSCULAR; INTRAVENOUS at 14:08

## 2022-09-16 RX ADMIN — HYDROCORTISONE SODIUM SUCCINATE 50 MG: 100 INJECTION, POWDER, FOR SOLUTION INTRAMUSCULAR; INTRAVENOUS at 19:27

## 2022-09-16 RX ADMIN — METRONIDAZOLE 500 MG: 500 INJECTION, SOLUTION INTRAVENOUS at 08:18

## 2022-09-16 RX ADMIN — SODIUM PHOSPHATE, MONOBASIC, MONOHYDRATE 12 MMOL: 276; 142 INJECTION, SOLUTION INTRAVENOUS at 19:25

## 2022-09-16 RX ADMIN — MUPIROCIN: 20 OINTMENT TOPICAL at 19:28

## 2022-09-16 RX ADMIN — Medication 1500 ML/HR: at 07:06

## 2022-09-16 RX ADMIN — MAGNESIUM SULFATE HEPTAHYDRATE 1000 MG: 1 INJECTION, SOLUTION INTRAVENOUS at 00:28

## 2022-09-16 RX ADMIN — Medication: at 13:30

## 2022-09-16 RX ADMIN — MAGNESIUM SULFATE HEPTAHYDRATE 1000 MG: 1 INJECTION, SOLUTION INTRAVENOUS at 01:29

## 2022-09-16 RX ADMIN — Medication 1000 ML/HR: at 13:45

## 2022-09-16 ASSESSMENT — PULMONARY FUNCTION TESTS
PIF_VALUE: 18
PIF_VALUE: 18
PIF_VALUE: 20
PIF_VALUE: 18
PIF_VALUE: 18
PIF_VALUE: 20
PIF_VALUE: 20
PIF_VALUE: 17
PIF_VALUE: 19
PIF_VALUE: 18
PIF_VALUE: 17
PIF_VALUE: 19
PIF_VALUE: 17
PIF_VALUE: 16
PIF_VALUE: 18
PIF_VALUE: 17
PIF_VALUE: 19
PIF_VALUE: 18
PIF_VALUE: 15
PIF_VALUE: 18
PIF_VALUE: 19
PIF_VALUE: 20
PIF_VALUE: 18
PIF_VALUE: 19
PIF_VALUE: 18
PIF_VALUE: 18
PIF_VALUE: 19
PIF_VALUE: 18
PIF_VALUE: 18
PIF_VALUE: 19
PIF_VALUE: 18
PIF_VALUE: 19
PIF_VALUE: 19
PIF_VALUE: 17
PIF_VALUE: 19
PIF_VALUE: 18
PIF_VALUE: 13
PIF_VALUE: 18
PIF_VALUE: 17
PIF_VALUE: 20
PIF_VALUE: 17
PIF_VALUE: 18
PIF_VALUE: 20
PIF_VALUE: 18
PIF_VALUE: 19
PIF_VALUE: 17
PIF_VALUE: 19
PIF_VALUE: 18
PIF_VALUE: 13
PIF_VALUE: 18
PIF_VALUE: 19

## 2022-09-16 ASSESSMENT — PAIN SCALES - GENERAL: PAINLEVEL_OUTOF10: 0

## 2022-09-16 NOTE — PROGRESS NOTES
09/16/22 1951   Patient Observation   Heart Rate 100   Resp 14   SpO2 99 %   Breath Sounds   Right Upper Lobe Diminished   Right Middle Lobe Diminished   Right Lower Lobe Diminished   Left Upper Lobe Diminished   Left Lower Lobe Diminished   Vent Information   Vent Mode AC/VC   Ventilator Settings   FiO2  30 %   Vt (Set, mL) 450 mL   Resp Rate (Set) 14 bmp   PEEP/CPAP (cmH2O) 5   Vent Patient Data (Readings)   Vt (Measured) 473 mL   Peak Inspiratory Pressure (cmH2O) 18 cmH2O   Rate Measured 14 br/min   Minute Volume (L/min) 6.6 Liters   Mean Airway Pressure (cmH2O) 8 cmH20   I:E Ratio 1:2.90   Vent Alarm Settings   Low Minute Volume (lpm) 2 L/min   High Minute Volumn (lpm) 20 L/min   Low Exhaled Vt (ml) 200 mL   RR High (bpm) 40 br/min   Apnea (secs) 20 secs   Additional Respiratoray Assessments   Humidification Source HME   Ambu Bag With Mask At Bedside Yes   Airway Clearance   Suction ET Tube   Subglottic Suction Done Yes   Suction Device Inline suction catheter   Sputum Method Obtained Endotracheal   Sputum Amount   (nothing)   ETT (adult)   Placement Date/Time: 09/15/22 1720   Present on Admission/Arrival: No  Placed By: In surgery; Licensed provider  Placement Verified By: Capnometry;Direct visualization; Auscultation  Preoxygenation: Yes  Mask Ventilation: Ventilated by mask with oral ai. ..    Secured At 22 cm   Measured From Lips   ETT Placement Right   Secured By Commercial tube wills   Site Assessment Dry   Cuff Pressure 30 cm H2O

## 2022-09-16 NOTE — ANESTHESIA POSTPROCEDURE EVALUATION
Department of Anesthesiology  Postprocedure Note    Patient: Jacque Rodriguez  MRN: [id number]  YOB: 1950  Date of evaluation: 9/15/2022      Procedure Summary     Date: 09/15/22 Room / Location: [address]    Anesthesia Start: 1705 Anesthesia Stop: 2034    Procedure: DIAGNOSTIC LAPAROSCOPY, EXPLORATORY LAPAROTOMY, SIGMOID COLECTOMY AND COLOSTOMY (Abdomen) Diagnosis:       Perforated bowel ([address]      Stercoral colitis      (Stercoral Colitis)    Surgeons: Shakila Carballo MD Responsible Provider: Kin Dodge MD    Anesthesia Type: general ASA Status: 3 - Emergent          Anesthesia Type: No value filed.     Tung Phase I:      Tung Phase II:        Anesthesia Post Evaluation    Patient location during evaluation: ICU  Level of consciousness: sedated and ventilated  Airway patency: patent  Nausea & Vomiting: no nausea  Complications: no  Cardiovascular status: blood pressure returned to baseline  Respiratory status: acceptable and ventilator  Hydration status: euvolemic

## 2022-09-16 NOTE — PROGRESS NOTES
09/16/22 1213   Patient Observation   Heart Rate 77   Resp 14   SpO2 100 %   Breath Sounds   Right Upper Lobe Diminished   Right Middle Lobe Diminished   Right Lower Lobe Diminished   Left Upper Lobe Diminished   Left Lower Lobe Diminished   Vent Information   Vent Mode AC/VC   Ventilator Settings   FiO2  30 %   Vt (Set, mL) 450 mL   Resp Rate (Set) 14 bmp   PEEP/CPAP (cmH2O) 5   Vent Patient Data (Readings)   Vt (Measured) 466 mL   Peak Inspiratory Pressure (cmH2O) 19 cmH2O   Rate Measured 14 br/min   Minute Volume (L/min) 6.51 Liters   Mean Airway Pressure (cmH2O) 8.5 cmH20   I:E Ratio 1:2.90   Flow Sensitivity 3 L/min   Backup Apnea On   Vent Alarm Settings   Low Minute Volume (lpm) 3 L/min   High Minute Volumn (lpm) 20 L/min   Low Exhaled Vt (ml) 200 mL   High Exhaled Vt (ml) 1000 mL   RR High (bpm) 40 br/min   Apnea (secs) 20 secs   Additional Respiratoray Assessments   Humidification Source HME   Circuit Condensation Drained   Ambu Bag With Mask At Bedside Yes   ETT (adult)   Placement Date/Time: 09/15/22 1720   Present on Admission/Arrival: No  Placed By: In surgery; Licensed provider  Placement Verified By: Capnometry;Direct visualization; Auscultation  Preoxygenation: Yes  Mask Ventilation: Ventilated by mask with oral ai. ..    Secured At 22 cm   Measured From Lips   ETT Placement (S)  Left   Secured By Commercial tube wills   Site Assessment Cool;Dry   Supplemental Gases   Supplemental Gases None   Ventilator Associated Pneumonia Bundle   Elevation of Head of Bed to 30-45 Degrees  Yes

## 2022-09-16 NOTE — PROGRESS NOTES
Patient's wedding ring removed due to swelling.  Placed in room lockbox with patient label.     -Phuc Carbajal RN

## 2022-09-16 NOTE — PLAN OF CARE
Problem: Pain  Goal: Verbalizes/displays adequate comfort level or baseline comfort level  Outcome: Progressing     Problem: Skin/Tissue Integrity  Goal: Absence of new skin breakdown  Description: 1. Monitor for areas of redness and/or skin breakdown  2. Assess vascular access sites hourly  3. Every 4-6 hours minimum:  Change oxygen saturation probe site  4. Every 4-6 hours:  If on nasal continuous positive airway pressure, respiratory therapy assess nares and determine need for appliance change or resting period. Outcome: Progressing     Problem: Safety - Medical Restraint  Goal: Remains free of injury from restraints (Restraint for Interference with Medical Device)  Description: INTERVENTIONS:  1. Determine that other, less restrictive measures have been tried or would not be effective before applying the restraint  2. Evaluate the patient's condition at the time of restraint application  3. Inform patient/family regarding the reason for restraint  4.  Q2H: Monitor safety, psychosocial status, comfort, nutrition and hydration  Outcome: Progressing     Problem: Discharge Planning  Goal: Discharge to home or other facility with appropriate resources  Outcome: Not Progressing     Problem: Nutrition Deficit:  Goal: Optimize nutritional status  Outcome: Not Progressing

## 2022-09-16 NOTE — PROGRESS NOTES
09/16/22 0018   Patient Observation   Heart Rate 80   Resp 16   SpO2 100 %   Vent Information   Vent Mode AC/VC   Ventilator Settings   FiO2  100 %   Vt (Set, mL) 500 mL   Resp Rate (Set) 14 bmp   PEEP/CPAP (cmH2O) 5   Vent Patient Data (Readings)   Vt (Measured) 522 mL   Peak Inspiratory Pressure (cmH2O) 15 cmH2O   Rate Measured 15 br/min   Minute Volume (L/min) 8.1 Liters   Mean Airway Pressure (cmH2O) 8.19 cmH20   I:E Ratio 1:2.10   Vent Alarm Settings   Low Minute Volume (lpm) 2 L/min   Low Exhaled Vt (ml) 200 mL   RR High (bpm) 40 br/min   Apnea (secs) 20 secs   Additional Respiratoray Assessments   Humidification Source HME   Ambu Bag With Mask At Bedside Yes   Airway Clearance   Suction ET Tube   Suction Device Inline suction catheter   Sputum Method Obtained Endotracheal   ETT (adult)   Placement Date/Time: 09/15/22 1720   Present on Admission/Arrival: No  Placed By: In surgery; Licensed provider  Placement Verified By: Capnometry;Direct visualization; Auscultation  Preoxygenation: Yes  Mask Ventilation: Ventilated by mask with oral ai. ..    Secured At 21 cm   Measured From Lips   ETT Placement Center   Secured By Commercial tube wills   Site Assessment Dry   Skin Assessment   Skin Assessment Clean, dry, & intact

## 2022-09-16 NOTE — PROGRESS NOTES
Shift: [id number]    Admitting diagnosis: Sepsis, enteritis    Presentation to hospital: HOTN, syncopal episode in bathroom @ home. BS [facility] EMS    Surgery: Yes; ex lap; ischemic sigmoid colon with resection and colostomy creation    Nursing assessment at handoff: Stable    Emergency Contact/POA: Anuja Gunter, spouse [phone]  Family updated: Yes    Most recent vitals: BP (!) 90/58   Pulse 95   Temp 99.5 °F (37.5 °C) (Bladder)   Resp 13   Ht 5' 9\" (1.753 m)   Wt 194 lb 0.1 oz (88 kg)   SpO2 99%   BMI 28.65 kg/m²      Rhythm: Normal Sinus Rhythm 70-80's    NC/HFNC-   Respiratory support: Ventilator    Vent days: Day 1    Increased O2 requirements: No    Admission weight Weight: 165 lb (74.8 kg)  Today's weight   Wt Readings from Last 1 Encounters:   09/16/22 194 lb 0.1 oz (88 kg)         UOP >30ml/hr: No, CRRT    Osorio need assessed each shift: yes, hemodynamically unstable    Restraints: yes Order current and documentation up to date?  Yes    Lines/Drains  LDA Insertion Date Discontinued Date Dressing Changes   PIV  9/14     TLC  9/16     Arterial  9/14     Osorio  9/14     Vas Cath 9/15     ETT  9/15     Surgical drains 9/15     Ostomy 9/15  9/16     Night Shift Hospitalist Interventions    Problem(Brief) Date Time Intervention Physician contacted       Viet Lomeli                                        Drip rates at handoff:    Liudmila Ores 4/2.5 1,500 mL/hr (09/16/22 1725)    prismaSATE BGK 4/2.5 1,000 mL/hr (09/16/22 1345)    prismaSATE BGK 4/2.5 500 mL/hr at 09/16/22 1330    sodium chloride      fentaNYL (SUBLIMAZE) infusion 50 mcg/hr (09/16/22 1800)    propofol 15 mcg/kg/min (09/16/22 1800)    norepinephrine 15 mcg/min (09/16/22 1800)    dextrose      sodium chloride      vasopressin (Septic Shock) infusion Stopped (09/16/22 1205)       Hospital Course Daily Updates:  Admit Day# 1  - RIJ CVC placed, levo & vaso infusion   -pH 7.1, 1 amp bicarb, NS @ 125  -R radial Sandee placed  -lactic 12.6 down 7.6    Admit Night #1 9/14/22  -Cdiff sample sent  -Lactic 11.3, 14.1  -critical CO2 8  -NS @ 125 changed to bicarb gtt @ 150  -1 amp bicarb given  -2L LR bolus  -2 amps bicarb per nephro, give 2 more amps if pH <7.2  -1L LR bolus  -urine output increasing, but less than <30 ml/hr    Admit Day #2 9/15/2022  -Cdiff Negative  -Lactic 19.3/18  -pH 7.27; 2 amps bicarb given  -BG 50s; x2 D10 boluses given, repeat BG 100s  -Repeat CT ABD; Severe inflammatory change in the right lower quadrant with etiology   uncertain. This could correspond to enteritis of the distal ileum and   terminal ileum. Colitis may also be considered. Infectious or inflammatory   etiologies may be favored     -Nephrology concerned for metformin contribution/DEJAN; Right fem vascath placed, CRRT started [id number]  -Consult General surgery; pt to OR at 1700 for concern for bowel perf    9/15 Nights:  -Back from OR at 2030.  Resection of ischemic sigmoid colon with colostomy placement  -new 2 piece appliance applied to colostomy; wound consult placed  -having BM's  -CRRT restarted at 2130  -electrolytes being replaced per protocall  -New Right IJ placed  previous was unusable (was pulled out at some point during surgery)    Admit Day #3 9/16/2022  -Lactic downtrending; 8.6 this AM  -Bicarb gtt d/c'ed  -Increase in pressors; 1L NS bolus given (not included in CRRT running total), albumin added  -Vaso gtt titrated off  -CRRT maintained, keeping net even  -Output noted from ostomy, wound care rounded; ostomy noted to be brown in color    Lab Data:   CBC:   Recent Labs     09/15/22  2115 09/16/22  0420   WBC 20.3* 20.5*   HGB 9.9* 10.0*   HCT 30.5* 29.8*   MCV 84.8 82.9    151       BMP:    Recent Labs     09/16/22 0420 09/16/22  1059   *  131* 132*   K 4.3  4.2 4.3   CO2 20*  20* 23   BUN 19  19 15   CREATININE 1.2  1.2 1.0       LIVR:   Recent Labs     09/15/22  2115 09/16/22  0420   AST 43* 43*   ALT 16 16       PT/INR:   Recent Labs     09/15/22  0421 09/15/22  2115 09/16/22  0420   PROT 5.6* 4.2* 4.3*   INR 1.78*  --  1.54*       APTT: No results for input(s): APTT in the last 72 hours.   ABG:   Recent Labs     09/15/22  2351 09/16/22  0524   PHART 7.371 7.462*   KDQ1OKM 32.4* 32.5*   PO2ART 336.3* 84.2       Consults (if GI or Nephrology- which group?)-  Nephro/Sarthak treviño, General surgery/Dr. Rodriguez

## 2022-09-16 NOTE — PROGRESS NOTES
09/16/22 0854   Patient Observation   Heart Rate 92   Resp 15   SpO2 99 %   Breath Sounds   Right Upper Lobe Diminished   Right Middle Lobe Diminished   Right Lower Lobe Diminished   Left Upper Lobe Diminished   Left Lower Lobe Diminished   Vent Information   Vent Mode AC/VC   Ventilator Settings   FiO2  30 %   Vt (Set, mL) (S)  450 mL   Resp Rate (Set) 14 bmp   PEEP/CPAP (cmH2O) 5   Vent Patient Data (Readings)   Vt (Measured) 516 mL   Peak Inspiratory Pressure (cmH2O) 19 cmH2O   Rate Measured 14 br/min   Minute Volume (L/min) 7.33 Liters   Peak Inspiratory Flow (lpm) 45 L/sec   Mean Airway Pressure (cmH2O) 9.19 cmH20   I:E Ratio 1:2.50   Flow Sensitivity 3 L/min   Backup Apnea On   Vent Alarm Settings   Low Minute Volume (lpm) 3 L/min   High Minute Volumn (lpm) 20 L/min   Low Exhaled Vt (ml) 200 mL   High Exhaled Vt (ml) 1000 mL   RR High (bpm) 40 br/min   Apnea (secs) 20 secs   Additional Respiratoray Assessments   Humidification Source HME   Circuit Condensation Drained   Ambu Bag With Mask At Bedside Yes   Airway Clearance   Suction ET Tube   Suction Device Inline suction catheter   Sputum Method Obtained Endotracheal   Sputum Amount Other (comment)  (none)   ETT (adult)   Placement Date/Time: 09/15/22 1720   Present on Admission/Arrival: No  Placed By: In surgery; Licensed provider  Placement Verified By: Capnometry;Direct visualization; Auscultation  Preoxygenation: Yes  Mask Ventilation: Ventilated by mask with oral ai. ..    Secured At 22 cm   Measured From Lips   ETT Placement (S)  Center   Secured By Commercial tube wills   Site Assessment Dry   Supplemental Gases   Supplemental Gases None

## 2022-09-16 NOTE — OP NOTE
Operative Note      Patient: Patricia Ibrahim  YOB: 1950  MRN: [id number]    Date of Procedure: 9/15/2022    Pre-Op Diagnosis: Stercoral Colitis    Post-Op Diagnosis:  Ischemic Colitis       Procedure(s):  DIAGNOSTIC LAPAROSCOPY, EXPLORATORY LAPAROTOMY, SIGMOID COLECTOMY AND COLOSTOMY    Surgeon(s):  MD Yusef Kline DO    Assistant:   Surgical Assistant: Florina Romo    Anesthesia: General    Estimated Blood Loss (mL): 30    Complications: None    Specimens:   ID Type Source Tests Collected by Time Destination   A : SIGMOID COLON, STITCH AT PROXIMAL MARGIN Tissue Colon SURGICAL PATHOLOGY Tam Mercer MD 9/15/2022 1835    B : LEFT PELVIC PERITONEAL IMPLANT Specimen Abdomen SURGICAL PATHOLOGY Tam Mercer MD 9/15/2022 1842        Implants: None      Drains:   Closed/Suction Drain Right Abdomen Bulb (Active)   Output (ml) 120 ml 09/15/22 2139       Colostomy LLQ (Active)       Urinary Catheter 09/14/22 Osorio (Active)   $ Urethral catheter insertion $ Not inserted for procedure 09/14/22 1335   Catheter Indications Need for fluid volume management of the critically ill patient in a critical care setting 09/15/22 1600   Site Assessment Pink; No urethral drainage 09/15/22 1600   Urine Color Yellow 09/15/22 1600   Urine Appearance Clear 09/15/22 1600   Collection Container Standard 09/15/22 1600   Securement Method Securing device (Describe) 09/15/22 1600   Catheter Care Completed Yes 09/14/22 2000   Catheter Best Practices  Drainage tube clipped to bed;Catheter secured to thigh; Tamper seal intact; Bag below bladder;Bag not on floor; Lack of dependent loop in tubing;Drainage bag less than half full 09/15/22 1600   Status Draining 09/15/22 1600   Output (mL) 147 mL 09/15/22 2139       [REMOVED] Rectal Tube (Removed)   Site Assessment Clean, dry & intact 09/15/22 1600   Stool Appearance Loose 09/15/22 1600   Stool Color Brown 09/15/22 1600   Stool Amount Medium 09/15/22 1600   Rectal Tube Output (mL) 50 ml 09/15/22 1600       Findings:  Ischemic sigmoid colon with diffuse inflammatory ascites. Sigmoid colectomy, stapled with contour stapler. Rectal stump marked with prolene suture x2. Midline fascia closed. Colostomy in left lower quadrant, able to pass 2 fingers through fascia. Left pelvic sidewall implant resected and sent for pathology. Indication: The patient is a 66-year-old female with a history of HTN, HLD, DM, CKD who presented to [facility] [place] with severe abdominal pain and hypotension after a syncopal episode. HPI significant for worsening constipation concerning for possible stercoral colitis. CT scan with extensive inflammatory changes within the colon. Patient continued to clinically deteriorate, requiring CRRT and multiple vasopressors. General surgery was consulted and the patient was noted to be peritoneal, and operative intervention was recommended. After discussion of indications, risks, benefits, and alternatives to surgery, the patient's  consented to surgery. Detailed Description of Procedure:   After informed consent was obtained, the patient was brought to the operating room and general anesthesia was induced while still on the ICU bed. After securement of the endotracheal tube, the patient was carefully moved to the operating table in the supine position and secured appropriately. SCDs were confirmed on and functioning. The abdomen was prepped and draped in the standard sterile fashion. A timeout was performed according to hospital protocol. A 5mm incision was made in the right upper quadrant and an attempt was made to enter the abdomen in direct Optiview fashion. When it was deemed not an adequate site for entry, a 5mm incision was then made in the left upper quadrant and entry into the abdomen was made safely and easily in the direct Optiview fashion. No injury was noted at either site of entry.  The previous entry site was used to place a 5mm trocar under direct vision. There were omental adhesions noted to the midline which were taken down with blunt dissection. The laparoscope was then used to survey the abdomen and pelvis. There was no lynn feculent material noted. Once ischemic colon was visualized in the deep pelvis, the decision was made to proceed with open laparotomy. Insufflation was discontinued and the trocars were removed. A midline laparotomy incision was then made with a 10 blade through the dermis. Electrocautery was then used to carry this incision down through the subcutaneous tissues. An area above the umbilicus was chosen for entry into the peritoneal cavity. Once the fascia was exposed, the abdominal wall was lifted and cautery was used to make a small hole in the peritoneum. A finger was inserted to ensure there were no adhesions or bowel above or below the site of entry. Using a finger as a guide, the laparotomy was completely opened. Retractors were then used to expose the large intestine and the pelvis. The entire sigmoid colon, up to the rectosigmoid junction was noted to be ischemic. There were dense inflammatory adhesions involving the distal sigmoid and the uterus. Using a combination of blunt dissection and electrocautery, the distal sigmoid was  from the uterus until an area of rectum was visualized that appeared viable. There was no area of perforation identified. There was extensive inflammatory ascites encountered in the pelvis. The sigmoid colo was then mobilized by opening the peritoneum along the white line of toldt , protecting structures in the retroperitoneum. The peritoneum was futher opened in the pelvis to allow for circumferential dissection of the planned distal resection site.  Once the sigmoid had been mobilized appropriately, an area of descending colon was chosen for the proximal resection location where the bowel appeared viable and mobile enough to bring to the abdominal wall for a colostomy. A window was created in the mesentery and the mesentery was divided using the vessel sealer to allow for passage of the stapler. The curved cutter contour stapler was then used to divide the colon at this proximal location. The mesentery was cleared in a similar fashion from the rectum in the pelvis and the contour was used to divide the rectum. The sigmoid colon was then removed from the patient and sent for pathological evaluation. The remainder of the colon and small intestine were gain inspected and found to be without perforation or ischemic changes. The cecum was noted to be injected and inflamed, but without signs of lynn ischemia. A large peritoneal implant previously encountered in the left pelvis was then removed and sent for pathological evaluation. The rectal stump was marked with 2-0 prolene at each corner of the staple line. Hemostasis was achieved with electrocautery. The abdomen was irrigated with a liter of warm saline and suctioned. Hemostasis was again confirmed. A 19 Fr drain was placed in the pelvis and draped around the rectal stump. This was sutured in place with a 2-0 silk. Attention was then turned toward creation of the colostomy. It was ensured that enough of the colon was mobilized to allow for easy passage of the colon to the abdominal wall. An area to the left of the umbilicus within the rectus muscle was chosen. A quarter-sized area of skin was removed using elecrocautery and this along with underlying subcutaneous fat and tissue was amputated using electrocautery. The anterior fascia was then opened with cautery in a cruciate fashion. The underlying muscle was spread using a hemostat. The peritoneum was also opened with cautery in a cruciate fashion. Two fingers were inserted and used to stretch the opening to accommodate the colon. The colon was then easily passed through the opening and confirmed to be without twisting or tension.  A garry was placed on the staple line and attention was turned to closing the abdomen. The midline fascia was closed using looped 0-PDS x2 in a running fashion with good approximation. The skin of the midline and the trocar sites was then approximated with staples. The midline was dressed with a Silver impregnated mepilex dressing, the trocar sites dressed with sterile mepilex squares, and the drain with gauze and Tegaderm. The colostomy was matured in standard fashion using 2-0 vicryl, brooking the ostomy with the initial four sutures placed at 12, 3, 6, and 9 o'clock. It was confirmed that two fingers could easily pass through the fascia after maturation was complete. The colostomy was then dressed with an ostomy appliance. Given initial hemodynamic instability, the decision was made to leave the patient intubated. Upon transfer back to the ICU bed, the R IJ CVC did back out of the patient's neck. Necessary drips were re-located to run through the right femoral vas cath for transport. The patient was then personally escorted to the ICU with the anesthesiologist and OR team. A CXR was ordered upon arrival to confirm positioning of the ET tube and evaluate the right IJ CVC. The patient remained in critical condition, stable from pre-op, and there were no immediate complications. All counts were confirmed correct prior to closure. Dr. Callie Mccracken was present and scrubbed for the entirety of the operation, and directed its course from beginning to end. Electronically signed by Aretha Jenkins DO on 9/15/2022 at 10:43 PM    Surgery Staff    I have examined this patient, and read and agree with the note by Aretha Jenkins DO  from today; more than half of the total time was spent by me on the encounter.      Robert Dennis MD

## 2022-09-16 NOTE — PROGRESS NOTES
Surgery Daily Progress Note      CC: Ischemic Colitis    SUBJECTIVE:  Patient remained intubated/sedated in the ICU post-operatively. R IJ CVC replaced overnight. Levo weaned down to 15, remains on vaso at 0.03. Lactate has trended down to 8.6 from 13 post-operatively. Remains on CRRT and bicarb per nephrology. Ostomy with function overnight. ROS:   A 14 point review of systems was conducted, significant findings as noted above. All other systems negative. OBJECTIVE:    PHYSICAL EXAM:  Vitals:    09/16/22 0400 09/16/22 0427 09/16/22 0500 09/16/22 0600   BP: (!) 100/49  116/70 (!) 104/49   Pulse: 70 68 73 80   Resp: 14 14 14 14   Temp: 98.4 °F (36.9 °C)  98.8 °F (37.1 °C)    TempSrc: Bladder      SpO2: 99% 98% 98% 98%   Weight:       Height:           General appearance: elderly, ill-appearing female, intubated, sedated  Neuro: sedated  HEENT: ET tube in place, R IJ CVC with dressing c/d/I,  Chest/Lungs: equal chest rise, mechanical breath sounds  Cardiovascular: RRR  Abdomen: soft, non-distended, midline dressing intact without drainage , colostomy viable with stool in back, right groin vas cath with dressing c/d/I no erythema  : poe in place, clear yellow urine  Extremities: mild edema in all extremities, no cyanosis      ASSESSMENT & PLAN:   This is a 67 y.o. female with a diagnosis of ischemic sigmoid colon s/p exploratory laparotomy, sigmoid colectomy, colostomy formation POD1    - continue to wean pressors as able. Would prefer vaso be stopped as soon as possible to allow for better mesenteric blood flow in the setting of colonic ischemia of unknown origin  - ensure NG/OG for decompression from above while intubated  - continue antibiotics given ischemic bowel and likely translocation of GI bacteria - may not require Flagyl with Zosyn   - monitor stoma and record output  - continue to trend lactate  - wound/ostomy RN for family education and evaluation today.  Stoma is close to midline and will need special attention to make dressings seal well and avoid spillage to midline incision  - extubation per pulm/critical care  - medical management per primary team and nephrology  - will continue to follow closely    Annemarie Dhillon DO  09/16/22  6:27 AM  [phone]

## 2022-09-16 NOTE — PROGRESS NOTES
INPATIENT PULMONARY CRITICAL CARE PROGRESS NOTE      Reason for visit    septic shock     SUBJECTIVE: Patient was taken to the OR by general surgery and patient underwent diagnostic laparoscopy along with that patient underwent exploratory laparotomy with sigmoid colectomy and colostomy and was found to have ischemic colitis, patient was transferred back to the ICU on mechanical vent to support, patient when seen this morning continues to be critically ill on mechanical vent support, patient was on 30% oxygen PEEP of 5 to maintain oxygen saturation, patient had a T-max of 99.5 °F, patient required IV pressors in the form of Levophed and vasopressin when seen this morning, patient has been started on CRRT by nephrology yesterday which was continuing, patient blood pressure was borderline in spite of being on IV pressors and patient was given a fluid bolus this morning, patient has a sinus rhythm on the monitor, patient is on IV sedation to maintain patient ventilator synchrony, patient's urine output is adequate, patient also has a cumulative fluid balance of +9.2 L, patient's blood sugars were slightly on the higher side when seen this morning, no other pertinent review of system could be obtained           Physical Exam:  Blood pressure (!) 90/58, pulse 98, temperature 99.5 °F (37.5 °C), temperature source Bladder, resp. rate 14, height 5' 9\" (1.753 m), weight 194 lb 0.1 oz (88 kg), SpO2 99 %.'     Constitutional:  No acute distress. On mechanical vent support  HENT: Endotracheal tube present no thyromegaly. Eyes:  Conjunctivae are normal. Pupils equal, round, and reactive to light. No scleral icterus. Neck: . No tracheal deviation present. No obvious thyroid mass. Cardiovascular: Sinus rhythm normal heart sounds. No right ventricular heave. No lower extremity edema. Pulmonary/Chest: No wheezes. No rales. Chest wall is not dull to percussion. No accessory muscle usage or stridor.   Decreased breath sound intensity  Abdominal: Soft. No significant bowel sounds present. No distension or hernia. Ostomy present  Musculoskeletal: No cyanosis. No clubbing. No obvious joint deformity. Lymphadenopathy: No cervical or supraclavicular adenopathy. Skin: Skin is warm and dry. No rash or nodules on the exposed extremities. Just neurologic: Intubated and sedated    Results:  CBC:   Recent Labs     09/15/22  0421 09/15/22  1037 09/15/22  1855 09/15/22  2115 09/16/22  0420   WBC 38.9*  --   --  20.3* 20.5*   HGB 11.7*   < > 8.8* 9.9* 10.0*   HCT 36.0  --   --  30.5* 29.8*   MCV 85.9  --   --  84.8 82.9     --   --  146 151    < > = values in this interval not displayed. BMP:   Recent Labs     09/15/22  2115 09/16/22  0420 09/16/22  1059   * 131*  131* 132*   K 4.2 4.3  4.2 4.3   CL 92* 94*  94* 97*   CO2 15* 20*  20* 23   PHOS 4.8 2.4*  2.4* 2.1*   BUN 27* 19  19 15   CREATININE 1.7* 1.2  1.2 1.0     LIVER PROFILE:   Recent Labs     09/14/22  1150 09/14/22  1514 09/15/22  0421 09/15/22  2115 09/16/22  0420   AST 32   < > 50* 43* 43*   ALT 12   < > 18 16 16   LIPASE 36.0  --   --   --   --    BILIDIR  --   --   --  <0.2  --    BILITOT 0.3   < > 0.3 0.3 0.3   ALKPHOS 142*   < > 566* 383* 339*    < > = values in this interval not displayed. PT/INR:   Recent Labs     09/15/22  0421 09/16/22  0420   PROTIME 20.6* 18.3*   INR 1.78* 1.54*     APTT: No results for input(s): APTT in the last 72 hours. UA:  Recent Labs     09/14/22  1208   COLORU Yellow   PHUR 6.0   WBCUA 21-50*   RBCUA None seen   BACTERIA Rare*   CLARITYU Clear   SPECGRAV <=1.005   LEUKOCYTESUR SMALL*   UROBILINOGEN 0.2   BILIRUBINUR Negative   BLOODU Negative   GLUCOSEU Negative       Imaging:  I have reviewed radiology images personally.     XR ABDOMEN FOR NG/OG/NE TUBE PLACEMENT   Final Result   Tip of NG tube projects in the left upper quadrant in the region of the   gastric fundus         XR CHEST PORTABLE   Final Result   Interval placement of a right-sided central venous catheter which extends   into the inferior aspect of the right atrium, approximately 5 cm beyond the   cavoatrial junction. XR CHEST PORTABLE   Final Result   Right IJ CVC catheter tip overlies the SVC at the level of the thoracic inlet. Endotracheal tube tip is 5.3 cm above the mark. Mild bibasilar airspace opacities, which could represent as atelectasis   and/or infiltrate. Possible small right pleural effusion. CT ABDOMEN PELVIS WO CONTRAST Additional Contrast? None   Final Result   1. Limited evaluation of the GI tract on this noncontrast exam.  Improved   mucosal changes of the duodenum and proximal jejunum that were described on   prior CT. 2. Severe inflammatory change in the right lower quadrant with etiology   uncertain. This could correspond to enteritis of the distal ileum and   terminal ileum. Colitis may also be considered. Infectious or inflammatory   etiologies may be favored. 3. Dense stool and diffuse mucosal thickening of the distal colon which may   represent a pattern of colitis. 4. Small right pleural effusion with airspace changes in the right lower   lobe, new from prior exam.   5. Evidence of chronic liver disease with a small amount of ascites stable. CT ABDOMEN PELVIS WO CONTRAST Additional Contrast? None   Final Result   1. Acute enteritis involving the duodenum and jejunal loops with thickening   of the loops and edema. No evidence of bowel obstruction. 2. Constipation with significant stool impaction in the rectum. 3. Mild ascites mostly around the liver and spleen. 4. Adequate position of Osorio catheter in the urinary bladder. XR CHEST PORTABLE   Final Result   Placement of a right internal jugular central venous catheter without evident   complication. The tip is at approximately the cavoatrial junction.       No acute findings in the chest.            Latest Reference Range & Units 9/15/22 08:20 9/15/22 13:38 9/15/22 21:15 9/16/22 04:20   Lactic Acid 0.4 - 2.0 mmol/L 19.3 (HH) 18.0 (HH) 13.7 (HH) 8.6 (HH)      Latest Reference Range & Units 9/15/22 23:51 9/16/22 04:20 9/16/22 05:24   O2 Therapy  Unknown  Unknown   Hemoglobin, Art, Extended 12.0 - 16.0 g/dL 10.9 (L)  10.7 (L)   pH, Arterial 7.350 - 7.450  7.371  7.462 (H)   pCO2, Arterial 35.0 - 45.0 mmHg 32.4 (L)  32.5 (L)   pO2, Arterial 75.0 - 108.0 mmHg 336.3 (H)  84.2   HCO3, Arterial 21.0 - 29.0 mmol/L 18.4 (L)  22.7   TCO2 (calc), Art Not Established mmol/L 19.3  23.7   Base Excess, Arterial -3.0 - 3.0 mmol/L -6.0 (L)  -0.6   O2 Sat, Arterial >92 % 99.3  96.3   Methemoglobin, Arterial <1.5 % 0.6  0.2   Carboxyhgb, Arterial 0.0 - 1.5 % 0.3  0.3   pH, Cedric 7.350 - 7.450   7.451 (H)      ONE XRAY VIEW OF THE CHEST       9/15/2022 11:34 pm       COMPARISON:   09/15/2022       HISTORY:   ORDERING SYSTEM PROVIDED HISTORY: central line insertion   TECHNOLOGIST PROVIDED HISTORY:   Reason for exam:->central line insertion   Reason for Exam: central line       FINDINGS:   Endotracheal tube appears in appropriate position. Interval placement of a   jugular venous central catheter which extends into the inferior aspect of the   right atrium. The catheter tip measures approximately 5 cm beyond the   cavoatrial junction. No acute osseous abnormality is identified. Small   right-sided pleural effusion similar appearing basilar airspace disease. Osseous structures appear similar. Impression   Interval placement of a right-sided central venous catheter which extends   into the inferior aspect of the right atrium, approximately 5 cm beyond the   cavoatrial junction.      Assessment:  Principal Problem:    Enteritis  Active Problems:    Syncope    Hyponatremia    DEJAN (acute kidney injury) ([address]    Abdominal pain    Elevated troponin    DM (diabetes mellitus), secondary uncontrolled (HCC)    Ischemic colitis ([facility] [address]    S/P exploratory laparotomy    Acute postoperative pulmonary insufficiency (HCC)  Resolved Problems:    * No resolved hospital problems.  *          Plan:   Ventilator support to keep saturation more than 92%  Ventilator settings and waveforms reviewed  Ventilator changes made  Pulmonary toilet  IV sedation to maintain patient ventilator synchrony  Titration of sedation as per RASS scores  Wound care and ostomy care as per surgery and wound care team  Surgical intervention and operative report reviewed  IV pressors to keep mean arterial pressure more than 65 mmHg  CRRT as per nephrology  Patient got fluid bolus this morning to prop up the blood pressure  Bicarb drip being discontinued  Patient's lactic acid levels to be trended-trending in right direction  Monitor input output and BMP  Correct electrolytes on whenever necessary basis  BGM with SSI  Monitor for any worsening hypoglycemia  Patient also has pyuria at this time and patient has been started on Zosyn empirically by the admitting provider-titration as per clinical status and cultures  IV flagyl d/brooks as patient's c.diff antigen was engative   Trend the WBC count and sodium levels   PUD and DVT prophylaxis       Case d/w ICU team and family  Case d/w surgery     Patient's clinical status is precarious and critical with potential for further decompensation       Critical care time spent -40 minutes(exclusive of any procedures)         Electronically signed by:  Ranjith Marroquin MD    9/16/2022    5:24 PM.

## 2022-09-16 NOTE — CARE COORDINATION
Chart reviewed. Pt admitted as inpatient day # 2 for ischemic sigmoid colon s/p exp. Lap. With sigmoid colectomy and colostomy. NG in place. On IV abx. On Vaso gtt- weaning. Pt on CRRT. Remains intubated and sedated. Consult for \"new ostomy- home care services. \" Call placed to Pt's  and message left for call back. Addendum 1445pm: met with the Pt's spouse and Dtr at the bedside for discharge planning discussion. Pt is retired, but works part time at [employer]. Her Dtr also works for an ECF. They are thinking that the pt will benefit from SNF placement at d/c prior to returning home. They would like referral to [facility]. Referral made through epic system and message left for admissions. Will follow Pt progress. Addendum 1520pm: [facility] able to accept and will follow.

## 2022-09-16 NOTE — PROGRESS NOTES
09/16/22 0427   Patient Observation   Heart Rate 68   Resp 14   SpO2 98 %   Vent Information   Vent Mode AC/VC   Ventilator Settings   FiO2  30 %   Vt (Set, mL) 500 mL   Resp Rate (Set) 14 bmp   PEEP/CPAP (cmH2O) 5   Vent Patient Data (Readings)   Vt (Measured) 514 mL   Peak Inspiratory Pressure (cmH2O) 19 cmH2O   Rate Measured 14 br/min   Minute Volume (L/min) 7.19 Liters   Mean Airway Pressure (cmH2O) 8.8 cmH20   I:E Ratio 1:2.50   Vent Alarm Settings   Low Minute Volume (lpm) 2 L/min   Low Exhaled Vt (ml) 200 mL   RR High (bpm) 40 br/min   Apnea (secs) 20 secs   Additional Respiratoray Assessments   Humidification Source HME   Ambu Bag With Mask At Bedside Yes   Airway Clearance   Suction ET Tube   Suction Device Inline suction catheter   Sputum Method Obtained Endotracheal   Sputum Amount Scant   Sputum Color/Odor Clear   Sputum Consistency Thin   ETT (adult)   Placement Date/Time: 09/15/22 1720   Present on Admission/Arrival: No  Placed By: In surgery; Licensed provider  Placement Verified By: Capnometry;Direct visualization; Auscultation  Preoxygenation: Yes  Mask Ventilation: Ventilated by mask with oral ai. ..    Secured At 21 cm   Measured From Lips   ETT Placement Left   Secured By Commercial tube wills   Site Assessment Dry   Skin Assessment   Skin Assessment Clean, dry, & intact

## 2022-09-16 NOTE — PROGRESS NOTES
09/15/22 2040   Patient Observation   SpO2 100 %   Breath Sounds   Right Upper Lobe Clear   Right Middle Lobe Clear   Right Lower Lobe Clear   Left Upper Lobe Clear   Left Lower Lobe Clear   Vent Information   Ventilator Initiate Yes   Vent Mode AC/VC   Ventilator Settings   FiO2  100 %   Vt (Set, mL) 500 mL   Resp Rate (Set) 14 bmp   PEEP/CPAP (cmH2O) 5   Vent Patient Data (Readings)   Vt (Measured) 518 mL   Peak Inspiratory Pressure (cmH2O) 19 cmH2O   Rate Measured 14 br/min   Minute Volume (L/min) 7.23 Liters   Mean Airway Pressure (cmH2O) 9 cmH20   I:E Ratio 1:2.50   Vent Alarm Settings   Low Minute Volume (lpm) 2 L/min   Low Exhaled Vt (ml) 200 mL   RR High (bpm) 40 br/min   Apnea (secs) 20 secs   Additional Respiratoray Assessments   Humidification Source HME   Ambu Bag With Mask At Bedside Yes   ETT (adult)   Placement Date/Time: 09/15/22 1720   Present on Admission/Arrival: No  Placed By: In surgery; Licensed provider  Placement Verified By: Capnometry;Direct visualization; Auscultation  Preoxygenation: Yes  Mask Ventilation: Ventilated by mask with oral ai. ..    Secured At 21 cm   Measured From Lips   ETT Placement Right   Secured By Commercial tube wills   Site Assessment Dry   Skin Assessment   Skin Assessment Clean, dry, & intact

## 2022-09-16 NOTE — CONSULTS
[facility] Nurse  Consult Note       NAME:  Rodrigo Cyr RECORD NUMBER:  [id number]  AGE: 67 y.o. GENDER: female  : 1950  TODAY'S DATE:  2022    Subjective   Reason for WOCN Evaluation and Assessment: New colostomy      Briana Bradley is a 67 y.o. female referred by:   [x] Physician  [] Nursing  [] Other:     Wound Identification:  Wound Type: New surgical staple line. Contributing Factors: edema, diabetes, chronic pressure, decreased mobility, shear force, obesity, and decreased tissue oxygenation    Stoma: measures 29 mm x 41 mm. Oval, brown, protrudes but distal edge in a divot. She may need convex at a later time. For now use flat wafer with drainable bag. Appliance:  Coloplast RED 2 piece flat flange # [id number] with drainable pouch # [id number]. Paste ring around stoma prior to application of flange. Wound History:   Briana Bradley is a 67 y.o. female with a history of CKD, diabetes, hyperlipidemia, hypertension, and neuropathy who presents to the ED complaining of abdominal pain/syncope. By EMS report, patient has had constipation over the last several days. .  Patient reportedly had a large bowel movement and shortly thereafter had a syncopal/near syncopal event where she was helped to the floor. EMS was called and arrived to find patient with blood pressure of 50/30. Upon admission found to have stercoral colitis. Went to OR   For Exploratory lap, sigmoid colectomy and colostomy on 9/15/22 by Dr Mary Lou Jacobson.      Current Wound Care Treatment:  foam dressing in place    Patient Goal of Care:  [x] Wound Healing  [] Odor Control  [] Palliative Care  [] Pain Control   [] Other:         PAST MEDICAL HISTORY        Diagnosis Date    Chronic kidney disease     Diabetes mellitus ([facility] [address]     Hyperlipidemia     Hypertension     Neuropathy        PAST SURGICAL HISTORY    Past Surgical History:   Procedure Laterality Date    LAPAROTOMY N/A 9/15/2022    DIAGNOSTIC LAPAROSCOPY, EXPLORATORY LAPAROTOMY, SIGMOID COLECTOMY AND COLOSTOMY performed by Marina Oliveira MD at [facility]    History reviewed. No pertinent family history. SOCIAL HISTORY    Social History     Tobacco Use    Smoking status: Never    Smokeless tobacco: Never   Substance Use Topics    Alcohol use: Not Currently    Drug use: Never       ALLERGIES    Allergies   Allergen Reactions    Latex     Lovastatin      Indigestion and Feels bad  Indigestion and Feels bad      Penicillins        MEDICATIONS    No current facility-administered medications on file prior to encounter. Current Outpatient Medications on File Prior to Encounter   Medication Sig Dispense Refill    insulin glargine (LANTUS SOLOSTAR) 100 UNIT/ML injection pen Inject 15 Units into the skin nightly INJECT 15 UNITS SUBCUTANEOUSLY AT BEDTIME (MAY USE UP TO 50 UNITS DAILY IF DIRECTED BY DOCTOR)      nitrofurantoin, macrocrystal-monohydrate, (MACROBID) 100 MG capsule Take 100 mg by mouth 2 times daily X 5 days      aspirin 81 MG chewable tablet Take 81 mg by mouth daily      metFORMIN (GLUCOPHAGE) 850 MG tablet Take 850 mg by mouth 2 times daily (with meals)      gabapentin (NEURONTIN) 100 MG capsule TAKE 2 CAPSULES BY MOUTH THREE TIMES DAILY      lisinopril (PRINIVIL;ZESTRIL) 40 MG tablet Take 1 tablet by mouth once daily      Dulaglutide (TRULICITY) 3 OU/9.1SA SOPN Inject 3 mg into the skin every 7 days      chlorthalidone (HYGROTON) 25 MG tablet TAKE 1 TABLET BY MOUTH ONCE DAILY      furosemide (LASIX) 40 MG tablet Take 20 mg by mouth See Admin Instructions TAKE 1/2 (ONE-HALF) TABLET BY MOUTH ONCE DAILY ON MONDAY, WEDNESDAY, AND FRIDAY AS NEEDED FOR SWELLING      glyBURIDE (DIABETA) 5 MG tablet TAKE 2 TABLETS BY MOUTH TWICE DAILY WITH MEALS      rosuvastatin (CRESTOR) 5 MG tablet TAKE 1 TABLET BY MOUTH ONCE DAILY         Objective  Sedated on ventilator. Fio2 at 30%. On CRRT. Osorio.  On Fentanyl, Levophed, vasopressin, propofol infusions. Arterial line, vas cath and CVP line. NG timbe and BROOKS drain. BP (!) 90/58   Pulse 98   Temp 99.5 °F (37.5 °C) (Bladder)   Resp 14   Ht 5' 9\" (1.753 m)   Wt 194 lb 0.1 oz (88 kg)   SpO2 99%   BMI 28.65 kg/m²     LABS:  WBC:    Lab Results   Component Value Date/Time    WBC 20.5 09/16/2022 04:20 AM     H/H:    Lab Results   Component Value Date/Time    HGB 10.0 09/16/2022 04:20 AM    HCT 29.8 09/16/2022 04:20 AM     PTT:  No results found for: APTT, PTT[APTT}  PT/INR:    Lab Results   Component Value Date/Time    PROTIME 18.3 09/16/2022 04:20 AM    INR 1.54 09/16/2022 04:20 AM     HgBA1c:    Lab Results   Component Value Date/Time    LABA1C 9.2 09/14/2022 01:37 PM       Assessment  Colostomy stoma moist but brown. Lesli stomal skin intact. Mid line stapes intact, approximated, slight drainage. See photo of ostomy. Levar Risk Score: Levar Scale Score: 10    Patient Active Problem List   Diagnosis    DKA (diabetic ketoacidosis) (HCC)    Hypotension    Syncope    Hyponatremia    DEJAN (acute kidney injury) ([facility] [address]    Abdominal pain    Enteritis    Septic shock (HCC)    Elevated troponin    Leukocytosis    Pyuria    Lactic acidosis    DM (diabetes mellitus), secondary uncontrolled ([facility] [address]       Measurements:     Incision 09/15/22 Abdomen Medial (Active)   Dressing Status New dressing applied 09/16/22 1638   Dressing Change Due 09/19/22 09/16/22 1638   Incision Cleansed Cleansed with saline 09/16/22 1638   Dressing/Treatment Foam 09/16/22 1638   Incision Length (cm) 15 09/16/22 1638   Incision Width (cm) 0 cm 09/16/22 1638   Incision Depth (cm) 0 cm 09/16/22 1638   Closure Kya 09/16/22 1638   Margins Approximated 09/16/22 1638   Incision Assessment Other (Comment) 09/16/22 1638   Drainage Amount Scant 09/16/22 1638   Drainage Description Serosanguinous; Thin 09/16/22 1638   Odor None 09/16/22 1638   Lesli-incision Assessment Edematous 09/16/22 1638   Number of days: 1     Colostomy LLQ (Active) Stomal Appliance 2 piece 09/16/22 1600   Flange Size (inches) 2.25 Inches 09/16/22 1600   Stoma  Assessment Moist 09/16/22 1600   Peristomal Assessment Clean, dry & intact 09/16/22 1600   Treatment Bag change;Site care;Stoma paste; Heat applied 09/16/22 1600   Stool Appearance Loose 09/16/22 1600   Stool Color Brown 09/16/22 1600   Stool Amount Small 09/16/22 1600   Output (mL) 20 ml 09/16/22 1600   Number of days: 0     Colostomy        Intake/Output Summary (Last 24 hours) at 9/16/2022 1645  Last data filed at 9/16/2022 1600  Gross per 24 hour   Intake 7423.6 ml   Output 5409 ml   Net 2014.6 ml       Response to treatment:  Well tolerated by patient. Pain Assessment:  Severity:  0 / 10  Quality of pain: N/A  Wound Pain Timing/Severity: none  Premedicated: Yes    Plan   Plan of wound Care:    Mid line abd staple line dressing changed, cleansed with normal saline, foam dressing reapplied    Plan for Ostomy Care:   Current bag leaking. Spouse and daughter here to learn colostomy care. Daughter reports she is a nurses aid and has some experience in changing and emptying colostomy bags. Both are willing to lear. Appliance removed. Skin cleansed with warm water and patted dry. Stoma measured and pattern created. Stoma in divot distal inner edge. Paste ring placed around stoma then flange placed. Bag attached and closed. Reviewed ostomy supplies and teaching packet with family members. Both verbalized understanding. Ostomy care to follow. Call Ostomy care for problems with seal at [phone] or or call [phone] and leave message     Stoma Care - New colostomy - Patient to empty appliance when 1/3 to 1/2 full with assistance of the staff. Cleanse inside and outside of the drain spout prior to rolling closed. Change appliance every 3-5 days or 1-2 times a week.   Call for problems with seal [phone]     Ostomy Plan of Care  [x] Supplies/Instructions left in room bags, wafers, paste rings, scissors, pattern, powder, paste. [] Patient using home supplies  [x] Brand/supplies at bedside Coloplast    Specialty Bed Required : Yes Isolibrium gel therapy pressure redistribution mattress with low air loss in place.    [x] Low Air Loss   [x] Pressure Redistribution  [] Fluid Immersion  [] Bariatric  [] Total Pressure Relief  [] Other:     Current Diet: Diet NPO  Dietician consult:  Yes    Discharge Plan:  Placement for patient upon discharge: intermediate care facility    Supplies given Yes   Samples requested NoPatient appropriate for Outpatient [address]: Yes    Referrals:  [x]  / discharge planner pete  [] [address] [place] [employer] [facility]  [] Supplies  [] Other    Patient/Caregiver Teaching: Spouse Liz Arenas) and daughter Neftaly Bobby)  Teaching provided:  [] Reviewed GI and A&P        [] Supplies  [x] Pouch emptying      [x] Manipulate closure  [x] Routine Care         [] Comment  [x] Pouch maintenance           Level of patient/caregiver understanding able to:   [x] Indicates understanding       [x] Needs reinforcement  [] Unsuccessful      [] Verbal Understanding  [] Demonstrated understanding       [] No evidence of learning  [] Refused teaching         [] N/A       Electronically signed by Nayan Lovelace, RN, MSN, Megan Poole on 9/16/2022 at 4:44 PM

## 2022-09-16 NOTE — PROGRESS NOTES
Interval History and plan: On CRRT  Acidosis improved  Had a surgery yesterday  Sigmoidectomy done and also colostomy done  Was on 2 vasopressors now on Levophed only FiO2 30%  Electrolytes are overall stable and replace per protocol    Plan:    Continue CRRT  stopped bicarbonate drip  Electrolyte replacement per protocol  Will keep net even for now  Hopefully will be able to try pull fluids tomorrow                     Assessment :     Acidosis  Severe  Requiring 2 vasopressors  Blood pressure okay with the 2 vasopressors but lactic is a still going up to 19 concerning for ischemia   /Possible metformin induced lactic acidosis       CKD Stage IIIb with DEJAN  Creatinine 2.1 on consult  Creatinine 1.6 on 7/22  Likely due to diabetes, hypertension  Renal imaging-normal in the past      hypotension  BP: ()/(49-70)  Heart Rate:  []   BP goal inpatient [id number] systolic inpatient  Pressures at the time of consult  Normally hypertensive    Due the potential for life-threatening deterioration due to patient's acidosis I spent 45 minutes providing critical care for this individual, excluding procedures on 9/15/22 . [facility] would like to thank Afia Peraza MD   for opportunity to serve this patient      Please call with questions at-   24 Hrs Answering service [phone] or  7 am- 5 pm via Perfect serve or cell phone  Sameera Zhou MD          CC/reason for consult :       DEJAN     HPI :     Tracy Barron is a 67 y.o. female presented to   the hospital on 9/14/2022 with lactic acidosis. She is known to have diabetes and on metformin to 2 days ago  Has constipation and with multiple bowel regimen has had good bowel movement yesterday following that she has syncope.   EMS was called and was found to have blood pressure of 50 systolic given IV fluids brought to the emergency room blood pressure was normal initially but later developed hypotension got 3 L of fluid and now on 2 vasopressors and in ICU. She also has severe acidosis with very high lactate. CT scan was normal initially. We are consulted for DEJAN on CKD and related issues. On CKD and also severe lactic acidosis    ROS:     Seen with-  No family  Discussed with RN discussed with Dr. Lanre Landeros and also NP for the ICU on 9/15/2020    positives in bold   Constitutional:  fever, chills, weakness, weight change, fatigue  Skin:  rash, pruritus, hair loss, bruising, dry skin, petechiae  Head, Face, Neck   headaches, swelling,  cervical adenopathy  Respiratory: shortness of breath, cough, or wheezing  Cardiovascular: chest pain, palpitations, dizzy, edema  Gastrointestinal: nausea, vomiting, diarrhea, constipation,belly pain    Yellow skin, blood in stool  Musculoskeletal:  back pain, muscle weakness, gait problems,       joint pain or swelling. Genitourinary:  dysuria, poor urine flow, flank pain, blood in urine  Neurologic:  vertigo, TIA'S, syncope, seizures, focal weakness  Psychosocial:  insomnia, anxiety, or depression. Additional positive findings:                    All other remaining systems are negative or unable to obtain        PMH/PSH/SH/Family History:     Past Medical History:   Diagnosis Date    Chronic kidney disease     Diabetes mellitus ([facility] [address]     Hyperlipidemia     Hypertension     Neuropathy        Past Surgical History:   Procedure Laterality Date    LAPAROTOMY N/A 9/15/2022    DIAGNOSTIC LAPAROSCOPY, EXPLORATORY LAPAROTOMY, SIGMOID COLECTOMY AND COLOSTOMY performed by Leola Reina MD at [facility]        reports that she has never smoked. She has never used smokeless tobacco. She reports that she does not currently use alcohol. She reports that she does not use drugs. family history is not on file.          Medication:     Current Facility-Administered Medications: 0.9 % sodium chloride bolus, 1,000 mL, IntraVENous, Once  hydrocortisone sodium succinate PF (SOLU-CORTEF) injection 50 mg, 50 mg, IntraVENous, Q6H  mupirocin (BACTROBAN) 2 % ointment, , Nasal, BID  prochlorperazine (COMPAZINE) injection 5 mg, 5 mg, IntraVENous, Q6H PRN  prismaSATE BGK 4/2.5 dialysis solution, 1,500 mL/hr, Dialysis, Continuous  prismaSATE BGK 4/2.5 dialysis solution, 1,000 mL/hr, Dialysis, Continuous  potassium chloride 20 mEq/50 mL IVPB (Central Line), 20 mEq, IntraVENous, PRN  magnesium sulfate 1000 mg in dextrose 5% 100 mL IVPB, 1,000 mg, IntraVENous, PRN  sodium phosphate 6 mmol in sodium chloride 0.9 % 250 mL IVPB, 6 mmol, IntraVENous, PRN **OR** sodium phosphate 12 mmol in sodium chloride 0.9 % 250 mL IVPB, 12 mmol, IntraVENous, PRN **OR** sodium phosphate 18 mmol in sodium chloride 0.9 % 500 mL IVPB, 18 mmol, IntraVENous, PRN **OR** sodium phosphate 24 mmol in sodium chloride 0.9 % 500 mL IVPB, 24 mmol, IntraVENous, PRN  prismaSATE BGK 4/2.5 dialysis solution, , Dialysis, Continuous  calcium gluconate 1000 mg in sodium chloride 50 mL, 1,000 mg, IntraVENous, PRN **OR** calcium gluconate 2,000 mg in dextrose 5 % 100 mL IVPB, 2,000 mg, IntraVENous, PRN **OR** calcium gluconate 3,000 mg in dextrose 5 % 100 mL IVPB, 3,000 mg, IntraVENous, PRN **OR** calcium gluconate 4,000 mg in dextrose 5 % 100 mL IVPB, 4,000 mg, IntraVENous, PRN  heparin (porcine) injection 5,000 Units, 5,000 Units, SubCUTAneous, 3 times per day  sodium chloride flush 0.9 % injection 5-40 mL, 5-40 mL, IntraVENous, 2 times per day  sodium chloride flush 0.9 % injection 5-40 mL, 5-40 mL, IntraVENous, PRN  0.9 % sodium chloride infusion, 25 mL, IntraVENous, PRN  HYDROmorphone (DILAUDID) injection 0.25 mg, 0.25 mg, IntraVENous, Q5 Min PRN  HYDROmorphone (DILAUDID) injection 0.5 mg, 0.5 mg, IntraVENous, Q5 Min PRN  ondansetron (ZOFRAN) injection 4 mg, 4 mg, IntraVENous, Q10 Min PRN  midazolam (VERSED) injection 1 mg, 1 mg, IntraVENous, Q5 Min PRN  hydrALAZINE (APRESOLINE) injection 5 mg, 5 mg, IntraVENous, Q15 Min PRN  fentaNYL (SUBLIMAZE) 1,000 mcg in sodium chloride 0.9 % 100 mL infusion,  mcg/hr, IntraVENous, Continuous  fentaNYL (SUBLIMAZE) injection 25 mcg, 25 mcg, IntraVENous, Q1H PRN  propofol injection, 5-50 mcg/kg/min, IntraVENous, Continuous  norepinephrine (LEVOPHED) 16 mg in dextrose 5 % 250 mL infusion, 1-100 mcg/min, IntraVENous, Continuous  piperacillin-tazobactam (ZOSYN) 3,375 mg in dextrose 5 % 50 mL IVPB extended infusion (mini-bag), 3,375 mg, IntraVENous, Q8H  glucose chewable tablet 16 g, 4 tablet, Oral, PRN  dextrose bolus 10% 125 mL, 125 mL, IntraVENous, PRN **OR** dextrose bolus 10% 250 mL, 250 mL, IntraVENous, PRN  glucagon (rDNA) injection 1 mg, 1 mg, SubCUTAneous, PRN  dextrose 10 % infusion, , IntraVENous, Continuous PRN  sodium chloride flush 0.9 % injection 5-40 mL, 5-40 mL, IntraVENous, 2 times per day  sodium chloride flush 0.9 % injection 5-40 mL, 5-40 mL, IntraVENous, PRN  0.9 % sodium chloride infusion, , IntraVENous, PRN  polyethylene glycol (GLYCOLAX) packet 17 g, 17 g, Oral, Daily PRN  acetaminophen (TYLENOL) tablet 650 mg, 650 mg, Oral, Q6H PRN **OR** acetaminophen (TYLENOL) suppository 650 mg, 650 mg, Rectal, Q6H PRN  pantoprazole (PROTONIX) injection 40 mg, 40 mg, IntraVENous, Daily  insulin glargine (LANTUS) injection vial 15 Units, 15 Units, SubCUTAneous, Nightly  vasopressin 20 Units in dextrose 5 % 100 mL infusion, 0.03 Units/min, IntraVENous, Titrated  insulin lispro (HUMALOG) injection vial 0-4 Units, 0-4 Units, SubCUTAneous, Q4H       Vitals :     Vitals:    09/16/22 1030   BP:    Pulse: 89   Resp: 14   Temp:    SpO2: 99%       I & O :       Intake/Output Summary (Last 24 hours) at 9/16/2022 1058  Last data filed at 9/16/2022 1000  Gross per 24 hour   Intake 8394.57 ml   Output 5183 ml   Net 3211.57 ml          Physical Examination :     General appearance: Anxious- no, distressed- no, in good spirits-  No,lethartgic  HEENT: Lips- normal, teeth- ok , oral mucosa- moist  Neck : Mass- no, appears symmetrical, JVD- not visible  Respiratory: Respiratory effort-  normal, wheeze- no, crackles -   Cardiovascular: Ausculation- No M/R/G, Edema none  Abdomen: visible mass- no, distention- no, scar- no, tenderness- no                            hepatosplenomegaly-  no  Musculoskeletal:  clubbing no,cyanosis- no , digital ischemia- no                           muscle strength- grossly normal , tone - grossly normal  Skin: rashes- no , ulcers- no, induration- no, tightening - no  Psychiatric:  Judgement and insight- normal           AAO X 3  Additional finding:      LABS:     Recent Labs     09/15/22  0421 09/15/22  1037 09/15/22  1855 09/15/22  2115 09/16/22  0420   WBC 38.9*  --   --  20.3* 20.5*   HGB 11.7*   < > 8.8* 9.9* 10.0*   HCT 36.0  --   --  30.5* 29.8*     --   --  146 151    < > = values in this interval not displayed.        Recent Labs     09/15/22  0421 09/15/22  2115 09/16/22  0420   * 131* 131*  131*   K 4.9 4.2 4.3  4.2   CL 90* 92* 94*  94*   CO2 8* 15* 20*  20*   BUN 34* 27* 19  19   CREATININE 2.1* 1.7* 1.2  1.2   GLUCOSE 104* 137* 212*  210*   MG 1.80 1.50* 2.10   PHOS  --  4.8 2.4*  2.4*

## 2022-09-16 NOTE — PROGRESS NOTES
Comprehensive Nutrition Assessment    Type and Reason for Visit:  Initial    Nutrition Recommendations/Plan:   When nutrition is medically feasible and able to use gut  Recommend TF initiation. Order: \"Diet: Tube feed continuous/ NPO\". Initiate Vital AF (peptide based formula) at 10 mL/hr and as tolerated, increase by 10 mL/hr q 4 hours until goal of 60 mL/hr is met via N/G  Recommend 60 mL H20 flush q 4 hours. Monitor IVF infusion, Na labs and need for adjustments in water flush  Monitor TF tolerance (abd distention, bowel habits, N/V, cramping)  Check TG while on diprivan infusion  Monitor nutrition adequacy, pertinent labs, bowel habits, wt changes, and clinical progress  When nutrition medically feasible and unable to use gut   Recommend check TG, Mg, Phos, CMP now if not done in last 24 hours. Bag 1: Clinimix 5/20 starting at 42 mL/hr  Physician/LIP to monitor closely and correct lytes (Phos,Mg,K+) d/t risk of refeeding syndrome  Bag 2: As long as electrolytes WNL, advance Clinimix 5/20 to goal rate of 75 mL/hr  No lipids while on propofol  Recommend FSBS, monitor glucose, need for insulin  Pharmacy to adjust MVI and Trace Elements as needed   When PN to be discontinued, cut rate by 50% and let current bag run out. Monitor nutrition adequacy, pertinent labs, bowel habits, wt changes, and clinical progress    Malnutrition Assessment:  Malnutrition Status: At risk for malnutrition (Comment) (09/16/22 1009)    Context:  Acute Illness       Nutrition Assessment:    Pt admitted with ichemic colon. S/p ex lap yesterday with sigmoid colectomy and colostomy. New vent. Hypotensive on levo and vaso. Sedated on on fentanyl and propofol at 6.7 mL/hr to provide 177 kcal from lipids. On CRRT. NG to suction. No plans for nutrition today d/t pressor support. Will monitor ability to begin TFs vs. TPN. Nutrition Related Findings:    Bridled NG to suction. Na 131. Phos 2.4. +2 BUE and BLE edema.  Hypoactive BS. +Colostomy with output. Wound Type: Surgical Incision       Current Nutrition Intake & Therapies:    Average Meal Intake: NPO  Average Supplements Intake: NPO  Diet NPO  Current Tube Feeding (TF) Orders:  Feeding Route: Nasogastric  Formula: Peptide Based  Schedule: Continuous  Goal TF & Flush Orders Provides: Vital AF at goal rate of 60 mL/hr x20 hours to provide 1200 mL TV, 1440 kcal, 90 g protein, and 973 mL free water. +60 mL free water flush q 4 hrs. Current Parenteral Nutrition Orders:  Type and Formula: Premix Central   Lipids: None  Duration: Continuous  Goal PN Orders Provides: Clinimix 5/20 at goal rate of 75 mL/hr x24 hours to provide 1800 mL TV, 90 g protein, 360 g dextrose, and 1584 kcal. No lipids while on propofol. GIR=3.38 mg/kg/min    Anthropometric Measures:  Height: 5' 9\" (175.3 cm)  Ideal Body Weight (IBW): 145 lbs (66 kg)       Current Body Weight: 165 lb (74.8 kg), 113.8 % IBW.  Weight Source: Not Specified  Current BMI (kg/m2): 24.4                          BMI Categories: Normal Weight (BMI 22.0 to 24.9) age over 72    Estimated Daily Nutrient Needs:  Energy Requirements Based On: Kcal/kg (20-25)  Weight Used for Energy Requirements: Current  Energy (kcal/day): 4820-0017 kcal  Weight Used for Protein Requirements: Current (1.2-2.0 g/kg)  Protein (g/day):  g  Method Used for Fluid Requirements: 1 ml/kcal  Fluid (ml/day): 6434-3633 mL    Nutrition Diagnosis:   Inadequate energy intake related to altered GI structure as evidenced by NPO or clear liquid status due to medical condition, intubation    Nutrition Interventions:   Food and/or Nutrient Delivery: Continue NPO  Nutrition Education/Counseling: No recommendation at this time  Coordination of Nutrition Care: Continue to monitor while inpatient, Interdisciplinary Rounds       Goals:     Goals:  (Initiate most appropriate form of nutrition within 24-48 hours)       Nutrition Monitoring and Evaluation:   Behavioral-Environmental Outcomes: None Identified  Food/Nutrient Intake Outcomes: Enteral Nutrition Intake/Tolerance, Parenteral Nutrition Intake/Tolerance  Physical Signs/Symptoms Outcomes: Biochemical Data, GI Status, Hemodynamic Status, Fluid Status or Edema, Nutrition Focused Physical Findings, Weight    Discharge Planning:     Too soon to determine     Андрей Muñiz [address], LD  Contact: [phone]

## 2022-09-16 NOTE — PROGRESS NOTES
Patient arrived back to room 235 from OR. Bedside handoff received from PACU team. Drips infusing through vascath due to issues with Right IJ CVC. CVC was accidentally pulled out further at some point and pressors were no longer working, line was unable to draw blood. Stat chest xray obtained. Patient hypothermic at 95.0f, emy hugger applied.   Sudha Angel at bedside, all questions answered appropriately.    -Dann Molina RN

## 2022-09-16 NOTE — PROGRESS NOTES
0720: Dr. Shanon Roberts to bedside. Plan of care reviewed and updated. Abd incisions and ostomy assessed. Verbal orders received to place NGT for decompression. Per resident, preferred RN to wean vaso gtt as able this AM.     0730: Call received from Dr. German Lima. Orders received to d/c Bicarb gtt and to remove -25ml/hr via CRRT. MD will be at bedside later this AM.     0745: Dr. Yary Neff to bedside.  Plan to maintain bicarb and current gtts until multidisciplinary rounds and nephrology rounds this AM.

## 2022-09-16 NOTE — PROGRESS NOTES
Shift: [id number]    Admitting diagnosis: Sepsis, enteritis    Presentation to hospital: HOTN, syncopal episode in bathroom @ home.  BS 60 EMS    Surgery: Yes; ex lap; ischemic sigmoid colon with resection and colostomy creation    Nursing assessment at handoff: stable    Emergency Contact/POA: Roseanna Wilkins, spouse [phone]  Family updated: Yes    Most recent vitals: /74   Pulse 77   Temp 98.2 °F (36.8 °C)   Resp 14   Ht 5' 9\" (1.753 m)   Wt 165 lb (74.8 kg)   SpO2 97%   BMI 24.37 kg/m²      Rhythm: Normal Sinus Rhythm 70-80's    NC/HFNC-   Respiratory support: vemtilator    Vent days: Day 1    Increased O2 requirements: no    Admission weight Weight: 165 lb (74.8 kg)  Today's weight   Wt Readings from Last 1 Encounters:   09/14/22 165 lb (74.8 kg)         UOP >30ml/hr: yes, 873ml during shift    Osorio need assessed each shift: yes, hemodynamically unstable    Restraints: yes Order current and documentation up to date?yes    Lines/Drains  LDA Insertion Date Discontinued Date Dressing Changes   PIV  9/14     TLC  9/16     Arterial  9/14     Osorio  9/14     Vas Cath 9/15     ETT  9/15     Surgical drains 9/15       Night Shift Hospitalist Interventions    Problem(Brief) Date Time Intervention Physician contacted       Jatin Bolivar                                        Drip rates at handoff:    sodium bicarbonate infusion 150 mL/hr at 09/15/22 2139    [address] 4/2.5 1,500 mL/hr (09/16/22 0023)    prismaSATE BGK 4/2.5 1,000 mL/hr (09/15/22 2300)    prismaSATE BGK 4/2.5 500 mL/hr at 09/15/22 1306    sodium chloride      fentaNYL (SUBLIMAZE) infusion 50 mcg/hr (09/16/22 0050)    propofol 20 mcg/kg/min (09/16/22 0238)    norepinephrine 25 mcg/min (09/16/22 0010)    dextrose      sodium chloride      vasopressin (Septic Shock) infusion 0.03 Units/min (09/16/22 0024)       Hospital Course Daily Updates:  Admit Day# 1  - RIJ CVC placed, levo & vaso infusion   -pH 7.1, 1 amp bicarb, NS @ 125  -R faustino Ignacio placed  -lactic 12.6 down 7.6    Admit Night #1 9/14/22  -Cdiff sample sent  -Lactic 11.3, 14.1  -critical CO2 8  -NS @ 125 changed to bicarb gtt @ 150  -1 amp bicarb given  -2L LR bolus  -2 amps bicarb per nephro, give 2 more amps if pH <7.2  -1L LR bolus  -urine output increasing, but less than <30 ml/hr    Admit Day #2 (9/15/2022)  -Cdiff Negative  -Lactic 19.3/18  -pH 7.27; 2 amps bicarb given  -BG 50s; x2 D10 boluses given, repeat BG 100s  -Repeat CT ABD; Severe inflammatory change in the right lower quadrant with etiology   uncertain. This could correspond to enteritis of the distal ileum and   terminal ileum. Colitis may also be considered. Infectious or inflammatory   etiologies may be favored     -Nephrology concerned for metformin contribution/DEJAN; Right fem vascath placed, CRRT started 9157-8672  -Consult General surgery; pt to OR at 1700 for concern for bowel perf    9/15 Nights:  -Back from OR at 2030. Resection of ischemic sigmoid colon with colostomy placement  -new 2 piece appliance applied to colostomy; wound consult placed  -having BM's  -CRRT restarted at 2130  -electrolytes being replaced per protocall  -New Right IJ placed  previous was unusable (was pulled out at some point during surgery)      Lab Data:   CBC:   Recent Labs     09/15/22  2115 09/16/22  0420   WBC 20.3* 20.5*   HGB 9.9* 10.0*   HCT 30.5* 29.8*   MCV 84.8 82.9    151     BMP:    Recent Labs     09/15/22  2115 09/16/22  0420   * 131*  131*   K 4.2 4.3  4.2   CO2 15* 20*  20*   BUN 27* 19  19   CREATININE 1.7* 1.2  1.2     LIVR:   Recent Labs     09/15/22  2115 09/16/22  0420   AST 43* 43*   ALT 16 16     PT/INR:   Recent Labs     09/15/22  0421 09/15/22  2115 09/16/22  0420   PROT 5.6* 4.2* 4.3*   INR 1.78*  --  1.54*     APTT: No results for input(s): APTT in the last 72 hours.   ABG:   Recent Labs     09/15/22  2351 09/16/22  0524   PHART 7.371 7.462*   QHB2KUK 32.4* 32.5*   PO2ART 336.3* 84.2     Consults (if GI or Nephrology- which group?)-  nephro consult Sarthak treviño, General surgery/Dr. Rodriguez

## 2022-09-16 NOTE — PROCEDURES
[facility]  Procedure Note  Date of Service:  09/15/22      Procedure: Central Vein Catheter     - 7Fr x 20cm triple lumen    Site: Right IJ Vein   Patient had a pre-existing right IJ triple lumen CVC. At some point after insertion it was pulled most of the way out. Description:  Patient was placed in the trendelenberg position. The site was prepped and draped in sterile fashion. The skin was infiltrated with 1% lidocaine. The vein was cannulated using real-time ultrasound guidance. A wire was threaded through the needle into the vein. Correct wire placement was verified by ultrasound. The soft tissue tract was then dilated. The catheter was then passed over the wire. The wire was withdrawn intact. All catheter ports were drawn and flushed. The catheter was secured in place with sutures at a depth of 20 cm at the skin. The site was covered with a sterile dressing. The right anterior chest was then interrogated by ultrasound. Lung sliding was still present . A portable CXR was ordered to verify appropriate catheter tip placement. There was no immediately apparent complication.   Estimated Blood Loss: < 20mL

## 2022-09-16 NOTE — PROGRESS NOTES
09/16/22 1613   Patient Observation   Heart Rate 98   Resp 14   SpO2 99 %   Breath Sounds   Right Upper Lobe Clear;Diminished   Right Middle Lobe Clear;Diminished   Right Lower Lobe Diminished   Left Upper Lobe Clear;Diminished   Left Lower Lobe Diminished   Vent Information   Vent Mode AC/VC   Ventilator Settings   FiO2  30 %   Vt (Set, mL) 450 mL   Resp Rate (Set) 14 bmp   PEEP/CPAP (cmH2O) 5   Vent Patient Data (Readings)   Vt (Measured) 467 mL   Peak Inspiratory Pressure (cmH2O) 18 cmH2O   Rate Measured 14 br/min   Minute Volume (L/min) 6.61 Liters   Peak Inspiratory Flow (lpm) 45 L/sec   Mean Airway Pressure (cmH2O) 8.1 cmH20   I:E Ratio 1:2.90   Flow Sensitivity 3 L/min   Vent Alarm Settings   Low Minute Volume (lpm) 3 L/min   High Minute Volumn (lpm) 20 L/min   Low Exhaled Vt (ml) 200 mL   High Exhaled Vt (ml) 1000 mL   RR High (bpm) 40 br/min   Apnea (secs) 20 secs   Additional Respiratoray Assessments   Humidification Source HME   Circuit Condensation Drained   Ambu Bag With Mask At Bedside Yes   ETT (adult)   Placement Date/Time: 09/15/22 1720   Present on Admission/Arrival: No  Placed By: In surgery; Licensed provider  Placement Verified By: Capnometry;Direct visualization; Auscultation  Preoxygenation: Yes  Mask Ventilation: Ventilated by mask with oral ai. ..    Secured At 22 cm   Measured From Lips   ETT Placement (S)  Right   Secured By Commercial tube wills   Site Assessment Cool;Dry   Cuff Pressure 30 cm H2O   Supplemental Gases   Supplemental Gases None   Ventilator Associated Pneumonia Bundle   Elevation of Head of Bed to 30-45 Degrees  Yes

## 2022-09-16 NOTE — PROGRESS NOTES
Hospitalist Progress Note      PCP: Haile Fernandez DO, DO    Date of Admission: 9/14/2022    Chief Complaint: Abdominal Pain    Subjective: no new c/o.         Medications:  Reviewed    Infusion Medications    prismaSATE BGK 4/2.5 1,500 mL/hr (09/16/22 0706)    prismaSATE BGK 4/2.5 1,000 mL/hr (09/16/22 0355)    prismaSATE BGK 4/2.5 500 mL/hr at 09/16/22 0400    sodium chloride      fentaNYL (SUBLIMAZE) infusion 50 mcg/hr (09/16/22 0900)    propofol 15 mcg/kg/min (09/16/22 0900)    norepinephrine 18 mcg/min (09/16/22 0900)    dextrose      sodium chloride      vasopressin (Septic Shock) infusion Stopped (09/16/22 0759)     Scheduled Medications    hydrocortisone sodium succinate PF  50 mg IntraVENous Q6H    mupirocin   Nasal BID    heparin (porcine)  5,000 Units SubCUTAneous 3 times per day    sodium chloride flush  5-40 mL IntraVENous 2 times per day    piperacillin-tazobactam  3,375 mg IntraVENous Q8H    sodium chloride flush  5-40 mL IntraVENous 2 times per day    pantoprazole  40 mg IntraVENous Daily    insulin glargine  15 Units SubCUTAneous Nightly    insulin lispro  0-4 Units SubCUTAneous Q4H     PRN Meds: prochlorperazine, potassium chloride, magnesium sulfate, sodium phosphate IVPB **OR** sodium phosphate IVPB **OR** sodium phosphate IVPB **OR** sodium phosphate IVPB, calcium gluconate **OR** calcium gluconate **OR** calcium gluconate **OR** calcium gluconate, sodium chloride flush, sodium chloride, HYDROmorphone, HYDROmorphone, ondansetron, midazolam, hydrALAZINE, fentanNYL, glucose, dextrose bolus **OR** dextrose bolus, glucagon (rDNA), dextrose, sodium chloride flush, sodium chloride, polyethylene glycol, acetaminophen **OR** acetaminophen      Intake/Output Summary (Last 24 hours) at 9/16/2022 0957  Last data filed at 9/16/2022 0900  Gross per 24 hour   Intake 8304.31 ml   Output 5061 ml   Net 3243.31 ml       Physical Exam Performed:    BP (!) 90/58   Pulse 98   Temp 99.2 °F (37.3 °C) (Bladder) Resp 14   Ht 5' 9\" (1.753 m)   Wt 165 lb (74.8 kg)   SpO2 98%   BMI 24.37 kg/m²     General appearance: No apparent distress, appears stated age and intubated/sedated. HEENT: Pupils equal, round, and reactive to light. Conjunctivae/corneas clear. Neck: Supple, with full range of motion. No jugular venous distention. Trachea midline. Respiratory:  Normal respiratory effort. Clear to auscultation, bilaterally without Rales/Wheezes/Rhonchi. Cardiovascular: Regular rate and rhythm with normal S1/S2 without murmurs, rubs or gallops. Abdomen: Soft, non-distended with hypoactive bowel sounds. Musculoskeletal: No clubbing, cyanosis or edema bilaterally. Skin: Skin color, texture, turgor normal.  No rashes or lesions. Capillary Refill: Brisk,< 3 seconds   Peripheral Pulses: +2 palpable, equal bilaterally       Labs:   Recent Labs     09/15/22  0421 09/15/22  1037 09/15/22  1855 09/15/22  2115 09/16/22  0420   WBC 38.9*  --   --  20.3* 20.5*   HGB 11.7*   < > 8.8* 9.9* 10.0*   HCT 36.0  --   --  30.5* 29.8*     --   --  146 151    < > = values in this interval not displayed.      Recent Labs     09/15/22  0421 09/15/22  2115 09/16/22  0420   * 131* 131*  131*   K 4.9 4.2 4.3  4.2   CL 90* 92* 94*  94*   CO2 8* 15* 20*  20*   BUN 34* 27* 19  19   CREATININE 2.1* 1.7* 1.2  1.2   CALCIUM 8.0* 8.0* 8.1*  8.1*   PHOS  --  4.8 2.4*  2.4*     Recent Labs     09/15/22  0421 09/15/22  2115 09/16/22  0420   AST 50* 43* 43*   ALT 18 16 16   BILIDIR  --  <0.2  --    BILITOT 0.3 0.3 0.3   ALKPHOS 566* 383* 339*     Recent Labs     09/15/22  0421 09/16/22  0420   INR 1.78* 1.54*     Recent Labs     09/14/22  1150 09/14/22  1514   TROPONINI 0.07* 0.08*       Urinalysis:      Lab Results   Component Value Date/Time    NITRU Negative 09/14/2022 12:08 PM    WBCUA 21-50 09/14/2022 12:08 PM    BACTERIA Rare 09/14/2022 12:08 PM    RBCUA None seen 09/14/2022 12:08 PM    BLOODU Negative 09/14/2022 12:08 PM SPECGRAV <=1.005 09/14/2022 12:08 PM    GLUCOSEU Negative 09/14/2022 12:08 PM       Consults:    IP CONSULT TO HOSPITALIST  IP CONSULT TO NEPHROLOGY  IP CONSULT TO CRITICAL CARE  IP CONSULT TO PHARMACY  IP CONSULT TO GENERAL SURGERY  IP CONSULT TO SOCIAL WORK      Assessment/Plan:    Active Hospital Problems    Diagnosis     Syncope [R55]      Priority: Medium    Hyponatremia [E87.1]      Priority: Medium    Abdominal pain [R10.9]      Priority: Medium    Enteritis [K52.9]      Priority: Medium    Elevated troponin [R77.8]      Priority: Medium    DEJAN (acute kidney injury) ([facility] [address] [N17.9]      Priority: Medium    DM (diabetes mellitus), secondary uncontrolled ([facility] [address] [E13.65]      Priority: Medium         Peritonitis - 2nd to bowel perforation, likely due to stercoral colitis. General Surgery consulted and appreciated s/p Lap/Sigmoid Colectomy and Colostomy  15 Sept w/out complications. Sepsis - w/ Leukocytosis/Tachycardia/Fever/Elevated Lactate POArrival 2nd to above infection. Continue IVF as appropriate and monitor clinical response w/ ABX as written. ARF - w/ elevated BUN/Cr ratio c/w pre-renal azotemia. Given IVF hydration and follow serial labs. Reviewed and documented as above. Nephrology consulted and appreciated started on CRRT. HypoNatremia - etiology clinically unable to determine but likely hypovolemic. Follow serial labs on IVF. Reviewed and documented as above. Troponin elevation - of unclear clinical significance w/ etiology clinically unable to determine, w/out signs/sxs of active ischemia. Follow serial troponins. Reviewed and documented as above. DM2 - controlled on home oral antiGlycemics/Insulin - held/continued. Follow FSBS/SSI low regimen. Last HbA1c 9.2% dated this admission. Anticipate resuming/continuing home regimen at discharge.          DVT Prophylaxis: LMWH      Recent Labs     09/15/22  0421 09/15/22  2115 09/16/22  0420    146 151     Diet: Diet NPO  Code Status: Full Code      PT/OT Eval Status: not yet ordered. Dispo - Remains in ICU. Patient is likely to remain in-house at least for the foreseeable future pending clinical course, subspecialty recs and eventual PT/OT eval/recs.   of 54 years preset at bedside w/ daughter age 48 when seen 12 Sept.        An Subramanian MD

## 2022-09-17 LAB
A/G RATIO: 1.9 (ref 1.1–2.2)
ACANTHOCYTES: ABNORMAL
ALBUMIN SERPL-MCNC: 3.3 G/DL (ref 3.4–5)
ALBUMIN SERPL-MCNC: 3.4 G/DL (ref 3.4–5)
ALP BLD-CCNC: 192 U/L (ref 40–129)
ALT SERPL-CCNC: 12 U/L (ref 10–40)
ANION GAP SERPL CALCULATED.3IONS-SCNC: 10 MMOL/L (ref 3–16)
ANION GAP SERPL CALCULATED.3IONS-SCNC: 7 MMOL/L (ref 3–16)
ANION GAP SERPL CALCULATED.3IONS-SCNC: 8 MMOL/L (ref 3–16)
ANION GAP SERPL CALCULATED.3IONS-SCNC: 8 MMOL/L (ref 3–16)
AST SERPL-CCNC: 38 U/L (ref 15–37)
BANDED NEUTROPHILS RELATIVE PERCENT: 52 % (ref 0–7)
BASE EXCESS ARTERIAL: 0.9 MMOL/L (ref -3–3)
BASOPHILS ABSOLUTE: 0 K/UL (ref 0–0.2)
BASOPHILS RELATIVE PERCENT: 0 %
BILIRUB SERPL-MCNC: 0.4 MG/DL (ref 0–1)
BUN BLDV-MCNC: 10 MG/DL (ref 7–20)
BUN BLDV-MCNC: 7 MG/DL (ref 7–20)
BUN BLDV-MCNC: 8 MG/DL (ref 7–20)
BUN BLDV-MCNC: 9 MG/DL (ref 7–20)
BURR CELLS: ABNORMAL
CALCIUM IONIZED: 1.01 MMOL/L (ref 1.12–1.32)
CALCIUM IONIZED: 1.03 MMOL/L (ref 1.12–1.32)
CALCIUM IONIZED: 1.04 MMOL/L (ref 1.12–1.32)
CALCIUM SERPL-MCNC: 7.6 MG/DL (ref 8.3–10.6)
CALCIUM SERPL-MCNC: 7.6 MG/DL (ref 8.3–10.6)
CALCIUM SERPL-MCNC: 7.9 MG/DL (ref 8.3–10.6)
CALCIUM SERPL-MCNC: 8.3 MG/DL (ref 8.3–10.6)
CARBOXYHEMOGLOBIN ARTERIAL: 0.2 % (ref 0–1.5)
CHLORIDE BLD-SCNC: 100 MMOL/L (ref 99–110)
CHLORIDE BLD-SCNC: 100 MMOL/L (ref 99–110)
CHLORIDE BLD-SCNC: 101 MMOL/L (ref 99–110)
CHLORIDE BLD-SCNC: 101 MMOL/L (ref 99–110)
CO2: 25 MMOL/L (ref 21–32)
CO2: 27 MMOL/L (ref 21–32)
CREAT SERPL-MCNC: 0.9 MG/DL (ref 0.6–1.2)
DOHLE BODIES: PRESENT
EOSINOPHILS ABSOLUTE: 0 K/UL (ref 0–0.6)
EOSINOPHILS RELATIVE PERCENT: 0 %
GFR AFRICAN AMERICAN: >60
GFR NON-AFRICAN AMERICAN: >60
GLUCOSE BLD-MCNC: 102 MG/DL (ref 70–99)
GLUCOSE BLD-MCNC: 104 MG/DL (ref 70–99)
GLUCOSE BLD-MCNC: 107 MG/DL (ref 70–99)
GLUCOSE BLD-MCNC: 113 MG/DL (ref 70–99)
GLUCOSE BLD-MCNC: 41 MG/DL (ref 70–99)
GLUCOSE BLD-MCNC: 53 MG/DL (ref 70–99)
GLUCOSE BLD-MCNC: 56 MG/DL (ref 70–99)
GLUCOSE BLD-MCNC: 66 MG/DL (ref 70–99)
GLUCOSE BLD-MCNC: 67 MG/DL (ref 70–99)
GLUCOSE BLD-MCNC: 70 MG/DL (ref 70–99)
GLUCOSE BLD-MCNC: 72 MG/DL (ref 70–99)
GLUCOSE BLD-MCNC: 75 MG/DL (ref 70–99)
GLUCOSE BLD-MCNC: 76 MG/DL (ref 70–99)
GLUCOSE BLD-MCNC: 77 MG/DL (ref 70–99)
GLUCOSE BLD-MCNC: 87 MG/DL (ref 70–99)
HCO3 ARTERIAL: 25.3 MMOL/L (ref 21–29)
HCT VFR BLD CALC: 22.5 % (ref 36–48)
HEMOGLOBIN, ART, EXTENDED: 7.4 G/DL (ref 12–16)
HEMOGLOBIN: 7.5 G/DL (ref 12–16)
INR BLD: 1.47 (ref 0.87–1.14)
LACTIC ACID: 2 MMOL/L (ref 0.4–2)
LYMPHOCYTES ABSOLUTE: 1.4 K/UL (ref 1–5.1)
LYMPHOCYTES RELATIVE PERCENT: 6 %
MAGNESIUM: 2.2 MG/DL (ref 1.8–2.4)
MCH RBC QN AUTO: 27.7 PG (ref 26–34)
MCHC RBC AUTO-ENTMCNC: 33.4 G/DL (ref 31–36)
MCV RBC AUTO: 83 FL (ref 80–100)
METAMYELOCYTES RELATIVE PERCENT: 4 %
METHEMOGLOBIN ARTERIAL: 0.6 %
MONOCYTES ABSOLUTE: 0.2 K/UL (ref 0–1.3)
MONOCYTES RELATIVE PERCENT: 1 %
NEUTROPHILS ABSOLUTE: 21 K/UL (ref 1.7–7.7)
NEUTROPHILS RELATIVE PERCENT: 37 %
O2 SAT, ARTERIAL: 96.6 %
O2 THERAPY: ABNORMAL
OVALOCYTES: ABNORMAL
PCO2 ARTERIAL: 39.2 MMHG (ref 35–45)
PDW BLD-RTO: 14.1 % (ref 12.4–15.4)
PERFORMED ON: ABNORMAL
PERFORMED ON: NORMAL
PH ARTERIAL: 7.43 (ref 7.35–7.45)
PH VENOUS: 7.43 (ref 7.35–7.45)
PH VENOUS: 7.48 (ref 7.35–7.45)
PH VENOUS: 7.5 (ref 7.35–7.45)
PHOSPHORUS: 1.5 MG/DL (ref 2.5–4.9)
PHOSPHORUS: 1.6 MG/DL (ref 2.5–4.9)
PHOSPHORUS: 1.9 MG/DL (ref 2.5–4.9)
PLATELET # BLD: 87 K/UL (ref 135–450)
PLATELET SLIDE REVIEW: ABNORMAL
PMV BLD AUTO: 7.5 FL (ref 5–10.5)
PO2 ARTERIAL: 93.8 MMHG (ref 75–108)
POTASSIUM SERPL-SCNC: 4 MMOL/L (ref 3.5–5.1)
POTASSIUM SERPL-SCNC: 4.1 MMOL/L (ref 3.5–5.1)
POTASSIUM SERPL-SCNC: 4.2 MMOL/L (ref 3.5–5.1)
POTASSIUM SERPL-SCNC: 4.3 MMOL/L (ref 3.5–5.1)
PROTHROMBIN TIME: 17.7 SEC (ref 11.7–14.5)
RBC # BLD: 2.71 M/UL (ref 4–5.2)
SODIUM BLD-SCNC: 133 MMOL/L (ref 136–145)
SODIUM BLD-SCNC: 134 MMOL/L (ref 136–145)
SODIUM BLD-SCNC: 134 MMOL/L (ref 136–145)
SODIUM BLD-SCNC: 136 MMOL/L (ref 136–145)
TCO2 ARTERIAL: 26.5 MMOL/L
TOTAL PROTEIN: 5 G/DL (ref 6.4–8.2)
TOXIC GRANULATION: PRESENT
VACUOLATED NEUTROPHILS: PRESENT
WBC # BLD: 22.6 K/UL (ref 4–11)

## 2022-09-17 PROCEDURE — 83605 ASSAY OF LACTIC ACID: CPT

## 2022-09-17 PROCEDURE — 6360000002 HC RX W HCPCS: Performed by: STUDENT IN AN ORGANIZED HEALTH CARE EDUCATION/TRAINING PROGRAM

## 2022-09-17 PROCEDURE — APPSS30 APP SPLIT SHARED TIME 16-30 MINUTES: Performed by: CLINICAL NURSE SPECIALIST

## 2022-09-17 PROCEDURE — P9047 ALBUMIN (HUMAN), 25%, 50ML: HCPCS | Performed by: NURSE PRACTITIONER

## 2022-09-17 PROCEDURE — 99291 CRITICAL CARE FIRST HOUR: CPT | Performed by: INTERNAL MEDICINE

## 2022-09-17 PROCEDURE — 6360000002 HC RX W HCPCS: Performed by: NURSE PRACTITIONER

## 2022-09-17 PROCEDURE — 83735 ASSAY OF MAGNESIUM: CPT

## 2022-09-17 PROCEDURE — 94003 VENT MGMT INPAT SUBQ DAY: CPT

## 2022-09-17 PROCEDURE — 85610 PROTHROMBIN TIME: CPT

## 2022-09-17 PROCEDURE — 2500000003 HC RX 250 WO HCPCS: Performed by: STUDENT IN AN ORGANIZED HEALTH CARE EDUCATION/TRAINING PROGRAM

## 2022-09-17 PROCEDURE — 94761 N-INVAS EAR/PLS OXIMETRY MLT: CPT

## 2022-09-17 PROCEDURE — 80053 COMPREHEN METABOLIC PANEL: CPT

## 2022-09-17 PROCEDURE — 2700000000 HC OXYGEN THERAPY PER DAY

## 2022-09-17 PROCEDURE — 82330 ASSAY OF CALCIUM: CPT

## 2022-09-17 PROCEDURE — 90945 DIALYSIS ONE EVALUATION: CPT

## 2022-09-17 PROCEDURE — 2580000003 HC RX 258: Performed by: ANESTHESIOLOGY

## 2022-09-17 PROCEDURE — 37799 UNLISTED PX VASCULAR SURGERY: CPT

## 2022-09-17 PROCEDURE — 6360000002 HC RX W HCPCS: Performed by: INTERNAL MEDICINE

## 2022-09-17 PROCEDURE — 2580000003 HC RX 258: Performed by: STUDENT IN AN ORGANIZED HEALTH CARE EDUCATION/TRAINING PROGRAM

## 2022-09-17 PROCEDURE — C9113 INJ PANTOPRAZOLE SODIUM, VIA: HCPCS | Performed by: STUDENT IN AN ORGANIZED HEALTH CARE EDUCATION/TRAINING PROGRAM

## 2022-09-17 PROCEDURE — 2000000000 HC ICU R&B

## 2022-09-17 PROCEDURE — 82803 BLOOD GASES ANY COMBINATION: CPT

## 2022-09-17 PROCEDURE — 85025 COMPLETE CBC W/AUTO DIFF WBC: CPT

## 2022-09-17 PROCEDURE — 6360000002 HC RX W HCPCS: Performed by: ANESTHESIOLOGY

## 2022-09-17 RX ORDER — MIDAZOLAM HYDROCHLORIDE 1 MG/ML
2 INJECTION INTRAMUSCULAR; INTRAVENOUS EVERY 4 HOURS PRN
Status: DISCONTINUED | OUTPATIENT
Start: 2022-09-17 | End: 2022-09-21

## 2022-09-17 RX ORDER — FENTANYL CITRATE 50 UG/ML
25 INJECTION, SOLUTION INTRAMUSCULAR; INTRAVENOUS EVERY 4 HOURS PRN
Status: DISCONTINUED | OUTPATIENT
Start: 2022-09-17 | End: 2022-09-24

## 2022-09-17 RX ADMIN — DEXTROSE MONOHYDRATE 125 ML: 100 INJECTION, SOLUTION INTRAVENOUS at 16:35

## 2022-09-17 RX ADMIN — Medication: at 10:29

## 2022-09-17 RX ADMIN — Medication 1500 ML/HR: at 07:06

## 2022-09-17 RX ADMIN — Medication 1500 ML/HR: at 13:49

## 2022-09-17 RX ADMIN — Medication 1000 ML/HR: at 00:28

## 2022-09-17 RX ADMIN — Medication 1500 ML/HR: at 17:15

## 2022-09-17 RX ADMIN — ALBUMIN (HUMAN) 25 G: 0.25 INJECTION, SOLUTION INTRAVENOUS at 01:55

## 2022-09-17 RX ADMIN — MUPIROCIN: 20 OINTMENT TOPICAL at 20:15

## 2022-09-17 RX ADMIN — HYDRALAZINE HYDROCHLORIDE 5 MG: 20 INJECTION INTRAMUSCULAR; INTRAVENOUS at 15:49

## 2022-09-17 RX ADMIN — SODIUM PHOSPHATE, MONOBASIC, MONOHYDRATE 12 MMOL: 276; 142 INJECTION, SOLUTION INTRAVENOUS at 04:01

## 2022-09-17 RX ADMIN — DEXTROSE MONOHYDRATE 125 ML: 100 INJECTION, SOLUTION INTRAVENOUS at 05:03

## 2022-09-17 RX ADMIN — CALCIUM GLUCONATE 1000 MG: 20 INJECTION, SOLUTION INTRAVENOUS at 20:46

## 2022-09-17 RX ADMIN — DEXTROSE MONOHYDRATE 125 ML: 100 INJECTION, SOLUTION INTRAVENOUS at 08:45

## 2022-09-17 RX ADMIN — HEPARIN SODIUM 5000 UNITS: 5000 INJECTION INTRAVENOUS; SUBCUTANEOUS at 05:22

## 2022-09-17 RX ADMIN — ALBUMIN (HUMAN) 25 G: 0.25 INJECTION, SOLUTION INTRAVENOUS at 08:30

## 2022-09-17 RX ADMIN — PIPERACILLIN AND TAZOBACTAM 3375 MG: 3; .375 INJECTION, POWDER, LYOPHILIZED, FOR SOLUTION INTRAVENOUS at 20:19

## 2022-09-17 RX ADMIN — PROPOFOL 15 MCG/KG/MIN: 10 INJECTION, EMULSION INTRAVENOUS at 02:47

## 2022-09-17 RX ADMIN — MIDAZOLAM 2 MG: 1 INJECTION INTRAMUSCULAR; INTRAVENOUS at 20:49

## 2022-09-17 RX ADMIN — DEXTROSE MONOHYDRATE 125 ML: 100 INJECTION, SOLUTION INTRAVENOUS at 09:23

## 2022-09-17 RX ADMIN — FENTANYL CITRATE 25 MCG: 0.05 INJECTION, SOLUTION INTRAMUSCULAR; INTRAVENOUS at 21:41

## 2022-09-17 RX ADMIN — PIPERACILLIN AND TAZOBACTAM 3375 MG: 3; .375 INJECTION, POWDER, LYOPHILIZED, FOR SOLUTION INTRAVENOUS at 06:09

## 2022-09-17 RX ADMIN — HYDROCORTISONE SODIUM SUCCINATE 50 MG: 100 INJECTION, POWDER, FOR SOLUTION INTRAMUSCULAR; INTRAVENOUS at 02:38

## 2022-09-17 RX ADMIN — Medication: at 00:22

## 2022-09-17 RX ADMIN — HYDROCORTISONE SODIUM SUCCINATE 50 MG: 100 INJECTION, POWDER, FOR SOLUTION INTRAMUSCULAR; INTRAVENOUS at 08:36

## 2022-09-17 RX ADMIN — HYDRALAZINE HYDROCHLORIDE 5 MG: 20 INJECTION INTRAMUSCULAR; INTRAVENOUS at 21:42

## 2022-09-17 RX ADMIN — CALCIUM GLUCONATE 1000 MG: 20 INJECTION, SOLUTION INTRAVENOUS at 02:57

## 2022-09-17 RX ADMIN — Medication 1000 ML/HR: at 10:29

## 2022-09-17 RX ADMIN — MUPIROCIN: 20 OINTMENT TOPICAL at 10:10

## 2022-09-17 RX ADMIN — Medication 1500 ML/HR: at 10:26

## 2022-09-17 RX ADMIN — Medication 10 ML: at 10:11

## 2022-09-17 RX ADMIN — DEXTROSE MONOHYDRATE 125 ML: 100 INJECTION, SOLUTION INTRAVENOUS at 17:09

## 2022-09-17 RX ADMIN — Medication 1000 ML/HR: at 20:42

## 2022-09-17 RX ADMIN — SODIUM PHOSPHATE, MONOBASIC, MONOHYDRATE 12 MMOL: 276; 142 INJECTION, SOLUTION INTRAVENOUS at 13:06

## 2022-09-17 RX ADMIN — Medication 10 ML: at 20:15

## 2022-09-17 RX ADMIN — Medication 1500 ML/HR: at 20:43

## 2022-09-17 RX ADMIN — HYDROCORTISONE SODIUM SUCCINATE 50 MG: 100 INJECTION, POWDER, FOR SOLUTION INTRAMUSCULAR; INTRAVENOUS at 15:00

## 2022-09-17 RX ADMIN — PANTOPRAZOLE SODIUM 40 MG: 40 INJECTION, POWDER, FOR SOLUTION INTRAVENOUS at 08:36

## 2022-09-17 RX ADMIN — Medication 1500 ML/HR: at 03:40

## 2022-09-17 RX ADMIN — HYDROCORTISONE SODIUM SUCCINATE 50 MG: 100 INJECTION, POWDER, FOR SOLUTION INTRAMUSCULAR; INTRAVENOUS at 20:14

## 2022-09-17 RX ADMIN — Medication 1500 ML/HR: at 00:15

## 2022-09-17 RX ADMIN — PIPERACILLIN AND TAZOBACTAM 3375 MG: 3; .375 INJECTION, POWDER, LYOPHILIZED, FOR SOLUTION INTRAVENOUS at 15:01

## 2022-09-17 RX ADMIN — Medication: at 20:42

## 2022-09-17 RX ADMIN — CALCIUM GLUCONATE 1000 MG: 20 INJECTION, SOLUTION INTRAVENOUS at 12:19

## 2022-09-17 ASSESSMENT — PULMONARY FUNCTION TESTS
PIF_VALUE: 18
PIF_VALUE: 18
PIF_VALUE: 13
PIF_VALUE: 13
PIF_VALUE: 16
PIF_VALUE: 13
PIF_VALUE: 16
PIF_VALUE: 16
PIF_VALUE: 13
PIF_VALUE: 16
PIF_VALUE: 19
PIF_VALUE: 16
PIF_VALUE: 20
PIF_VALUE: 18
PIF_VALUE: 19
PIF_VALUE: 24
PIF_VALUE: 18
PIF_VALUE: 19
PIF_VALUE: 16
PIF_VALUE: 13
PIF_VALUE: 16
PIF_VALUE: 18
PIF_VALUE: 16
PIF_VALUE: 13
PIF_VALUE: 16

## 2022-09-17 NOTE — PROGRESS NOTES
09/17/22 0019   Patient Observation   Heart Rate 98   Resp 14   SpO2 99 %   Breath Sounds   Right Upper Lobe Clear   Right Middle Lobe Diminished   Right Lower Lobe Diminished   Left Upper Lobe Clear   Left Lower Lobe Diminished   Vent Information   Vent Mode AC/VC   Ventilator Settings   FiO2  30 %   Vt (Set, mL) 450 mL   Resp Rate (Set) 14 bmp   PEEP/CPAP (cmH2O) 5   Vent Patient Data (Readings)   Vt (Measured) 466 mL   Peak Inspiratory Pressure (cmH2O) 18 cmH2O   Rate Measured 14 br/min   Minute Volume (L/min) 6.49 Liters   Mean Airway Pressure (cmH2O) 8.5 cmH20   I:E Ratio 1:2.90   Vent Alarm Settings   Low Minute Volume (lpm) 2 L/min   High Minute Volumn (lpm) 20 L/min   Low Exhaled Vt (ml) 200 mL   RR High (bpm) 40 br/min   Apnea (secs) 20 secs   Additional Respiratoray Assessments   Humidification Source HME   Ambu Bag With Mask At Bedside Yes   Airway Clearance   Subglottic Suction Done No   ETT (adult)   Placement Date/Time: 09/15/22 1720   Present on Admission/Arrival: No  Placed By: In surgery; Licensed provider  Placement Verified By: Capnometry;Direct visualization; Auscultation  Preoxygenation: Yes  Mask Ventilation: Ventilated by mask with oral ai. ..    Secured At 22 cm   Measured From Lips   ETT Placement Right   Secured By Commercial tube wills   Site Assessment Dry   Cuff Pressure 30 cm H2O

## 2022-09-17 NOTE — PROGRESS NOTES
09/17/22 0419   Breath Sounds   Right Upper Lobe Diminished   Right Middle Lobe Diminished   Right Lower Lobe Diminished   Left Upper Lobe Diminished   Left Lower Lobe Diminished   Vent Information   Vent Mode AC/VC   Vent Alarm Settings   Low Exhaled Vt (ml) 200 mL   Apnea (secs) 20 secs   Additional Respiratoray Assessments   Humidification Source HME   Ambu Bag With Mask At Bedside Yes   Airway Clearance   Suction ET Tube   Suction Device Inline suction catheter   Sputum Method Obtained Endotracheal   Sputum Amount Scant   ETT (adult)   Placement Date/Time: 09/15/22 1720   Present on Admission/Arrival: No  Placed By: In surgery; Licensed provider  Placement Verified By: Capnometry;Direct visualization; Auscultation  Preoxygenation: Yes  Mask Ventilation: Ventilated by mask with oral ai. ..    Secured At 22 cm   Measured From Lips   ETT Placement Center   Secured By Commercial tube wills   Site Assessment Dry   Cuff Pressure 28 cm H2O

## 2022-09-17 NOTE — PROGRESS NOTES
Shift: [id number]    Admitting diagnosis: Sepsis, enteritis    Presentation to hospital: HOTN, syncopal episode in bathroom @ home. BS 60 EMS    Surgery: Yes; ex lap; ischemic sigmoid colon with resection and colostomy creation    Nursing assessment at handoff: Stable    Emergency Contact/POA: Lavern Steele, spouse [phone]  Family updated: Yes    Most recent vitals: BP (!) 117/56   Pulse 99   Temp 98.4 °F (36.9 °C) (Bladder)   Resp 13   Ht 5' 9\" (1.753 m)   Wt 194 lb 0.1 oz (88 kg)   SpO2 99%   BMI 28.65 kg/m²      Rhythm: Normal Sinus Rhythm/ Sinus tach 's    NC/HFNC-   Respiratory support: Ventilator    Vent days: Day 2    Increased O2 requirements: No    Admission weight Weight: 165 lb (74.8 kg)  Today's weight   Wt Readings from Last 1 Encounters:   09/16/22 194 lb 0.1 oz (88 kg)         UOP >30ml/hr: No, CRRT    Osorio need assessed each shift: yes, hemodynamically unstable    Restraints: yes Order current and documentation up to date?  Yes    Lines/Drains  LDA Insertion Date Discontinued Date Dressing Changes   PIV  9/14     TLC  9/16 9/22   Arterial  9/14     Osorio  9/14     Vas Cath 9/15  9/22   ETT  9/15     Surgical drains 9/15     Ostomy 9/15  9/16     Night Shift Hospitalist Interventions    Problem(Brief) Date Time Intervention Physician contacted       Felisa Reinoso                                        Drip rates at handoff:    Verline Amabile 4/2.5 1,500 mL/hr (09/17/22 0340)    prismaSATE BGK 4/2.5 1,000 mL/hr (09/17/22 0028)    prismaSATE BGK 4/2.5 500 mL/hr at 09/17/22 0022    sodium chloride      fentaNYL (SUBLIMAZE) infusion 50 mcg/hr (09/17/22 0000)    propofol 15 mcg/kg/min (09/17/22 0526)    norepinephrine 5 mcg/min (09/17/22 0526)    dextrose      sodium chloride      vasopressin (Septic Shock) infusion Stopped (09/16/22 1205)       Hospital Course Daily Updates:  Admit Day# 1  - RIJ CVC placed, levo & vaso infusion   -pH 7.1, 1 amp bicarb, NS @ 125  -R radial [place] placed  -lactic 12.6 down 7.6    Admit Night #1 9/14/22  -Cdiff sample sent  -Lactic 11.3, 14.1  -critical CO2 8  -NS @ 125 changed to bicarb gtt @ 150  -1 amp bicarb given  -2L LR bolus  -2 amps bicarb per nephro, give 2 more amps if pH <7.2  -1L LR bolus  -urine output increasing, but less than <30 ml/hr    Admit Day #2 9/15/2022  -Cdiff Negative  -Lactic 19.3/18  -pH 7.27; 2 amps bicarb given  -BG 50s; x2 D10 boluses given, repeat BG 100s  -Repeat CT ABD; Severe inflammatory change in the right lower quadrant with etiology   uncertain. This could correspond to enteritis of the distal ileum and   terminal ileum. Colitis may also be considered. Infectious or inflammatory   etiologies may be favored     -Nephrology concerned for metformin contribution/DEJAN; Right fem vascath placed, CRRT started 7828-4949  -Consult General surgery; pt to OR at 1700 for concern for bowel perf    9/15 Nights:  -Back from OR at 2030. Resection of ischemic sigmoid colon with colostomy placement  -new 2 piece appliance applied to colostomy; wound consult placed  -having BM's  -CRRT restarted at 2130  -electrolytes being replaced per protocall  -New Right IJ placed  previous was unusable (was pulled out at some point during surgery)    Admit Day #3 9/16/2022  -Lactic downtrending; 8.6 this AM  -Bicarb gtt d/c'ed  -Increase in pressors; 1L NS bolus given (not included in CRRT running total), albumin added  -Vaso gtt titrated off  -CRRT maintained, keeping net even  -Output noted from ostomy, wound care rounded; ostomy noted to be brown in color    9/16 nights  -electrolytes replaced per prn orders  -125ml D10 bolus of glucose of 66, 104 after  -ART line very positional, not able to obtain accurate BP reading.  Line kept in as it does pull back blood  -low grade fever fixed by lowering CRRT blood warmer  -ostomy still brown in color with areas of reddish tone  -patient tolerating scheduled albumin    9/17 Days:  -Levo stopped  -Fentanyl stopped  -Propofol stopped  -Placed on SBT @ 0810  -Hydralazine PRN given for SBP <170  -CRRT continues  -CRRT new orders to start pulling 50mL/hr starting @ 1700 on 9/17  -BROOKS Drain output: 120mL  -Urine output: 51mL        Lab Data:   CBC:   Recent Labs     09/16/22  0420 09/17/22  0452   WBC 20.5* 22.6*   HGB 10.0* 7.5*   HCT 29.8* 22.5*   MCV 82.9 83.0    87*     BMP:    Recent Labs     09/17/22  0232 09/17/22  0452   * 136   K 4.2 4.3   CO2 27 25   BUN 10 9   CREATININE 0.9 0.9     LIVR:   Recent Labs     09/16/22  0420 09/17/22  0452   AST 43* 38*   ALT 16 12     PT/INR:   Recent Labs     09/15/22  0421 09/15/22  2115 09/16/22  0420   PROT 5.6* 4.2* 4.3*   INR 1.78*  --  1.54*       APTT: No results for input(s): APTT in the last 72 hours.   ABG:   Recent Labs     09/15/22  2351 09/16/22  0524   PHART 7.371 7.462*   [id number] 32.4* 32.5*   PO2ART 336.3* 84.2     Consults (if GI or Nephrology- which group?)-  Nephro/Sarthak treviño, General surgery/Dr. Rodriguez     Electronically signed by Lesa Chavis RN on 9/17/2022 at 6:38 PM

## 2022-09-17 NOTE — PROGRESS NOTES
09/17/22 1203   Patient Observation   Heart Rate (!) 105   Resp 12   SpO2 100 %   Observations ambu  @ bedside   Breath Sounds   Right Upper Lobe Diminished   Right Middle Lobe Diminished   Right Lower Lobe Diminished   Left Upper Lobe Diminished   Left Lower Lobe Diminished   Ventilator Settings   FiO2  30 %   PEEP/CPAP (cmH2O) 5   Vent Patient Data (Readings)   Vt (Measured) 523 mL   Peak Inspiratory Pressure (cmH2O) 16 cmH2O   Rate Measured 14 br/min   Minute Volume (L/min) 7.99 Liters   Mean Airway Pressure (cmH2O) 7.8 cmH20   I:E Ratio 1:3.10   Vent Alarm Settings   Low Minute Volume (lpm) 2 L/min   RR High (bpm) 40 br/min   ETT (adult)   Placement Date/Time: 09/15/22 1720   Present on Admission/Arrival: No  Placed By: In surgery; Licensed provider  Placement Verified By: Capnometry;Direct visualization; Auscultation  Preoxygenation: Yes  Mask Ventilation: Ventilated by mask with oral ai. ..    Secured At 22 cm   Measured From Lips   ETT Placement Center   Secured By Commercial tube wills   Site Assessment Dry   Cuff Pressure 30 cm H2O

## 2022-09-17 NOTE — PLAN OF CARE
Problem: Discharge Planning  Goal: Discharge to home or other facility with appropriate resources  9/16/2022 2219 by José Antonio Alberto RN  Outcome: Progressing  9/16/2022 1827 by Rain Galvin RN  Outcome: Not Progressing     Problem: Pain  Goal: Verbalizes/displays adequate comfort level or baseline comfort level  9/16/2022 2219 by José Antonio Alberto RN  Outcome: Progressing  9/16/2022 1827 by Rain Galvin RN  Outcome: Progressing     Problem: Skin/Tissue Integrity  Goal: Absence of new skin breakdown  Description: 1. Monitor for areas of redness and/or skin breakdown  2. Assess vascular access sites hourly  3. Every 4-6 hours minimum:  Change oxygen saturation probe site  4. Every 4-6 hours:  If on nasal continuous positive airway pressure, respiratory therapy assess nares and determine need for appliance change or resting period. 9/16/2022 2219 by José Antonio Alberto RN  Outcome: Progressing  9/16/2022 1827 by Rain Galvin RN  Outcome: Progressing     Problem: Chronic Conditions and Co-morbidities  Goal: Patient's chronic conditions and co-morbidity symptoms are monitored and maintained or improved  Outcome: Progressing     Problem: Safety - Medical Restraint  Goal: Remains free of injury from restraints (Restraint for Interference with Medical Device)  Description: INTERVENTIONS:  1. Determine that other, less restrictive measures have been tried or would not be effective before applying the restraint  2. Evaluate the patient's condition at the time of restraint application  3. Inform patient/family regarding the reason for restraint  4.  Q2H: Monitor safety, psychosocial status, comfort, nutrition and hydration  9/16/2022 2219 by José Antonio Alberto RN  Outcome: Progressing  Flowsheets (Taken 9/16/2022 2000)  Remains free of injury from restraints (restraint for interference with medical device):   Determine that other, less restrictive measures have been tried or would not be effective before applying the restraint   Evaluate the patient's condition at the time of restraint application   Inform patient/family regarding the reason for restraint   Every 2 hours: Monitor safety, psychosocial status, comfort, nutrition and hydration  9/16/2022 1827 by Tay Nunes RN  Outcome: Progressing     Problem: Nutrition Deficit:  Goal: Optimize nutritional status  9/16/2022 2219 by Martin Kaufman RN  Outcome: Progressing  9/16/2022 1827 by Tay Nunes RN  Outcome: Not Progressing

## 2022-09-17 NOTE — PROGRESS NOTES
Interval History and plan:   Patient seen at bedside. No issues with CRRT  Lytes stable  FiO2 30 %  Off Pressors now  + 8 liters since admission. Plan:  Continue CRRT  Electrolyte replacement per protocol  Start pulling fluid ~ 50 ml/hr as tolerated. D/W patient's  and RN at bedside. Assessment :     Acidosis  Controlled with CRRT       CKD Stage IIIb with DEJAN  Creatinine 2.1 on consult  Creatinine 1.6 on 7/22  CKD likely due to diabetes, hypertension  Renal imaging-normal in the past      hypotension  BP: (131-161)/(48-92)  Heart Rate:  [103-108]   BP goal inpatient [phone] systolic inpatient  Recovering from shock. Off pressors now. [facility] would like to thank Chris Silva MD   for opportunity to serve this patient      Please call with questions at-   24 Hrs Answering service [phone] or  7 am- 5 pm via Perfect serve or cell phone  Howard Foster MD          CC/reason for consult :       DEJAN     HPI :     Bryan Soliz is a 67 y.o. female presented to   the hospital on 9/14/2022 with lactic acidosis. She is known to have diabetes and on metformin to 2 days ago  Has constipation and with multiple bowel regimen has had good bowel movement yesterday following that she has syncope. EMS was called and was found to have blood pressure of 50 systolic given IV fluids brought to the emergency room blood pressure was normal initially but later developed hypotension got 3 L of fluid and now on 2 vasopressors and in ICU. She also has severe acidosis with very high lactate. CT scan was normal initially. We are consulted for DEJAN on CKD and related issues. On CKD and also severe lactic acidosis    ROS:   Unable.      positives in bold   Constitutional:  fever, chills, weakness, weight change, fatigue  Skin:  rash, pruritus, hair loss, bruising, dry skin, petechiae  Head, Face, Neck   headaches, swelling,  cervical adenopathy  Respiratory: shortness of breath, cough, or wheezing  Cardiovascular: chest pain, palpitations, dizzy, edema  Gastrointestinal: nausea, vomiting, diarrhea, constipation,belly pain    Yellow skin, blood in stool  Musculoskeletal:  back pain, muscle weakness, gait problems,       joint pain or swelling. Genitourinary:  dysuria, poor urine flow, flank pain, blood in urine  Neurologic:  vertigo, TIA'S, syncope, seizures, focal weakness  Psychosocial:  insomnia, anxiety, or depression. Additional positive findings:                    All other remaining systems are negative or unable to obtain        PMH/PSH/SH/Family History:     Past Medical History:   Diagnosis Date    Chronic kidney disease     Diabetes mellitus ([facility], [place] [address]     Hyperlipidemia     Hypertension     Neuropathy        Past Surgical History:   Procedure Laterality Date    LAPAROTOMY N/A 9/15/2022    DIAGNOSTIC LAPAROSCOPY, EXPLORATORY LAPAROTOMY, SIGMOID COLECTOMY AND COLOSTOMY performed by Hunter Artis MD at [facility]        reports that she has never smoked. She has never used smokeless tobacco. She reports that she does not currently use alcohol. She reports that she does not use drugs. family history is not on file.          Medication:     Current Facility-Administered Medications: hydrocortisone sodium succinate PF (SOLU-CORTEF) injection 50 mg, 50 mg, IntraVENous, Q6H  mupirocin (BACTROBAN) 2 % ointment, , Nasal, BID  prochlorperazine (COMPAZINE) injection 5 mg, 5 mg, IntraVENous, Q6H PRN  prismaSATE BGK 4/2.5 dialysis solution, 1,500 mL/hr, Dialysis, Continuous  prismaSATE BGK 4/2.5 dialysis solution, 1,000 mL/hr, Dialysis, Continuous  potassium chloride 20 mEq/50 mL IVPB (Central Line), 20 mEq, IntraVENous, PRN  magnesium sulfate 1000 mg in dextrose 5% 100 mL IVPB, 1,000 mg, IntraVENous, PRN  sodium phosphate 6 mmol in sodium chloride 0.9 % 250 mL IVPB, 6 mmol, IntraVENous, PRN **OR** sodium phosphate 12 mmol in sodium chloride 0.9 % 250 mL IVPB, 12 mmol, IntraVENous, PRN **OR** sodium phosphate 18 mmol in sodium chloride 0.9 % 500 mL IVPB, 18 mmol, IntraVENous, PRN **OR** sodium phosphate 24 mmol in sodium chloride 0.9 % 500 mL IVPB, 24 mmol, IntraVENous, PRN  prismaSATE BGK 4/2.5 dialysis solution, , Dialysis, Continuous  calcium gluconate 1000 mg in sodium chloride 50 mL, 1,000 mg, IntraVENous, PRN **OR** calcium gluconate 2,000 mg in dextrose 5 % 100 mL IVPB, 2,000 mg, IntraVENous, PRN **OR** calcium gluconate 3,000 mg in dextrose 5 % 100 mL IVPB, 3,000 mg, IntraVENous, PRN **OR** calcium gluconate 4,000 mg in dextrose 5 % 100 mL IVPB, 4,000 mg, IntraVENous, PRN  heparin (porcine) injection 5,000 Units, 5,000 Units, SubCUTAneous, 3 times per day  sodium chloride flush 0.9 % injection 5-40 mL, 5-40 mL, IntraVENous, 2 times per day  sodium chloride flush 0.9 % injection 5-40 mL, 5-40 mL, IntraVENous, PRN  0.9 % sodium chloride infusion, 25 mL, IntraVENous, PRN  HYDROmorphone (DILAUDID) injection 0.25 mg, 0.25 mg, IntraVENous, Q5 Min PRN  HYDROmorphone (DILAUDID) injection 0.5 mg, 0.5 mg, IntraVENous, Q5 Min PRN  ondansetron (ZOFRAN) injection 4 mg, 4 mg, IntraVENous, Q10 Min PRN  midazolam (VERSED) injection 1 mg, 1 mg, IntraVENous, Q5 Min PRN  hydrALAZINE (APRESOLINE) injection 5 mg, 5 mg, IntraVENous, Q15 Min PRN  fentaNYL (SUBLIMAZE) 1,000 mcg in sodium chloride 0.9 % 100 mL infusion,  mcg/hr, IntraVENous, Continuous  fentaNYL (SUBLIMAZE) injection 25 mcg, 25 mcg, IntraVENous, Q1H PRN  propofol injection, 5-50 mcg/kg/min, IntraVENous, Continuous  norepinephrine (LEVOPHED) 16 mg in dextrose 5 % 250 mL infusion, 1-100 mcg/min, IntraVENous, Continuous  piperacillin-tazobactam (ZOSYN) 3,375 mg in dextrose 5 % 50 mL IVPB extended infusion (mini-bag), 3,375 mg, IntraVENous, Q8H  glucose chewable tablet 16 g, 4 tablet, Oral, PRN  dextrose bolus 10% 125 mL, 125 mL, IntraVENous, PRN **OR** dextrose bolus 10% 250 mL, 250 mL, IntraVENous, PRN  glucagon (rDNA) injection 1 mg, 1 mg, SubCUTAneous, PRN  dextrose 10 % infusion, , IntraVENous, Continuous PRN  sodium chloride flush 0.9 % injection 5-40 mL, 5-40 mL, IntraVENous, 2 times per day  sodium chloride flush 0.9 % injection 5-40 mL, 5-40 mL, IntraVENous, PRN  0.9 % sodium chloride infusion, , IntraVENous, PRN  polyethylene glycol (GLYCOLAX) packet 17 g, 17 g, Oral, Daily PRN  acetaminophen (TYLENOL) tablet 650 mg, 650 mg, Oral, Q6H PRN **OR** acetaminophen (TYLENOL) suppository 650 mg, 650 mg, Rectal, Q6H PRN  pantoprazole (PROTONIX) injection 40 mg, 40 mg, IntraVENous, Daily  insulin glargine (LANTUS) injection vial 15 Units, 15 Units, SubCUTAneous, Nightly  vasopressin 20 Units in dextrose 5 % 100 mL infusion, 0.03 Units/min, IntraVENous, Titrated  insulin lispro (HUMALOG) injection vial 0-4 Units, 0-4 Units, SubCUTAneous, Q4H       Vitals :     Vitals:    09/17/22 1600   BP: (!) 132/48   Pulse: (!) 105   Resp: 12   Temp: 100.1 °F (37.8 °C)   SpO2: 98%       I & O :       Intake/Output Summary (Last 24 hours) at 9/17/2022 1645  Last data filed at 9/17/2022 1613  Gross per 24 hour   Intake 2099.47 ml   Output 2598 ml   Net -498.53 ml          Physical Examination :     General appearance: NAD  HEENT: ETT in mouth  Neck : Mass- no, appears symmetrical, JVD- not visible  Respiratory: Respiratory effort-  normal, wheeze- no, crackles - no, intubated. Cardiovascular: Ausculation- No M/R/G, Edema ++  Abdomen: visible mass- no, distention- no                           hepatosplenomegaly-  no  Skin: rashes- no   Psychiatric: Cannot assess  CNS: Sedated.    Additional finding:      LABS:     Recent Labs     09/15/22  2115 09/16/22  0420 09/17/22  0452   WBC 20.3* 20.5* 22.6*   HGB 9.9* 10.0* 7.5*   HCT 30.5* 29.8* 22.5*    151 87*       Recent Labs     09/16/22  0420 09/16/22  1059 09/16/22  1808 09/17/22  0232 09/17/22  0452 09/17/22  1120   *  131*   < > 134* 134* 136 134*   K 4.3  4.2 < > 4.4 4.2 4.3 4.0   CL 94*  94*   < > 100 100 101 101   CO2 20*  20*   < > 26 27 25 25   BUN 19  19   < > 13 10 9 8   CREATININE 1.2  1.2   < > 1.0 0.9 0.9 0.9   GLUCOSE 212*  210*   < > 130* 76 70 87   MG 2.10  --  2.20  --  2.20  --    PHOS 2.4*  2.4*   < > 1.9* 1.9*  --  1.6*    < > = values in this interval not displayed.

## 2022-09-17 NOTE — PLAN OF CARE
Problem: Discharge Planning  Goal: Discharge to home or other facility with appropriate resources  9/17/2022 1020 by Clay Costa RN  Outcome: Progressing  Flowsheets (Taken 9/17/2022 0800)  Discharge to home or other facility with appropriate resources: Identify barriers to discharge with patient and caregiver  9/16/2022 2219 by Tish Finn RN  Outcome: Progressing     Problem: Pain  Goal: Verbalizes/displays adequate comfort level or baseline comfort level  9/17/2022 1020 by Clay Costa RN  Outcome: Progressing  9/16/2022 2219 by Tish Finn RN  Outcome: Progressing     Problem: Skin/Tissue Integrity  Goal: Absence of new skin breakdown  Description: 1. Monitor for areas of redness and/or skin breakdown  2. Assess vascular access sites hourly  3. Every 4-6 hours minimum:  Change oxygen saturation probe site  4. Every 4-6 hours:  If on nasal continuous positive airway pressure, respiratory therapy assess nares and determine need for appliance change or resting period. 9/17/2022 1020 by Clay Costa RN  Outcome: Progressing  9/16/2022 2219 by Tish Finn RN  Outcome: Progressing     Problem: Chronic Conditions and Co-morbidities  Goal: Patient's chronic conditions and co-morbidity symptoms are monitored and maintained or improved  9/17/2022 1020 by Clay Costa RN  Outcome: Progressing  Flowsheets (Taken 9/17/2022 0800)  Care Plan - Patient's Chronic Conditions and Co-Morbidity Symptoms are Monitored and Maintained or Improved:   Monitor and assess patient's chronic conditions and comorbid symptoms for stability, deterioration, or improvement   Collaborate with multidisciplinary team to address chronic and comorbid conditions and prevent exacerbation or deterioration  9/16/2022 2219 by Tish Finn RN  Outcome: Progressing     Problem: Safety - Medical Restraint  Goal: Remains free of injury from restraints (Restraint for Interference with Medical Device)  Description: INTERVENTIONS:  1. Determine that other, less restrictive measures have been tried or would not be effective before applying the restraint  2. Evaluate the patient's condition at the time of restraint application  3. Inform patient/family regarding the reason for restraint  4.  Q2H: Monitor safety, psychosocial status, comfort, nutrition and hydration  9/17/2022 1020 by Clayton Lefort, RN  Outcome: Progressing  Flowsheets  Taken 9/17/2022 1000  Remains free of injury from restraints (restraint for interference with medical device):   Determine that other, less restrictive measures have been tried or would not be effective before applying the restraint   Evaluate the patient's condition at the time of restraint application   Inform patient/family regarding the reason for restraint   Every 2 hours: Monitor safety, psychosocial status, comfort, nutrition and hydration  Taken 9/17/2022 0800  Remains free of injury from restraints (restraint for interference with medical device):   Determine that other, less restrictive measures have been tried or would not be effective before applying the restraint   Evaluate the patient's condition at the time of restraint application   Inform patient/family regarding the reason for restraint   Every 2 hours: Monitor safety, psychosocial status, comfort, nutrition and hydration  Taken 9/17/2022 0749  Remains free of injury from restraints (restraint for interference with medical device):   Determine that other, less restrictive measures have been tried or would not be effective before applying the restraint   Evaluate the patient's condition at the time of restraint application   Inform patient/family regarding the reason for restraint   Every 2 hours: Monitor safety, psychosocial status, comfort, nutrition and hydration  9/16/2022 2219 by Mario Davies RN  Outcome: Progressing  Flowsheets (Taken 9/16/2022 2000)  Remains free of injury from restraints (restraint for interference with medical device): Determine that other, less restrictive measures have been tried or would not be effective before applying the restraint   Evaluate the patient's condition at the time of restraint application   Inform patient/family regarding the reason for restraint   Every 2 hours: Monitor safety, psychosocial status, comfort, nutrition and hydration     Problem: Nutrition Deficit:  Goal: Optimize nutritional status  9/17/2022 1020 by Veronica Manzo RN  Outcome: Progressing  9/16/2022 2219 by Livier Jose RN  Outcome: Progressing

## 2022-09-17 NOTE — PROGRESS NOTES
09/17/22 1556   Patient Observation   Heart Rate (!) 106   Resp 14   SpO2 98 %   Observations ambu @ bedside   Breath Sounds   Right Upper Lobe Diminished   Right Middle Lobe Diminished   Right Lower Lobe Diminished   Left Upper Lobe Diminished   Left Lower Lobe Diminished   Ventilator Settings   FiO2  30 %   PEEP/CPAP (cmH2O) 5   Vent Patient Data (Readings)   Vt (Measured) 633 mL   Peak Inspiratory Pressure (cmH2O) 16 cmH2O   Rate Measured 13 br/min   Minute Volume (L/min) 7.83 Liters   Mean Airway Pressure (cmH2O) 7.8 cmH20   I:E Ratio 1:3.10   Vent Alarm Settings   Low Minute Volume (lpm) 2 L/min   RR High (bpm) 40 br/min   ETT (adult)   Placement Date/Time: 09/15/22 1720   Present on Admission/Arrival: No  Placed By: In surgery; Licensed provider  Placement Verified By: Capnometry;Direct visualization; Auscultation  Preoxygenation: Yes  Mask Ventilation: Ventilated by mask with oral ai. ..    Secured At 22 cm   Measured From Lips   ETT Placement Center   Secured By Commercial tube wills   Site Assessment Dry   Cuff Pressure 30 cm H2O

## 2022-09-17 NOTE — PROGRESS NOTES
[facility] GENERAL SURGERY    Surgery Progress Note           POD # 2    PATIENT NAME: Ju Hinton     TODAY'S DATE: 9/17/2022    INTERVAL HISTORY:    Spontaneous breathing trial this AM per critical care team. Nurse reports pt responding to name, but not commands yet. No ostomy output. Lactic acid normal this AM     OBJECTIVE:   VITALS:  BP (!) 124/50   Pulse 98   Temp 98.4 °F (36.9 °C) (Bladder)   Resp 14   Ht 5' 9\" (1.753 m)   Wt 194 lb 0.1 oz (88 kg)   SpO2 100%   BMI 28.65 kg/m²     INTAKE/OUTPUT:    I/O last 3 completed shifts: In: 80 [I.V.:6892.2; IV Piggyback:695.9]  Out: 9724 [Urine:1002; Drains:1300; Stool:95; Blood:200]  I/O this shift:  In: 299.3 [I.V.:150.8;  IV Piggyback:148.6]  Out: 161               CONSTITUTIONAL:  awake   LUNGS:  remains intubated, on sbt  ABDOMEN:   few bowel sounds, soft, non-distended, stoma dusky but appears viable with some effluent in bag   INCISION: clean, dry    Data:  CBC:   Recent Labs     09/15/22  2115 09/16/22  0420 09/17/22  0452   WBC 20.3* 20.5* 22.6*   HGB 9.9* 10.0* 7.5*   HCT 30.5* 29.8* 22.5*    151 87*     BMP:    Recent Labs     09/16/22 1808 09/17/22  0232 09/17/22  0452   * 134* 136   K 4.4 4.2 4.3    100 101   CO2 26 27 25   BUN 13 10 9   CREATININE 1.0 0.9 0.9   GLUCOSE 130* 76 70     Hepatic:   Recent Labs     09/15/22  2115 09/16/22  0420 09/17/22  0452   AST 43* 43* 38*   ALT 16 16 12   BILITOT 0.3 0.3 0.4   ALKPHOS 383* 339* 192*     Mag:      Recent Labs     09/16/22 0420 09/16/22 1808 09/17/22  0452   MG 2.10 2.20 2.20      Phos:     Recent Labs     09/16/22  1059 09/16/22  1808 09/17/22  0232   PHOS 2.1* 1.9* 1.9*      INR:   Recent Labs     09/15/22  0421 09/16/22  0420 09/17/22  0452   INR 1.78* 1.54* 1.47*       ASSESSMENT AND PLAN:  67 y.o. female status post diagnosis of ischemic sigmoid colon s/p exploratory laparotomy, sigmoid colectomy, colostomy formation POD2     GI: continue with ngt to suction  ID: continue with IV antibiotics - leukocytosis trending up, monitor  New ostomy: monitor stoma and output  Pulm: extubation per critical care team    Medical management per primary team    Dr. Johnnie Pfeiffer is the surgeon on call this weekend, please call the office at [phone] for questions or concerns.        Electronically signed by BRAEDEN Grimm CNP

## 2022-09-17 NOTE — PROGRESS NOTES
INPATIENT PULMONARY CRITICAL CARE PROGRESS NOTE      Reason for visit    septic shock     SUBJECTIVE: Patient when seen this morning continues to be critically ill on mechanical vent support, patient was on 30% oxygen PEEP of 5 to maintain oxygen saturation; patient does not have any increasing secretions endotracheal tube, patient has a T-max of 99.7 °F, patient continues to be on IV pressors overnight to maintain hemodynamics, patient was only on Levophed infusion now and the vasopressin has been discontinued, patient was on IV sedation to maintain patient ventilator synchrony, patient has a sinus rhythm on the monitor, patient's glycemic control was acceptable, patient is on CRRT at this time which is being kept even, patient has a cumulative fluid balance of +8.7 L, no other pertinent review of system could be obtained because of patient's clinical status which remains precarious medical           Physical Exam:  Blood pressure (!) 124/50, pulse 98, temperature 98.4 °F (36.9 °C), temperature source Bladder, resp. rate 14, height 5' 9\" (1.753 m), weight 194 lb 0.1 oz (88 kg), SpO2 100 %.'     Constitutional:  No acute distress. On mechanical vent support  HENT: Endotracheal tube present no thyromegaly. Eyes:  Conjunctivae are normal. Pupils equal, round, and reactive to light. No scleral icterus. Neck: . No tracheal deviation present. No obvious thyroid mass. Cardiovascular: Sinus rhythm normal heart sounds. No right ventricular heave. No lower extremity edema. Pulmonary/Chest: No wheezes. No rales. Chest wall is not dull to percussion. No accessory muscle usage or stridor. Decreased breath sound intensity  Abdominal: Soft. Minimal significant bowel sounds present. No distension or hernia. Ostomy present  Musculoskeletal: No cyanosis. No clubbing. No obvious joint deformity. Lymphadenopathy: No cervical or supraclavicular adenopathy. Skin: Skin is warm and dry.  No rash or nodules on the exposed extremities. Just neurologic: Intubated and sedated    Results:  CBC:   Recent Labs     09/15/22  2115 09/16/22  0420 09/17/22  0452   WBC 20.3* 20.5* 22.6*   HGB 9.9* 10.0* 7.5*   HCT 30.5* 29.8* 22.5*   MCV 84.8 82.9 83.0    151 87*       BMP:   Recent Labs     09/16/22  1059 09/16/22  1808 09/17/22  0232 09/17/22  0452   * 134* 134* 136   K 4.3 4.4 4.2 4.3   CL 97* 100 100 101   CO2 23 26 27 25   PHOS 2.1* 1.9* 1.9*  --    BUN 15 13 10 9   CREATININE 1.0 1.0 0.9 0.9       LIVER PROFILE:   Recent Labs     09/14/22  1150 09/14/22  1514 09/15/22  2115 09/16/22  0420 09/17/22  0452   AST 32   < > 43* 43* 38*   ALT 12   < > 16 16 12   LIPASE 36.0  --   --   --   --    BILIDIR  --   --  <0.2  --   --    BILITOT 0.3   < > 0.3 0.3 0.4   ALKPHOS 142*   < > 383* 339* 192*    < > = values in this interval not displayed. PT/INR:   Recent Labs     09/15/22  0421 09/16/22 0420 09/17/22  0452   PROTIME 20.6* 18.3* 17.7*   INR 1.78* 1.54* 1.47*       APTT: No results for input(s): APTT in the last 72 hours. UA:  Recent Labs     09/14/22  1208   COLORU Yellow   PHUR 6.0   WBCUA 21-50*   RBCUA None seen   BACTERIA Rare*   CLARITYU Clear   SPECGRAV <=1.005   LEUKOCYTESUR SMALL*   UROBILINOGEN 0.2   BILIRUBINUR Negative   BLOODU Negative   GLUCOSEU Negative         Imaging:  I have reviewed radiology images personally. XR ABDOMEN FOR NG/OG/NE TUBE PLACEMENT   Final Result   Tip of NG tube projects in the left upper quadrant in the region of the   gastric fundus         XR CHEST PORTABLE   Final Result   Interval placement of a right-sided central venous catheter which extends   into the inferior aspect of the right atrium, approximately 5 cm beyond the   cavoatrial junction. XR CHEST PORTABLE   Final Result   Right IJ CVC catheter tip overlies the SVC at the level of the thoracic inlet. Endotracheal tube tip is 5.3 cm above the mark.       Mild bibasilar airspace opacities, which could represent as atelectasis   and/or infiltrate. Possible small right pleural effusion. CT ABDOMEN PELVIS WO CONTRAST Additional Contrast? None   Final Result   1. Limited evaluation of the GI tract on this noncontrast exam.  Improved   mucosal changes of the duodenum and proximal jejunum that were described on   prior CT. 2. Severe inflammatory change in the right lower quadrant with etiology   uncertain. This could correspond to enteritis of the distal ileum and   terminal ileum. Colitis may also be considered. Infectious or inflammatory   etiologies may be favored. 3. Dense stool and diffuse mucosal thickening of the distal colon which may   represent a pattern of colitis. 4. Small right pleural effusion with airspace changes in the right lower   lobe, new from prior exam.   5. Evidence of chronic liver disease with a small amount of ascites stable. CT ABDOMEN PELVIS WO CONTRAST Additional Contrast? None   Final Result   1. Acute enteritis involving the duodenum and jejunal loops with thickening   of the loops and edema. No evidence of bowel obstruction. 2. Constipation with significant stool impaction in the rectum. 3. Mild ascites mostly around the liver and spleen. 4. Adequate position of Osorio catheter in the urinary bladder. XR CHEST PORTABLE   Final Result   Placement of a right internal jugular central venous catheter without evident   complication. The tip is at approximately the cavoatrial junction.       No acute findings in the chest.            Latest Reference Range & Units 9/17/22 09:45   pH, Arterial 7.350 - 7.450  7.427   pCO2, Arterial 35.0 - 45.0 mmHg 39.2   pO2, Arterial 75.0 - 108.0 mmHg 93.8   HCO3, Arterial 21.0 - 29.0 mmol/L 25.3   TCO2 (calc), Art Not Established mmol/L 26.5   Base Excess, Arterial -3.0 - 3.0 mmol/L 0.9   O2 Sat, Arterial >92 % 96.6   Methemoglobin, Arterial <1.5 % 0.6   Carboxyhgb, Arterial 0.0 - 1.5 % 0.2      Latest Reference Range & Units 9/15/22 13:38 9/15/22 21:15 9/16/22 04:20 9/17/22 04:52   Lactic Acid 0.4 - 2.0 mmol/L 18.0 (HH) 13.7 (HH) 8.6 (HH) 2.0       Assessment:  Principal Problem:    Enteritis  Active Problems:    Syncope    Hyponatremia    DEJAN (acute kidney injury) ([facility] [address]    Abdominal pain    Elevated troponin    DM (diabetes mellitus), secondary uncontrolled ([facility] [address]    Ischemic colitis (HCC)    S/P exploratory laparotomy    Acute postoperative pulmonary insufficiency (HCC)  Resolved Problems:    * No resolved hospital problems.  *          Plan:   Ventilator support to keep saturation more than 92%  Ventilator settings and waveforms reviewed  Ventilator changes made  Pulmonary toilet  IV sedation to maintain patient ventilator synchrony  Titration of sedation as per RASS scores  Will decrease sedation and try SBT   Wound care and ostomy care as per surgery and wound care team  Surgical intervention and operative report reviewed  IV pressors to keep mean arterial pressure more than 65 mmHg  CRRT as per nephrology  Patient's lactic acid levels has trended in right direction   Monitor input output and BMP  Correct electrolytes on whenever necessary basis  BGM with SSI  Monitor for any worsening hypoglycemia  Patient also has pyuria at this time and patient has been started on Zosyn empirically by the admitting provider-titration as per clinical status and cultures  Trend the WBC count and sodium levels   PUD and DVT prophylaxis       Case d/w ICU team     Patient's clinical status is precarious and critical with potential for further decompensation and needs to be monitored in ICU        Critical care time spent -35 minutes(exclusive of any procedures)         Electronically signed by:  Rose Holman MD    9/17/2022    8:20 AM.

## 2022-09-17 NOTE — PROGRESS NOTES
09/17/22 0808   Patient Observation   Heart Rate 98   Resp 14   SpO2 100 %   Observations ambu @ bedside   Breath Sounds   Right Upper Lobe Clear   Right Middle Lobe Clear   Right Lower Lobe Clear   Left Upper Lobe Clear   Left Lower Lobe Clear   Ventilator Settings   FiO2  30 %   Vt (Set, mL) 450 mL   Resp Rate (Set) 14 bmp   PEEP/CPAP (cmH2O) 5   Vent Patient Data (Readings)   Vt (Measured) 470 mL   Peak Inspiratory Pressure (cmH2O) 13 cmH2O   Rate Measured 16 br/min   Minute Volume (L/min) 7.32 Liters   Mean Airway Pressure (cmH2O) 8.69 cmH20   I:E Ratio 1:2.90   Vent Alarm Settings   Low Minute Volume (lpm) 2 L/min   RR High (bpm) 40 br/min   ETT (adult)   Placement Date/Time: 09/15/22 1720   Present on Admission/Arrival: No  Placed By: In surgery; Licensed provider  Placement Verified By: Capnometry;Direct visualization; Auscultation  Preoxygenation: Yes  Mask Ventilation: Ventilated by mask with oral ai. ..    Secured At 22 cm   Measured From Lips   ETT Placement Right   Secured By Commercial tube wills   Site Assessment Dry   Cuff Pressure 30 cm H2O

## 2022-09-17 NOTE — PROGRESS NOTES
Hospitalist Progress Note  9/17/2022 10:50 AM  Subjective:   Admit Date: 9/14/2022  PCP: Vivi Joiner DO, DO Status: Inpataient  Interval History: Hospital Day: 4, admitted with ischemic sigmoid colon requiring exploratory laparotomy, sigmoid colectomy, colostomy formation (9/15). Acute kidney injury on CKD requiring CRRT (9/15). Continues NPO. Antibiotic therapy with Zosyn. CRRT via vascath (9/15).   Spontaneous breathing trial.     Diet: NPO  Right internal jugular CVC triple lumen (9/16, day #2)  Right abdomen closed suction drain (9/15, day #3)  Left nostril NG (9/16, day #2)  Colostomy LLQ (9/15)  Urinary catheter (9/14, day #4)  ETT (9/15, day #3)  Right femoral vein vascath (9/15, day #3)  Medications:     CRRT prismasate BGK 4/2.5 1,500 mL/hr (09/17/22 1026)   fentanyl Stopped (09/17/22 0904)   propofol Stopped (09/17/22 0809)   norepinephrine Stopped (09/17/22 0908)     hydrocortisone  50 mg IntraVENous Q6H   heparin  5,000 Units SubCUTAneous 3 times per day   piperacillin-tazobactam  3,375 mg IntraVENous Q8H (9/14, day #4)   pantoprazole  40 mg IntraVENous Daily   insulin glargine  15 Units SubCUTAneous Nightly   insulin lispro SSI  0-4 Units SubCUTAneous Q4H     Recent Labs     09/15/22  2115 09/16/22  0420 09/17/22  0452   WBC 20.3* 20.5* 22.6*   HGB 9.9* 10.0* 7.5*    151 87*   MCV 84.8 82.9 83.0     Recent Labs     09/16/22  1808 09/17/22  0232 09/17/22  0452   * 134* 136   K 4.4 4.2 4.3    100 101   CO2 26 27 25   BUN 13 10 9   CREATININE 1.0 0.9 0.9   GLUCOSE 130* 76 70     Recent Labs     09/15/22  2115 09/16/22  0420 09/17/22  0452   AST 43* 43* 38*   ALT 16 16 12   BILITOT 0.3 0.3 0.4   ALKPHOS 383* 339* 192*       Troponin T:   Recent Labs     09/14/22  1150 09/14/22  1514   TROPONINI 0.07* 0.08*     INR:   Recent Labs     09/15/22  0421 09/16/22  0420 09/17/22  0452   INR 1.78* 1.54* 1.47*     POC Glucose:   Recent Labs     09/17/22  0521 09/17/22  0839 09/17/22  0919 09/17/22  0948   POCGLU 104* 53* 67* 113*       Noncontrast CT abd / pelvis (9/15) Limited evaluation of the GI tract on this noncontrast exam.  Improved  mucosal changes of the duodenum and proximal jejunum that were described on prior CT. Severe inflammatory change in the right lower quadrant with etiology uncertain. This could correspond to enteritis  of the distal ileum and terminal ileum. Colitis may also be considered. Infectious or inflammatory etiologies may be favored. Dense stool and diffuse mucosal thickening of the distal colon which may represent a pattern of colitis. Small right pleural effusion with airspace changes in the right lower lobe, new from prior exam. Evidence of chronic liver disease with a small amount of ascites stable. Portable CXR (9/14) Placement of a right internal jugular central venous catheter without evident complication. The tip is at approximately the cavoatrial junction. No acute findings in the chest.      Objective:   Vitals:  BP (!) 131/55   Pulse (!) 104   Temp 99.4 °F (37.4 °C) (Bladder)   Resp 11   Ht 5' 9\" (1.753 m)   Wt 194 lb 0.1 oz (88 kg)   SpO2 99%   BMI 28.65 kg/m²   General appearance: sedated on vent  Lungs: ventilated bilaterally  Heart:  distant, regular rate and rhythm, no appreciable murmur  Abdomen: post-op, LLQ colostomy  Extremities:  1+ edema, neurovascular status intact  Neurologic: sedated on vent    Assessment and Plan:   Ischemic sigmoid colon requiring exploratory laparotomy, sigmoid colectomy, colostomy formation (9/15). Followed by wound and ostomy care. Sepsis secondary to peritonitis:  Antibiotic therapy with piperacillin-tazobactam.  Was on two vasopressors, including norepinephrine. Acute kidney injury on CKD requiring CRRT (9/15) via vascath (placed 9/15). Bicarbonate infusion (9/15 - 9/16).     Post-op respiratory failure on mechanical ventilation:  Spontaneous breathing trial.   Elevated troponin:   Type 2 Diabetes (uncontrolled, HgbA1c 9.2%): Basal glargine insulin 15 units per day. Cover with a \"sliding scale\" lispro moderate scale prandial correction insulin. Hyponatremia:  Resolved. Unclear etiology. Electrolyte replacement per nephrology.        Advance Directive: Full Code  DVT prophylaxis with heparin 5,000 units sub-Q TID  Discharge planning: unknown at this time      Jose Molina MD  [facility] Hospitalist

## 2022-09-17 NOTE — PROGRESS NOTES
Dr. Rajinder Booker at bedside. Spontaneous Breathing Trial started @ 0810. Will continue to monitor.

## 2022-09-18 LAB
A/G RATIO: 1.2 (ref 1.1–2.2)
ALBUMIN SERPL-MCNC: 2.9 G/DL (ref 3.4–5)
ALBUMIN SERPL-MCNC: 2.9 G/DL (ref 3.4–5)
ALBUMIN SERPL-MCNC: 3 G/DL (ref 3.4–5)
ALP BLD-CCNC: 219 U/L (ref 40–129)
ALT SERPL-CCNC: 15 U/L (ref 10–40)
ANION GAP SERPL CALCULATED.3IONS-SCNC: 10 MMOL/L (ref 3–16)
ANION GAP SERPL CALCULATED.3IONS-SCNC: 11 MMOL/L (ref 3–16)
ANION GAP SERPL CALCULATED.3IONS-SCNC: 13 MMOL/L (ref 3–16)
AST SERPL-CCNC: 47 U/L (ref 15–37)
BASOPHILS ABSOLUTE: 0 K/UL (ref 0–0.2)
BASOPHILS RELATIVE PERCENT: 0.1 %
BILIRUB SERPL-MCNC: 0.4 MG/DL (ref 0–1)
BUN BLDV-MCNC: 11 MG/DL (ref 7–20)
BUN BLDV-MCNC: 7 MG/DL (ref 7–20)
BUN BLDV-MCNC: 9 MG/DL (ref 7–20)
CALCIUM IONIZED: 1.02 MMOL/L (ref 1.12–1.32)
CALCIUM IONIZED: 1.02 MMOL/L (ref 1.12–1.32)
CALCIUM IONIZED: 1.07 MMOL/L (ref 1.12–1.32)
CALCIUM SERPL-MCNC: 8.4 MG/DL (ref 8.3–10.6)
CALCIUM SERPL-MCNC: 8.5 MG/DL (ref 8.3–10.6)
CALCIUM SERPL-MCNC: 8.5 MG/DL (ref 8.3–10.6)
CHLORIDE BLD-SCNC: 101 MMOL/L (ref 99–110)
CHLORIDE BLD-SCNC: 101 MMOL/L (ref 99–110)
CHLORIDE BLD-SCNC: 99 MMOL/L (ref 99–110)
CO2: 22 MMOL/L (ref 21–32)
CO2: 24 MMOL/L (ref 21–32)
CO2: 25 MMOL/L (ref 21–32)
CREAT SERPL-MCNC: 0.8 MG/DL (ref 0.6–1.2)
CREAT SERPL-MCNC: 1 MG/DL (ref 0.6–1.2)
CREAT SERPL-MCNC: 1 MG/DL (ref 0.6–1.2)
EOSINOPHILS ABSOLUTE: 0 K/UL (ref 0–0.6)
EOSINOPHILS RELATIVE PERCENT: 0 %
GFR AFRICAN AMERICAN: >60
GFR NON-AFRICAN AMERICAN: 54
GFR NON-AFRICAN AMERICAN: 54
GFR NON-AFRICAN AMERICAN: >60
GLUCOSE BLD-MCNC: 102 MG/DL (ref 70–99)
GLUCOSE BLD-MCNC: 103 MG/DL (ref 70–99)
GLUCOSE BLD-MCNC: 109 MG/DL (ref 70–99)
GLUCOSE BLD-MCNC: 110 MG/DL (ref 70–99)
GLUCOSE BLD-MCNC: 122 MG/DL (ref 70–99)
GLUCOSE BLD-MCNC: 93 MG/DL (ref 70–99)
GLUCOSE BLD-MCNC: 96 MG/DL (ref 70–99)
HCT VFR BLD CALC: 24.4 % (ref 36–48)
HEMOGLOBIN: 8.1 G/DL (ref 12–16)
INR BLD: 1.22 (ref 0.87–1.14)
LYMPHOCYTES ABSOLUTE: 0.5 K/UL (ref 1–5.1)
LYMPHOCYTES RELATIVE PERCENT: 1.9 %
MAGNESIUM: 2.3 MG/DL (ref 1.8–2.4)
MCH RBC QN AUTO: 28.1 PG (ref 26–34)
MCHC RBC AUTO-ENTMCNC: 33.3 G/DL (ref 31–36)
MCV RBC AUTO: 84.3 FL (ref 80–100)
MONOCYTES ABSOLUTE: 1.2 K/UL (ref 0–1.3)
MONOCYTES RELATIVE PERCENT: 4.6 %
NEUTROPHILS ABSOLUTE: 23.9 K/UL (ref 1.7–7.7)
NEUTROPHILS RELATIVE PERCENT: 93.4 %
PDW BLD-RTO: 14.2 % (ref 12.4–15.4)
PERFORMED ON: ABNORMAL
PERFORMED ON: ABNORMAL
PERFORMED ON: NORMAL
PERFORMED ON: NORMAL
PH VENOUS: 7.39 (ref 7.35–7.45)
PH VENOUS: 7.41 (ref 7.35–7.45)
PH VENOUS: 7.42 (ref 7.35–7.45)
PHOSPHORUS: 1.9 MG/DL (ref 2.5–4.9)
PHOSPHORUS: 2 MG/DL (ref 2.5–4.9)
PHOSPHORUS: 2.3 MG/DL (ref 2.5–4.9)
PLATELET # BLD: 72 K/UL (ref 135–450)
PMV BLD AUTO: 7.5 FL (ref 5–10.5)
POTASSIUM SERPL-SCNC: 3.7 MMOL/L (ref 3.5–5.1)
POTASSIUM SERPL-SCNC: 3.8 MMOL/L (ref 3.5–5.1)
POTASSIUM SERPL-SCNC: 3.8 MMOL/L (ref 3.5–5.1)
PROTHROMBIN TIME: 15.3 SEC (ref 11.7–14.5)
RBC # BLD: 2.89 M/UL (ref 4–5.2)
SODIUM BLD-SCNC: 134 MMOL/L (ref 136–145)
SODIUM BLD-SCNC: 136 MMOL/L (ref 136–145)
SODIUM BLD-SCNC: 136 MMOL/L (ref 136–145)
TOTAL PROTEIN: 5.3 G/DL (ref 6.4–8.2)
WBC # BLD: 25.5 K/UL (ref 4–11)

## 2022-09-18 PROCEDURE — 84100 ASSAY OF PHOSPHORUS: CPT

## 2022-09-18 PROCEDURE — 83735 ASSAY OF MAGNESIUM: CPT

## 2022-09-18 PROCEDURE — 80053 COMPREHEN METABOLIC PANEL: CPT

## 2022-09-18 PROCEDURE — 2580000003 HC RX 258: Performed by: ANESTHESIOLOGY

## 2022-09-18 PROCEDURE — 6360000002 HC RX W HCPCS: Performed by: INTERNAL MEDICINE

## 2022-09-18 PROCEDURE — 2580000003 HC RX 258: Performed by: STUDENT IN AN ORGANIZED HEALTH CARE EDUCATION/TRAINING PROGRAM

## 2022-09-18 PROCEDURE — 90945 DIALYSIS ONE EVALUATION: CPT

## 2022-09-18 PROCEDURE — 2000000000 HC ICU R&B

## 2022-09-18 PROCEDURE — 36592 COLLECT BLOOD FROM PICC: CPT

## 2022-09-18 PROCEDURE — 2500000003 HC RX 250 WO HCPCS: Performed by: STUDENT IN AN ORGANIZED HEALTH CARE EDUCATION/TRAINING PROGRAM

## 2022-09-18 PROCEDURE — 6360000002 HC RX W HCPCS: Performed by: STUDENT IN AN ORGANIZED HEALTH CARE EDUCATION/TRAINING PROGRAM

## 2022-09-18 PROCEDURE — 99024 POSTOP FOLLOW-UP VISIT: CPT | Performed by: SURGERY

## 2022-09-18 PROCEDURE — 2700000000 HC OXYGEN THERAPY PER DAY

## 2022-09-18 PROCEDURE — 82330 ASSAY OF CALCIUM: CPT

## 2022-09-18 PROCEDURE — 85610 PROTHROMBIN TIME: CPT

## 2022-09-18 PROCEDURE — 94761 N-INVAS EAR/PLS OXIMETRY MLT: CPT

## 2022-09-18 PROCEDURE — 94003 VENT MGMT INPAT SUBQ DAY: CPT

## 2022-09-18 PROCEDURE — 85025 COMPLETE CBC W/AUTO DIFF WBC: CPT

## 2022-09-18 PROCEDURE — 99291 CRITICAL CARE FIRST HOUR: CPT | Performed by: INTERNAL MEDICINE

## 2022-09-18 PROCEDURE — C9113 INJ PANTOPRAZOLE SODIUM, VIA: HCPCS | Performed by: STUDENT IN AN ORGANIZED HEALTH CARE EDUCATION/TRAINING PROGRAM

## 2022-09-18 RX ADMIN — PANTOPRAZOLE SODIUM 40 MG: 40 INJECTION, POWDER, FOR SOLUTION INTRAVENOUS at 08:43

## 2022-09-18 RX ADMIN — CALCIUM GLUCONATE 1000 MG: 20 INJECTION, SOLUTION INTRAVENOUS at 12:15

## 2022-09-18 RX ADMIN — HYDROCORTISONE SODIUM SUCCINATE 50 MG: 100 INJECTION, POWDER, FOR SOLUTION INTRAMUSCULAR; INTRAVENOUS at 20:25

## 2022-09-18 RX ADMIN — Medication 1000 ML/HR: at 12:00

## 2022-09-18 RX ADMIN — Medication 1500 ML/HR: at 10:18

## 2022-09-18 RX ADMIN — CALCIUM GLUCONATE 1000 MG: 20 INJECTION, SOLUTION INTRAVENOUS at 05:16

## 2022-09-18 RX ADMIN — HYDROCORTISONE SODIUM SUCCINATE 50 MG: 100 INJECTION, POWDER, FOR SOLUTION INTRAMUSCULAR; INTRAVENOUS at 02:03

## 2022-09-18 RX ADMIN — Medication 1000 ML/HR: at 00:04

## 2022-09-18 RX ADMIN — PIPERACILLIN AND TAZOBACTAM 3375 MG: 3; .375 INJECTION, POWDER, LYOPHILIZED, FOR SOLUTION INTRAVENOUS at 21:47

## 2022-09-18 RX ADMIN — Medication 1000 ML/HR: at 17:03

## 2022-09-18 RX ADMIN — MIDAZOLAM 2 MG: 1 INJECTION INTRAMUSCULAR; INTRAVENOUS at 00:36

## 2022-09-18 RX ADMIN — Medication 10 ML: at 08:43

## 2022-09-18 RX ADMIN — Medication: at 22:41

## 2022-09-18 RX ADMIN — Medication 1500 ML/HR: at 17:05

## 2022-09-18 RX ADMIN — Medication 1000 ML/HR: at 20:59

## 2022-09-18 RX ADMIN — Medication 1000 ML/HR: at 06:56

## 2022-09-18 RX ADMIN — Medication: at 01:47

## 2022-09-18 RX ADMIN — CALCIUM GLUCONATE 1000 MG: 20 INJECTION, SOLUTION INTRAVENOUS at 20:25

## 2022-09-18 RX ADMIN — DEXTROSE MONOHYDRATE 2 MCG/MIN: 50 INJECTION, SOLUTION INTRAVENOUS at 00:53

## 2022-09-18 RX ADMIN — HYDROCORTISONE SODIUM SUCCINATE 50 MG: 100 INJECTION, POWDER, FOR SOLUTION INTRAMUSCULAR; INTRAVENOUS at 08:43

## 2022-09-18 RX ADMIN — MUPIROCIN: 20 OINTMENT TOPICAL at 08:44

## 2022-09-18 RX ADMIN — SODIUM PHOSPHATE, MONOBASIC, MONOHYDRATE 6 MMOL: 276; 142 INJECTION, SOLUTION INTRAVENOUS at 06:22

## 2022-09-18 RX ADMIN — Medication 10 ML: at 20:26

## 2022-09-18 RX ADMIN — SODIUM PHOSPHATE, MONOBASIC, MONOHYDRATE 12 MMOL: 276; 142 INJECTION, SOLUTION INTRAVENOUS at 00:37

## 2022-09-18 RX ADMIN — SODIUM PHOSPHATE, MONOBASIC, MONOHYDRATE 6 MMOL: 276; 142 INJECTION, SOLUTION INTRAVENOUS at 20:56

## 2022-09-18 RX ADMIN — PIPERACILLIN AND TAZOBACTAM 3375 MG: 3; .375 INJECTION, POWDER, LYOPHILIZED, FOR SOLUTION INTRAVENOUS at 13:43

## 2022-09-18 RX ADMIN — PIPERACILLIN AND TAZOBACTAM 3375 MG: 3; .375 INJECTION, POWDER, LYOPHILIZED, FOR SOLUTION INTRAVENOUS at 06:25

## 2022-09-18 RX ADMIN — MUPIROCIN: 20 OINTMENT TOPICAL at 20:26

## 2022-09-18 RX ADMIN — Medication 1500 ML/HR: at 13:40

## 2022-09-18 RX ADMIN — HYDROCORTISONE SODIUM SUCCINATE 50 MG: 100 INJECTION, POWDER, FOR SOLUTION INTRAMUSCULAR; INTRAVENOUS at 14:07

## 2022-09-18 RX ADMIN — SODIUM PHOSPHATE, MONOBASIC, MONOHYDRATE 12 MMOL: 276; 142 INJECTION, SOLUTION INTRAVENOUS at 13:47

## 2022-09-18 RX ADMIN — Medication: at 17:04

## 2022-09-18 RX ADMIN — Medication: at 06:56

## 2022-09-18 RX ADMIN — Medication 1000 ML/HR: at 03:31

## 2022-09-18 RX ADMIN — Medication 1500 ML/HR: at 06:56

## 2022-09-18 ASSESSMENT — PULMONARY FUNCTION TESTS
PIF_VALUE: 15
PIF_VALUE: 15
PIF_VALUE: 14
PIF_VALUE: 19
PIF_VALUE: 15
PIF_VALUE: 15
PIF_VALUE: 9
PIF_VALUE: 15
PIF_VALUE: 15
PIF_VALUE: 16
PIF_VALUE: 13
PIF_VALUE: 15
PIF_VALUE: 15
PIF_VALUE: 19
PIF_VALUE: 17
PIF_VALUE: 16
PIF_VALUE: 19
PIF_VALUE: 11
PIF_VALUE: 15
PIF_VALUE: 25
PIF_VALUE: 9
PIF_VALUE: 15
PIF_VALUE: 13
PIF_VALUE: 15
PIF_VALUE: 10
PIF_VALUE: 16
PIF_VALUE: 15
PIF_VALUE: 13
PIF_VALUE: 15
PIF_VALUE: 15

## 2022-09-18 ASSESSMENT — PAIN SCALES - GENERAL
PAINLEVEL_OUTOF10: 3
PAINLEVEL_OUTOF10: 3

## 2022-09-18 NOTE — PROGRESS NOTES
Changed to SBT by Dr. Branden Marroquin   09/18/22 0851   Patient Observation   Heart Rate (!) 104   Resp 18   SpO2 99 %   Ventilator Settings   FiO2  30 %   PEEP/CPAP (cmH2O) 5   Vent Patient Data (Readings)   Vt (Measured) 525 mL   Peak Inspiratory Pressure (cmH2O) 15 cmH2O   Rate Measured 18 br/min   Minute Volume (L/min) 10 Liters   Mean Airway Pressure (cmH2O) 8.8 cmH20   I:E Ratio 1:2.20   Vent Alarm Settings   Low Minute Volume (lpm) 2 L/min   RR High (bpm) 40 br/min

## 2022-09-18 NOTE — PROGRESS NOTES
09/18/22 0736   Patient Observation   Heart Rate (!) 102   Resp 22   SpO2 99 %   Vent Information   Vent Mode AC/VC   Ventilator Settings   FiO2  30 %   Vt (Set, mL) 450 mL   Resp Rate (Set) 14 bmp   PEEP/CPAP (cmH2O) 5   Vent Patient Data (Readings)   Vt (Measured) 473 mL   Peak Inspiratory Pressure (cmH2O) 13 cmH2O   Rate Measured 20 br/min   Minute Volume (L/min) 9.59 Liters   Mean Airway Pressure (cmH2O) 6.8 cmH20   I:E Ratio 1:1.70   Flow Sensitivity 3 L/min   Vent Alarm Settings   Low Minute Volume (lpm) 2 L/min   Low Exhaled Vt (ml) 200 mL   RR High (bpm) 40 br/min   Additional Respiratoray Assessments   Humidification Source HME   Airway Clearance   Suction ET Tube   Suction Device Inline suction catheter   Sputum Method Obtained Endotracheal   Sputum Amount Large   Sputum Color/Odor Clear   Sputum Consistency Thick   ETT (adult)   Placement Date/Time: 09/15/22 1720   Present on Admission/Arrival: No  Placed By: In surgery; Licensed provider  Placement Verified By: Capnometry;Direct visualization; Auscultation  Preoxygenation: Yes  Mask Ventilation: Ventilated by mask with oral ai. ..    Secured At 22 cm   Measured From Lips   ETT Placement Center   Secured By Commercial tube wills   Cuff Pressure 30 cm H2O

## 2022-09-18 NOTE — PROGRESS NOTES
09/18/22 1939   Patient Observation   Heart Rate 89   Resp 10   SpO2 100 %   Vent Information   Vent Mode CPAP/PS   Ventilator Settings   FiO2  30 %   PEEP/CPAP (cmH2O) 5   Pressure Support (cm H2O) 10 cm H2O   Vent Patient Data (Readings)   Vt Spont (mL) 605 mL   Vt (Measured) 620 mL   Peak Inspiratory Pressure (cmH2O) 15 cmH2O   Rate Measured 10 br/min   Minute Volume (L/min) 6.19 Liters   Mean Airway Pressure (cmH2O) 7.1 cmH20   I:E Ratio 1:4.30   Vent Alarm Settings   Low Minute Volume (lpm) 2 L/min   RR High (bpm) 40 br/min   RSBI 17

## 2022-09-18 NOTE — PROGRESS NOTES
Shift: [id number]    Admitting diagnosis: Sepsis, enteritis    Presentation to hospital: HOTN, syncopal episode in bathroom @ home. [facility]    Surgery: Yes; ex lap; ischemic sigmoid colon with resection and colostomy creation    Nursing assessment at handoff: Stable    Emergency Contact/POA: Marek Gutiérrez, spouse [phone]  Family updated: Yes    Most recent vitals: /63   Pulse 98   Temp 99.4 °F (37.4 °C) (Bladder)   Resp 14   Ht 5' 9\" (1.753 m)   Wt 194 lb 0.1 oz (88 kg)   SpO2 100%   BMI 28.65 kg/m²      Rhythm: Normal Sinus Rhythm 70-80's    NC/HFNC-   Respiratory support: Ventilator    Vent days: Day 3    Increased O2 requirements: No    Admission weight Weight: 165 lb (74.8 kg)  Today's weight   Wt Readings from Last 1 Encounters:   09/16/22 194 lb 0.1 oz (88 kg)         UOP >30ml/hr: No, CRRT    Osorio need assessed each shift: yes, hemodynamically unstable    Restraints: yes Order current and documentation up to date?  Yes    Lines/Drains  LDA Insertion Date Discontinued Date Dressing Changes   PIV  9/14     TLC  9/16     Arterial  9/14     Osorio  9/14     Vas Cath 9/15     ETT  9/15     Surgical drains 9/15     Ostomy 9/15  9/16     Night Shift Hospitalist Interventions    Problem(Brief) Date Time Intervention Physician contacted       Blayne Soto                                        Drip rates at handoff:    Cristino Marcelino 4/2.5 1,500 mL/hr (09/18/22 0656)    prismaSATE BGK 4/2.5 1,000 mL/hr (09/18/22 0656)    prismaSATE BGK 4/2.5 500 mL/hr at 09/18/22 0656    sodium chloride      norepinephrine Stopped (09/18/22 0126)    dextrose      sodium chloride      vasopressin (Septic Shock) infusion Stopped (09/16/22 1205)       Hospital Course Daily Updates:  Admit Day# 1  - RIJ CVC placed, levo & vaso infusion   -pH 7.1, 1 amp bicarb, NS @ 125  -R radial Mcclellan placed  -lactic 12.6 down 7.6    Admit Night #1 9/14/22  -Cdiff sample sent  -Lactic 11.3, 14.1  -critical CO2 8  -NS @ 125 changed to bicarb gtt @ 150  -1 amp bicarb given  -2L LR bolus  -2 amps bicarb per nephro, give 2 more amps if pH <7.2  -1L LR bolus  -urine output increasing, but less than <30 ml/hr    Admit Day #2 9/15/2022  -Cdiff Negative  -Lactic 19.3/18  -pH 7.27; 2 amps bicarb given  -BG 50s; x2 D10 boluses given, repeat BG 100s  -Repeat CT ABD; Severe inflammatory change in the right lower quadrant with etiology   uncertain. This could correspond to enteritis of the distal ileum and   terminal ileum. Colitis may also be considered. Infectious or inflammatory   etiologies may be favored     -Nephrology concerned for metformin contribution/DEJAN; Right fem vascath placed, CRRT started [id number]  -Consult General surgery; pt to OR at 1700 for concern for bowel perf    9/15 Nights:  -Back from OR at 2030.  Resection of ischemic sigmoid colon with colostomy placement  -new 2 piece appliance applied to colostomy; wound consult placed  -having BM's  -CRRT restarted at 2130  -electrolytes being replaced per protocall  -New Right IJ placed  previous was unusable (was pulled out at some point during surgery)    Admit Day #3 9/16/2022  -Lactic downtrending; 8.6 this AM  -Bicarb gtt d/c'ed  -Increase in pressors; 1L NS bolus given (not included in CRRT running total), albumin added  -Vaso gtt titrated off  -CRRT maintained, keeping net even  -Output noted from ostomy, wound care rounded; ostomy noted to be brown in color    9/17 PM:  -calcium and phos replaced per protocol5  -pt acutely hypotensive, restarted levo gtt   -levo weaned off  -had a small smear  -no output in ostomy  -net negative 50ml an hour    Lab Data:   CBC:   Recent Labs     09/17/22 0452 09/18/22  0410   WBC 22.6* 25.5*   HGB 7.5* 8.1*   HCT 22.5* 24.4*   MCV 83.0 84.3   PLT 87* 72*       BMP:    Recent Labs     09/17/22  1925 09/18/22  0410   * 134*   K 4.1 3.8   CO2 25 24   BUN 7 7   CREATININE 0.9 0.8       LIVR:   Recent Labs     09/17/22 0452 09/18/22  0410   AST 38* 47* ALT 12 15       PT/INR:   Recent Labs     09/17/22  0452 09/18/22  0410   PROT 5.0* 5.3*   INR 1.47* 1.22*       APTT: No results for input(s): APTT in the last 72 hours.   ABG:   Recent Labs     09/16/22  0524 09/17/22  0945   PHART 7.462* 7.427   QAJ9OAC 32.5* 39.2   PO2ART 84.2 93.8       Consults (if GI or Nephrology- which group?)-  Nephro/Sarthak treviño, General surgery/Dr. Rodriguez

## 2022-09-18 NOTE — PLAN OF CARE
Problem: Discharge Planning  Goal: Discharge to home or other facility with appropriate resources  9/18/2022 0948 by Jaciel Thomas RN  Outcome: Progressing  Flowsheets (Taken 9/18/2022 0800)  Discharge to home or other facility with appropriate resources:   Identify barriers to discharge with patient and caregiver   Arrange for needed discharge resources and transportation as appropriate   Identify discharge learning needs (meds, wound care, etc)   Refer to discharge planning if patient needs post-hospital services based on physician order or complex needs related to functional status, cognitive ability or social support system  9/17/2022 2253 by Chan Santacruz RN  Outcome: Progressing     Problem: Pain  Goal: Verbalizes/displays adequate comfort level or baseline comfort level  9/18/2022 0948 by Jaciel Thomas RN  Outcome: Progressing  9/17/2022 2253 by Chan Santacruz RN  Outcome: Progressing     Problem: Skin/Tissue Integrity  Goal: Absence of new skin breakdown  Description: 1. Monitor for areas of redness and/or skin breakdown  2. Assess vascular access sites hourly  3. Every 4-6 hours minimum:  Change oxygen saturation probe site  4. Every 4-6 hours:  If on nasal continuous positive airway pressure, respiratory therapy assess nares and determine need for appliance change or resting period.   9/18/2022 0948 by Jaciel Thomas RN  Outcome: Progressing  9/17/2022 2253 by Chan Santacruz RN  Outcome: Progressing     Problem: Chronic Conditions and Co-morbidities  Goal: Patient's chronic conditions and co-morbidity symptoms are monitored and maintained or improved  Outcome: Progressing  Flowsheets (Taken 9/18/2022 0800)  Care Plan - Patient's Chronic Conditions and Co-Morbidity Symptoms are Monitored and Maintained or Improved:   Monitor and assess patient's chronic conditions and comorbid symptoms for stability, deterioration, or improvement   Collaborate with multidisciplinary team to address chronic and comorbid conditions and prevent exacerbation or deterioration   Update acute care plan with appropriate goals if chronic or comorbid symptoms are exacerbated and prevent overall improvement and discharge     Problem: Safety - Medical Restraint  Goal: Remains free of injury from restraints (Restraint for Interference with Medical Device)  Description: INTERVENTIONS:  1. Determine that other, less restrictive measures have been tried or would not be effective before applying the restraint  2. Evaluate the patient's condition at the time of restraint application  3. Inform patient/family regarding the reason for restraint  4. Q2H: Monitor safety, psychosocial status, comfort, nutrition and hydration  Outcome: Progressing     Problem: Nutrition Deficit:  Goal: Optimize nutritional status  Outcome: Progressing     Problem: Safety - Adult  Goal: Free from fall injury  Outcome: Progressing     Problem: Neurosensory - Adult  Goal: Achieves stable or improved neurological status  Outcome: Progressing  Flowsheets (Taken 9/18/2022 0800)  Achieves stable or improved neurological status:   Assess for and report changes in neurological status   Initiate measures to prevent increased intracranial pressure   Maintain blood pressure and fluid volume within ordered parameters to optimize cerebral perfusion and minimize risk of hemorrhage   Monitor temperature, glucose, and sodium.  Initiate appropriate interventions as ordered  Goal: Achieves maximal functionality and self care  Outcome: Progressing     Problem: Respiratory - Adult  Goal: Achieves optimal ventilation and oxygenation  Outcome: Progressing  Flowsheets (Taken 9/18/2022 0800)  Achieves optimal ventilation and oxygenation:   Assess for changes in respiratory status   Assess for changes in mentation and behavior   Position to facilitate oxygenation and minimize respiratory effort   Oxygen supplementation based on oxygen saturation or arterial blood gases   Assess the need for suctioning and aspirate as needed   Respiratory therapy support as indicated     Problem: Cardiovascular - Adult  Goal: Maintains optimal cardiac output and hemodynamic stability  Outcome: Progressing  Flowsheets (Taken 9/18/2022 0800)  Maintains optimal cardiac output and hemodynamic stability:   Monitor blood pressure and heart rate   Monitor urine output and notify Licensed Independent Practitioner for values outside of normal range   Assess for signs of decreased cardiac output   Administer fluid and/or volume expanders as ordered   Administer vasoactive medications as ordered  Goal: Absence of cardiac dysrhythmias or at baseline  Outcome: Progressing  Flowsheets (Taken 9/18/2022 0800)  Absence of cardiac dysrhythmias or at baseline:   Monitor cardiac rate and rhythm   Assess for signs of decreased cardiac output   Administer antiarrhythmia medication and electrolyte replacement as ordered     Problem: Skin/Tissue Integrity - Adult  Goal: Incisions, wounds, or drain sites healing without S/S of infection  Outcome: Progressing  Flowsheets (Taken 9/18/2022 0800)  Incisions, Wounds, or Drain Sites Healing Without Sign and Symptoms of Infection:   ADMISSION and DAILY: Assess and document risk factors for pressure ulcer development   TWICE DAILY: Assess and document skin integrity   TWICE DAILY: Assess and document dressing/incision, wound bed, drain sites and surrounding tissue   Implement wound care per orders   Initiate pressure ulcer prevention bundle as indicated  Goal: Oral mucous membranes remain intact  Outcome: Progressing  Flowsheets (Taken 9/18/2022 0800)  Oral Mucous Membranes Remain Intact:   Assess oral mucosa and hygiene practices   Implement preventative oral hygiene regimen   Implement oral medicated treatments as ordered     Problem: Musculoskeletal - Adult  Goal: Return mobility to safest level of function  Outcome: Progressing  Flowsheets (Taken 9/18/2022 0800)  Return Mobility to Safest Level of Function: Assess patient stability and activity tolerance for standing, transferring and ambulating with or without assistive devices  Goal: Return ADL status to a safe level of function  Outcome: Progressing  Flowsheets (Taken 9/18/2022 0800)  Return ADL Status to a Safe Level of Function:   Administer medication as ordered   Assess activities of daily living deficits and provide assistive devices as needed     Problem: Gastrointestinal - Adult  Goal: Maintains adequate nutritional intake  Outcome: Progressing  Flowsheets (Taken 9/18/2022 0800)  Maintains adequate nutritional intake:   Monitor percentage of each meal consumed   Identify factors contributing to decreased intake, treat as appropriate   Assist with meals as needed   Monitor intake and output, weight and lab values   Obtain nutritional consult as needed  Goal: Establish and maintain optimal ostomy function  Outcome: Progressing  Flowsheets (Taken 9/18/2022 0800)  Establish and maintain optimal ostomy function:   Monitor output from ostomies   Administer IV fluids and TPN as ordered   Introduce and advance enteral feedings as ordered   Nutrition consult   Gastric suctioning as ordered   Infuse IV Fluids/TPN as ordered     Problem: Genitourinary - Adult  Goal: Urinary catheter remains patent  Outcome: Progressing  Flowsheets (Taken 9/18/2022 0800)  Urinary catheter remains patent:   Assess patency of urinary catheter   Irrigate catheter per Licensed Independent Practitioner order if indicated and notify Licensed Independent Practitioner if unable to irrigate   Assess need for a larger catheter size or a 3-way catheter for continuous bladder irrigation     Problem: Metabolic/Fluid and Electrolytes - Adult  Goal: Electrolytes maintained within normal limits  Outcome: Progressing  Flowsheets (Taken 9/18/2022 0800)  Electrolytes maintained within normal limits:   Monitor labs and assess patient for signs and symptoms of electrolyte imbalances   Administer electrolyte replacement as ordered   Monitor response to electrolyte replacements, including repeat lab results as appropriate  Goal: Hemodynamic stability and optimal renal function maintained  Outcome: Progressing  Flowsheets (Taken 9/18/2022 0800)  Hemodynamic stability and optimal renal function maintained:   Monitor labs and assess for signs and symptoms of volume excess or deficit   Monitor intake, output and patient weight   Monitor urine specific gravity, serum osmolarity and serum sodium as indicated or ordered   Monitor response to interventions for patient's volume status, including labs, urine output, blood pressure (other measures as available)  Goal: Glucose maintained within prescribed range  Outcome: Progressing  Flowsheets (Taken 9/18/2022 0800)  Glucose maintained within prescribed range:   Monitor blood glucose as ordered   Assess for signs and symptoms of hyperglycemia and hypoglycemia   Administer ordered medications to maintain glucose within target range   Assess barriers to adequate nutritional intake and initiate nutrition consult as needed

## 2022-09-18 NOTE — PROGRESS NOTES
Pt acutely hypotensive, RN restarted levo to meet MAP goal >65. Will titrate accordingly and continue to monitor.

## 2022-09-18 NOTE — PROGRESS NOTES
INPATIENT PULMONARY CRITICAL CARE PROGRESS NOTE      Reason for visit    septic shock     SUBJECTIVE: Patient when seen this morning continues to be critically ill on mechanical vent support, patient was on 30% oxygen PEEP of 5 to maintain oxygen saturation; patient does not have any increasing secretions endotracheal tube, patient has been taken off the continuous IV sedation since yesterday afternoon, patient got 1 dose of IV push sedation at 12:30 AM as per nursing, patient still is not opening of the eyes and communicative at this time, patient continues to be on CRRT, patient has a T-max of 100 °F, patient is now off IV pressors to maintain hemodynamics, patient has a sinus rhythm on the monitor which ranges from normal to sinus tachycardia, patient's glycemic control was acceptable, patient was on 30% oxygen to maintain oxygen saturation, patient urine output is not significant, patient has a cumulative fluid balance of +7.4 L, patient continues to be n.p.o., no other pertinent review of system of concern      Physical Exam:  Blood pressure (!) 120/54, pulse 93, temperature 99.4 °F (37.4 °C), temperature source Bladder, resp. rate 11, height 5' 9\" (1.753 m), weight 190 lb 11.2 oz (86.5 kg), SpO2 99 %.'     Constitutional:  No acute distress. On mechanical vent support  HENT: Endotracheal tube present no thyromegaly. Eyes:  Conjunctivae are normal. Pupils equal, round, and reactive to light. No scleral icterus. Neck: . No tracheal deviation present. No obvious thyroid mass. Cardiovascular: Sinus rhythm normal heart sounds. No right ventricular heave. No lower extremity edema. Pulmonary/Chest: No wheezes. No rales. Chest wall is not dull to percussion. No accessory muscle usage or stridor. Decreased breath sound intensity  Abdominal: Soft. Some significant bowel sounds present. No distension or hernia. Ostomy present  Musculoskeletal: No cyanosis. No clubbing. No obvious joint deformity. Lymphadenopathy: No cervical or supraclavicular adenopathy. Skin: Skin is warm and dry. No rash or nodules on the exposed extremities. Just neurologic: Intubated and sedated    Results:  CBC:   Recent Labs     09/16/22 0420 09/17/22 0452 09/18/22  0410   WBC 20.5* 22.6* 25.5*   HGB 10.0* 7.5* 8.1*   HCT 29.8* 22.5* 24.4*   MCV 82.9 83.0 84.3    87* 72*       BMP:   Recent Labs     09/17/22  1120 09/17/22 1925 09/18/22  0410   * 133* 134*   K 4.0 4.1 3.8    100 99   CO2 25 25 24   PHOS 1.6* 1.5* 2.0*   BUN 8 7 7   CREATININE 0.9 0.9 0.8       LIVER PROFILE:   Recent Labs     09/15/22  2115 09/16/22  0420 09/17/22  0452 09/18/22  0410   AST 43* 43* 38* 47*   ALT 16 16 12 15   BILIDIR <0.2  --   --   --    BILITOT 0.3 0.3 0.4 0.4   ALKPHOS 383* 339* 192* 219*       PT/INR:   Recent Labs     09/16/22 0420 09/17/22 0452 09/18/22  0410   PROTIME 18.3* 17.7* 15.3*   INR 1.54* 1.47* 1.22*        Latest Reference Range & Units 9/17/22 11:20 9/17/22 19:25 9/18/22 04:10 9/18/22 11:09   pH, Cedric 7.350 - 7.450  7.476 (H) 7.495 (H) 7.425 7.406         Imaging:  I have reviewed radiology images personally. XR ABDOMEN FOR NG/OG/NE TUBE PLACEMENT   Final Result   Tip of NG tube projects in the left upper quadrant in the region of the   gastric fundus         XR CHEST PORTABLE   Final Result   Interval placement of a right-sided central venous catheter which extends   into the inferior aspect of the right atrium, approximately 5 cm beyond the   cavoatrial junction. XR CHEST PORTABLE   Final Result   Right IJ CVC catheter tip overlies the SVC at the level of the thoracic inlet. Endotracheal tube tip is 5.3 cm above the mark. Mild bibasilar airspace opacities, which could represent as atelectasis   and/or infiltrate. Possible small right pleural effusion. CT ABDOMEN PELVIS WO CONTRAST Additional Contrast? None   Final Result   1.  Limited evaluation of the GI tract on this noncontrast exam.  Improved   mucosal changes of the duodenum and proximal jejunum that were described on   prior CT. 2. Severe inflammatory change in the right lower quadrant with etiology   uncertain. This could correspond to enteritis of the distal ileum and   terminal ileum. Colitis may also be considered. Infectious or inflammatory   etiologies may be favored. 3. Dense stool and diffuse mucosal thickening of the distal colon which may   represent a pattern of colitis. 4. Small right pleural effusion with airspace changes in the right lower   lobe, new from prior exam.   5. Evidence of chronic liver disease with a small amount of ascites stable. CT ABDOMEN PELVIS WO CONTRAST Additional Contrast? None   Final Result   1. Acute enteritis involving the duodenum and jejunal loops with thickening   of the loops and edema. No evidence of bowel obstruction. 2. Constipation with significant stool impaction in the rectum. 3. Mild ascites mostly around the liver and spleen. 4. Adequate position of Osorio catheter in the urinary bladder. XR CHEST PORTABLE   Final Result   Placement of a right internal jugular central venous catheter without evident   complication. The tip is at approximately the cavoatrial junction.       No acute findings in the chest.            Latest Reference Range & Units 9/17/22 09:45   pH, Arterial 7.350 - 7.450  7.427   pCO2, Arterial 35.0 - 45.0 mmHg 39.2   pO2, Arterial 75.0 - 108.0 mmHg 93.8   HCO3, Arterial 21.0 - 29.0 mmol/L 25.3   TCO2 (calc), Art Not Established mmol/L 26.5   Base Excess, Arterial -3.0 - 3.0 mmol/L 0.9   O2 Sat, Arterial >92 % 96.6   Methemoglobin, Arterial <1.5 % 0.6   Carboxyhgb, Arterial 0.0 - 1.5 % 0.2      Latest Reference Range & Units 9/15/22 13:38 9/15/22 21:15 9/16/22 04:20 9/17/22 04:52   Lactic Acid 0.4 - 2.0 mmol/L 18.0 (HH) 13.7 (HH) 8.6 (HH) 2.0       Assessment:  Principal Problem:    Enteritis  Active Problems:    Syncope Hyponatremia    DEJAN (acute kidney injury) ([facility] [address]    Abdominal pain    Elevated troponin    DM (diabetes mellitus), secondary uncontrolled ([facility] [address]    Ischemic colitis ([facility] [address]    S/P exploratory laparotomy    Acute postoperative pulmonary insufficiency (H[facility])  Resolved Problems:    * No resolved hospital problems.  *          Plan:   Ventilator support to keep saturation more than 92%  Ventilator settings and waveforms reviewed  Ventilator changes made  Pulmonary toilet  IV sedation to maintain patient ventilator synchrony-taking off continuous sedation and given IV sedation on PRN basis   Titration of sedation as per RASS scores  Will try SBT again   Wound care and ostomy care as per surgery and wound care team  Surgical intervention and operative report reviewed  IV pressors,if required to keep mean arterial pressure more than 65 mmHg  CRRT as per nephrology-patient's cumulative fluid balance is +7.5 L  Patient's lactic acid levels has trended in right direction   Monitor input output and BMP  Correct electrolytes on whenever necessary basis  BGM with SSI  Monitor for any worsening hypoglycemia  Patient also has pyuria at this time and patient has been started on Zosyn empirically by the admitting provider-titration as per clinical status and cultures  Neurochecks   Trend the WBC count -slightly worse as compared to yesterday   PUD and DVT prophylaxis       Case d/w ICU team and family     Patient's clinical status is precarious and critical with potential for further decompensation and needs to be monitored in ICU        Critical care time spent -35 minutes(exclusive of any procedures)         Electronically signed by:  Sonya Butler MD    9/18/2022    9:38 AM.

## 2022-09-18 NOTE — PROGRESS NOTES
09/18/22 1156   Patient Observation   Heart Rate 86   Resp 11   SpO2 100 %   Ventilator Settings   FiO2  30 %   PEEP/CPAP (cmH2O) 5   Vent Patient Data (Readings)   Vt (Measured) 633 mL   Peak Inspiratory Pressure (cmH2O) 15 cmH2O   Rate Measured 11 br/min   Minute Volume (L/min) 6.72 Liters   Mean Airway Pressure (cmH2O) 7.3 cmH20   I:E Ratio 1:4.30   Vent Alarm Settings   Low Minute Volume (lpm) 2 L/min   Low Exhaled Vt (ml) 200 mL   RR High (bpm) 40 br/min   Airway Clearance   Suction ET Tube   Suction Device Inline suction catheter   Sputum Method Obtained Endotracheal   Sputum Amount Small   Sputum Color/Odor Clear   Sputum Consistency Thick   ETT (adult)   Placement Date/Time: 09/15/22 1720   Present on Admission/Arrival: No  Placed By: In surgery; Licensed provider  Placement Verified By: Capnometry;Direct visualization; Auscultation  Preoxygenation: Yes  Mask Ventilation: Ventilated by mask with oral ai. ..    Secured At 22 cm   Measured From Lips   ETT Placement Right   Secured By Commercial tube wills   Cuff Pressure 30 cm H2O

## 2022-09-18 NOTE — PROGRESS NOTES
09/17/22 2336   Patient Observation   Heart Rate (!) 101   Resp 14   SpO2 100 %   Breath Sounds   Right Upper Lobe Clear   Right Middle Lobe Diminished   Right Lower Lobe Diminished   Left Upper Lobe Clear   Left Lower Lobe Diminished   Vent Information   Vent Mode AC/VC   Ventilator Settings   FiO2  30 %   Vt (Set, mL) 450 mL   Resp Rate (Set) 14 bmp   PEEP/CPAP (cmH2O) 5   Vent Patient Data (Readings)   Vt (Measured) 468 mL   Peak Inspiratory Pressure (cmH2O) 18 cmH2O   Rate Measured 14 br/min   Minute Volume (L/min) 6.54 Liters   Mean Airway Pressure (cmH2O) 7.7 cmH20   I:E Ratio 1:2.90   Backup Apnea On   Vent Alarm Settings   Low Minute Volume (lpm) 2 L/min   Low Exhaled Vt (ml) 200 mL   RR High (bpm) 40 br/min   Apnea (secs) 20 secs   Additional Respiratoray Assessments   Humidification Source HME   Ambu Bag With Mask At Bedside Yes   ETT (adult)   Placement Date/Time: 09/15/22 1720   Present on Admission/Arrival: No  Placed By: In surgery; Licensed provider  Placement Verified By: Capnometry;Direct visualization; Auscultation  Preoxygenation: Yes  Mask Ventilation: Ventilated by mask with oral ai. ..    Secured At 22 cm   Measured From Lips   ETT Placement Center   Secured By Commercial tube wills   Site Assessment Dry   Cuff Pressure 30 cm H2O

## 2022-09-18 NOTE — PLAN OF CARE
Problem: Discharge Planning  Goal: Discharge to home or other facility with appropriate resources  9/17/2022 2253 by Isaias Ny RN  Outcome: Progressing  9/17/2022 1020 by Pam Fisher RN  Outcome: Progressing  Flowsheets (Taken 9/17/2022 0800)  Discharge to home or other facility with appropriate resources: Identify barriers to discharge with patient and caregiver     Problem: Pain  Goal: Verbalizes/displays adequate comfort level or baseline comfort level  9/17/2022 2253 by Isaias Ny RN  Outcome: Progressing  9/17/2022 1020 by Pam Fisher RN  Outcome: Progressing     Problem: Skin/Tissue Integrity  Goal: Absence of new skin breakdown  Description: 1. Monitor for areas of redness and/or skin breakdown  2. Assess vascular access sites hourly  3. Every 4-6 hours minimum:  Change oxygen saturation probe site  4. Every 4-6 hours:  If on nasal continuous positive airway pressure, respiratory therapy assess nares and determine need for appliance change or resting period.   9/17/2022 2253 by Isaias Ny RN  Outcome: Progressing  9/17/2022 1020 by Pam Fisher RN  Outcome: Progressing

## 2022-09-18 NOTE — PROGRESS NOTES
09/18/22 0350   Patient Observation   Heart Rate (!) 101   Resp 17   SpO2 99 %   Breath Sounds   Right Upper Lobe Clear   Right Middle Lobe Clear   Right Lower Lobe Diminished   Left Upper Lobe Clear   Left Lower Lobe Diminished   Vent Information   Vent Mode AC/VC   Ventilator Settings   FiO2  30 %   Vt (Set, mL) 450 mL   Resp Rate (Set) 14 bmp   PEEP/CPAP (cmH2O) 5   Vent Patient Data (Readings)   Vt (Measured) 463 mL   Peak Inspiratory Pressure (cmH2O) 13 cmH2O   Rate Measured 17 br/min   Minute Volume (L/min) 7.82 Liters   Mean Airway Pressure (cmH2O) 7 cmH20   I:E Ratio 1:2.00   Flow Sensitivity 3 L/min   Backup Apnea On   Vent Alarm Settings   Low Minute Volume (lpm) 2 L/min   Low Exhaled Vt (ml) 200 mL   RR High (bpm) 40 br/min   Apnea (secs) 20 secs   Additional Respiratoray Assessments   Humidification Source HME   Ambu Bag With Mask At Bedside Yes   ETT (adult)   Placement Date/Time: 09/15/22 1720   Present on Admission/Arrival: No  Placed By: In surgery; Licensed provider  Placement Verified By: Capnometry;Direct visualization; Auscultation  Preoxygenation: Yes  Mask Ventilation: Ventilated by mask with oral ai. ..    Secured At 22 cm   Measured From Lips   ETT Placement Left   Secured By Commercial tube wills   Site Assessment Dry   Cuff Pressure 30 cm H2O

## 2022-09-18 NOTE — PROGRESS NOTES
Hospitalist Progress Note  9/18/2022 10:12 AM  Subjective:   Admit Date: 9/14/2022  PCP: Suzie Kendrick, DO, DO  Status: Inpatient  Interval History: Hospital Day: 5, continues on AC/VC at 30% FiO2 with 5 PEEP. Spontaneous breathing trial again today. Nasogastric tube to intermittent suction. Post-op day #3 exploratory laparotomy, sigmoid colectomy, colostomy. History of present illness:  Admitted with ischemic sigmoid colon requiring exploratory laparotomy, sigmoid colectomy, colostomy formation (9/15). Acute kidney injury on CKD requiring CRRT (9/15). Continues NPO. Antibiotic therapy with Zosyn. CRRT via vascath (9/15).        Diet: NPO  Right internal jugular CVC triple lumen (9/16, day #3)  Right abdomen closed suction drain (9/15, day #4)  Left nostril NG (9/16, day #3)  Colostomy LLQ (9/15)  Urinary catheter (9/14, day #5)  ETT (9/15, day #4)  Right femoral vein vascath (9/15, day #4)  Medications:     CRRT prismaSATE BGK 4/2.5 1,500 mL/hr (09/18/22 0656)     hydrocortisone  50 mg IntraVENous Q6H   piperacillin-tazobactam  3,375 mg IntraVENous Q8H (9/14, day #5)   pantoprazole  40 mg IntraVENous Daily   insulin glargine  15 Units SubCUTAneous Nightly   insulin lispro SSI  0-4 Units SubCUTAneous Q4H     Recent Labs     09/16/22  0420 09/17/22  0452 09/18/22  0410   WBC 20.5* 22.6* 25.5*   HGB 10.0* 7.5* 8.1*    87* 72*   MCV 82.9 83.0 84.3     Recent Labs     09/17/22  1120 09/17/22  1925 09/18/22  0410   * 133* 134*   K 4.0 4.1 3.8    100 99   CO2 25 25 24   BUN 8 7 7   CREATININE 0.9 0.9 0.8   GLUCOSE 87 107* 103*     Recent Labs     09/16/22  0420 09/17/22  0452 09/18/22  0410   AST 43* 38* 47*   ALT 16 12 15   BILITOT 0.3 0.4 0.4   ALKPHOS 339* 192* 219*       Troponin T:   09/14/22  1150 09/14/22  1514   0.07* 0.08*     INR:   Recent Labs     09/16/22  0420 09/17/22  0452 09/18/22  0410   INR 1.54* 1.47* 1.22*     POC Glucose:   Recent Labs     09/17/22  1931 09/17/22  2359 09/18/22  0424 09/18/22  0826   POCGLU 102* 93 96 102*     Noncontrast CT abd / pelvis (9/15) Limited evaluation of the GI tract on this noncontrast exam.  Improved   mucosal changes of the duodenum and proximal jejunum that were described on prior CT. Severe inflammatory change in the right lower quadrant with etiology uncertain. This could correspond to enteritis  of the distal ileum and terminal ileum. Colitis may also be considered. Infectious or inflammatory etiologies may be favored. Dense stool and diffuse mucosal thickening of the distal colon which may represent a pattern of colitis. Small right pleural effusion with airspace changes in the right lower lobe, new from prior exam. Evidence of chronic liver disease with a small amount of ascites stable. Portable CXR (9/14) Placement of a right internal jugular central venous catheter without evident complication. The tip is at approximately the cavoatrial junction. No acute findings in the chest.    Objective:   Vitals:  BP (!) 121/55   Pulse 87   Temp 99.4 °F (37.4 °C) (Bladder)   Resp 11   Ht 5' 9\" (1.753 m)   Wt 190 lb 11.2 oz (86.5 kg)   SpO2 100%   BMI 28.16 kg/m²   General appearance: sedated on vent, moves eyes on command  Lungs: ventilated bilaterally  Heart:  distant, regular rate and rhythm, no appreciable murmur  Abdomen: post-op, LLQ colostomy  Extremities:  1+ edema, neurovascular status intact  Neurologic: sedated on vent    Assessment and Plan:   Ischemic sigmoid colon requiring exploratory laparotomy, sigmoid colectomy, colostomy formation (9/15). Followed by wound and ostomy care. Sepsis secondary to peritonitis:  Antibiotic therapy with piperacillin-tazobactam.  Was on two vasopressors, including norepinephrine. Acute kidney injury on CKD requiring CRRT (9/15) via vascath (placed 9/15). Bicarbonate infusion (9/15 - 9/16).     Post-op respiratory failure on mechanical ventilation:  Spontaneous breathing trial. Elevated troponin: Demand ischemia. Type 2 Diabetes (uncontrolled, HgbA1c 9.2%): Basal glargine insulin 15 units per day. Cover with a \"sliding scale\" lispro moderate scale prandial correction insulin. Hyponatremia:  Resolved. Unclear etiology. Electrolyte replacement per nephrology. Normocytic anemia: Stable  Thrombocytopenia:  Discontinue heparin.           Advance Directive: Full Code  DVT prophylaxis with intermittent compression (NO heparin due to low Plt)  Discharge planning: unknown at this time      Darshan Bacon MD  [address] [place] [address]

## 2022-09-18 NOTE — PROGRESS NOTES
Shift: [id number]    Admitting diagnosis: Sepsis, Enteritis    Presentation to hospital: Jaylyn Mckeon, syncopal episode in bathroom @ home. BS [facility] EMS    History: CKD, DM2, Hyperlipidemia, HTN, Neuropathy    Surgery: Yes; ex lap; ischemic sigmoid colon with resection and colostomy creation    Nursing assessment at handoff  stable    Emergency Contact/POA: Francine Tomas, spouse [phone]    Family updated: yes, visited    Most recent vitals: BP (!) 127/54   Pulse 99   Temp 99.4 °F (37.4 °C) (Bladder)   Resp 17   Ht 5' 9\" (1.753 m)   Wt 190 lb 11.2 oz (86.5 kg)   SpO2 100%   BMI 28.16 kg/m²      Rhythm: Normal Sinus Rhythm 80s and NS VT runs     NC/HFNC - -- L/min  Respiratory support: - No ventilator support  - Ventilator Settings:    Vt (Set, mL): 450 mL  Resp Rate (Set): 14 bmp  FiO2 : 30 %    PEEP/CPAP (cmH2O): 5  Pressure Support: 10 cmH20    Vent days: Day 4    Increased O2 requirements: no    Admission weight Weight: 165 lb (74.8 kg)  Today's weight   Wt Readings from Last 1 Encounters:   09/18/22 190 lb 11.2 oz (86.5 kg)         UOP >30ml/hr: No - on CRRT     Osorio need assessed each shift: yes    Restraints: no  Order current and documentation up to date?     Lines/Drains  LDA Insertion Date Discontinued Date Dressing Changes   PIV  09/14/22       TLC  09/16/22       Arterial  09/14/22       Osorio  09/14/22       Vas Cath 09/15/22       ETT  09/15/22       Surgical drains 09/15/22       Ostomy 09/15/22   09/16/22        Night Shift Hospitalist Interventions    Problem(Brief) Date Time Intervention Physician contacted                                               Drip rates at handoff:    Maryjudyzabeth Reynaldo 4/2.5 1,500 mL/hr (09/18/22 1705)    prismaSATE BGK 4/2.5 1,000 mL/hr (09/18/22 1703)    prismaSATE BGK 4/2.5 500 mL/hr at 09/18/22 1704    sodium chloride      norepinephrine Stopped (09/18/22 0126)    dextrose      sodium chloride      vasopressin (Septic Shock) infusion Stopped (09/16/22 6964)       Hospital Course Daily Updates:  Admit Day #1: Days (09/14)  - R/IJ CVC placed, levo & vaso infusion   - pH 7.1, 1 amp bicarb, NS @ 125  - R/Radial ART line placed  - Lactic 12.6 down to 7.6 on repeat     Admit Day #1: Nights (09/14-09/15)  - C-diff sample sent  - Lactic 11.3, 14.1  - Critical CO2 8  - NS @ 125 changed to bicarb gtt @ 150  - 1x amp bicarb given  - LR bolus x2L + 1L  - 2x amps bicarb per nephro, give 2 more amps if pH <7.2  -urine output increasing, but less than <30 ml/hr     Admit Day #2 Days (09/15)  - C-diff Negative  - Lactic 19.3/18  - pH 7.27; 2 amps bicarb given  - BG 50s; x2 D10 boluses given, repeat BG 100s  - Repeat CT ABD; Severe inflammatory change in the right lower quadrant with etiology   uncertain. This could correspond to enteritis of the distal ileum and   terminal ileum. Colitis may also be considered. Infectious or inflammatory   etiologies may be favored      - Nephrology concerned for metformin-related DEJAN; Right fem vascath placed, CRRT started [id number]  - Consult Gen Sx; pt to OR at 1700 for concern for bowel perf     Admit Day #2: Nights (09/15-09/16)  - Back from OR at 2030. Resection of ischemic sigmoid colon with colostomy placement  - New 2 piece appliance applied to colostomy; wound consult placed  - Having BM's  - CRRT restarted at 2130  - Electrolytes being replaced per protocol  - New Right IJ placed  previous was unusable (was pulled out at some point during surgery)     Admit Day #3 Days (09/16)  - Lactic downtrending; 8.6 this AM  - Bicarb gtt d/c'ed  - Increase in pressors; 1L NS bolus given (not included in CRRT running total), albumin added  - Vaso gtt titrated off  - CRRT maintained, keeping net even  - Output noted from ostomy, WOCN rounded; ostomy noted to be brown in color     Admit Day #4 Days (09/17)  - Levo weaned off  - SBT started, returned to vent settings in the late afternoon d/t pt not becoming awake enough for extubation.      Admit Day #4 Nights (09/17-09/18)  - Calcium and phos replaced per protocol  - Pt acutely hypotensive, restarted levo gtt briefly   - Levo weaned off  - Had a small smear  - No output in ostomy  - CRRT FRG -50mL/h     Admit Day #5 Days (09/18)  - SBT @ 0850 10/5  - 1 g Calcium and 12 mmol Na Phos Replaced per PRN protocol.  - Increased FLG from -50mL/h to -100mL/h  - Set changed about 1800     Admit Day #5 Nights (09/18)  -       Lab Data:  CBC:   Recent Labs     09/16/22  0420 09/17/22  0452 09/18/22  0410   WBC 20.5* 22.6* 25.5*   HGB 10.0* 7.5* 8.1*   HCT 29.8* 22.5* 24.4*   MCV 82.9 83.0 84.3    87* 72*     BMP:    Recent Labs     09/17/22  1925 09/18/22  0410 09/18/22  1109 09/18/22  1911   * 134* 136  --    K 4.1 3.8 3.7  --    CO2 25 24 25  --    BUN 7 7 9  --    CREATININE 0.9 0.8 1.0  --    PHOS 1.5* 2.0* 1.9*  --    CAION 1.04* 1.02* 1.07* 1.02*     LIVER:   Recent Labs     09/17/22  0452 09/18/22  0410   AST 38* 47*   ALT 12 15     PT/INR:   Recent Labs     09/17/22  0452 09/18/22  0410   PROT 5.0* 5.3*   INR 1.47* 1.22*     APTT: No results for input(s): APTT in the last 72 hours. Anti-XA: No results for input(s): ANTIXA in the last 72 hours. ABG:   Recent Labs     09/16/22  0524 09/17/22  0945   PHART 7.462* 7.427   CQV9BDI 32.5* 39.2   PO2ART 84.2 93.8       Consults (if GI or Nephrology- which group?) :  - Critical Care/Pulmonology  - Hospitalist  - [place] [facility] [place]  - Gen Sx

## 2022-09-18 NOTE — PROGRESS NOTES
Interval History and plan:   Patient seen at bedside. No issues with CRRT  Lytes stable  FiO2 30 %, intubated  Off Pressors now  + 7.5  liters since admission. Able to pull fluid. Plan:  Continue CRRT  Electrolyte replacement per protocol  Increase UF to 100 ml/hr as tolerated  If BP remain stable will plan for transition to iHD in AM.     D/W RN at bedside. Assessment :     Acidosis  Controlled with CRRT       CKD Stage IIIb with DEJAN  Creatinine 2.1 on consult  Creatinine 1.6 on 7/22  CKD likely due to diabetes, hypertension  Renal imaging-normal in the past      hypotension  BP: (120-140)/(54-75)  Heart Rate:  []   BP goal inpatient [phone] systolic inpatient  Recovering from shock. Off pressors now. [facility] would like to thank Mikayla Simon MD   for opportunity to serve this patient      Please call with questions at-   24 Hrs Answering service [phone] or  7 am- 5 pm via Perfect serve or cell phone  Bayron Mendoza MD          CC/reason for consult :       DEJAN     HPI :     Timothy Flynn is a 67 y.o. female presented to   the hospital on 9/14/2022 with lactic acidosis. She is known to have diabetes and on metformin to 2 days ago  Has constipation and with multiple bowel regimen has had good bowel movement yesterday following that she has syncope. EMS was called and was found to have blood pressure of 50 systolic given IV fluids brought to the emergency room blood pressure was normal initially but later developed hypotension got 3 L of fluid and now on 2 vasopressors and in ICU. She also has severe acidosis with very high lactate. CT scan was normal initially. We are consulted for DEJAN on CKD and related issues. On CKD and also severe lactic acidosis    ROS:   Unable.      positives in bold   Constitutional:  fever, chills, weakness, weight change, fatigue  Skin:  rash, pruritus, hair loss, bruising, dry skin, petechiae  Head, Face, Neck   headaches, swelling,  cervical adenopathy  Respiratory: shortness of breath, cough, or wheezing  Cardiovascular: chest pain, palpitations, dizzy, edema  Gastrointestinal: nausea, vomiting, diarrhea, constipation,belly pain    Yellow skin, blood in stool  Musculoskeletal:  back pain, muscle weakness, gait problems,       joint pain or swelling. Genitourinary:  dysuria, poor urine flow, flank pain, blood in urine  Neurologic:  vertigo, TIA'S, syncope, seizures, focal weakness  Psychosocial:  insomnia, anxiety, or depression. Additional positive findings:                    All other remaining systems are negative or unable to obtain        PMH/PSH/SH/Family History:     Past Medical History:   Diagnosis Date    Chronic kidney disease     Diabetes mellitus ([facility] [address]     Hyperlipidemia     Hypertension     Neuropathy        Past Surgical History:   Procedure Laterality Date    LAPAROTOMY N/A 9/15/2022    DIAGNOSTIC LAPAROSCOPY, EXPLORATORY LAPAROTOMY, SIGMOID COLECTOMY AND COLOSTOMY performed by Leola Reina MD at [facility]        reports that she has never smoked. She has never used smokeless tobacco. She reports that she does not currently use alcohol. She reports that she does not use drugs. family history is not on file.          Medication:     Current Facility-Administered Medications: fentaNYL (SUBLIMAZE) injection 25 mcg, 25 mcg, IntraVENous, Q4H PRN  midazolam (VERSED) injection 2 mg, 2 mg, IntraVENous, Q4H PRN  hydrocortisone sodium succinate PF (SOLU-CORTEF) injection 50 mg, 50 mg, IntraVENous, Q6H  mupirocin (BACTROBAN) 2 % ointment, , Nasal, BID  prochlorperazine (COMPAZINE) injection 5 mg, 5 mg, IntraVENous, Q6H PRN  prismaSATE BGK 4/2.5 dialysis solution, 1,500 mL/hr, Dialysis, Continuous  prismaSATE BGK 4/2.5 dialysis solution, 1,000 mL/hr, Dialysis, Continuous  potassium chloride 20 mEq/50 mL IVPB (Central Line), 20 mEq, IntraVENous, PRN  magnesium sulfate 1000 mg in dextrose 5% 100 mL IVPB, 1,000 mg, IntraVENous, PRN  sodium phosphate 6 mmol in sodium chloride 0.9 % 250 mL IVPB, 6 mmol, IntraVENous, PRN **OR** sodium phosphate 12 mmol in sodium chloride 0.9 % 250 mL IVPB, 12 mmol, IntraVENous, PRN **OR** sodium phosphate 18 mmol in sodium chloride 0.9 % 500 mL IVPB, 18 mmol, IntraVENous, PRN **OR** sodium phosphate 24 mmol in sodium chloride 0.9 % 500 mL IVPB, 24 mmol, IntraVENous, PRN  prismaSATE BGK 4/2.5 dialysis solution, , Dialysis, Continuous  calcium gluconate 1000 mg in sodium chloride 50 mL, 1,000 mg, IntraVENous, PRN **OR** calcium gluconate 2,000 mg in dextrose 5 % 100 mL IVPB, 2,000 mg, IntraVENous, PRN **OR** calcium gluconate 3,000 mg in dextrose 5 % 100 mL IVPB, 3,000 mg, IntraVENous, PRN **OR** calcium gluconate 4,000 mg in dextrose 5 % 100 mL IVPB, 4,000 mg, IntraVENous, PRN  sodium chloride flush 0.9 % injection 5-40 mL, 5-40 mL, IntraVENous, 2 times per day  sodium chloride flush 0.9 % injection 5-40 mL, 5-40 mL, IntraVENous, PRN  0.9 % sodium chloride infusion, 25 mL, IntraVENous, PRN  HYDROmorphone (DILAUDID) injection 0.25 mg, 0.25 mg, IntraVENous, Q5 Min PRN  HYDROmorphone (DILAUDID) injection 0.5 mg, 0.5 mg, IntraVENous, Q5 Min PRN  ondansetron (ZOFRAN) injection 4 mg, 4 mg, IntraVENous, Q10 Min PRN  midazolam (VERSED) injection 1 mg, 1 mg, IntraVENous, Q5 Min PRN  fentaNYL (SUBLIMAZE) injection 25 mcg, 25 mcg, IntraVENous, Q1H PRN  norepinephrine (LEVOPHED) 16 mg in dextrose 5 % 250 mL infusion, 1-100 mcg/min, IntraVENous, Continuous  piperacillin-tazobactam (ZOSYN) 3,375 mg in dextrose 5 % 50 mL IVPB extended infusion (mini-bag), 3,375 mg, IntraVENous, Q8H  glucose chewable tablet 16 g, 4 tablet, Oral, PRN  dextrose bolus 10% 125 mL, 125 mL, IntraVENous, PRN **OR** dextrose bolus 10% 250 mL, 250 mL, IntraVENous, PRN  glucagon (rDNA) injection 1 mg, 1 mg, SubCUTAneous, PRN  dextrose 10 % infusion, , IntraVENous, Continuous PRN  sodium chloride flush 0.9 % injection 5-40 mL, 5-40 mL, IntraVENous, 2 times per day  sodium chloride flush 0.9 % injection 5-40 mL, 5-40 mL, IntraVENous, PRN  0.9 % sodium chloride infusion, , IntraVENous, PRN  polyethylene glycol (GLYCOLAX) packet 17 g, 17 g, Oral, Daily PRN  acetaminophen (TYLENOL) tablet 650 mg, 650 mg, Oral, Q6H PRN **OR** acetaminophen (TYLENOL) suppository 650 mg, 650 mg, Rectal, Q6H PRN  pantoprazole (PROTONIX) injection 40 mg, 40 mg, IntraVENous, Daily  insulin glargine (LANTUS) injection vial 15 Units, 15 Units, SubCUTAneous, Nightly  vasopressin 20 Units in dextrose 5 % 100 mL infusion, 0.03 Units/min, IntraVENous, Titrated  insulin lispro (HUMALOG) injection vial 0-4 Units, 0-4 Units, SubCUTAneous, Q4H       Vitals :     Vitals:    09/18/22 1600   BP: (!) 126/57   Pulse: 90   Resp: (!) 8   Temp: 99.4 °F (37.4 °C)   SpO2: 99%       I & O :       Intake/Output Summary (Last 24 hours) at 9/18/2022 1654  Last data filed at 9/18/2022 1600  Gross per 24 hour   Intake 1599.94 ml   Output 2821 ml   Net -1221.06 ml          Physical Examination :     General appearance: NAD  HEENT: ETT in mouth  Neck : Mass- no, appears symmetrical, JVD- not visible  Respiratory: Respiratory effort-  normal, wheeze- no, crackles - no, intubated. Cardiovascular: Ausculation- No M/R/G, Edema ++  Abdomen: visible mass- no, distention- no                           hepatosplenomegaly-  no  Skin: rashes- no   Psychiatric: Cannot assess  CNS: Sedated.    Additional finding:      LABS:     Recent Labs     09/16/22  0420 09/17/22  0452 09/18/22  0410   WBC 20.5* 22.6* 25.5*   HGB 10.0* 7.5* 8.1*   HCT 29.8* 22.5* 24.4*    87* 72*       Recent Labs     09/16/22  1808 09/17/22  0232 09/17/22  0452 09/17/22  1120 09/17/22  1925 09/18/22  0410 09/18/22  1109   *   < > 136   < > 133* 134* 136   K 4.4   < > 4.3   < > 4.1 3.8 3.7      < > 101   < > 100 99 101   CO2 26   < > 25   < > 25 24 25   BUN 13   < > 9   < > 7 7 9   CREATININE 1.0   < > 0.9   < > 0.9 0.8 1.0   GLUCOSE 130*   < > 70   < > 107* 103* 110*   MG 2.20  --  2.20  --   --  2.30  --    PHOS 1.9*   < >  --    < > 1.5* 2.0* 1.9*    < > = values in this interval not displayed.

## 2022-09-18 NOTE — PROGRESS NOTES
[facility] GENERAL SURGERY DAILY PROGRESS NOTE    SUBJECTIVE: Intubated    OBJECTIVE: CURRENT VITALS:  BP (!) 120/54   Pulse 93   Temp 99.4 °F (37.4 °C) (Bladder)   Resp 11   Ht 5' 9\" (1.753 m)   Wt 190 lb 11.2 oz (86.5 kg)   SpO2 99%   BMI 28.16 kg/m²          ABD: Soft  BROOKS drainage serous. OSTOMY:  Pink; non functional    LABS:    CBC:   Recent Labs     09/16/22  0420 09/17/22  0452 09/18/22  0410   WBC 20.5* 22.6* 25.5*   RBC 3.59* 2.71* 2.89*   HGB 10.0* 7.5* 8.1*   HCT 29.8* 22.5* 24.4*   MCV 82.9 83.0 84.3   RDW 13.8 14.1 14.2    87* 72*     BMP:   Recent Labs     09/17/22  1120 09/17/22  1925 09/18/22  0410   * 133* 134*   K 4.0 4.1 3.8    100 99   CO2 25 25 24   PHOS 1.6* 1.5* 2.0*   BUN 8 7 7   CREATININE 0.9 0.9 0.8     Recent Labs     09/16/22  1808 09/17/22  0452 09/18/22  0410   MG 2.20 2.20 2.30        ASSESSMENT AND PLAN:  67 y.o. female status post diagnosis of ischemic sigmoid colon s/p exploratory laparotomy, sigmoid colectomy, colostomy formation POD 3.      GI: continue with ngt to suction  ID: continue with IV antibiotics - leukocytosis trending up, monitor  New ostomy: monitor stoma and output  Pulm: extubation per critical care team     Medical management per primary team        Parry Primrose, MD

## 2022-09-18 NOTE — PROGRESS NOTES
09/18/22 1520   Patient Observation   Heart Rate 97   Resp 18   SpO2 98 %   Breath Sounds   Right Upper Lobe Clear   Right Middle Lobe Diminished   Right Lower Lobe Diminished   Left Upper Lobe Clear   Left Lower Lobe Diminished   Vent Information   Vent Mode CPAP/PS   Ventilator Settings   FiO2  30 %   PEEP/CPAP (cmH2O) 5   Pressure Support (cm H2O) 10 cm H2O   Vent Patient Data (Readings)   Vt (Measured) 523 mL   Peak Inspiratory Pressure (cmH2O) 15 cmH2O   Rate Measured 18 br/min   Minute Volume (L/min) 9.65 Liters   Mean Airway Pressure (cmH2O) 8.3 cmH20   I:E Ratio 1:2.30   Backup Apnea On   Vent Alarm Settings   Low Minute Volume (lpm) 2 L/min   Low Exhaled Vt (ml) 200 mL   RR High (bpm) 40 br/min   Additional Respiratoray Assessments   Humidification Source HME   Ambu Bag With Mask At Bedside Yes   Airway Clearance   Suction ET Tube   Suction Device Inline suction catheter   Sputum Method Obtained Endotracheal   Sputum Amount Small   Sputum Color/Odor Clear   Sputum Consistency Thick   ETT (adult)   Placement Date/Time: 09/15/22 1720   Present on Admission/Arrival: No  Placed By: In surgery; Licensed provider  Placement Verified By: Capnometry;Direct visualization; Auscultation  Preoxygenation: Yes  Mask Ventilation: Ventilated by mask with oral ai. ..    Secured At 22 cm   Measured From Lips   ETT Placement Center  (moved from th right)   Secured By Commercial tube wills   Cuff Pressure 30 cm H2O

## 2022-09-19 ENCOUNTER — APPOINTMENT (OUTPATIENT)
Dept: GENERAL RADIOLOGY | Age: 72
DRG: 853 | End: 2022-09-19
Payer: MEDICARE

## 2022-09-19 ENCOUNTER — APPOINTMENT (OUTPATIENT)
Dept: CT IMAGING | Age: 72
DRG: 853 | End: 2022-09-19
Payer: MEDICARE

## 2022-09-19 LAB
A/G RATIO: 1.1 (ref 1.1–2.2)
ACANTHOCYTES: ABNORMAL
ALBUMIN SERPL-MCNC: 2.5 G/DL (ref 3.4–5)
ALBUMIN SERPL-MCNC: 2.6 G/DL (ref 3.4–5)
ALBUMIN SERPL-MCNC: 2.8 G/DL (ref 3.4–5)
ALP BLD-CCNC: 241 U/L (ref 40–129)
ALT SERPL-CCNC: 16 U/L (ref 10–40)
ANION GAP SERPL CALCULATED.3IONS-SCNC: 13 MMOL/L (ref 3–16)
ANION GAP SERPL CALCULATED.3IONS-SCNC: 14 MMOL/L (ref 3–16)
ANION GAP SERPL CALCULATED.3IONS-SCNC: 15 MMOL/L (ref 3–16)
ANISOCYTOSIS: ABNORMAL
AST SERPL-CCNC: 38 U/L (ref 15–37)
BANDED NEUTROPHILS RELATIVE PERCENT: 5 % (ref 0–7)
BASE EXCESS ARTERIAL: -2 MMOL/L (ref -3–3)
BASOPHILS ABSOLUTE: 0 K/UL (ref 0–0.2)
BASOPHILS RELATIVE PERCENT: 0 %
BILIRUB SERPL-MCNC: 0.4 MG/DL (ref 0–1)
BUN BLDV-MCNC: 12 MG/DL (ref 7–20)
BUN BLDV-MCNC: 18 MG/DL (ref 7–20)
BUN BLDV-MCNC: 32 MG/DL (ref 7–20)
BURR CELLS: ABNORMAL
CALCIUM IONIZED: 1.06 MMOL/L (ref 1.12–1.32)
CALCIUM IONIZED: 1.07 MMOL/L (ref 1.12–1.32)
CALCIUM IONIZED: 1.1 MMOL/L (ref 1.12–1.32)
CALCIUM SERPL-MCNC: 7.7 MG/DL (ref 8.3–10.6)
CALCIUM SERPL-MCNC: 7.9 MG/DL (ref 8.3–10.6)
CALCIUM SERPL-MCNC: 8.7 MG/DL (ref 8.3–10.6)
CARBOXYHEMOGLOBIN ARTERIAL: 0.3 % (ref 0–1.5)
CHLORIDE BLD-SCNC: 102 MMOL/L (ref 99–110)
CHLORIDE BLD-SCNC: 94 MMOL/L (ref 99–110)
CHLORIDE BLD-SCNC: 97 MMOL/L (ref 99–110)
CO2: 20 MMOL/L (ref 21–32)
CO2: 22 MMOL/L (ref 21–32)
CO2: 22 MMOL/L (ref 21–32)
CREAT SERPL-MCNC: 0.8 MG/DL (ref 0.6–1.2)
CREAT SERPL-MCNC: 1 MG/DL (ref 0.6–1.2)
CREAT SERPL-MCNC: 1.6 MG/DL (ref 0.6–1.2)
DOHLE BODIES: PRESENT
EOSINOPHILS ABSOLUTE: 0 K/UL (ref 0–0.6)
EOSINOPHILS RELATIVE PERCENT: 0 %
GFR AFRICAN AMERICAN: 38
GFR AFRICAN AMERICAN: >60
GFR AFRICAN AMERICAN: >60
GFR NON-AFRICAN AMERICAN: 32
GFR NON-AFRICAN AMERICAN: 54
GFR NON-AFRICAN AMERICAN: >60
GLUCOSE BLD-MCNC: 118 MG/DL (ref 70–99)
GLUCOSE BLD-MCNC: 125 MG/DL (ref 70–99)
GLUCOSE BLD-MCNC: 138 MG/DL (ref 70–99)
GLUCOSE BLD-MCNC: 149 MG/DL (ref 70–99)
GLUCOSE BLD-MCNC: 166 MG/DL (ref 70–99)
GLUCOSE BLD-MCNC: 179 MG/DL (ref 70–99)
GLUCOSE BLD-MCNC: 216 MG/DL (ref 70–99)
GLUCOSE BLD-MCNC: 72 MG/DL (ref 70–99)
HCO3 ARTERIAL: 21.7 MMOL/L (ref 21–29)
HCT VFR BLD CALC: 25.9 % (ref 36–48)
HEMATOLOGY PATH CONSULT: NORMAL
HEMOGLOBIN, ART, EXTENDED: 8.9 G/DL (ref 12–16)
HEMOGLOBIN: 8.6 G/DL (ref 12–16)
INR BLD: 1.22 (ref 0.87–1.14)
LYMPHOCYTES ABSOLUTE: 0.9 K/UL (ref 1–5.1)
LYMPHOCYTES RELATIVE PERCENT: 3 %
MAGNESIUM: 2.3 MG/DL (ref 1.8–2.4)
MCH RBC QN AUTO: 28 PG (ref 26–34)
MCHC RBC AUTO-ENTMCNC: 33.1 G/DL (ref 31–36)
MCV RBC AUTO: 84.6 FL (ref 80–100)
METHEMOGLOBIN ARTERIAL: 0.5 %
MONOCYTES ABSOLUTE: 1.2 K/UL (ref 0–1.3)
MONOCYTES RELATIVE PERCENT: 4 %
NEUTROPHILS ABSOLUTE: 28.2 K/UL (ref 1.7–7.7)
NEUTROPHILS RELATIVE PERCENT: 88 %
O2 SAT, ARTERIAL: 96.3 %
O2 THERAPY: ABNORMAL
OVALOCYTES: ABNORMAL
PCO2 ARTERIAL: 32.5 MMHG (ref 35–45)
PDW BLD-RTO: 14.2 % (ref 12.4–15.4)
PERFORMED ON: ABNORMAL
PERFORMED ON: NORMAL
PH ARTERIAL: 7.44 (ref 7.35–7.45)
PH VENOUS: 7.35 (ref 7.35–7.45)
PH VENOUS: 7.37 (ref 7.35–7.45)
PH VENOUS: 7.38 (ref 7.35–7.45)
PHOSPHORUS: 1.8 MG/DL (ref 2.5–4.9)
PHOSPHORUS: 2.7 MG/DL (ref 2.5–4.9)
PHOSPHORUS: 3.3 MG/DL (ref 2.5–4.9)
PLATELET # BLD: 83 K/UL (ref 135–450)
PLATELET SLIDE REVIEW: ABNORMAL
PMV BLD AUTO: 7.8 FL (ref 5–10.5)
PO2 ARTERIAL: 85 MMHG (ref 75–108)
POTASSIUM SERPL-SCNC: 3.8 MMOL/L (ref 3.5–5.1)
POTASSIUM SERPL-SCNC: 4.1 MMOL/L (ref 3.5–5.1)
POTASSIUM SERPL-SCNC: 4.1 MMOL/L (ref 3.5–5.1)
PROTHROMBIN TIME: 15.2 SEC (ref 11.7–14.5)
RBC # BLD: 3.06 M/UL (ref 4–5.2)
SODIUM BLD-SCNC: 129 MMOL/L (ref 136–145)
SODIUM BLD-SCNC: 132 MMOL/L (ref 136–145)
SODIUM BLD-SCNC: 138 MMOL/L (ref 136–145)
TCO2 ARTERIAL: 22.7 MMOL/L
TOTAL PROTEIN: 5.4 G/DL (ref 6.4–8.2)
TOXIC GRANULATION: PRESENT
WBC # BLD: 30.3 K/UL (ref 4–11)

## 2022-09-19 PROCEDURE — 2000000000 HC ICU R&B

## 2022-09-19 PROCEDURE — 2580000003 HC RX 258: Performed by: ANESTHESIOLOGY

## 2022-09-19 PROCEDURE — 87077 CULTURE AEROBIC IDENTIFY: CPT

## 2022-09-19 PROCEDURE — 6360000002 HC RX W HCPCS: Performed by: INTERNAL MEDICINE

## 2022-09-19 PROCEDURE — 2500000003 HC RX 250 WO HCPCS: Performed by: STUDENT IN AN ORGANIZED HEALTH CARE EDUCATION/TRAINING PROGRAM

## 2022-09-19 PROCEDURE — 82330 ASSAY OF CALCIUM: CPT

## 2022-09-19 PROCEDURE — 84100 ASSAY OF PHOSPHORUS: CPT

## 2022-09-19 PROCEDURE — 71045 X-RAY EXAM CHEST 1 VIEW: CPT

## 2022-09-19 PROCEDURE — 90945 DIALYSIS ONE EVALUATION: CPT

## 2022-09-19 PROCEDURE — 6360000002 HC RX W HCPCS: Performed by: NURSE PRACTITIONER

## 2022-09-19 PROCEDURE — 82803 BLOOD GASES ANY COMBINATION: CPT

## 2022-09-19 PROCEDURE — 87040 BLOOD CULTURE FOR BACTERIA: CPT

## 2022-09-19 PROCEDURE — 6360000004 HC RX CONTRAST MEDICATION: Performed by: SURGERY

## 2022-09-19 PROCEDURE — 85025 COMPLETE CBC W/AUTO DIFF WBC: CPT

## 2022-09-19 PROCEDURE — 94003 VENT MGMT INPAT SUBQ DAY: CPT

## 2022-09-19 PROCEDURE — 85610 PROTHROMBIN TIME: CPT

## 2022-09-19 PROCEDURE — 6360000002 HC RX W HCPCS: Performed by: STUDENT IN AN ORGANIZED HEALTH CARE EDUCATION/TRAINING PROGRAM

## 2022-09-19 PROCEDURE — 2580000003 HC RX 258: Performed by: STUDENT IN AN ORGANIZED HEALTH CARE EDUCATION/TRAINING PROGRAM

## 2022-09-19 PROCEDURE — 80053 COMPREHEN METABOLIC PANEL: CPT

## 2022-09-19 PROCEDURE — 87186 SC STD MICRODIL/AGAR DIL: CPT

## 2022-09-19 PROCEDURE — C9113 INJ PANTOPRAZOLE SODIUM, VIA: HCPCS | Performed by: STUDENT IN AN ORGANIZED HEALTH CARE EDUCATION/TRAINING PROGRAM

## 2022-09-19 PROCEDURE — 70450 CT HEAD/BRAIN W/O DYE: CPT

## 2022-09-19 PROCEDURE — 87205 SMEAR GRAM STAIN: CPT

## 2022-09-19 PROCEDURE — 87070 CULTURE OTHR SPECIMN AEROBIC: CPT

## 2022-09-19 PROCEDURE — 74177 CT ABD & PELVIS W/CONTRAST: CPT

## 2022-09-19 PROCEDURE — 83735 ASSAY OF MAGNESIUM: CPT

## 2022-09-19 PROCEDURE — 99291 CRITICAL CARE FIRST HOUR: CPT | Performed by: INTERNAL MEDICINE

## 2022-09-19 PROCEDURE — 99024 POSTOP FOLLOW-UP VISIT: CPT | Performed by: SURGERY

## 2022-09-19 PROCEDURE — 36592 COLLECT BLOOD FROM PICC: CPT

## 2022-09-19 RX ORDER — FLUCONAZOLE 2 MG/ML
400 INJECTION, SOLUTION INTRAVENOUS EVERY 24 HOURS
Status: DISCONTINUED | OUTPATIENT
Start: 2022-09-19 | End: 2022-09-19

## 2022-09-19 RX ORDER — HEPARIN SODIUM 1000 [USP'U]/ML
3200 INJECTION, SOLUTION INTRAVENOUS; SUBCUTANEOUS PRN
Status: DISPENSED | OUTPATIENT
Start: 2022-09-19 | End: 2022-09-29

## 2022-09-19 RX ORDER — FLUCONAZOLE 2 MG/ML
400 INJECTION, SOLUTION INTRAVENOUS 2 TIMES DAILY
Status: DISCONTINUED | OUTPATIENT
Start: 2022-09-19 | End: 2022-09-20

## 2022-09-19 RX ADMIN — HEPARIN SODIUM 3200 UNITS: 1000 INJECTION INTRAVENOUS; SUBCUTANEOUS at 17:10

## 2022-09-19 RX ADMIN — FLUCONAZOLE 400 MG: 400 INJECTION, SOLUTION INTRAVENOUS at 12:04

## 2022-09-19 RX ADMIN — Medication 10 ML: at 08:24

## 2022-09-19 RX ADMIN — CALCIUM GLUCONATE 1000 MG: 20 INJECTION, SOLUTION INTRAVENOUS at 05:22

## 2022-09-19 RX ADMIN — IOPAMIDOL 75 ML: 755 INJECTION, SOLUTION INTRAVENOUS at 11:59

## 2022-09-19 RX ADMIN — SODIUM PHOSPHATE, MONOBASIC, MONOHYDRATE 12 MMOL: 276; 142 INJECTION, SOLUTION INTRAVENOUS at 06:21

## 2022-09-19 RX ADMIN — HYDROCORTISONE SODIUM SUCCINATE 50 MG: 100 INJECTION, POWDER, FOR SOLUTION INTRAMUSCULAR; INTRAVENOUS at 02:24

## 2022-09-19 RX ADMIN — CALCIUM GLUCONATE 1000 MG: 20 INJECTION, SOLUTION INTRAVENOUS at 14:26

## 2022-09-19 RX ADMIN — PANTOPRAZOLE SODIUM 40 MG: 40 INJECTION, POWDER, FOR SOLUTION INTRAVENOUS at 08:24

## 2022-09-19 RX ADMIN — HYDROCORTISONE SODIUM SUCCINATE 50 MG: 100 INJECTION, POWDER, FOR SOLUTION INTRAMUSCULAR; INTRAVENOUS at 08:24

## 2022-09-19 RX ADMIN — IOHEXOL 50 ML: 240 INJECTION, SOLUTION INTRATHECAL; INTRAVASCULAR; INTRAVENOUS; ORAL at 10:26

## 2022-09-19 RX ADMIN — PIPERACILLIN AND TAZOBACTAM 3375 MG: 3; .375 INJECTION, POWDER, LYOPHILIZED, FOR SOLUTION INTRAVENOUS at 06:15

## 2022-09-19 RX ADMIN — HYDROCORTISONE SODIUM SUCCINATE 50 MG: 100 INJECTION, POWDER, FOR SOLUTION INTRAMUSCULAR; INTRAVENOUS at 21:58

## 2022-09-19 RX ADMIN — Medication 1000 ML/HR: at 00:26

## 2022-09-19 RX ADMIN — Medication 1000 ML/HR: at 03:52

## 2022-09-19 RX ADMIN — Medication: at 03:47

## 2022-09-19 RX ADMIN — Medication 1000 ML/HR: at 07:15

## 2022-09-19 RX ADMIN — FLUCONAZOLE 400 MG: 400 INJECTION, SOLUTION INTRAVENOUS at 21:59

## 2022-09-19 RX ADMIN — Medication 1500 ML/HR: at 03:52

## 2022-09-19 RX ADMIN — MUPIROCIN: 20 OINTMENT TOPICAL at 08:29

## 2022-09-19 RX ADMIN — MUPIROCIN: 20 OINTMENT TOPICAL at 22:13

## 2022-09-19 RX ADMIN — PIPERACILLIN AND TAZOBACTAM 3375 MG: 3; .375 INJECTION, POWDER, LYOPHILIZED, FOR SOLUTION INTRAVENOUS at 14:28

## 2022-09-19 ASSESSMENT — PULMONARY FUNCTION TESTS
PIF_VALUE: 8
PIF_VALUE: 13
PIF_VALUE: 15
PIF_VALUE: 11
PIF_VALUE: 11
PIF_VALUE: 18
PIF_VALUE: 16
PIF_VALUE: 17
PIF_VALUE: 15
PIF_VALUE: 8
PIF_VALUE: 12
PIF_VALUE: 9
PIF_VALUE: 14
PIF_VALUE: 13
PIF_VALUE: 11
PIF_VALUE: 11
PIF_VALUE: 16
PIF_VALUE: 14
PIF_VALUE: 10
PIF_VALUE: 17
PIF_VALUE: 13
PIF_VALUE: 6
PIF_VALUE: 11
PIF_VALUE: 11
PIF_VALUE: 9
PIF_VALUE: 10
PIF_VALUE: 13
PIF_VALUE: 10
PIF_VALUE: 13
PIF_VALUE: 14

## 2022-09-19 NOTE — PROGRESS NOTES
A-line positional and waveform dampened, RN attempted to troubleshoot with no success. Arterial line was removed with no issues.

## 2022-09-19 NOTE — PROGRESS NOTES
09/19/22 1932   Patient Observation   Heart Rate 86   Resp 15   SpO2 97 %   Vent Information   Vent Mode AC/PRVC   Ventilator Settings   FiO2  30 %   Vt (Set, mL) 450 mL   Resp Rate (Set) 14 bmp   PEEP/CPAP (cmH2O) 5   Vent Patient Data (Readings)   Vt (Measured) 474 mL   Peak Inspiratory Pressure (cmH2O) 10 cmH2O   Rate Measured 17 br/min   Minute Volume (L/min) 6.82 Liters   Mean Airway Pressure (cmH2O) 6.6 cmH20   I:E Ratio 1:2.5   Flow Sensitivity 3 L/min   I Time/ I Time % 1 s   Backup Apnea On   Vent Alarm Settings   Low Minute Volume (lpm) 3 L/min   Low Exhaled Vt (ml) 200 mL   RR High (bpm) 40 br/min   Apnea (secs) 20 secs   Additional Respiratoray Assessments   Humidification Source HME   Ambu Bag With Mask At Bedside Yes   Airway Clearance   Suction ET Tube   Suction Device Inline suction catheter   Sputum Method Obtained Endotracheal   Sputum Amount Moderate   Sputum Color/Odor Clear   Sputum Consistency Thin   ETT (adult)   Placement Date/Time: 09/15/22 1720   Present on Admission/Arrival: No  Placed By: In surgery; Licensed provider  Placement Verified By: Capnometry;Direct visualization; Auscultation  Preoxygenation: Yes  Mask Ventilation: Ventilated by mask with oral ai. ..    Secured At 22 cm   Measured From Lips   ETT Placement Right   Secured By Commercial tube wills   Site Assessment Dry   Cuff Pressure 30 cm H2O   Skin Assessment   Skin Assessment Clean, dry, & intact

## 2022-09-19 NOTE — PROGRESS NOTES
Hospitalist Progress Note      PCP: Severiano Duncans, DO,     Date of Admission: 9/14/2022    Chief Complaint: Abdominal Pain    Subjective: no new c/o.         Medications:  Reviewed    Infusion Medications    prismaSATE BGK 4/2.5 1,500 mL/hr (09/19/22 0352)    prismaSATE BGK 4/2.5 1,000 mL/hr (09/19/22 0715)    prismaSATE BGK 4/2.5 500 mL/hr at 09/19/22 0347    sodium chloride      norepinephrine Stopped (09/18/22 0126)    dextrose      sodium chloride      vasopressin (Septic Shock) infusion Stopped (09/16/22 1205)     Scheduled Medications    hydrocortisone sodium succinate PF  50 mg IntraVENous Q12H    fluconazole  400 mg IntraVENous Q24H    mupirocin   Nasal BID    sodium chloride flush  5-40 mL IntraVENous 2 times per day    piperacillin-tazobactam  3,375 mg IntraVENous Q8H    sodium chloride flush  5-40 mL IntraVENous 2 times per day    pantoprazole  40 mg IntraVENous Daily    insulin lispro  0-4 Units SubCUTAneous Q4H     PRN Meds: fentanNYL, midazolam, prochlorperazine, potassium chloride, magnesium sulfate, sodium phosphate IVPB **OR** sodium phosphate IVPB **OR** sodium phosphate IVPB **OR** sodium phosphate IVPB, calcium gluconate **OR** calcium gluconate **OR** calcium gluconate **OR** calcium gluconate, sodium chloride flush, sodium chloride, HYDROmorphone, HYDROmorphone, ondansetron, midazolam, fentanNYL, glucose, dextrose bolus **OR** dextrose bolus, glucagon (rDNA), dextrose, sodium chloride flush, sodium chloride, polyethylene glycol, acetaminophen **OR** acetaminophen      Intake/Output Summary (Last 24 hours) at 9/19/2022 1045  Last data filed at 9/19/2022 1000  Gross per 24 hour   Intake 1524.56 ml   Output 3353 ml   Net -1828.44 ml         Physical Exam Performed:    BP (!) 103/58   Pulse 83   Temp 99.2 °F (37.3 °C) (Bladder)   Resp 17   Ht 5' 9\" (1.753 m)   Wt 184 lb 8.4 oz (83.7 kg)   SpO2 98%   BMI 27.25 kg/m²     General appearance: No apparent distress, appears stated age and intubated/sedated. HEENT: Pupils equal, round, and reactive to light. Conjunctivae/corneas clear. Neck: Supple, with full range of motion. No jugular venous distention. Trachea midline. Respiratory:  Normal respiratory effort. Clear to auscultation, bilaterally without Rales/Wheezes/Rhonchi. Cardiovascular: Regular rate and rhythm with normal S1/S2 without murmurs, rubs or gallops. Abdomen: Soft, non-distended with hypoactive bowel sounds. Musculoskeletal: No clubbing, cyanosis or edema bilaterally. Skin: Skin color, texture, turgor normal.  No rashes or lesions. Capillary Refill: Brisk,< 3 seconds   Peripheral Pulses: +2 palpable, equal bilaterally       Labs:   Recent Labs     09/17/22 0452 09/18/22  0410 09/19/22  0440   WBC 22.6* 25.5* 30.3*   HGB 7.5* 8.1* 8.6*   HCT 22.5* 24.4* 25.9*   PLT 87* 72* 83*       Recent Labs     09/18/22  1109 09/18/22  1911 09/19/22  0440    136 138   K 3.7 3.8 4.1    101 102   CO2 25 22 22   BUN 9 11 12   CREATININE 1.0 1.0 0.8   CALCIUM 8.5 8.4 8.7   PHOS 1.9* 2.3* 1.8*       Recent Labs     09/17/22  0452 09/18/22  0410 09/19/22  0440   AST 38* 47* 38*   ALT 12 15 16   BILITOT 0.4 0.4 0.4   ALKPHOS 192* 219* 241*       Recent Labs     09/17/22  0452 09/18/22  0410 09/19/22  0440   INR 1.47* 1.22* 1.22*       No results for input(s): CKTOTAL, TROPONINI in the last 72 hours.       Urinalysis:      Lab Results   Component Value Date/Time    NITRU Negative 09/14/2022 12:08 PM    WBCUA 21-50 09/14/2022 12:08 PM    BACTERIA Rare 09/14/2022 12:08 PM    RBCUA None seen 09/14/2022 12:08 PM    BLOODU Negative 09/14/2022 12:08 PM    SPECGRAV <=1.005 09/14/2022 12:08 PM    GLUCOSEU Negative 09/14/2022 12:08 PM       Consults:    IP CONSULT TO HOSPITALIST  IP CONSULT TO NEPHROLOGY  IP CONSULT TO CRITICAL CARE  IP CONSULT TO PHARMACY  IP CONSULT TO GENERAL SURGERY  IP CONSULT TO SOCIAL WORK      Assessment/Plan:    Active Hospital Problems    Diagnosis     Ischemic colitis ([facility] [K55.9]      Priority: Medium    S/P exploratory laparotomy [Z98.890]      Priority: Medium    Acute postoperative pulmonary insufficiency (HCC) [J95.2]      Priority: Medium    Syncope [R55]      Priority: Medium    Hyponatremia [E87.1]      Priority: Medium    Abdominal pain [R10.9]      Priority: Medium    Enteritis [K52.9]      Priority: Medium    Elevated troponin [R77.8]      Priority: Medium    DEJAN (acute kidney injury) ([facility] [N17.9]      Priority: Medium    DM (diabetes mellitus), secondary uncontrolled ([facility] [E13.65]      Priority: Medium         Peritonitis - 2nd to bowel perforation, likely due to stercoral/ischemic colitis. General Surgery consulted and appreciated s/p Lap/Sigmoid Colectomy and Colostomy 15 Sept w/out complications. Sepsis - w/ Leukocytosis/Tachycardia/Fever/Elevated Lactate POArrival 2nd to above infection. Continue IVF as appropriate and monitor clinical response w/ ABX as written. ARF - w/ elevated BUN/Cr ratio c/w pre-renal azotemia. Given IVF hydration and follow serial labs. Reviewed and documented as above. Nephrology consulted and appreciated started on CRRT 15 Sept w/ vascath placed. Acute Respiratory Failure - post-op w/ hypoxia,l.  Presence of clinical respiratory distress w/ tachypnea/dyspnea/SOB and wheezing w/ use of accessory muscles to breath requiring intubation. Supplemental O2 and wean as tolerated. HypoNatremia - etiology clinically unable to determine but likely hypovolemic. Follow serial labs on IVF. Reviewed and documented as above. Troponin elevation - of unclear clinical significance w/ etiology clinically unable to determine, w/out signs/sxs of active ischemia. Follow serial troponins. Reviewed and documented as above. DM2 - controlled on home oral antiGlycemics/Insulin - held/continued. Follow FSBS/SSI low regimen. Last HbA1c 9.2% dated this admission. Anticipate resuming/continuing home regimen at discharge. DVT Prophylaxis: none      Recent Labs     09/17/22  0452 09/18/22  0410 09/19/22  0440   PLT 87* 72* 83*       Diet: Diet NPO  Code Status: Full Code      PT/OT Eval Status: not yet ordered. Dispo - Remains in ICU. Patient is likely to remain in-house at least for the foreseeable future pending clinical course, subspecialty recs and eventual PT/OT eval/recs.   of 55 years preset at bedside when seen 19 Sept.        Chaz Gonzalez MD

## 2022-09-19 NOTE — PROGRESS NOTES
09/18/22 2302   Patient Observation   Heart Rate 93   Resp 17   SpO2 99 %   Breath Sounds   Right Upper Lobe Clear   Right Middle Lobe Diminished   Right Lower Lobe Diminished   Left Upper Lobe Clear   Left Lower Lobe Diminished   Vent Information   Vent Mode AC/PRVC   Ventilator Settings   FiO2  30 %   Insp Time (sec) 1 sec   Vt (Set, mL) 450 mL   Resp Rate (Set) 14 bmp   PEEP/CPAP (cmH2O) 5   Vent Patient Data (Readings)   Vt (Measured) 443 mL   Peak Inspiratory Pressure (cmH2O) 9 cmH2O   Rate Measured 19 br/min   Minute Volume (L/min) 7.6 Liters   Mean Airway Pressure (cmH2O) 6.9 cmH20   I:E Ratio 1:2.40   I Time/ I Time % 1 s   Vent Alarm Settings   Low Minute Volume (lpm) 2 L/min   Low Exhaled Vt (ml) 200 mL   RR High (bpm) 40 br/min   Additional Respiratoray Assessments   Humidification Source HME   Ambu Bag With Mask At Bedside Yes   Airway Clearance   Suction ET Tube   Suction Device Inline suction catheter   Sputum Method Obtained Endotracheal   Sputum Amount Scant   Sputum Color/Odor Clear   Sputum Consistency Thin   ETT (adult)   Placement Date/Time: 09/15/22 1720   Present on Admission/Arrival: No  Placed By: In surgery; Licensed provider  Placement Verified By: Capnometry;Direct visualization; Auscultation  Preoxygenation: Yes  Mask Ventilation: Ventilated by mask with oral ai. ..    Secured At 21 cm   Measured From Lips   ETT Placement Right   Secured By Commercial tube wills   Site Assessment Dry   Cuff Pressure 28 cm H2O

## 2022-09-19 NOTE — PROGRESS NOTES
Updates:  Admit Day #1: Days (09/14)  - R/IJ CVC placed, levo & vaso infusion   - pH 7.1, 1 amp bicarb, NS @ 125  - R/Radial ART line placed  - Lactic 12.6 down to 7.6 on repeat     Admit Day #1: Nights (09/14-09/15)  - C-diff sample sent  - Lactic 11.3, 14.1  - Critical CO2 8  - NS @ 125 changed to bicarb gtt @ 150  - 1x amp bicarb given  - LR bolus x2L + 1L  - 2x amps bicarb per nephro, give 2 more amps if pH <7.2  -urine output increasing, but less than <30 ml/hr     Admit Day #2 Days (09/15)  - C-diff Negative  - Lactic 19.3/18  - pH 7.27; 2 amps bicarb given  - BG 50s; x2 D10 boluses given, repeat BG 100s  - Repeat CT ABD; Severe inflammatory change in the right lower quadrant with etiology   uncertain. This could correspond to enteritis of the distal ileum and   terminal ileum. Colitis may also be considered. Infectious or inflammatory   etiologies may be favored      - Nephrology concerned for metformin-related DEJAN; Right fem vascath placed, CRRT started [id number]  - Consult Gen Sx; pt to OR at 1700 for concern for bowel perf     Admit Day #2: Nights (09/15-09/16)  - Back from OR at 2030. Resection of ischemic sigmoid colon with colostomy placement  - New 2 piece appliance applied to colostomy; wound consult placed  - Having BM's  - CRRT restarted at 2130  - Electrolytes being replaced per protocol  - New Right IJ placed  previous was unusable (was pulled out at some point during surgery)     Admit Day #3 Days (09/16)  - Lactic downtrending; 8.6 this AM  - Bicarb gtt d/c'ed  - Increase in pressors; 1L NS bolus given (not included in CRRT running total), albumin added  - Vaso gtt titrated off  - CRRT maintained, keeping net even  - Output noted from ostomy, WOCN rounded; ostomy noted to be brown in color     Admit Day #4 Days (09/17)  - Levo weaned off  - SBT started, returned to vent settings in the late afternoon d/t pt not becoming awake enough for extubation.      Admit Day #4 Nights (09/17-09/18)  - Calcium and phos replaced per protocol  - Pt acutely hypotensive, restarted levo gtt briefly   - Levo weaned off  - Had a small smear  - No output in ostomy  - CRRT FRG -50mL/h     Admit Day #5 Days (09/18)  - SBT @ 0850 10/5  - 1 g Calcium and 12 mmol Na Phos Replaced per PRN protocol.  - Increased FLG from -50mL/h to -100mL/h  - Set changed about 1800     Admit Day #5 Nights (09/18)  -calcium and phos replaced per protocol  -pt more awake, responding to commands  -removed a-line  -no issues with CRRT and tolerated fluid removal      Lab Data:  CBC:   Recent Labs     09/17/22  0452 09/18/22  0410 09/19/22  0440   WBC 22.6* 25.5* 30.3*   HGB 7.5* 8.1* 8.6*   HCT 22.5* 24.4* 25.9*   MCV 83.0 84.3 84.6   PLT 87* 72* 83*       BMP:    Recent Labs     09/18/22  1109 09/18/22  1911 09/19/22  0440    136 138   K 3.7 3.8 4.1   CO2 25 22 22   BUN 9 11 12   CREATININE 1.0 1.0 0.8   PHOS 1.9* 2.3* 1.8*   CAION 1.07* 1.02* 1.07*       LIVER:   Recent Labs     09/18/22  0410 09/19/22  0440   AST 47* 38*   ALT 15 16       PT/INR:   Recent Labs     09/18/22  0410 09/19/22  0440   PROT 5.3* 5.4*   INR 1.22* 1.22*       APTT: No results for input(s): APTT in the last 72 hours. Anti-XA: No results for input(s): ANTIXA in the last 72 hours. ABG:   Recent Labs     09/17/22  0945 09/19/22  0440   PHART 7.427 7.442   VMP4CAG 39.2 32.5*   PO2ART 93.8 85.0         Consults (if GI or Nephrology- which group?) :  - Critical Care/Pulmonology  - Hospitalist  - Nephrology [facility] [place]  Sirisha)  - Gen Sx

## 2022-09-19 NOTE — PROGRESS NOTES
09/19/22 1209   Patient Observation   Heart Rate 98   Resp 17   SpO2 97 %   Breath Sounds   Right Upper Lobe Diminished   Right Middle Lobe Diminished   Right Lower Lobe Diminished   Left Upper Lobe Diminished   Left Lower Lobe Diminished   Vent Information   Vent Mode (S)  AC/PRVC   Ventilator Settings   FiO2  30 %   Insp Time (sec) 1 sec   Vt (Set, mL) 450 mL   Resp Rate (Set) 14 bmp   PEEP/CPAP (cmH2O) 5   Vent Patient Data (Readings)   Vt (Measured) 450 mL   Peak Inspiratory Pressure (cmH2O) 13 cmH2O   Rate Measured 19 br/min   Minute Volume (L/min) 8.52 Liters   Mean Airway Pressure (cmH2O) 7.6 cmH20   I:E Ratio 1:1.40   Flow Sensitivity 3 L/min   I Time/ I Time % 1 s   Vent Alarm Settings   Low Minute Volume (lpm) 3 L/min   High Minute Volumn (lpm) 20 L/min   Low Exhaled Vt (ml) 200 mL   High Exhaled Vt (ml) 1000 mL   RR High (bpm) 40 br/min   Apnea (secs) 20 secs   Additional Respiratoray Assessments   Humidification Source HME   Circuit Condensation Drained   Ambu Bag With Mask At Bedside Yes   ETT (adult)   Placement Date/Time: 09/15/22 1720   Present on Admission/Arrival: No  Placed By: In surgery; Licensed provider  Placement Verified By: Capnometry;Direct visualization; Auscultation  Preoxygenation: Yes  Mask Ventilation: Ventilated by mask with oral ai. ..    Secured At 22 cm   Measured From Lips   ETT Placement (S)  Right   Secured By Commercial tube wills   Site Assessment Dry   Skin Assessment   Skin Assessment Clean, dry, & intact   Supplemental Gases   Supplemental Gases None   Ventilator Associated Pneumonia Bundle   Elevation of Head of Bed to 30-45 Degrees  Yes

## 2022-09-19 NOTE — CARE COORDINATION
CM following. Pt from home with spouse. Pt works part-time at [employer]. Family has asked for referral to the [facility], they have accepted. Pt will not need precert. Pt is remains intubated, has CRRT. Pt is POD 4 for ischemic sigmoid colon s/p exploratory laparotomy, sigmoid colectomy, colostomy formation.

## 2022-09-19 NOTE — PROGRESS NOTES
Interval History and plan:     RRT  Pulling out 100 mL of fluid per hour  Is she is very edematous  Off of vasopressors  6.2 L    Plan:  CRRT  Once extubated okay to hold CRRT  Try to remove fluid now to help with extubation and to maintain it  Check hepatitis B surface antigen in preparation for hemodialysis                     Assessment :     Acidosis  Severe  Requiring 2 vasopressors  Blood pressure okay with the 2 vasopressors but lactic is a still going up to 19 concerning for ischemia   /Possible metformin induced lactic acidosis       CKD Stage IIIb with DEJAN  Creatinine 2.1 on consult  Creatinine 1.6 on 7/22  Likely due to diabetes, hypertension  Renal imaging-normal in the past      hypotension  BP: ()/(45-60)  Heart Rate:  [84-98]   BP goal inpatient 168-875 systolic inpatient  Pressures at the time of consult  Normally hypertensive    Due the potential for life-threatening deterioration due to patient's acidosis I spent 45 minutes providing critical care for this individual, excluding procedures on 9/15/22 . [facility] would like to thank Cliff Eaton MD   for opportunity to serve this patient      Please call with questions at-   24 Hrs Answering service [phone] or  7 am- 5 pm via Perfect serve or cell phone  Hodan Andersen MD          CC/reason for consult :       DEJAN     HPI :     Patricia Ibrahim is a 67 y.o. female presented to   the hospital on 9/14/2022 with lactic acidosis. She is known to have diabetes and on metformin to 2 days ago  Has constipation and with multiple bowel regimen has had good bowel movement yesterday following that she has syncope. EMS was called and was found to have blood pressure of 50 systolic given IV fluids brought to the emergency room blood pressure was normal initially but later developed hypotension got 3 L of fluid and now on 2 vasopressors and in ICU. She also has severe acidosis with very high lactate.   CT scan was normal initially. We are consulted for DEJAN on CKD and related issues. On CKD and also severe lactic acidosis    ROS:     Seen with-  No family  Discussed with RN discussed with Dr. Paresh Back and also NP for the ICU on 9/15/2020    positives in bold   Constitutional:  fever, chills, weakness, weight change, fatigue  Skin:  rash, pruritus, hair loss, bruising, dry skin, petechiae  Head, Face, Neck   headaches, swelling,  cervical adenopathy  Respiratory: shortness of breath, cough, or wheezing  Cardiovascular: chest pain, palpitations, dizzy, edema  Gastrointestinal: nausea, vomiting, diarrhea, constipation,belly pain    Yellow skin, blood in stool  Musculoskeletal:  back pain, muscle weakness, gait problems,       joint pain or swelling. Genitourinary:  dysuria, poor urine flow, flank pain, blood in urine  Neurologic:  vertigo, TIA'S, syncope, seizures, focal weakness  Psychosocial:  insomnia, anxiety, or depression. Additional positive findings:                    All other remaining systems are negative or unable to obtain        PMH/PSH/SH/Family History:     Past Medical History:   Diagnosis Date    Chronic kidney disease     Diabetes mellitus ([facility]     Hyperlipidemia     Hypertension     Neuropathy        Past Surgical History:   Procedure Laterality Date    LAPAROTOMY N/A 9/15/2022    DIAGNOSTIC LAPAROSCOPY, EXPLORATORY LAPAROTOMY, SIGMOID COLECTOMY AND COLOSTOMY performed by Sukhwinder Montes De Oca MD at [facility]        reports that she has never smoked. She has never used smokeless tobacco. She reports that she does not currently use alcohol. She reports that she does not use drugs. family history is not on file.          Medication:     Current Facility-Administered Medications: fentaNYL (SUBLIMAZE) injection 25 mcg, 25 mcg, IntraVENous, Q4H PRN  midazolam (VERSED) injection 2 mg, 2 mg, IntraVENous, Q4H PRN  hydrocortisone sodium succinate PF (SOLU-CORTEF) injection 50 mg, 50 mg, IntraVENous, Q6H  mupirocin (BACTROBAN) 2 % ointment, , Nasal, BID  prochlorperazine (COMPAZINE) injection 5 mg, 5 mg, IntraVENous, Q6H PRN  prismaSATE BGK 4/2.5 dialysis solution, 1,500 mL/hr, Dialysis, Continuous  prismaSATE BGK 4/2.5 dialysis solution, 1,000 mL/hr, Dialysis, Continuous  potassium chloride 20 mEq/50 mL IVPB (Central Line), 20 mEq, IntraVENous, PRN  magnesium sulfate 1000 mg in dextrose 5% 100 mL IVPB, 1,000 mg, IntraVENous, PRN  sodium phosphate 6 mmol in sodium chloride 0.9 % 250 mL IVPB, 6 mmol, IntraVENous, PRN **OR** sodium phosphate 12 mmol in sodium chloride 0.9 % 250 mL IVPB, 12 mmol, IntraVENous, PRN **OR** sodium phosphate 18 mmol in sodium chloride 0.9 % 500 mL IVPB, 18 mmol, IntraVENous, PRN **OR** sodium phosphate 24 mmol in sodium chloride 0.9 % 500 mL IVPB, 24 mmol, IntraVENous, PRN  prismaSATE BGK 4/2.5 dialysis solution, , Dialysis, Continuous  calcium gluconate 1000 mg in sodium chloride 50 mL, 1,000 mg, IntraVENous, PRN **OR** calcium gluconate 2,000 mg in dextrose 5 % 100 mL IVPB, 2,000 mg, IntraVENous, PRN **OR** calcium gluconate 3,000 mg in dextrose 5 % 100 mL IVPB, 3,000 mg, IntraVENous, PRN **OR** calcium gluconate 4,000 mg in dextrose 5 % 100 mL IVPB, 4,000 mg, IntraVENous, PRN  sodium chloride flush 0.9 % injection 5-40 mL, 5-40 mL, IntraVENous, 2 times per day  sodium chloride flush 0.9 % injection 5-40 mL, 5-40 mL, IntraVENous, PRN  0.9 % sodium chloride infusion, 25 mL, IntraVENous, PRN  HYDROmorphone (DILAUDID) injection 0.25 mg, 0.25 mg, IntraVENous, Q5 Min PRN  HYDROmorphone (DILAUDID) injection 0.5 mg, 0.5 mg, IntraVENous, Q5 Min PRN  ondansetron (ZOFRAN) injection 4 mg, 4 mg, IntraVENous, Q10 Min PRN  midazolam (VERSED) injection 1 mg, 1 mg, IntraVENous, Q5 Min PRN  fentaNYL (SUBLIMAZE) injection 25 mcg, 25 mcg, IntraVENous, Q1H PRN  norepinephrine (LEVOPHED) 16 mg in dextrose 5 % 250 mL infusion, 1-100 mcg/min, IntraVENous, Continuous  piperacillin-tazobactam (ZOSYN) 3,375 mg in dextrose 5 % 50 mL IVPB extended infusion (mini-bag), 3,375 mg, IntraVENous, Q8H  glucose chewable tablet 16 g, 4 tablet, Oral, PRN  dextrose bolus 10% 125 mL, 125 mL, IntraVENous, PRN **OR** dextrose bolus 10% 250 mL, 250 mL, IntraVENous, PRN  glucagon (rDNA) injection 1 mg, 1 mg, SubCUTAneous, PRN  dextrose 10 % infusion, , IntraVENous, Continuous PRN  sodium chloride flush 0.9 % injection 5-40 mL, 5-40 mL, IntraVENous, 2 times per day  sodium chloride flush 0.9 % injection 5-40 mL, 5-40 mL, IntraVENous, PRN  0.9 % sodium chloride infusion, , IntraVENous, PRN  polyethylene glycol (GLYCOLAX) packet 17 g, 17 g, Oral, Daily PRN  acetaminophen (TYLENOL) tablet 650 mg, 650 mg, Oral, Q6H PRN **OR** acetaminophen (TYLENOL) suppository 650 mg, 650 mg, Rectal, Q6H PRN  pantoprazole (PROTONIX) injection 40 mg, 40 mg, IntraVENous, Daily  insulin glargine (LANTUS) injection vial 15 Units, 15 Units, SubCUTAneous, Nightly  vasopressin 20 Units in dextrose 5 % 100 mL infusion, 0.03 Units/min, IntraVENous, Titrated  insulin lispro (HUMALOG) injection vial 0-4 Units, 0-4 Units, SubCUTAneous, Q4H       Vitals :     Vitals:    09/19/22 0827   BP:    Pulse: 98   Resp: 19   Temp:    SpO2: 98%       I & O :       Intake/Output Summary (Last 24 hours) at 9/19/2022 7496  Last data filed at 9/19/2022 4882  Gross per 24 hour   Intake 1448.07 ml   Output 3102 ml   Net -1653.93 ml          Physical Examination :     General appearance: unresponsive, intubated   HEENT: Lips- normal, teeth- ok , oral mucosa- moist  Neck : Mass- no, appears symmetrical, JVD- not raised  Respiratory: Respiratory effort-  On ventilation- FiO2-  S  wheeze- no, crackles - no  Cardiovascular:  Ausculation- No M/R/G, Edema yes  Abdomen: visible mass- no, distention- no, scar- no, tenderness- unable to assess                           hepatosplenomegaly-  No--  Musculoskeletal:  clubbing no,cyanosis- no , digital ischemia- no                           muscle strength- patient unable to co-operate     , tone - patient unable to co-operate   Skin: rashes- no , ulcers- no, induration- no, tightening - no  Psychiatric:   Intubated, unable to assess  Additional findings: -        LABS:     Recent Labs     09/17/22 0452 09/18/22 0410 09/19/22 0440   WBC 22.6* 25.5* 30.3*   HGB 7.5* 8.1* 8.6*   HCT 22.5* 24.4* 25.9*   PLT 87* 72* 83*       Recent Labs     09/17/22  0452 09/17/22  1120 09/18/22  0410 09/18/22  1109 09/18/22  1911 09/19/22  0440      < > 134* 136 136 138   K 4.3   < > 3.8 3.7 3.8 4.1      < > 99 101 101 102   CO2 25   < > 24 25 22 22   BUN 9   < > 7 9 11 12   CREATININE 0.9   < > 0.8 1.0 1.0 0.8   GLUCOSE 70   < > 103* 110* 122* 138*   MG 2.20  --  2.30  --   --  2.30   PHOS  --    < > 2.0* 1.9* 2.3* 1.8*    < > = values in this interval not displayed.

## 2022-09-19 NOTE — PROGRESS NOTES
09/19/22 0359   Patient Observation   Heart Rate 91   Resp 18   SpO2 98 %   Breath Sounds   Right Upper Lobe Clear   Right Middle Lobe Diminished   Right Lower Lobe Diminished   Left Upper Lobe Clear   Left Lower Lobe Diminished   Vent Information   Equipment Changed HME   Vent Mode AC/PRVC   Ventilator Settings   FiO2  30 %   Vt (Set, mL) 450 mL   Resp Rate (Set) 14 bmp   PEEP/CPAP (cmH2O) 5   Vent Patient Data (Readings)   Vt (Measured) 454 mL   Peak Inspiratory Pressure (cmH2O) 11 cmH2O   Rate Measured 20 br/min   Minute Volume (L/min) 9.52 Liters   Mean Airway Pressure (cmH2O) 7.5 cmH20   I:E Ratio 1:2.00   I Time/ I Time % 1 s   Vent Alarm Settings   Low Minute Volume (lpm) 2 L/min   Low Exhaled Vt (ml) 200 mL   RR High (bpm) 40 br/min   Additional Respiratoray Assessments   Humidification Source HME   Ambu Bag With Mask At Bedside Yes   Airway Clearance   Suction ET Tube   Suction Device Inline suction catheter   Sputum Method Obtained Endotracheal   Sputum Amount Small   Sputum Color/Odor Clear   Sputum Consistency Thick   ETT (adult)   Placement Date/Time: 09/15/22 1720   Present on Admission/Arrival: No  Placed By: In surgery; Licensed provider  Placement Verified By: Capnometry;Direct visualization; Auscultation  Preoxygenation: Yes  Mask Ventilation: Ventilated by mask with oral ai. ..    Secured At 21 cm   Measured From Lips   ETT Placement Left   Secured By Commercial tube wills   Site Assessment Dry   Cuff Pressure 30 cm H2O

## 2022-09-19 NOTE — PROGRESS NOTES
09/19/22 1549   Patient Observation   Heart Rate 80   Resp 14   SpO2 99 %   Breath Sounds   Right Upper Lobe Diminished   Right Middle Lobe Diminished   Right Lower Lobe Diminished   Left Upper Lobe Diminished   Left Lower Lobe Diminished   Vent Information   Vent Mode AC/PRVC   Ventilator Settings   FiO2  30 %   Insp Time (sec) 1 sec   Vt (Set, mL) 450 mL   Resp Rate (Set) 14 bmp   PEEP/CPAP (cmH2O) 5   Vent Patient Data (Readings)   Vt (Measured) 485 mL   Peak Inspiratory Pressure (cmH2O) 11 cmH2O   Rate Measured 19 br/min   Minute Volume (L/min) 8.96 Liters   Mean Airway Pressure (cmH2O) 7.1 cmH20   I:E Ratio 1:2.40   Flow Sensitivity 3 L/min   I Time/ I Time % 1 s   Backup Apnea On   Vent Alarm Settings   Low Minute Volume (lpm) 3 L/min   High Minute Volumn (lpm) 20 L/min   Low Exhaled Vt (ml) 200 mL   High Exhaled Vt (ml) 1000 mL   RR High (bpm) 40 br/min   Apnea (secs) 20 secs   Additional Respiratoray Assessments   Humidification Source HME   Circuit Condensation Drained   Ambu Bag With Mask At Bedside Yes   ETT (adult)   Placement Date/Time: 09/15/22 1720   Present on Admission/Arrival: No  Placed By: In surgery; Licensed provider  Placement Verified By: Capnometry;Direct visualization; Auscultation  Preoxygenation: Yes  Mask Ventilation: Ventilated by mask with oral ai. ..    Secured At 22 cm   Measured From Lips   ETT Placement (S)  Left   Secured By Commercial tube wills   Site Assessment Dry   Skin Assessment   Skin Assessment Clean, dry, & intact   Supplemental Gases   Supplemental Gases None   Ventilator Associated Pneumonia Bundle   Elevation of Head of Bed to 30-45 Degrees  Yes

## 2022-09-19 NOTE — PROGRESS NOTES
SBT initiated with a PS of 5 cmH2O. Patient is tolerating well thus far with no immediate complications noted. RN at bedside and is aware of this change.      Electronically signed by Edouard Armando RCP, RRT, RRT-ACCS on 9/19/2022 at 8:29 AM       09/19/22 0827   Patient Observation   Heart Rate 98   Resp 19   SpO2 98 %   Vent Information   Vent Mode (S)  CPAP/PS   Ventilator Settings   FiO2  30 %   PEEP/CPAP (cmH2O) 5   Pressure Support (cm H2O) (S)  5 cm H2O   Vent Patient Data (Readings)   Vt (Measured) 596 mL   Peak Inspiratory Pressure (cmH2O) 15 cmH2O   Rate Measured 20 br/min   Minute Volume (L/min) 10.6 Liters   Mean Airway Pressure (cmH2O) 7.3 cmH20   I:E Ratio 1:1.90   Vent Alarm Settings   Low Minute Volume (lpm) 3 L/min   High Minute Volumn (lpm) 20 L/min   Low Exhaled Vt (ml) 200 mL   High Exhaled Vt (ml) 1000 mL   RR High (bpm) 40 br/min   Apnea (secs) 20 secs   Weaning Parameters   Respiratory Rate Observed 21   Ve 11      RSBI 37

## 2022-09-19 NOTE — PROGRESS NOTES
09/19/22 0824   Patient Observation   Heart Rate 85   Resp 15   SpO2 98 %   Breath Sounds   Right Upper Lobe Diminished   Right Middle Lobe Diminished   Right Lower Lobe Diminished   Left Upper Lobe Diminished   Left Lower Lobe Diminished   Vent Information   Vent Mode AC/PRVC   Ventilator Settings   FiO2  30 %   Insp Time (sec) 1 sec   Vt (Set, mL) 450 mL   Resp Rate (Set) 14 bmp   PEEP/CPAP (cmH2O) 5   Vent Patient Data (Readings)   Vt (Measured) 482 mL   Peak Inspiratory Pressure (cmH2O) 8 cmH2O   Rate Measured 19 br/min   Minute Volume (L/min) 8.98 Liters   Mean Airway Pressure (cmH2O) 6.5 cmH20   I:E Ratio 1:2.40   Flow Sensitivity 3 L/min   I Time/ I Time % 1 s   Vent Alarm Settings   Low Minute Volume (lpm) 3 L/min   High Minute Volumn (lpm) 20 L/min   Low Exhaled Vt (ml) 200 mL   High Exhaled Vt (ml) 1000 mL   RR High (bpm) 40 br/min   Apnea (secs) 20 secs   Additional Respiratoray Assessments   Humidification Source HME   Circuit Condensation Drained   Ambu Bag With Mask At Bedside Yes   ETT (adult)   Placement Date/Time: 09/15/22 1720   Present on Admission/Arrival: No  Placed By: In surgery; Licensed provider  Placement Verified By: Capnometry;Direct visualization; Auscultation  Preoxygenation: Yes  Mask Ventilation: Ventilated by mask with oral ai. ..    Secured At 22 cm   Measured From Lips   ETT Placement (S)  Center   Secured By Commercial tube wills   Site Assessment Dry   Cuff Pressure 26 cm H2O   Supplemental Gases   Supplemental Gases None   Ventilator Associated Pneumonia Bundle   Elevation of Head of Bed to 30-45 Degrees  Yes

## 2022-09-19 NOTE — PROGRESS NOTES
Checked on patient. No family present. Still on ventilator, weaning off meds. NO sedation no fentanyl at this time. Not awake at this time. CRRT on hold. Plan for CT scan of abdomin today. Current ostomy appliance placed 9/16/22 is with secure seal and draining brown liquid. No need to change at this time. Will revisit tomorrow. No teaching done today. Patient appears edematous today, hands ear lobes, legs.

## 2022-09-19 NOTE — PROGRESS NOTES
Call returned from Dr. Marielena Braden. Will see about IR to drain right sided fluid collection in abdomen then possibly start tube feedings tomorrow post procedure.  Will readdress in AM.

## 2022-09-19 NOTE — PROGRESS NOTES
Intensive Care Unit  Intensivist Progress Note    Patient: Sofia Fuentes  : 1950  MRN#: [id number]  2022 8:12 AM  ADMISSION DAY:  2022 11:45 AM     Subjective:  Patient is off pressors. On mechanical ventilation with AC VC mode PEEP of 5 and FiO2 30%. This morning. WBC is trending up, it is up to 30 K today. HPI:   Sofia Fuentes is a 67 y.o. female with a history of CKD, diabetes, hyperlipidemia, hypertension, and neuropathy who presents to the ED complaining of abdominal pain/syncope. By EMS report, patient has had constipation over the last several days. .  Patient reportedly had a large bowel movement and shortly thereafter had a syncopal/near syncopal event where she was helped to the floor. EMS was called and arrived to find patient with blood pressure of 50/30.  300 cc of normal saline were infused during truck ride to the ED. Blood pressure shortly after arrival was 134/94. Patient reports lower abdominal discomfort. No additional complaints. No other complaints, modifying factors or associated symptoms. In ER -Patient seen and evaluated. Old records reviewed. Labs and imaging reviewed and results discussed with patient. Patient was given broad-spectrum antibiotics, normal saline, and multiple pressors. Central line was placed. Improvement of symptoms throughout patient's stay in the emergency department. Labs reveal significant leukocytosis with elevated lactic and concern for UTI. Mild DEJAN noted. Imaging shows nonspecific enteritis. Patient will be admitted for further evaluation and treatment. . Patient was reassessed as noted above . Juan Carlos Olivares Plan of care discussed with patient and family. Patient and family in agreement with plan.    Patient was transferred to the ICU for further management  Patient when seen in the ICU was somewhat lethargic but was able to give simple answers and was complaining of abdominal pain, patient was also was found to be significantly hypotensive in spite of patient getting IV Levophed, patient's systolic blood pressure was 52 mmHg when seen, patient's blood sugars were still on the higher side now which was lower as per EMT, patient was given dextrose 50 IV push in the EMS as per documentation, patient was having sinus rhythm on the monitor which was ranging from normal sinus rhythm to sinus tachycardia, patient was given another fluid bolus and also vasopressin infusion was started on the patient, no other pertinent review of system of concern    Patient Vitals for the past 8 hrs:   BP Temp Temp src Pulse Resp SpO2 Weight   09/19/22 0722 -- -- -- -- -- -- 184 lb 8.4 oz (83.7 kg)   09/19/22 0700 (!) 104/49 -- -- 87 16 96 % --   09/19/22 0600 (!) 101/59 -- -- 98 24 96 % --   09/19/22 0500 (!) 80/45 -- -- 86 11 97 % --   09/19/22 0400 (!) 117/59 99.2 °F (37.3 °C) Bladder 93 15 97 % --   09/19/22 0359 -- -- -- 91 18 98 % --   09/19/22 0300 (!) 98/50 -- -- 88 16 97 % --   09/19/22 0200 108/60 -- -- 84 14 99 % --   09/19/22 0100 (!) 116/58 -- -- 89 15 98 % --       EXAM:  HENT: Endotracheal tube present no thyromegaly. Eyes:  Conjunctivae are normal. Pupils equal, round, and reactive to light. No scleral icterus. Neck: . No tracheal deviation present. No obvious thyroid mass. Cardiovascular: Sinus rhythm normal heart sounds. No right ventricular heave. No lower extremity edema. Pulmonary/Chest: No wheezes. No rales. No accessory muscle usage or stridor. Decreased breath sound bilaterally  Abdominal: Soft. Some significant bowel sounds present. No distension or hernia. Ostomy present  Musculoskeletal: No cyanosis. No clubbing. No obvious joint deformity. Lymphadenopathy: No cervical or supraclavicular adenopathy. Skin: Skin is warm and dry. No rash or nodules on the exposed extremities.         Intake/Output Summary (Last 24 hours) at 9/19/2022 0812  Last data filed at 9/19/2022 0803  Gross per 24 hour   Intake 1448.07 ml   Output 3002 ml   Net -1553.93 ml     I/O last 3 completed shifts: In: 2353 [I.V.:468; IV Piggyback:1885]  Out: 5374 [Urine:35; Drains:170; Stool:170]   Date 09/19/22 0000 - 09/19/22 2359   Shift 8450-10395 3379-2434 3406-6698 24 Hour Total   INTAKE   I.V.(mL/kg) 14(0.2)   14(0.2)   NG/GT(mL/kg)  0(0)  0(0)   IV Piggyback(mL/kg) 168(2) 75.1(0.9)  243.1(2.9)   Shift Total(mL/kg) 182(2.2) 75.1(0.9)  257. 1(3.1)   OUTPUT   Urine(mL/kg/hr) 7(0) 0  7   Drains(mL/kg) 35(0.4) 0(0)  35(0.4)   Stool(mL/kg) 150(1.8)   150(1.8)   CRRT 835   835   Shift Total(mL/kg) 1027(12.3) 0(0)  1027(12.3)   Weight (kg) 83.7 83.7 83.7 83.7     Wt Readings from Last 3 Encounters:   09/19/22 184 lb 8.4 oz (83.7 kg)   07/14/22 161 lb (73 kg)      Body mass index is 27.25 kg/m².          Scheduled Meds:   hydrocortisone sodium succinate PF  50 mg IntraVENous Q6H    mupirocin   Nasal BID    sodium chloride flush  5-40 mL IntraVENous 2 times per day    piperacillin-tazobactam  3,375 mg IntraVENous Q8H    sodium chloride flush  5-40 mL IntraVENous 2 times per day    pantoprazole  40 mg IntraVENous Daily    insulin glargine  15 Units SubCUTAneous Nightly    insulin lispro  0-4 Units SubCUTAneous Q4H     Continuous Infusions:   prismaSATE BGK 4/2.5 1,500 mL/hr (09/19/22 0352)    prismaSATE BGK 4/2.5 1,000 mL/hr (09/19/22 0715)    prismaSATE BGK 4/2.5 500 mL/hr at 09/19/22 0347    sodium chloride      norepinephrine Stopped (09/18/22 0126)    dextrose      sodium chloride      vasopressin (Septic Shock) infusion Stopped (09/16/22 1205)     PRN Meds:fentanNYL, 25 mcg, Q4H PRN  midazolam, 2 mg, Q4H PRN  prochlorperazine, 5 mg, Q6H PRN  potassium chloride, 20 mEq, PRN  magnesium sulfate, 1,000 mg, PRN  sodium phosphate IVPB, 6 mmol, PRN   Or  sodium phosphate IVPB, 12 mmol, PRN   Or  sodium phosphate IVPB, 18 mmol, PRN   Or  sodium phosphate IVPB, 24 mmol, PRN  calcium gluconate, 1,000 mg, PRN   Or  calcium gluconate, 2,000 mg, PRN   Or  calcium gluconate, 3,000 mg, PRN Or  calcium gluconate, 4,000 mg, PRN  sodium chloride flush, 5-40 mL, PRN  sodium chloride, 25 mL, PRN  HYDROmorphone, 0.25 mg, Q5 Min PRN  HYDROmorphone, 0.5 mg, Q5 Min PRN  ondansetron, 4 mg, Q10 Min PRN  midazolam, 1 mg, Q5 Min PRN  fentanNYL, 25 mcg, Q1H PRN  glucose, 4 tablet, PRN  dextrose bolus, 125 mL, PRN   Or  dextrose bolus, 250 mL, PRN  glucagon (rDNA), 1 mg, PRN  dextrose, , Continuous PRN  sodium chloride flush, 5-40 mL, PRN  sodium chloride, , PRN  polyethylene glycol, 17 g, Daily PRN  acetaminophen, 650 mg, Q6H PRN   Or  acetaminophen, 650 mg, Q6H PRN        Oxygen Delivery - O2 Flow Rate (L/min): 2 L/min  VENT SETTINGS (Comprehensive)  Vent Information  Equipment Changed: HME  Ventilator Initiate: Yes  Vent Mode: AC/PRVC  Additional Respiratory Assessments  Heart Rate: 87  Resp: 16  SpO2: 96 %  Humidification Source: HME  Circuit Condensation: Drained      DATA:    CBC:   Recent Labs     09/17/22  0452 09/18/22 0410 09/19/22  0440   WBC 22.6* 25.5* 30.3*   RBC 2.71* 2.89* 3.06*   HGB 7.5* 8.1* 8.6*   HCT 22.5* 24.4* 25.9*   MCV 83.0 84.3 84.6   MCH 27.7 28.1 28.0   MCHC 33.4 33.3 33.1   RDW 14.1 14.2 14.2   PLT 87* 72* 83*   MPV 7.5 7.5 7.8      BMP/CMP:   Recent Labs     09/17/22  0452 09/17/22  1120 09/18/22  0410 09/18/22  1109 09/18/22  1911 09/19/22  0440      < > 134* 136 136 138   K 4.3   < > 3.8 3.7 3.8 4.1      < > 99 101 101 102   CO2 25   < > 24 25 22 22   ANIONGAP 10   < > 11 10 13 14   BUN 9   < > 7 9 11 12   CREATININE 0.9   < > 0.8 1.0 1.0 0.8   GLUCOSE 70   < > 103* 110* 122* 138*   CALCIUM 8.3   < > 8.5 8.5 8.4 8.7   PROT 5.0*  --  5.3*  --   --  5.4*   LABALBU 3.3*   < > 2.9* 3.0* 2.9* 2.8*   BILITOT 0.4  --  0.4  --   --  0.4   ALKPHOS 192*  --  219*  --   --  241*   AST 38*  --  47*  --   --  38*   ALT 12  --  15  --   --  16    < > = values in this interval not displayed.       Other Electrolytes:   Recent Labs     09/17/22  0452 09/17/22  1120 09/18/22  0416 09/18/22  1109 09/18/22  1911 09/19/22  0440   CAION  --    < > 1.02* 1.07* 1.02* 1.07*   PHOS  --    < > 2.0* 1.9* 2.3* 1.8*   MG 2.20  --  2.30  --   --  2.30    < > = values in this interval not displayed. Serum Osmolality  No results for input(s): OSMOMEASER in the last 72 hours. Procalcitonin:  No results for input(s): PROCAL in the last 72 hours. Cardiac: No results for input(s): TROPONINT in the last 72 hours. Lipids: No results for input(s): CHOL, HDL in the last 72 hours. Invalid input(s): LDLCALCU  Coagulation:   Recent Labs     09/17/22  0452 09/18/22  0410 09/19/22  0440   INR 1.47* 1.22* 1.22*     Lactic Acid:   Recent Labs     09/17/22 0452   LACTA 2.0      ABGs:   No results found for: PH, PCO2, PO2, HCO3, O2SAT  No results found for: Cindy Medina    Radiology/Imaging:   XR CHEST PORTABLE [[id number]] Collected: 09/15/22 2226     Order Status: Completed Updated: 09/16/22 1429     Narrative:       EXAMINATION:   ONE XRAY VIEW OF THE CHEST     9/15/2022 8:40 pm     COMPARISON:   09/14/2022     HISTORY:   ORDERING SYSTEM PROVIDED HISTORY: right IJ central line evaluation, intubated   after OR   TECHNOLOGIST PROVIDED HISTORY:   Reason for exam:->right IJ central line evaluation, intubated after OR   Reason for Exam: ett; cvc     FINDINGS:   Endotracheal tube tip is 5.3 cm above the mark. Right IJ CVC catheter tip   overlies the SVC at the level of the thoracic inlet. The heart appears borderline enlarged; however, this may be accentuated by   technique. Possible small right pleural effusion. No pneumothorax is seen. Mild bibasilar airspace opacities. Impression:       Right IJ CVC catheter tip overlies the SVC at the level of the thoracic inlet. Endotracheal tube tip is 5.3 cm above the mark. Mild bibasilar airspace opacities, which could represent as atelectasis   and/or infiltrate. Possible small right pleural effusion.       XR ABDOMEN FOR NG/OG/NE TUBE PLACEMENT [[id number]] Collected: 09/16/22 0839     Order Status: Completed Updated: 09/16/22 0842     Narrative:       EXAMINATION:   ONE SUPINE XRAY VIEW(S) OF THE ABDOMEN     9/16/2022 7:29 am     COMPARISON:   None. HISTORY:   ORDERING SYSTEM PROVIDED HISTORY: Confirmation of course of NG/OG/NE tube and   location of tip of tube   TECHNOLOGIST PROVIDED HISTORY:   Reason for exam:->Confirmation of course of NG/OG/NE tube and location of tip   of tube   Portable? ->Yes   Reason for Exam: NGT placement     FINDINGS:   Tip of NG tube projects in the left upper quadrant in the region of the   gastric fundus     Postsurgical change seen in upper abdomen. Pleuroparenchymal disease is seen at the right lung base. Impression:       Tip of NG tube projects in the left upper quadrant in the region of the   gastric fundus      XR CHEST PORTABLE [[id number]] Collected: 09/16/22 0130     Order Status: Completed Updated: 09/16/22 0137     Narrative:       EXAMINATION:   ONE XRAY VIEW OF THE CHEST     9/15/2022 11:34 pm     COMPARISON:   09/15/2022     HISTORY:   ORDERING SYSTEM PROVIDED HISTORY: central line insertion   TECHNOLOGIST PROVIDED HISTORY:   Reason for exam:->central line insertion   Reason for Exam: central line     FINDINGS:   Endotracheal tube appears in appropriate position. Interval placement of a   jugular venous central catheter which extends into the inferior aspect of the   right atrium. The catheter tip measures approximately 5 cm beyond the   cavoatrial junction. No acute osseous abnormality is identified. Small   right-sided pleural effusion similar appearing basilar airspace disease. Osseous structures appear similar. Impression:       Interval placement of a right-sided central venous catheter which extends   into the inferior aspect of the right atrium, approximately 5 cm beyond the   cavoatrial junction.       CT ABDOMEN PELVIS WO CONTRAST Additional Contrast? None [[id number]] Collected: 09/15/22 1017     Order Status: Completed Updated: 09/15/22 1027     Narrative:       EXAMINATION:   CT OF THE ABDOMEN AND PELVIS WITHOUT CONTRAST 9/15/2022 10:00 am     TECHNIQUE:   CT of the abdomen and pelvis was performed without the administration of   intravenous contrast. Multiplanar reformatted images are provided for review. Automated exposure control, iterative reconstruction, and/or weight based   adjustment of the mA/kV was utilized to reduce the radiation dose to as low   as reasonably achievable. COMPARISON:   09/14/2022 CT     HISTORY:   ORDERING SYSTEM PROVIDED HISTORY: worsening acidosis, hypotension ? ischemia   TECHNOLOGIST PROVIDED HISTORY:   Reason for exam:->worsening acidosis, hypotension ? ischemia   Additional Contrast?->None   Reason for Exam: worsening labs, abd pain     FINDINGS:   Lower Chest:  Interval development of a small right pleural effusion with   dense peripheral airspace opacification in the right lower lobe. Base of the   heart is normal.     Organs: Liver parenchyma is normal on this noncontrast exam.  Slight   nodularity of the capsule may suggest an underlying pattern of chronic liver   disease. There is mild ascites. The gallbladder is absent. Biliary ducts   and pancreas normal.  Calcified granulomata in the spleen. Kidneys and   adrenal glands are normal.     GI/Bowel: The stomach is normal.  Prior imaging described enteritis of the   duodenum and proximal jejunum. This is difficult to evaluate on the current   noncontrast exam but there is no pattern of inflammation. Questionable   mucosal thickening of the distal duodenum and proximal jejunum. No pattern   of obstruction. Of greater suspicion, there is severe inflammatory change in   the right lower quadrant that has progressed since the prior exam.  This   includes mesenteric inflammation and ill-defined free fluid. There is no   pattern of perforation or abscess.   Etiology appears to correspond to either   mucosal changes of the distal ileum, terminal ileum or cecum. Nonvisualized   appendix. There is also a pattern of diffuse mucosal thickening of the colon   extending from the splenic flexure through the rectum. Dense stool within   the distal colon also noted. Pelvis: The bladder is decompressed around a Osorio catheter. The uterus and   adnexa are unremarkable. Peritoneum/Retroperitoneum: The aorta tapers normally. No adenopathy. Bones/Soft Tissues: No significant skeletal abnormalities. Impression:       1. Limited evaluation of the GI tract on this noncontrast exam.  Improved   mucosal changes of the duodenum and proximal jejunum that were described on   prior CT. 2. Severe inflammatory change in the right lower quadrant with etiology   uncertain. This could correspond to enteritis of the distal ileum and   terminal ileum. Colitis may also be considered. Infectious or inflammatory   etiologies may be favored. 3. Dense stool and diffuse mucosal thickening of the distal colon which may   represent a pattern of colitis. 4. Small right pleural effusion with airspace changes in the right lower   lobe, new from prior exam.   5. Evidence of chronic liver disease with a small amount of ascites stable. CT ABDOMEN PELVIS WO CONTRAST Additional Contrast? None [[id number]] Collected: 09/14/22 1452     Order Status: Completed Updated: 09/14/22 9977     Narrative:       EXAMINATION:   CT OF THE ABDOMEN AND PELVIS WITHOUT CONTRAST, 9/14/2022 2:46 pm     TECHNIQUE:   CT of the abdomen and pelvis was performed without the administration of   intravenous contrast. Multiplanar reformatted images are provided for review. Automated exposure control, iterative reconstruction, and/or weight based   adjustment of the mA/kV was utilized to reduce the radiation dose to as low   as reasonably achievable.      COMPARISON:   None     HISTORY:   ORDERING SYSTEM PROVIDED HISTORY:  Abdominal pain/ near syncope   TECHNOLOGIST PROVIDED HISTORY:   Additional Contrast?  None   Reason for Exam:  Abdominal pain/ near syncope   Decision Support Exception - unselect if not a suspected or confirmed   emergency medical condition->Emergency Medical Condition (MA)   Reason for Exam:  Vomiting and pain     FINDINGS:   Lower Chest: No active disease. Organs: The liver appears unremarkable. Status post cholecystectomy. Pancreas and spleen, adrenals, kidneys, aorta and IVC appear stable. GI/Bowel: There is evidence of thickening of the duodenum as well as jejunal   loops with perijejunal inflammatory changes. These changes are compatible   with acute enteritis. No evidence of bowel obstruction or perforation. Evidence of constipation and significant stool impaction in the rectum. Pelvis: Urinary bladder contains Osorio catheter. The uterus and adnexa   demonstrate no acute abnormality. Peritoneum/Retroperitoneum: No evidence of retroperitoneal lymphadenopathy or   acute peritoneal findings. Mild ascites mostly around the liver and spleen. Bones/Soft Tissues: No acute abnormality. Osteopenia. Moderate multilevel   degenerative disc disease. Impression:       1. Acute enteritis involving the duodenum and jejunal loops with thickening   of the loops and edema. No evidence of bowel obstruction. 2. Constipation with significant stool impaction in the rectum. 3. Mild ascites mostly around the liver and spleen. 4. Adequate position of Osorio catheter in the urinary bladder. XR CHEST PORTABLE [[id number]] Collected: 09/14/22 1334     Order Status: Completed Updated: 09/14/22 1349     Narrative:       EXAMINATION:   ONE XRAY VIEW OF THE CHEST     9/14/2022 10:22 am     COMPARISON:   None.      HISTORY:   ORDERING SYSTEM PROVIDED HISTORY: confirm central line   TECHNOLOGIST PROVIDED HISTORY:   Reason for exam:->confirm central line   Reason for Exam: confirm central line     FINDINGS:   A right internal jugular venous catheter is been placed with the tip at the   cavoatrial junction. No pneumothorax is identified. The lungs and costophrenic angles are clear. The cardiomediastinal   silhouette and pulmonary vessels appear normal.      Impression:       Placement of a right internal jugular central venous catheter without evident   complication. The tip is at approximately the cavoatrial junction. No acute findings in the chest.          Patient Active Problem List   Diagnosis    DKA (diabetic ketoacidosis) ([facility])    Hypotension    Syncope    Hyponatremia    DEJAN (acute kidney injury) ([address]    Abdominal pain    Enteritis    Septic shock ([facility])    Elevated troponin    Leukocytosis    Pyuria    Lactic acidosis    DM (diabetes mellitus), secondary uncontrolled ([address]    Ischemic colitis ([facility])    S/P exploratory laparotomy    Acute postoperative pulmonary insufficiency ([facility])       Assessment:  Acute respiratory failure with hypoxia  Ischemic colitis s/p exploratory laparotomy and sigmoid colectomy and colostomy on 9/15/2022  Septic shock. Acute encephalopathy  Acute renal failure  Syncope  Electrolytes abnormality  Diabetes mellitus type 2. Hyponatremia        Plan:  WBC is trending up. The abdomen is tender and the patient still not following commands. She opens her eyes to voice but does not track or follow commands. Will repeat CT abdomen with IV contrast  Add Diflucan  Continue mechanical ventilation support. Mental status precludes extubation. Surgery is following. Sedation is off. Pain control.   Pressors are off  CT scan of the brain  DC Lantus  DVT and GI prophylaxis  Ccm:32    Reviewed with ICU Staff     Seen with multidisciplinary ICU team         Adolph Godinez MD,

## 2022-09-19 NOTE — PROGRESS NOTES
General Surgery POC Note    Updated patient's  and Daughter at bedside regarding plan of care. Relayed results of CT scan - fluid collection noted in pelvis, expected given degree of inflammation and dissection required for resection of colon. Plan for possible drain placement tomorrow. Will monitor NG output tonight and decide about tube feeds versus TPN in the morning. Exam:  Dressing intact, drain remains serous/mildly cloudy, ostomy with stool in bag, NG with light bilious output.      Sharmila Maurer, DO  PGY4, General Surgery  09/19/22  5:36 PM

## 2022-09-19 NOTE — PROGRESS NOTES
Rounded with Dr. Fozia Ty. Plan for head and abdominal CT with contrast today due to WBC. Send blood cultures and culture from [facility] drain. Add Diflucan antibiotic.

## 2022-09-19 NOTE — PROGRESS NOTES
[facility] GENERAL SURGERY DAILY PROGRESS NOTE    SUBJECTIVE: Remains intubated and sedated. No longer on pressors. Intermittently has purposeful movement but not following commands off sedation. OBJECTIVE: CURRENT VITALS:  BP (!) 104/49   Pulse 87   Temp 99.2 °F (37.3 °C) (Bladder)   Resp 16   Ht 5' 9\" (1.753 m)   Wt 184 lb 8.4 oz (83.7 kg)   SpO2 96%   BMI 27.25 kg/m²          ABD: Soft  BROOKS drainage cloudy  OSTOMY:  Pink; with soft brown stool in bag    LABS:    CBC:   Recent Labs     09/17/22  0452 09/18/22  0410 09/19/22  0440   WBC 22.6* 25.5* 30.3*   RBC 2.71* 2.89* 3.06*   HGB 7.5* 8.1* 8.6*   HCT 22.5* 24.4* 25.9*   MCV 83.0 84.3 84.6   RDW 14.1 14.2 14.2   PLT 87* 72* 83*       BMP:   Recent Labs     09/18/22  1109 09/18/22  1911 09/19/22  0440    136 138   K 3.7 3.8 4.1    101 102   CO2 25 22 22   PHOS 1.9* 2.3* 1.8*   BUN 9 11 12   CREATININE 1.0 1.0 0.8       Recent Labs     09/17/22  0452 09/18/22  0410 09/19/22  0440   MG 2.20 2.30 2.30          ASSESSMENT AND PLAN:  67 y.o. female status post diagnosis of ischemic sigmoid colon s/p exploratory laparotomy, sigmoid colectomy, colostomy formation POD 4. GI: continue with ngt to suction, ok to use for oral contrast for scan  Will discuss possible tube feeds vs TPN pending scan results   ID: continue with IV antibiotics  leukocytosis trending up, will obtain CT scan today to rule out abscess and evaluate stump  New ostomy: monitor stoma and output  Pulm: extubation per critical care team     Medical management per primary team        Nicole Graft, DO     Patient seen and agree with above and more than half of the total time was spent by me on the encounter.      Bernie Perez MD

## 2022-09-20 LAB
A/G RATIO: 1 (ref 1.1–2.2)
ALBUMIN SERPL-MCNC: 2.5 G/DL (ref 3.4–5)
ALBUMIN SERPL-MCNC: 2.7 G/DL (ref 3.4–5)
ALP BLD-CCNC: 201 U/L (ref 40–129)
ALT SERPL-CCNC: 13 U/L (ref 10–40)
ANION GAP SERPL CALCULATED.3IONS-SCNC: 15 MMOL/L (ref 3–16)
ANION GAP SERPL CALCULATED.3IONS-SCNC: 17 MMOL/L (ref 3–16)
AST SERPL-CCNC: 22 U/L (ref 15–37)
BANDED NEUTROPHILS RELATIVE PERCENT: 7 % (ref 0–7)
BASE EXCESS ARTERIAL: -4.5 MMOL/L (ref -3–3)
BASOPHILS ABSOLUTE: 0 K/UL (ref 0–0.2)
BASOPHILS RELATIVE PERCENT: 0 %
BILIRUB SERPL-MCNC: 0.3 MG/DL (ref 0–1)
BUN BLDV-MCNC: 30 MG/DL (ref 7–20)
BUN BLDV-MCNC: 39 MG/DL (ref 7–20)
CALCIUM IONIZED: 1.09 MMOL/L (ref 1.12–1.32)
CALCIUM IONIZED: 1.1 MMOL/L (ref 1.12–1.32)
CALCIUM SERPL-MCNC: 7.9 MG/DL (ref 8.3–10.6)
CALCIUM SERPL-MCNC: 8.7 MG/DL (ref 8.3–10.6)
CARBOXYHEMOGLOBIN ARTERIAL: 0.6 % (ref 0–1.5)
CHLORIDE BLD-SCNC: 99 MMOL/L (ref 99–110)
CHLORIDE BLD-SCNC: 99 MMOL/L (ref 99–110)
CO2: 19 MMOL/L (ref 21–32)
CO2: 21 MMOL/L (ref 21–32)
CREAT SERPL-MCNC: 1.6 MG/DL (ref 0.6–1.2)
CREAT SERPL-MCNC: 1.9 MG/DL (ref 0.6–1.2)
EOSINOPHILS ABSOLUTE: 0 K/UL (ref 0–0.6)
EOSINOPHILS RELATIVE PERCENT: 0 %
GFR AFRICAN AMERICAN: 31
GFR AFRICAN AMERICAN: 38
GFR NON-AFRICAN AMERICAN: 26
GFR NON-AFRICAN AMERICAN: 32
GLUCOSE BLD-MCNC: 201 MG/DL (ref 70–99)
GLUCOSE BLD-MCNC: 208 MG/DL (ref 70–99)
GLUCOSE BLD-MCNC: 209 MG/DL (ref 70–99)
GLUCOSE BLD-MCNC: 224 MG/DL (ref 70–99)
GLUCOSE BLD-MCNC: 241 MG/DL (ref 70–99)
GLUCOSE BLD-MCNC: 247 MG/DL (ref 70–99)
HCO3 ARTERIAL: 19.5 MMOL/L (ref 21–29)
HCT VFR BLD CALC: 25.5 % (ref 36–48)
HEMOGLOBIN, ART, EXTENDED: 9.4 G/DL (ref 12–16)
HEMOGLOBIN: 8.3 G/DL (ref 12–16)
INR BLD: 1.28 (ref 0.87–1.14)
LYMPHOCYTES ABSOLUTE: 0.9 K/UL (ref 1–5.1)
LYMPHOCYTES RELATIVE PERCENT: 3 %
MAGNESIUM: 2.5 MG/DL (ref 1.8–2.4)
MCH RBC QN AUTO: 27.8 PG (ref 26–34)
MCHC RBC AUTO-ENTMCNC: 32.6 G/DL (ref 31–36)
MCV RBC AUTO: 85.4 FL (ref 80–100)
METHEMOGLOBIN ARTERIAL: 0.4 %
MONOCYTES ABSOLUTE: 2.1 K/UL (ref 0–1.3)
MONOCYTES RELATIVE PERCENT: 7 %
NEUTROPHILS ABSOLUTE: 27.5 K/UL (ref 1.7–7.7)
NEUTROPHILS RELATIVE PERCENT: 83 %
O2 SAT, ARTERIAL: 94.2 %
O2 THERAPY: ABNORMAL
PCO2 ARTERIAL: 31.8 MMHG (ref 35–45)
PDW BLD-RTO: 14.9 % (ref 12.4–15.4)
PERFORMED ON: ABNORMAL
PH ARTERIAL: 7.41 (ref 7.35–7.45)
PH VENOUS: 7.36 (ref 7.35–7.45)
PH VENOUS: 7.39 (ref 7.35–7.45)
PHOSPHORUS: 2.8 MG/DL (ref 2.5–4.9)
PHOSPHORUS: 3.8 MG/DL (ref 2.5–4.9)
PLATELET # BLD: 91 K/UL (ref 135–450)
PMV BLD AUTO: 8 FL (ref 5–10.5)
PO2 ARTERIAL: 72.3 MMHG (ref 75–108)
POTASSIUM SERPL-SCNC: 3.9 MMOL/L (ref 3.5–5.1)
POTASSIUM SERPL-SCNC: 4.2 MMOL/L (ref 3.5–5.1)
PROTHROMBIN TIME: 15.8 SEC (ref 11.7–14.5)
RBC # BLD: 2.99 M/UL (ref 4–5.2)
SODIUM BLD-SCNC: 135 MMOL/L (ref 136–145)
SODIUM BLD-SCNC: 135 MMOL/L (ref 136–145)
TCO2 ARTERIAL: 20.5 MMOL/L
TOTAL PROTEIN: 4.9 G/DL (ref 6.4–8.2)
WBC # BLD: 30.6 K/UL (ref 4–11)

## 2022-09-20 PROCEDURE — 90935 HEMODIALYSIS ONE EVALUATION: CPT

## 2022-09-20 PROCEDURE — 82803 BLOOD GASES ANY COMBINATION: CPT

## 2022-09-20 PROCEDURE — 2500000003 HC RX 250 WO HCPCS: Performed by: STUDENT IN AN ORGANIZED HEALTH CARE EDUCATION/TRAINING PROGRAM

## 2022-09-20 PROCEDURE — 6360000002 HC RX W HCPCS

## 2022-09-20 PROCEDURE — 5A1D70Z PERFORMANCE OF URINARY FILTRATION, INTERMITTENT, LESS THAN 6 HOURS PER DAY: ICD-10-PCS | Performed by: INTERNAL MEDICINE

## 2022-09-20 PROCEDURE — 85610 PROTHROMBIN TIME: CPT

## 2022-09-20 PROCEDURE — 6360000002 HC RX W HCPCS: Performed by: INTERNAL MEDICINE

## 2022-09-20 PROCEDURE — 6360000002 HC RX W HCPCS: Performed by: STUDENT IN AN ORGANIZED HEALTH CARE EDUCATION/TRAINING PROGRAM

## 2022-09-20 PROCEDURE — 2000000000 HC ICU R&B

## 2022-09-20 PROCEDURE — P9047 ALBUMIN (HUMAN), 25%, 50ML: HCPCS

## 2022-09-20 PROCEDURE — 84100 ASSAY OF PHOSPHORUS: CPT

## 2022-09-20 PROCEDURE — 82330 ASSAY OF CALCIUM: CPT

## 2022-09-20 PROCEDURE — C9113 INJ PANTOPRAZOLE SODIUM, VIA: HCPCS | Performed by: STUDENT IN AN ORGANIZED HEALTH CARE EDUCATION/TRAINING PROGRAM

## 2022-09-20 PROCEDURE — 99024 POSTOP FOLLOW-UP VISIT: CPT | Performed by: SURGERY

## 2022-09-20 PROCEDURE — 85025 COMPLETE CBC W/AUTO DIFF WBC: CPT

## 2022-09-20 PROCEDURE — 83735 ASSAY OF MAGNESIUM: CPT

## 2022-09-20 PROCEDURE — 6360000002 HC RX W HCPCS: Performed by: NURSE PRACTITIONER

## 2022-09-20 PROCEDURE — 2700000000 HC OXYGEN THERAPY PER DAY

## 2022-09-20 PROCEDURE — 2580000003 HC RX 258: Performed by: STUDENT IN AN ORGANIZED HEALTH CARE EDUCATION/TRAINING PROGRAM

## 2022-09-20 PROCEDURE — 80053 COMPREHEN METABOLIC PANEL: CPT

## 2022-09-20 PROCEDURE — 94761 N-INVAS EAR/PLS OXIMETRY MLT: CPT

## 2022-09-20 PROCEDURE — 6370000000 HC RX 637 (ALT 250 FOR IP): Performed by: STUDENT IN AN ORGANIZED HEALTH CARE EDUCATION/TRAINING PROGRAM

## 2022-09-20 PROCEDURE — 94003 VENT MGMT INPAT SUBQ DAY: CPT

## 2022-09-20 PROCEDURE — 99291 CRITICAL CARE FIRST HOUR: CPT | Performed by: INTERNAL MEDICINE

## 2022-09-20 RX ORDER — FLUCONAZOLE 2 MG/ML
200 INJECTION, SOLUTION INTRAVENOUS EVERY 24 HOURS
Status: DISCONTINUED | OUTPATIENT
Start: 2022-09-20 | End: 2022-09-25

## 2022-09-20 RX ORDER — ALBUMIN (HUMAN) 12.5 G/50ML
12.5 SOLUTION INTRAVENOUS ONCE
Status: COMPLETED | OUTPATIENT
Start: 2022-09-20 | End: 2022-09-20

## 2022-09-20 RX ORDER — HEPARIN SODIUM 1000 [USP'U]/ML
INJECTION, SOLUTION INTRAVENOUS; SUBCUTANEOUS
Status: DISPENSED
Start: 2022-09-20 | End: 2022-09-20

## 2022-09-20 RX ORDER — ALBUMIN (HUMAN) 12.5 G/50ML
SOLUTION INTRAVENOUS
Status: COMPLETED
Start: 2022-09-20 | End: 2022-09-20

## 2022-09-20 RX ADMIN — PANTOPRAZOLE SODIUM 40 MG: 40 INJECTION, POWDER, FOR SOLUTION INTRAVENOUS at 11:39

## 2022-09-20 RX ADMIN — FLUCONAZOLE 200 MG: 2 INJECTION, SOLUTION INTRAVENOUS at 20:36

## 2022-09-20 RX ADMIN — INSULIN LISPRO 1 UNITS: 100 INJECTION, SOLUTION INTRAVENOUS; SUBCUTANEOUS at 16:25

## 2022-09-20 RX ADMIN — INSULIN LISPRO 1 UNITS: 100 INJECTION, SOLUTION INTRAVENOUS; SUBCUTANEOUS at 20:41

## 2022-09-20 RX ADMIN — FENTANYL CITRATE 25 MCG: 0.05 INJECTION, SOLUTION INTRAMUSCULAR; INTRAVENOUS at 02:42

## 2022-09-20 RX ADMIN — PIPERACILLIN AND TAZOBACTAM 3375 MG: 3; .375 INJECTION, POWDER, LYOPHILIZED, FOR SOLUTION INTRAVENOUS at 22:13

## 2022-09-20 RX ADMIN — ALBUMIN (HUMAN) 12.5 G: 0.25 INJECTION, SOLUTION INTRAVENOUS at 09:04

## 2022-09-20 RX ADMIN — INSULIN LISPRO 1 UNITS: 100 INJECTION, SOLUTION INTRAVENOUS; SUBCUTANEOUS at 06:00

## 2022-09-20 RX ADMIN — PIPERACILLIN AND TAZOBACTAM 3375 MG: 3; .375 INJECTION, POWDER, LYOPHILIZED, FOR SOLUTION INTRAVENOUS at 00:44

## 2022-09-20 RX ADMIN — HYDROCORTISONE SODIUM SUCCINATE 50 MG: 100 INJECTION, POWDER, FOR SOLUTION INTRAMUSCULAR; INTRAVENOUS at 20:36

## 2022-09-20 RX ADMIN — CALCIUM GLUCONATE 1000 MG: 20 INJECTION, SOLUTION INTRAVENOUS at 18:07

## 2022-09-20 RX ADMIN — HYDROCORTISONE SODIUM SUCCINATE 50 MG: 100 INJECTION, POWDER, FOR SOLUTION INTRAMUSCULAR; INTRAVENOUS at 11:38

## 2022-09-20 RX ADMIN — FENTANYL CITRATE 25 MCG: 0.05 INJECTION, SOLUTION INTRAMUSCULAR; INTRAVENOUS at 11:38

## 2022-09-20 RX ADMIN — PIPERACILLIN AND TAZOBACTAM 3375 MG: 3; .375 INJECTION, POWDER, LYOPHILIZED, FOR SOLUTION INTRAVENOUS at 06:45

## 2022-09-20 RX ADMIN — INSULIN LISPRO 1 UNITS: 100 INJECTION, SOLUTION INTRAVENOUS; SUBCUTANEOUS at 00:34

## 2022-09-20 RX ADMIN — ALBUMIN (HUMAN) 12.5 G: 12.5 SOLUTION INTRAVENOUS at 09:04

## 2022-09-20 RX ADMIN — PIPERACILLIN AND TAZOBACTAM 3375 MG: 3; .375 INJECTION, POWDER, LYOPHILIZED, FOR SOLUTION INTRAVENOUS at 14:17

## 2022-09-20 RX ADMIN — INSULIN LISPRO 1 UNITS: 100 INJECTION, SOLUTION INTRAVENOUS; SUBCUTANEOUS at 12:08

## 2022-09-20 ASSESSMENT — PAIN DESCRIPTION - LOCATION
LOCATION: ABDOMEN;ARM
LOCATION: ABDOMEN;ARM

## 2022-09-20 ASSESSMENT — PULMONARY FUNCTION TESTS
PIF_VALUE: 13
PIF_VALUE: 16
PIF_VALUE: 21
PIF_VALUE: 20
PIF_VALUE: 15
PIF_VALUE: 22
PIF_VALUE: 19
PIF_VALUE: 18
PIF_VALUE: 8
PIF_VALUE: 13
PIF_VALUE: 8
PIF_VALUE: 14
PIF_VALUE: 26
PIF_VALUE: 14
PIF_VALUE: 14
PIF_VALUE: 25
PIF_VALUE: 11
PIF_VALUE: 14
PIF_VALUE: 10
PIF_VALUE: 21
PIF_VALUE: 13
PIF_VALUE: 14
PIF_VALUE: 15
PIF_VALUE: 17
PIF_VALUE: 13
PIF_VALUE: 14
PIF_VALUE: 13
PIF_VALUE: 10

## 2022-09-20 ASSESSMENT — PAIN DESCRIPTION - ORIENTATION
ORIENTATION: LEFT;UPPER
ORIENTATION: LEFT;UPPER

## 2022-09-20 ASSESSMENT — PAIN SCALES - WONG BAKER
WONGBAKER_NUMERICALRESPONSE: 6
WONGBAKER_NUMERICALRESPONSE: 6

## 2022-09-20 ASSESSMENT — PAIN DESCRIPTION - FREQUENCY
FREQUENCY: CONTINUOUS
FREQUENCY: CONTINUOUS

## 2022-09-20 ASSESSMENT — PAIN SCALES - GENERAL: PAINLEVEL_OUTOF10: 0

## 2022-09-20 ASSESSMENT — PAIN - FUNCTIONAL ASSESSMENT: PAIN_FUNCTIONAL_ASSESSMENT: PREVENTS OR INTERFERES SOME ACTIVE ACTIVITIES AND ADLS

## 2022-09-20 ASSESSMENT — PAIN DESCRIPTION - ONSET
ONSET: UNABLE TO COMMUNICATE
ONSET: UNABLE TO COMMUNICATE

## 2022-09-20 NOTE — PROGRESS NOTES
Interval History and plan: Off of CRRT  Not on vasopressors  Was difficult to dialyze because of hypotension  Not much of fluid removed    Plan:  Okay to remove femoral Vas-Cath  We will plan on doing dialysis in about 2 days unless there is recovery of renal function                     Assessment :     Acidosis  Severe  Requiring 2 vasopressors  Blood pressure okay with the 2 vasopressors but lactic is a still going up to 19 concerning for ischemia   /Possible metformin induced lactic acidosis       CKD Stage IIIb with DEJAN  Creatinine 2.1 on consult  Creatinine 1.6 on 7/22  Likely due to diabetes, hypertension  Renal imaging-normal in the past      hypotension  BP: (116-142)/(53-72)  Heart Rate:  [68-89]   BP goal inpatient 774-106 systolic inpatient  Pressures at the time of consult  Normally hypertensive    Due the potential for life-threatening deterioration due to patient's acidosis I spent 45 minutes providing critical care for this individual, excluding procedures on 9/15/22 . [facility] would like to thank Ashley Lutz MD   for opportunity to serve this patient      Please call with questions at-   24 Hrs Answering service [phone] or  7 am- 5 pm via Perfect serve or cell phone  Simon Ramirez MD          CC/reason for consult :       DEJAN     HPI :     Timothy Flynn is a 67 y.o. female presented to   the hospital on 9/14/2022 with lactic acidosis. She is known to have diabetes and on metformin to 2 days ago  Has constipation and with multiple bowel regimen has had good bowel movement yesterday following that she has syncope. EMS was called and was found to have blood pressure of 50 systolic given IV fluids brought to the emergency room blood pressure was normal initially but later developed hypotension got 3 L of fluid and now on 2 vasopressors and in ICU. She also has severe acidosis with very high lactate. CT scan was normal initially.   We are consulted for DEJAN on CKD and related issues. On CKD and also severe lactic acidosis    ROS:     Seen with-  No family  Discussed with RN discussed with Dr. Ant Escobar and also NP for the ICU on 9/15/2020    positives in bold   Constitutional:  fever, chills, weakness, weight change, fatigue  Skin:  rash, pruritus, hair loss, bruising, dry skin, petechiae  Head, Face, Neck   headaches, swelling,  cervical adenopathy  Respiratory: shortness of breath, cough, or wheezing  Cardiovascular: chest pain, palpitations, dizzy, edema  Gastrointestinal: nausea, vomiting, diarrhea, constipation,belly pain    Yellow skin, blood in stool  Musculoskeletal:  back pain, muscle weakness, gait problems,       joint pain or swelling. Genitourinary:  dysuria, poor urine flow, flank pain, blood in urine  Neurologic:  vertigo, TIA'S, syncope, seizures, focal weakness  Psychosocial:  insomnia, anxiety, or depression. Additional positive findings:                    All other remaining systems are negative or unable to obtain        PMH/PSH/SH/Family History:     Past Medical History:   Diagnosis Date    Chronic kidney disease     Diabetes mellitus ([facility] [address]     Hyperlipidemia     Hypertension     Neuropathy        Past Surgical History:   Procedure Laterality Date    LAPAROTOMY N/A 9/15/2022    DIAGNOSTIC LAPAROSCOPY, EXPLORATORY LAPAROTOMY, SIGMOID COLECTOMY AND COLOSTOMY performed by Benito Vergara MD at [facility]        reports that she has never smoked. She has never used smokeless tobacco. She reports that she does not currently use alcohol. She reports that she does not use drugs. family history is not on file.          Medication:     Current Facility-Administered Medications: heparin (porcine) 1000 UNIT/ML injection, , ,   fluconazole (DIFLUCAN) in 0.9 % sodium chloride IVPB 200 mg, 200 mg, IntraVENous, Q24H  hydrocortisone sodium succinate PF (SOLU-CORTEF) injection 50 mg, 50 mg, IntraVENous, Q12H  heparin (porcine) injection 3,200 Units, 3,200 Units, IntraCATHeter, PRN  fentaNYL (SUBLIMAZE) injection 25 mcg, 25 mcg, IntraVENous, Q4H PRN  midazolam (VERSED) injection 2 mg, 2 mg, IntraVENous, Q4H PRN  prochlorperazine (COMPAZINE) injection 5 mg, 5 mg, IntraVENous, Q6H PRN  prismaSATE BGK 4/2.5 dialysis solution, 1,500 mL/hr, Dialysis, Continuous  prismaSATE BGK 4/2.5 dialysis solution, 1,000 mL/hr, Dialysis, Continuous  potassium chloride 20 mEq/50 mL IVPB (Central Line), 20 mEq, IntraVENous, PRN  magnesium sulfate 1000 mg in dextrose 5% 100 mL IVPB, 1,000 mg, IntraVENous, PRN  sodium phosphate 6 mmol in sodium chloride 0.9 % 250 mL IVPB, 6 mmol, IntraVENous, PRN **OR** sodium phosphate 12 mmol in sodium chloride 0.9 % 250 mL IVPB, 12 mmol, IntraVENous, PRN **OR** sodium phosphate 18 mmol in sodium chloride 0.9 % 500 mL IVPB, 18 mmol, IntraVENous, PRN **OR** sodium phosphate 24 mmol in sodium chloride 0.9 % 500 mL IVPB, 24 mmol, IntraVENous, PRN  prismaSATE BGK 4/2.5 dialysis solution, , Dialysis, Continuous  calcium gluconate 1000 mg in sodium chloride 50 mL, 1,000 mg, IntraVENous, PRN **OR** calcium gluconate 2,000 mg in dextrose 5 % 100 mL IVPB, 2,000 mg, IntraVENous, PRN **OR** calcium gluconate 3,000 mg in dextrose 5 % 100 mL IVPB, 3,000 mg, IntraVENous, PRN **OR** calcium gluconate 4,000 mg in dextrose 5 % 100 mL IVPB, 4,000 mg, IntraVENous, PRN  sodium chloride flush 0.9 % injection 5-40 mL, 5-40 mL, IntraVENous, PRN  0.9 % sodium chloride infusion, 25 mL, IntraVENous, PRN  HYDROmorphone (DILAUDID) injection 0.25 mg, 0.25 mg, IntraVENous, Q5 Min PRN  HYDROmorphone (DILAUDID) injection 0.5 mg, 0.5 mg, IntraVENous, Q5 Min PRN  ondansetron (ZOFRAN) injection 4 mg, 4 mg, IntraVENous, Q10 Min PRN  midazolam (VERSED) injection 1 mg, 1 mg, IntraVENous, Q5 Min PRN  fentaNYL (SUBLIMAZE) injection 25 mcg, 25 mcg, IntraVENous, Q1H PRN  norepinephrine (LEVOPHED) 16 mg in dextrose 5 % 250 mL infusion, 1-100 mcg/min, IntraVENous, Continuous  piperacillin-tazobactam (ZOSYN) 3,375 mg in dextrose 5 % 50 mL IVPB extended infusion (mini-bag), 3,375 mg, IntraVENous, Q8H  glucose chewable tablet 16 g, 4 tablet, Oral, PRN  dextrose bolus 10% 125 mL, 125 mL, IntraVENous, PRN **OR** dextrose bolus 10% 250 mL, 250 mL, IntraVENous, PRN  glucagon (rDNA) injection 1 mg, 1 mg, SubCUTAneous, PRN  dextrose 10 % infusion, , IntraVENous, Continuous PRN  sodium chloride flush 0.9 % injection 5-40 mL, 5-40 mL, IntraVENous, PRN  0.9 % sodium chloride infusion, , IntraVENous, PRN  polyethylene glycol (GLYCOLAX) packet 17 g, 17 g, Oral, Daily PRN  acetaminophen (TYLENOL) tablet 650 mg, 650 mg, Oral, Q6H PRN **OR** acetaminophen (TYLENOL) suppository 650 mg, 650 mg, Rectal, Q6H PRN  pantoprazole (PROTONIX) injection 40 mg, 40 mg, IntraVENous, Daily  vasopressin 20 Units in dextrose 5 % 100 mL infusion, 0.03 Units/min, IntraVENous, Titrated  insulin lispro (HUMALOG) injection vial 0-4 Units, 0-4 Units, SubCUTAneous, Q4H       Vitals :     Vitals:    09/20/22 1000   BP: (!) 116/53   Pulse: 80   Resp: 18   Temp:    SpO2: 96%       I & O :       Intake/Output Summary (Last 24 hours) at 9/20/2022 1133  Last data filed at 9/20/2022 0534  Gross per 24 hour   Intake 630 ml   Output 488 ml   Net 142 ml          Physical Examination :     General appearance: unresponsive, intubated   HEENT: Lips- normal, teeth- ok , oral mucosa- moist  Neck : Mass- no, appears symmetrical, JVD- not raised  Respiratory: Respiratory effort-  On ventilation- FiO2-  S  wheeze- no, crackles - no  Cardiovascular:  Ausculation- No M/R/G, Edema yes  Abdomen: visible mass- no, distention- no, scar- no, tenderness- unable to assess                           hepatosplenomegaly-  No--  Musculoskeletal:  clubbing no,cyanosis- no , digital ischemia- no                           muscle strength- patient unable to co-operate     , tone - patient unable to co-operate   Skin: rashes- no , ulcers- no, induration- no, tightening - no  Psychiatric:   Intubated, unable to assess  Additional findings: -        LABS:     Recent Labs     09/18/22  0410 09/19/22  0440 09/20/22  0520   WBC 25.5* 30.3* 30.6*   HGB 8.1* 8.6* 8.3*   HCT 24.4* 25.9* 25.5*   PLT 72* 83* 91*       Recent Labs     09/18/22  0410 09/18/22  1109 09/19/22  0440 09/19/22  1212 09/19/22  2200 09/20/22  0520   *   < > 138 132* 129* 135*   K 3.8   < > 4.1 4.1 3.8 4.2   CL 99   < > 102 97* 94* 99   CO2 24   < > 22 22 20* 19*   BUN 7   < > 12 18 32* 39*   CREATININE 0.8   < > 0.8 1.0 1.6* 1.9*   GLUCOSE 103*   < > 138* 166* 216* 247*   MG 2.30  --  2.30  --   --  2.50*   PHOS 2.0*   < > 1.8* 2.7 3.3 3.8    < > = values in this interval not displayed.

## 2022-09-20 NOTE — CONSULTS
Comprehensive Nutrition Assessment    Type and Reason for Visit:  Reassess    Nutrition Recommendations/Plan:   Initiate tube feeds at trophic rate of Vital HP today via NG. When medically feasible and as tolerated increase tube feeds by 10 mL/hr q 4 hours until goal of 60 mL/hr is met via N/G  Recommend 60 mL H20 flush q 4 hours. Monitor IVF infusion, Na labs and need for adjustments in water flush  Consider prophylactic prokinetic agent (such as reglan, erythromycin) to promote EN tolerance as appropriate   Consider daily micronutrient supplementation: 2000 IU Vitamin D3, MVM, + Probiotic   Monitor TF tolerance (abd distention, bowel habits, N/V, cramping)  Monitor nutrition adequacy, pertinent labs, bowel habits, wt changes, and clinical progress     Malnutrition Assessment:  Malnutrition Status: At risk for malnutrition (Comment) (09/20/22 1128)    Context:  Acute Illness     Findings of the 6 clinical characteristics of malnutrition:  Energy Intake:  50% or less of estimated energy requirements for 5 or more days  Weight Loss:  Unable to assess (possibly fluid related)    Fluid Accumulation:  Mild Extremities    Nutrition Assessment:    Follow up/consult for tube feeding ordering and management. Pt remains on vent support. FiO2 30% and PEEP 5. Pt off sedation, on SBT this AM. Plans for IR placed drain for right sided fluid accumulation. Minimal NG output overnight. General surgery OK to start tube feeds today at trophic rate. Recommendations provided. Will monitor. Nutrition Related Findings:    Na 135. Mg 2.50. -247 past 24 hrs. Colostomy output 295 mL past 24 hrs. Hypoactive BS.  Wound Type: Surgical Incision       Current Nutrition Intake & Therapies:    Average Meal Intake: NPO  Average Supplements Intake: NPO  Diet NPO  Current Tube Feeding (TF) Orders:  Feeding Route: Nasogastric  Formula: Peptide Based  Schedule: Continuous  Goal TF & Flush Orders Provides: Vital AF at goal rate of 60 mL/hr x20 hours to provide 1200 mL TV, 1440 kcal, 90 g protein, and 973 mL free water. +60 mL free water flush q 4 hrs. Anthropometric Measures:  Height: 5' 9\" (175.3 cm)  Ideal Body Weight (IBW): 145 lbs (66 kg)    Admission Body Weight: 194 lb (88 kg) (bed scale)  Current Body Weight: 179 lb (81.2 kg), 113.8 % IBW. Weight Source: Bed Scale  Current BMI (kg/m2): 26.4                          BMI Categories: Normal Weight (BMI 22.0 to 24.9) age over 72    Estimated Daily Nutrient Needs:  Energy Requirements Based On: Kcal/kg (20-25)  Weight Used for Energy Requirements: Current  Energy (kcal/day): 3604-5219 kcal  Weight Used for Protein Requirements: Current (1.2-2.0 g/kg)  Protein (g/day):  g  Method Used for Fluid Requirements: 1 ml/kcal  Fluid (ml/day): 1846-4762 mL    Nutrition Diagnosis:   Inadequate energy intake related to altered GI structure as evidenced by NPO or clear liquid status due to medical condition, intubation, nutrition support - enteral nutrition    Nutrition Interventions:   Food and/or Nutrient Delivery: Start Tube Feeding  Nutrition Education/Counseling: No recommendation at this time  Coordination of Nutrition Care: Continue to monitor while inpatient, Interdisciplinary Rounds       Goals:     Goals: Initiate nutrition support, within 2 days       Nutrition Monitoring and Evaluation:   Behavioral-Environmental Outcomes: None Identified  Food/Nutrient Intake Outcomes: Enteral Nutrition Intake/Tolerance  Physical Signs/Symptoms Outcomes: Biochemical Data, GI Status, Nutrition Focused Physical Findings, Weight    Discharge Planning:     Too soon to determine     Андрей Muniz [address], [address], LD  Contact: [phone]

## 2022-09-20 NOTE — PLAN OF CARE
Problem: Discharge Planning  Goal: Discharge to home or other facility with appropriate resources  Outcome: Progressing     Problem: Pain  Goal: Verbalizes/displays adequate comfort level or baseline comfort level  Outcome: Progressing     Problem: Skin/Tissue Integrity  Goal: Absence of new skin breakdown  Description: 1. Monitor for areas of redness and/or skin breakdown  2. Assess vascular access sites hourly  3. Every 4-6 hours minimum:  Change oxygen saturation probe site  4. Every 4-6 hours:  If on nasal continuous positive airway pressure, respiratory therapy assess nares and determine need for appliance change or resting period. Outcome: Progressing     Problem: Chronic Conditions and Co-morbidities  Goal: Patient's chronic conditions and co-morbidity symptoms are monitored and maintained or improved  Outcome: Progressing     Problem: Safety - Medical Restraint  Goal: Remains free of injury from restraints (Restraint for Interference with Medical Device)  Description: INTERVENTIONS:  1. Determine that other, less restrictive measures have been tried or would not be effective before applying the restraint  2. Evaluate the patient's condition at the time of restraint application  3. Inform patient/family regarding the reason for restraint  4.  Q2H: Monitor safety, psychosocial status, comfort, nutrition and hydration  Outcome: Progressing     Problem: Nutrition Deficit:  Goal: Optimize nutritional status  Outcome: Progressing     Problem: Safety - Adult  Goal: Free from fall injury  Outcome: Progressing     Problem: Neurosensory - Adult  Goal: Achieves stable or improved neurological status  Outcome: Progressing  Flowsheets (Taken 9/19/2022 2000)  Achieves stable or improved neurological status:   Assess for and report changes in neurological status   Initiate measures to prevent increased intracranial pressure   Maintain blood pressure and fluid volume within ordered parameters to optimize cerebral perfusion and minimize risk of hemorrhage   Monitor temperature, glucose, and sodium.  Initiate appropriate interventions as ordered  Goal: Achieves maximal functionality and self care  Outcome: Progressing  Flowsheets (Taken 9/19/2022 2000)  Achieves maximal functionality and self care:   Monitor swallowing and airway patency with patient fatigue and changes in neurological status   Encourage and assist patient to increase activity and self care with guidance from physical therapy/occupational therapy   Encourage visually impaired, hearing impaired and aphasic patients to use assistive/communication devices     Problem: Respiratory - Adult  Goal: Achieves optimal ventilation and oxygenation  Outcome: Progressing  Flowsheets (Taken 9/19/2022 2000)  Achieves optimal ventilation and oxygenation:   Assess for changes in respiratory status   Assess for changes in mentation and behavior   Oxygen supplementation based on oxygen saturation or arterial blood gases   Initiate smoking cessation protocol as indicated   Encourage broncho-pulmonary hygiene including cough, deep breathe, incentive spirometry   Position to facilitate oxygenation and minimize respiratory effort   Assess the need for suctioning and aspirate as needed   Assess and instruct to report shortness of breath or any respiratory difficulty   Respiratory therapy support as indicated     Problem: Cardiovascular - Adult  Goal: Maintains optimal cardiac output and hemodynamic stability  Outcome: Progressing  Flowsheets (Taken 9/19/2022 2000)  Maintains optimal cardiac output and hemodynamic stability:   Monitor blood pressure and heart rate   Monitor urine output and notify Licensed Independent Practitioner for values outside of normal range   Assess for signs of decreased cardiac output   Administer fluid and/or volume expanders as ordered   Administer vasoactive medications as ordered  Goal: Absence of cardiac dysrhythmias or at baseline  Outcome: Progressing  Flowsheets (Taken 9/19/2022 2000)  Absence of cardiac dysrhythmias or at baseline:   Monitor cardiac rate and rhythm   Assess for signs of decreased cardiac output   Administer antiarrhythmia medication and electrolyte replacement as ordered     Problem: Skin/Tissue Integrity - Adult  Goal: Incisions, wounds, or drain sites healing without S/S of infection  Outcome: Progressing  Flowsheets (Taken 9/19/2022 2235)  Incisions, Wounds, or Drain Sites Healing Without Sign and Symptoms of Infection:   ADMISSION and DAILY: Assess and document risk factors for pressure ulcer development   TWICE DAILY: Assess and document skin integrity   TWICE DAILY: Assess and document dressing/incision, wound bed, drain sites and surrounding tissue   Implement wound care per orders   Initiate isolation precautions as appropriate   Initiate pressure ulcer prevention bundle as indicated  Goal: Oral mucous membranes remain intact  Outcome: Progressing  Flowsheets (Taken 9/19/2022 2235)  Oral Mucous Membranes Remain Intact:   Assess oral mucosa and hygiene practices   Implement oral medicated treatments as ordered   Implement preventative oral hygiene regimen     Problem: Musculoskeletal - Adult  Goal: Return mobility to safest level of function  Outcome: Progressing  Goal: Return ADL status to a safe level of function  Outcome: Progressing     Problem: Gastrointestinal - Adult  Goal: Maintains adequate nutritional intake  Outcome: Progressing  Goal: Establish and maintain optimal ostomy function  Outcome: Progressing     Problem: Genitourinary - Adult  Goal: Urinary catheter remains patent  Outcome: Progressing     Problem: Metabolic/Fluid and Electrolytes - Adult  Goal: Electrolytes maintained within normal limits  Outcome: Progressing  Goal: Hemodynamic stability and optimal renal function maintained  Outcome: Progressing  Goal: Glucose maintained within prescribed range  Outcome: Progressing

## 2022-09-20 NOTE — PROGRESS NOTES
09/20/22 1544   Breath Sounds   Right Upper Lobe Diminished;Rhonchi   Right Middle Lobe Diminished;Rhonchi   Right Lower Lobe Diminished;Rhonchi   Left Upper Lobe Diminished;Rhonchi   Left Lower Lobe Diminished;Rhonchi   Vent Information   Vent Mode AC/PRVC   Vent Alarm Settings   High Exhaled Vt (ml) 200 mL   Apnea (secs) 20 secs   Additional Respiratoray Assessments   Humidification Source HME   Ambu Bag With Mask At Bedside Yes   Airway Clearance   Suction ET Tube   Suction Device Inline suction catheter   Sputum Amount Moderate   Sputum Color/Odor White   Sputum Consistency Thin;Thick   ETT (adult)   Placement Date/Time: 09/15/22 1720   Present on Admission/Arrival: No  Placed By: In surgery; Licensed provider  Placement Verified By: Capnometry;Direct visualization; Auscultation  Preoxygenation: Yes  Mask Ventilation: Ventilated by mask with oral ai. ..    Secured At 22 cm   Measured From Lips   ETT Placement (S)  Right   Secured By Commercial tube wills   Cuff Pressure 28 cm H2O

## 2022-09-20 NOTE — PROGRESS NOTES
09/19/22 2335   Patient Observation   Heart Rate 76   Resp 14   SpO2 96 %   Breath Sounds   Right Upper Lobe Diminished   Right Middle Lobe Diminished   Right Lower Lobe Diminished   Left Upper Lobe Diminished   Left Lower Lobe Diminished   Vent Information   Vent Mode AC/PRVC   Ventilator Settings   FiO2  30 %   Vt (Set, mL) 450 mL   Resp Rate (Set) 14 bmp   PEEP/CPAP (cmH2O) 5   Vent Patient Data (Readings)   Vt (Measured) 570 mL   Peak Inspiratory Pressure (cmH2O) 13 cmH2O   Rate Measured 17 br/min   Minute Volume (L/min) 6.69 Liters   Mean Airway Pressure (cmH2O) 7.5 cmH20   I:E Ratio 1:2.8   Flow Sensitivity 3 L/min   I Time/ I Time % 1 s   Backup Apnea On   Vent Alarm Settings   Low Minute Volume (lpm) 3 L/min   Low Exhaled Vt (ml) 200 mL   RR High (bpm) 40 br/min   Apnea (secs) 20 secs   Additional Respiratoray Assessments   Humidification Source HME   Ambu Bag With Mask At Bedside Yes   Airway Clearance   Suction ET Tube   Suction Device Inline suction catheter   Sputum Method Obtained Endotracheal   Sputum Amount Small   Sputum Color/Odor Clear   Sputum Consistency Thin   ETT (adult)   Placement Date/Time: 09/15/22 1720   Present on Admission/Arrival: No  Placed By: In surgery; Licensed provider  Placement Verified By: Capnometry;Direct visualization; Auscultation  Preoxygenation: Yes  Mask Ventilation: Ventilated by mask with oral ai. ..    Secured At 22 cm   Measured From Lips   ETT Placement Left   Secured By Commercial tube wills   Site Assessment Dry   Skin Assessment   Skin Assessment Clean, dry, & intact

## 2022-09-20 NOTE — PROGRESS NOTES
09/20/22 0319   Patient Observation   Heart Rate 78   Resp 16   SpO2 91 %   Ventilator Settings   FiO2  30 %   Vt (Set, mL) 450 mL   Resp Rate (Set) 14 bmp   PEEP/CPAP (cmH2O) 5   Vent Patient Data (Readings)   Vt (Measured) 471 mL   Peak Inspiratory Pressure (cmH2O) 10 cmH2O   Rate Measured 17 br/min   Minute Volume (L/min) 7.61 Liters   Mean Airway Pressure (cmH2O) 6.5 cmH20   I:E Ratio 1:2.80   I Time/ I Time % 1 s   Backup Apnea On   Vent Alarm Settings   Low Minute Volume (lpm) 3 L/min   Low Exhaled Vt (ml) 200 mL   RR High (bpm) 40 br/min   Apnea (secs) 20 secs   Additional Respiratoray Assessments   Humidification Source HME   Ambu Bag With Mask At Bedside Yes   ETT (adult)   Placement Date/Time: 09/15/22 1720   Present on Admission/Arrival: No  Placed By: In surgery; Licensed provider  Placement Verified By: Capnometry;Direct visualization; Auscultation  Preoxygenation: Yes  Mask Ventilation: Ventilated by mask with oral ai. ..    Secured At 22 cm   Measured From Lips   ETT Placement Center   Secured By Commercial tube wills   Site Assessment Dry   Skin Assessment   Skin Assessment Clean, dry, & intact

## 2022-09-20 NOTE — PROGRESS NOTES
09/20/22 1953   Breath Sounds   Right Upper Lobe Crackles; Rhonchi   Right Middle Lobe Crackles; Rhonchi   Right Lower Lobe Crackles; Rhonchi   Left Upper Lobe Crackles; Rhonchi   Left Lower Lobe Crackles; Rhonchi   Vent Information   Vent Mode AC/PRVC   Vent Patient Data (Readings)   I Time/ I Time % 0.9 s   Vent Alarm Settings   Apnea (secs) 20 secs   Additional Respiratoray Assessments   Humidification Source HME   Ambu Bag With Mask At Bedside Yes   Airway Clearance   Subglottic Suction Done No  (patient biting and wont open mouth)   ETT (adult)   Placement Date/Time: 09/15/22 1720   Present on Admission/Arrival: No  Placed By: In surgery; Licensed provider  Placement Verified By: Capnometry;Direct visualization; Auscultation  Preoxygenation: Yes  Mask Ventilation: Ventilated by mask with oral ai. ..    Secured At 23 cm   Measured From Lips   ETT Placement Right   Secured By Commercial tube wills

## 2022-09-20 NOTE — PROGRESS NOTES
09/20/22 0820   Patient Observation   Heart Rate 89   Resp 19   SpO2 97 %   Breath Sounds   Right Upper Lobe Diminished   Right Middle Lobe Diminished   Right Lower Lobe Diminished   Left Upper Lobe Diminished   Left Lower Lobe Diminished   Vent Information   Vent Mode AC/PRVC   Ventilator Settings   FiO2  30 %   Insp Time (sec) 1 sec   Vt (Set, mL) 450 mL   Resp Rate (Set) 14 bmp   PEEP/CPAP (cmH2O) 5   Vent Patient Data (Readings)   Vt (Measured) 517 mL   Peak Inspiratory Pressure (cmH2O) 8 cmH2O   Rate Measured 19 br/min   Minute Volume (L/min) 9.31 Liters   Mean Airway Pressure (cmH2O) 6.4 cmH20   I:E Ratio 1:2.20   I Time/ I Time % 1 s   Vent Alarm Settings   Low Minute Volume (lpm) 2 L/min   High Minute Volumn (lpm) 20 L/min   Low Exhaled Vt (ml) 200 mL   RR High (bpm) 40 br/min   Apnea (secs) 20 secs   Additional Respiratoray Assessments   Humidification Source (S)  HME   Ambu Bag With Mask At Bedside Yes   Airway Clearance   Suction ET Tube;Oral   Subglottic Suction Done Yes   Suction Device Inline suction catheter   Sputum Method Obtained Endotracheal   Sputum Amount Small   Sputum Color/Odor Clear; White   Sputum Consistency Thin   ETT (adult)   Placement Date/Time: 09/15/22 1720   Present on Admission/Arrival: No  Placed By: In surgery; Licensed provider  Placement Verified By: Capnometry;Direct visualization; Auscultation  Preoxygenation: Yes  Mask Ventilation: Ventilated by mask with oral ai. ..    Secured At 22 cm   Measured From Lips   ETT Placement (S)  Left   Secured By Commercial tube sue   Site Assessment Dry   Cuff Pressure 30 cm H2O   Tie/Sue Changed No

## 2022-09-20 NOTE — PROGRESS NOTES
09/20/22 1234   Patient Observation   Heart Rate 74   Resp 14   SpO2 95 %   Observations ambu @ bedside   Breath Sounds   Right Upper Lobe Diminished   Right Middle Lobe Diminished   Right Lower Lobe Diminished   Left Upper Lobe Diminished   Left Lower Lobe Diminished   Ventilator Settings   FiO2  30 %   Vt (Set, mL) 450 mL   Resp Rate (Set) 14 bmp   PEEP/CPAP (cmH2O) 5   Vent Patient Data (Readings)   Vt (Measured) 478 mL   Peak Inspiratory Pressure (cmH2O) 13 cmH2O   Rate Measured 16 br/min   Minute Volume (L/min) 7.46 Liters   Mean Airway Pressure (cmH2O) 7.6 cmH20   I:E Ratio 1:2.90   I Time/ I Time % 1 s   Vent Alarm Settings   Low Minute Volume (lpm) 2 L/min   RR High (bpm) 40 br/min   ETT (adult)   Placement Date/Time: 09/15/22 1720   Present on Admission/Arrival: No  Placed By: In surgery; Licensed provider  Placement Verified By: Capnometry;Direct visualization; Auscultation  Preoxygenation: Yes  Mask Ventilation: Ventilated by mask with oral ai. ..    Secured At 22 cm   Measured From Lips   ETT Placement Right   Secured By Commercial tube wills   Site Assessment Dry   Cuff Pressure 30 cm H2O

## 2022-09-20 NOTE — PROGRESS NOTES
[facility] GENERAL SURGERY DAILY PROGRESS NOTE    SUBJECTIVE: Remains intubated and sedated. Afebrile, hemodynamically stable overnight. CRRT stopped yesterday. CT scan yesterday with fluid collection in pelvis. OBJECTIVE: CURRENT VITALS:  BP (!) 121/57   Pulse 75   Temp 98.8 °F (37.1 °C) (Bladder)   Resp 16   Ht 5' 9\" (1.753 m)   Wt 179 lb 14.3 oz (81.6 kg)   SpO2 96%   BMI 26.57 kg/m²          ABD: Soft  BROOKS drainage serous  OSTOMY:  Pink; with soft brown stool in bag  HEENT: NG with minimal gastric output in canister    LABS:    CBC:   Recent Labs     09/18/22  0410 09/19/22  0440 09/20/22  0520   WBC 25.5* 30.3* 30.6*   RBC 2.89* 3.06* 2.99*   HGB 8.1* 8.6* 8.3*   HCT 24.4* 25.9* 25.5*   MCV 84.3 84.6 85.4   RDW 14.2 14.2 14.9   PLT 72* 83* 91*       BMP:   Recent Labs     09/19/22  1212 09/19/22  2200 09/20/22  0520   * 129* 135*   K 4.1 3.8 4.2   CL 97* 94* 99   CO2 22 20* 19*   PHOS 2.7 3.3 3.8   BUN 18 32* 39*   CREATININE 1.0 1.6* 1.9*       Recent Labs     09/18/22  0410 09/19/22  0440 09/20/22  0520   MG 2.30 2.30 2.50*          ASSESSMENT AND PLAN:  67 y.o. female status post diagnosis of ischemic sigmoid colon s/p exploratory laparotomy, sigmoid colectomy, colostomy formation POD 5     GI: minimal out of NG tube overnight  Will discuss initiation of tube feeds today with primary team    ID: continue with IV antibiotics/antifungal  -Leukocytosis stable at 30  -Plan for possible IR drainage today    New ostomy: monitor stoma and output  Pulm: extubation per critical care team     Medical management per primary team    Bailee Wilson,   PGY4, General Surgery  09/20/22  6:32 AM    Patient seen and agree with above and more than half of the total time was spent by me on the encounter. Discussed with IR and abscesses not safely reachable. Continue with abx and repeat imaging in the future.   Start tube feeds    Tasha Newman MD

## 2022-09-20 NOTE — PROGRESS NOTES
Treatment time: 3 hours     Net UF: 200 ml     Pre weight: 81.6kg   Post weight: 81.4  EDW: TBD    Access used: Left fem CVC   Access function: well, tolerated 300 BFR     Medications or blood products given: albumin, heparin dwells     Regular outpatient schedule: DEJAN     Summary of response to treatment: pt hypotensive during HD despite albumin and saline administration. Unable to complete full HD tx. Copy of dialysis treatment record placed in chart, to be scanned into EMR.     09/20/22 0802 09/20/22 0949   Vital Signs   /71 (!) 118/53   Temp 98.3 °F (36.8 °C) 98.1 °F (36.7 °C)   Heart Rate 83 85   Resp 18 16   SpO2 97 %  --    Weight 179 lb 14.3 oz (81.6 kg) 179 lb 7.3 oz (81.4 kg)   Technical Checks   Machine Alarm Self Test Completed; Passed  --    Dialysis Bath   K+ (Potassium) 3  --    Ca+ (Calcium) 2.5  --    Na+ (Sodium) 135  --    HCO3 (Bicarb) 30  --

## 2022-09-20 NOTE — CARE COORDINATION
Writer met with pt Carisa Sharma at bedside, he wanted update about [facility]. Pt remains on vent, off CRRT. Writer confirmed [facility] can accept pt pending ability to accommodate needs at discharge, HD may be a barrier, but they are still following via [facility]. CM will continue to follow pt's progress and coordinate discharge arrangements as appropriate.   JEANNINE Feldman-DWAIN

## 2022-09-20 NOTE — PROGRESS NOTES
Intensive Care Unit  Intensivist Progress Note    Patient: Tracy Barron  : 1950  MRN#: [id number]  2022 8:24 AM  ADMISSION DAY:  2022 11:45 AM     Subjective: The patient started to follow simple commands intermittently. She is very weak though. WBC still up to 30 K. She was started on Diflucan yesterday on top of Zosyn. CT scan of the abdomen showed fluid collection  Patient is off pressors. On mechanical ventilation with AC VC mode PEEP of 5 and FiO2 30%. This morning. Urine output is minimum. She is off CRRT    HPI:   Tracy Barron is a 67 y.o. female with a history of CKD, diabetes, hyperlipidemia, hypertension, and neuropathy who presents to the ED complaining of abdominal pain/syncope. By EMS report, patient has had constipation over the last several days. .  Patient reportedly had a large bowel movement and shortly thereafter had a syncopal/near syncopal event where she was helped to the floor. EMS was called and arrived to find patient with blood pressure of 50/30.  300 cc of normal saline were infused during truck ride to the ED. Blood pressure shortly after arrival was 134/94. Patient reports lower abdominal discomfort. No additional complaints. No other complaints, modifying factors or associated symptoms. In ER -Patient seen and evaluated. Old records reviewed. Labs and imaging reviewed and results discussed with patient. Patient was given broad-spectrum antibiotics, normal saline, and multiple pressors. Central line was placed. Improvement of symptoms throughout patient's stay in the emergency department. Labs reveal significant leukocytosis with elevated lactic and concern for UTI. Mild DEJAN noted. Imaging shows nonspecific enteritis. Patient will be admitted for further evaluation and treatment. . Patient was reassessed as noted above . Diane Arnett Plan of care discussed with patient and family. Patient and family in agreement with plan.    Patient was transferred to the ICU for further management  Patient when seen in the ICU was somewhat lethargic but was able to give simple answers and was complaining of abdominal pain, patient was also was found to be significantly hypotensive in spite of patient getting IV Levophed, patient's systolic blood pressure was 52 mmHg when seen, patient's blood sugars were still on the higher side now which was lower as per EMT, patient was given dextrose 50 IV push in the EMS as per documentation, patient was having sinus rhythm on the monitor which was ranging from normal sinus rhythm to sinus tachycardia, patient was given another fluid bolus and also vasopressin infusion was started on the patient, no other pertinent review of system of concern    Patient Vitals for the past 8 hrs:   BP Temp Pulse Resp SpO2 Weight   09/20/22 0820 -- -- 89 19 97 % --   09/20/22 0802 137/71 98.3 °F (36.8 °C) 83 18 97 % 179 lb 14.3 oz (81.6 kg)   09/20/22 0600 (!) 121/57 -- 75 16 96 % --   09/20/22 0500 134/63 -- 73 16 96 % --   09/20/22 0400 (!) 123/55 -- 68 15 93 % --   09/20/22 0332 -- -- -- -- -- 179 lb 14.3 oz (81.6 kg)   09/20/22 0319 -- -- 78 16 91 % --   09/20/22 0300 -- -- 91 17 95 % --   09/20/22 0200 (!) 126/56 -- 73 14 98 % --   09/20/22 0100 (!) 134/58 -- 81 17 94 % --         EXAM:  HENT: Endotracheal tube present no thyromegaly. Open eyes to commands, follow commands intermittently. Eyes:  Conjunctivae are normal. Pupils equal, round, and reactive to light. No scleral icterus. Neck: . No tracheal deviation present. No obvious thyroid mass. Cardiovascular: Sinus rhythm normal heart sounds. No right ventricular heave. No lower extremity edema. Pulmonary/Chest: No wheezes. No rales. No accessory muscle usage or stridor. Decreased breath sound bilaterally  Abdominal: Soft. Some significant bowel sounds present. No distension or hernia. Ostomy present  Musculoskeletal: No cyanosis. No clubbing. No obvious joint deformity.    Lymphadenopathy: No cervical or supraclavicular adenopathy. Skin: Skin is warm and dry. No rash or nodules on the exposed extremities. Intake/Output Summary (Last 24 hours) at 9/20/2022 0824  Last data filed at 9/20/2022 0534  Gross per 24 hour   Intake 780.1 ml   Output 996 ml   Net -215.9 ml       I/O last 3 completed shifts: In: 1724.2 [I.V.:758; IV Piggyback:966.2]  Out: 5579 [Urine:17; Emesis/NG output:250; Drains:130; Stool:465]   Date 09/20/22 0000 - 09/20/22 2359   Shift 4626-9173 5721-9227 3661-1971 24 Hour Total   INTAKE   IV Piggyback(mL/kg) 250(3.1)   250(3.1)   Shift Total(mL/kg) 250(3.1)   250(3.1)   OUTPUT   Urine(mL/kg/hr) 3(0)   3   Emesis/NG output(mL/kg) 50(0.6)   50(0.6)   Drains(mL/kg) 15(0.2)   15(0.2)   Shift Total(mL/kg) 68(0.8)   68(0.8)   Weight (kg) 81.6 81.6 81.6 81.6       Wt Readings from Last 3 Encounters:   09/20/22 179 lb 14.3 oz (81.6 kg)   07/14/22 161 lb (73 kg)      Body mass index is 26.57 kg/m².          Scheduled Meds:   heparin (porcine)        hydrocortisone sodium succinate PF  50 mg IntraVENous Q12H    fluconazole  400 mg IntraVENous BID    piperacillin-tazobactam  3,375 mg IntraVENous Q8H    pantoprazole  40 mg IntraVENous Daily    insulin lispro  0-4 Units SubCUTAneous Q4H     Continuous Infusions:   prismaSATE BGK 4/2.5 Stopped (09/19/22 1030)    prismaSATE BGK 4/2.5 Stopped (09/19/22 1030)    prismaSATE BGK 4/2.5 Stopped (09/19/22 1030)    sodium chloride      norepinephrine Stopped (09/18/22 0126)    dextrose      sodium chloride      vasopressin (Septic Shock) infusion Stopped (09/16/22 1205)     PRN Meds:heparin (porcine), 3,200 Units, PRN  fentanNYL, 25 mcg, Q4H PRN  midazolam, 2 mg, Q4H PRN  prochlorperazine, 5 mg, Q6H PRN  potassium chloride, 20 mEq, PRN  magnesium sulfate, 1,000 mg, PRN  sodium phosphate IVPB, 6 mmol, PRN   Or  sodium phosphate IVPB, 12 mmol, PRN   Or  sodium phosphate IVPB, 18 mmol, PRN   Or  sodium phosphate IVPB, 24 mmol, PRN  calcium gluconate, 1,000 mg, PRN   Or  calcium gluconate, 2,000 mg, PRN   Or  calcium gluconate, 3,000 mg, PRN   Or  calcium gluconate, 4,000 mg, PRN  sodium chloride flush, 5-40 mL, PRN  sodium chloride, 25 mL, PRN  HYDROmorphone, 0.25 mg, Q5 Min PRN  HYDROmorphone, 0.5 mg, Q5 Min PRN  ondansetron, 4 mg, Q10 Min PRN  midazolam, 1 mg, Q5 Min PRN  fentanNYL, 25 mcg, Q1H PRN  glucose, 4 tablet, PRN  dextrose bolus, 125 mL, PRN   Or  dextrose bolus, 250 mL, PRN  glucagon (rDNA), 1 mg, PRN  dextrose, , Continuous PRN  sodium chloride flush, 5-40 mL, PRN  sodium chloride, , PRN  polyethylene glycol, 17 g, Daily PRN  acetaminophen, 650 mg, Q6H PRN   Or  acetaminophen, 650 mg, Q6H PRN      Oxygen Delivery - O2 Flow Rate (L/min): 2 L/min  VENT SETTINGS (Comprehensive)  Vent Information  Equipment Changed: HME  Ventilator Initiate: Yes  Vent Mode: AC/PRVC  Additional Respiratory Assessments  Heart Rate: 89  Resp: 19  SpO2: 97 %  Humidification Source: (S) HME  Circuit Condensation: Drained      DATA:    CBC:   Recent Labs     09/18/22 0410 09/19/22 0440 09/20/22  0520   WBC 25.5* 30.3* 30.6*   RBC 2.89* 3.06* 2.99*   HGB 8.1* 8.6* 8.3*   HCT 24.4* 25.9* 25.5*   MCV 84.3 84.6 85.4   MCH 28.1 28.0 27.8   MCHC 33.3 33.1 32.6   RDW 14.2 14.2 14.9   PLT 72* 83* 91*   MPV 7.5 7.8 8.0        BMP/CMP:   Recent Labs     09/18/22  0410 09/18/22  1109 09/19/22  0440 09/19/22  1212 09/19/22  2200 09/20/22  0520   *   < > 138 132* 129* 135*   K 3.8   < > 4.1 4.1 3.8 4.2   CL 99   < > 102 97* 94* 99   CO2 24   < > 22 22 20* 19*   ANIONGAP 11   < > 14 13 15 17*   BUN 7   < > 12 18 32* 39*   CREATININE 0.8   < > 0.8 1.0 1.6* 1.9*   GLUCOSE 103*   < > 138* 166* 216* 247*   CALCIUM 8.5   < > 8.7 7.7* 7.9* 8.7   PROT 5.3*  --  5.4*  --   --  4.9*   LABALBU 2.9*   < > 2.8* 2.6* 2.5* 2.5*   BILITOT 0.4  --  0.4  --   --  0.3   ALKPHOS 219*  --  241*  --   --  201*   AST 47*  --  38*  --   --  22   ALT 15  --  16  --   --  13    < > = values in this interval not displayed. Other Electrolytes:   Recent Labs     09/18/22  0410 09/18/22  1109 09/19/22  0440 09/19/22  1210 09/19/22  1212 09/19/22  2200 09/20/22  0520   CAION 1.02*   < > 1.07* 1.06*  --  1.10* 1.10*   PHOS 2.0*   < > 1.8*  --  2.7 3.3 3.8   MG 2.30  --  2.30  --   --   --  2.50*    < > = values in this interval not displayed. Serum Osmolality  No results for input(s): OSMOMEASER in the last 72 hours. Procalcitonin:  No results for input(s): PROCAL in the last 72 hours. Cardiac: No results for input(s): TROPONINT in the last 72 hours. Lipids: No results for input(s): CHOL, HDL in the last 72 hours. Invalid input(s): LDLCALCU  Coagulation:   Recent Labs     09/18/22  0410 09/19/22  0440 09/20/22  0520   INR 1.22* 1.22* 1.28*       Lactic Acid:   No results for input(s): LACTA in the last 72 hours. ABGs:   No results found for: PH, PCO2, PO2, HCO3, O2SAT  No results found for: Livan Mathews    Radiology/Imaging:   XR CHEST PORTABLE [[id number]] Collected: 09/15/22 2226     Order Status: Completed Updated: 09/16/22 1429     Narrative:       EXAMINATION:   ONE XRAY VIEW OF THE CHEST     9/15/2022 8:40 pm     COMPARISON:   09/14/2022     HISTORY:   ORDERING SYSTEM PROVIDED HISTORY: right IJ central line evaluation, intubated   after OR   TECHNOLOGIST PROVIDED HISTORY:   Reason for exam:->right IJ central line evaluation, intubated after OR   Reason for Exam: ett; cvc     FINDINGS:   Endotracheal tube tip is 5.3 cm above the mark. Right IJ CVC catheter tip   overlies the SVC at the level of the thoracic inlet. The heart appears borderline enlarged; however, this may be accentuated by   technique. Possible small right pleural effusion. No pneumothorax is seen. Mild bibasilar airspace opacities. Impression:       Right IJ CVC catheter tip overlies the SVC at the level of the thoracic inlet. Endotracheal tube tip is 5.3 cm above the mark.      Mild bibasilar airspace opacities, which could represent as atelectasis   and/or infiltrate. Possible small right pleural effusion. XR ABDOMEN FOR NG/OG/NE TUBE PLACEMENT [[id number]] Collected: 09/16/22 0839     Order Status: Completed Updated: 09/16/22 0842     Narrative:       EXAMINATION:   ONE SUPINE XRAY VIEW(S) OF THE ABDOMEN     9/16/2022 7:29 am     COMPARISON:   None. HISTORY:   ORDERING SYSTEM PROVIDED HISTORY: Confirmation of course of NG/OG/NE tube and   location of tip of tube   TECHNOLOGIST PROVIDED HISTORY:   Reason for exam:->Confirmation of course of NG/OG/NE tube and location of tip   of tube   Portable? ->Yes   Reason for Exam: NGT placement     FINDINGS:   Tip of NG tube projects in the left upper quadrant in the region of the   gastric fundus     Postsurgical change seen in upper abdomen. Pleuroparenchymal disease is seen at the right lung base. Impression:       Tip of NG tube projects in the left upper quadrant in the region of the   gastric fundus      XR CHEST PORTABLE [[id number]] Collected: 09/16/22 0130     Order Status: Completed Updated: 09/16/22 0137     Narrative:       EXAMINATION:   ONE XRAY VIEW OF THE CHEST     9/15/2022 11:34 pm     COMPARISON:   09/15/2022     HISTORY:   ORDERING SYSTEM PROVIDED HISTORY: central line insertion   TECHNOLOGIST PROVIDED HISTORY:   Reason for exam:->central line insertion   Reason for Exam: central line     FINDINGS:   Endotracheal tube appears in appropriate position. Interval placement of a   jugular venous central catheter which extends into the inferior aspect of the   right atrium. The catheter tip measures approximately 5 cm beyond the   cavoatrial junction. No acute osseous abnormality is identified. Small   right-sided pleural effusion similar appearing basilar airspace disease. Osseous structures appear similar.       Impression:       Interval placement of a right-sided central venous catheter which extends   into the inferior aspect of the right atrium, approximately 5 cm beyond the   cavoatrial junction. CT ABDOMEN PELVIS WO CONTRAST Additional Contrast? None [[id number]] Collected: 09/15/22 1017     Order Status: Completed Updated: 09/15/22 1027     Narrative:       EXAMINATION:   CT OF THE ABDOMEN AND PELVIS WITHOUT CONTRAST 9/15/2022 10:00 am     TECHNIQUE:   CT of the abdomen and pelvis was performed without the administration of   intravenous contrast. Multiplanar reformatted images are provided for review. Automated exposure control, iterative reconstruction, and/or weight based   adjustment of the mA/kV was utilized to reduce the radiation dose to as low   as reasonably achievable. COMPARISON:   09/14/2022 CT     HISTORY:   ORDERING SYSTEM PROVIDED HISTORY: worsening acidosis, hypotension ? ischemia   TECHNOLOGIST PROVIDED HISTORY:   Reason for exam:->worsening acidosis, hypotension ? ischemia   Additional Contrast?->None   Reason for Exam: worsening labs, abd pain     FINDINGS:   Lower Chest:  Interval development of a small right pleural effusion with   dense peripheral airspace opacification in the right lower lobe. Base of the   heart is normal.     Organs: Liver parenchyma is normal on this noncontrast exam.  Slight   nodularity of the capsule may suggest an underlying pattern of chronic liver   disease. There is mild ascites. The gallbladder is absent. Biliary ducts   and pancreas normal.  Calcified granulomata in the spleen. Kidneys and   adrenal glands are normal.     GI/Bowel: The stomach is normal.  Prior imaging described enteritis of the   duodenum and proximal jejunum. This is difficult to evaluate on the current   noncontrast exam but there is no pattern of inflammation. Questionable   mucosal thickening of the distal duodenum and proximal jejunum. No pattern   of obstruction.   Of greater suspicion, there is severe inflammatory change in   the right lower quadrant that has progressed since the prior exam.  This   includes mesenteric inflammation and ill-defined free fluid. There is no   pattern of perforation or abscess. Etiology appears to correspond to either   mucosal changes of the distal ileum, terminal ileum or cecum. Nonvisualized   appendix. There is also a pattern of diffuse mucosal thickening of the colon   extending from the splenic flexure through the rectum. Dense stool within   the distal colon also noted. Pelvis: The bladder is decompressed around a Osorio catheter. The uterus and   adnexa are unremarkable. Peritoneum/Retroperitoneum: The aorta tapers normally. No adenopathy. Bones/Soft Tissues: No significant skeletal abnormalities. Impression:       1. Limited evaluation of the GI tract on this noncontrast exam.  Improved   mucosal changes of the duodenum and proximal jejunum that were described on   prior CT. 2. Severe inflammatory change in the right lower quadrant with etiology   uncertain. This could correspond to enteritis of the distal ileum and   terminal ileum. Colitis may also be considered. Infectious or inflammatory   etiologies may be favored. 3. Dense stool and diffuse mucosal thickening of the distal colon which may   represent a pattern of colitis. 4. Small right pleural effusion with airspace changes in the right lower   lobe, new from prior exam.   5. Evidence of chronic liver disease with a small amount of ascites stable. CT ABDOMEN PELVIS WO CONTRAST Additional Contrast? None [[id number]] Collected: 09/14/22 1452     Order Status: Completed Updated: 09/14/22 0947     Narrative:       EXAMINATION:   CT OF THE ABDOMEN AND PELVIS WITHOUT CONTRAST, 9/14/2022 2:46 pm     TECHNIQUE:   CT of the abdomen and pelvis was performed without the administration of   intravenous contrast. Multiplanar reformatted images are provided for review.    Automated exposure control, iterative reconstruction, and/or weight based   adjustment of the mA/kV was utilized to reduce the radiation dose to as low   as reasonably achievable. COMPARISON:   None     HISTORY:   ORDERING SYSTEM PROVIDED HISTORY:  Abdominal pain/ near syncope   TECHNOLOGIST PROVIDED HISTORY:   Additional Contrast?  None   Reason for Exam:  Abdominal pain/ near syncope   Decision Support Exception - unselect if not a suspected or confirmed   emergency medical condition->Emergency Medical Condition (MA)   Reason for Exam:  Vomiting and pain     FINDINGS:   Lower Chest: No active disease. Organs: The liver appears unremarkable. Status post cholecystectomy. Pancreas and spleen, adrenals, kidneys, aorta and IVC appear stable. GI/Bowel: There is evidence of thickening of the duodenum as well as jejunal   loops with perijejunal inflammatory changes. These changes are compatible   with acute enteritis. No evidence of bowel obstruction or perforation. Evidence of constipation and significant stool impaction in the rectum. Pelvis: Urinary bladder contains Osorio catheter. The uterus and adnexa   demonstrate no acute abnormality. Peritoneum/Retroperitoneum: No evidence of retroperitoneal lymphadenopathy or   acute peritoneal findings. Mild ascites mostly around the liver and spleen. Bones/Soft Tissues: No acute abnormality. Osteopenia. Moderate multilevel   degenerative disc disease. Impression:       1. Acute enteritis involving the duodenum and jejunal loops with thickening   of the loops and edema. No evidence of bowel obstruction. 2. Constipation with significant stool impaction in the rectum. 3. Mild ascites mostly around the liver and spleen. 4. Adequate position of Osorio catheter in the urinary bladder. XR CHEST PORTABLE [[id number]] Collected: 09/14/22 1334     Order Status: Completed Updated: 09/14/22 1345     Narrative:       EXAMINATION:   ONE XRAY VIEW OF THE CHEST     9/14/2022 10:22 am     COMPARISON:   None.      HISTORY:   ORDERING SYSTEM PROVIDED HISTORY: confirm central line   TECHNOLOGIST PROVIDED HISTORY:   Reason for exam:->confirm central line   Reason for Exam: confirm central line     FINDINGS:   A right internal jugular venous catheter is been placed with the tip at the   cavoatrial junction. No pneumothorax is identified. The lungs and costophrenic angles are clear. The cardiomediastinal   silhouette and pulmonary vessels appear normal.      Impression:       Placement of a right internal jugular central venous catheter without evident   complication. The tip is at approximately the cavoatrial junction. No acute findings in the chest.          Patient Active Problem List   Diagnosis    DKA (diabetic ketoacidosis) ([facility])    Hypotension    Syncope    Hyponatremia    DEJAN (acute kidney injury) ([address]    Abdominal pain    Enteritis    Septic shock ([facility])    Elevated troponin    Leukocytosis    Pyuria    Lactic acidosis    DM (diabetes mellitus), secondary uncontrolled ([address]    Ischemic colitis ([facility])    S/P exploratory laparotomy    Acute postoperative pulmonary insufficiency ([facility])       Assessment:  Acute respiratory failure with hypoxia  Ischemic colitis s/p exploratory laparotomy and sigmoid colectomy and colostomy on 9/15/2022  Septic shock. Acute encephalopathy  Acute renal failure  Syncope  Electrolytes abnormality  Diabetes mellitus type 2. Hyponatremia        Plan:  WBC is still elevated. The abdomen is tender, CT scan showed fluid collection. The plan for drainage by IR. Continue Zosyn and Diflucan  Continue mechanical ventilation support, she is getting more awake, hopefully will try to extubate tomorrow if she continues to improve. Mental status precludes extubation. Surgery is following. Sedation is off since Saturday. Pain control. Pressors are off  Hemodialysis today. Remove the femoral temporary hemodialysis catheter today.   Will place new one as IJ in the next hemodialysis session  CT scan of the brain is negative  DVT and GI prophylaxis  Ccm:32    Reviewed with ICU Staff     Seen with multidisciplinary ICU team         Mariella Garcia MD,

## 2022-09-20 NOTE — PROGRESS NOTES
Hospitalist Progress Note      PCP: Gustavo Akins DO,     Date of Admission: 9/14/2022    Chief Complaint: Abdominal Pain    Subjective: no new c/o. Medications:  Reviewed    Infusion Medications    prismaSATE BGK 4/2.5 Stopped (09/19/22 1030)    prismaSATE BGK 4/2.5 Stopped (09/19/22 1030)    prismaSATE BGK 4/2.5 Stopped (09/19/22 1030)    sodium chloride      norepinephrine Stopped (09/18/22 0126)    dextrose      sodium chloride      vasopressin (Septic Shock) infusion Stopped (09/16/22 1205)     Scheduled Medications    heparin (porcine)        hydrocortisone sodium succinate PF  50 mg IntraVENous Q12H    fluconazole  400 mg IntraVENous BID    piperacillin-tazobactam  3,375 mg IntraVENous Q8H    pantoprazole  40 mg IntraVENous Daily    insulin lispro  0-4 Units SubCUTAneous Q4H     PRN Meds: heparin (porcine), fentanNYL, midazolam, prochlorperazine, potassium chloride, magnesium sulfate, sodium phosphate IVPB **OR** sodium phosphate IVPB **OR** sodium phosphate IVPB **OR** sodium phosphate IVPB, calcium gluconate **OR** calcium gluconate **OR** calcium gluconate **OR** calcium gluconate, sodium chloride flush, sodium chloride, HYDROmorphone, HYDROmorphone, ondansetron, midazolam, fentanNYL, glucose, dextrose bolus **OR** dextrose bolus, glucagon (rDNA), dextrose, sodium chloride flush, sodium chloride, polyethylene glycol, acetaminophen **OR** acetaminophen      Intake/Output Summary (Last 24 hours) at 9/20/2022 1016  Last data filed at 9/20/2022 0534  Gross per 24 hour   Intake 630 ml   Output 488 ml   Net 142 ml         Physical Exam Performed:    BP (!) 116/53   Pulse 80   Temp 98.3 °F (36.8 °C)   Resp 18   Ht 5' 9\" (1.753 m)   Wt 179 lb 14.3 oz (81.6 kg)   SpO2 96%   BMI 26.57 kg/m²     General appearance: No apparent distress, appears stated age and intubated/sedated. HEENT: Pupils equal, round, and reactive to light. Conjunctivae/corneas clear.   Neck: Supple, with full range of pulmonary insufficiency (HCC) [J95.2]      Priority: Medium    Syncope [R55]      Priority: Medium    Hyponatremia [E87.1]      Priority: Medium    Abdominal pain [R10.9]      Priority: Medium    Enteritis [K52.9]      Priority: Medium    Elevated troponin [R77.8]      Priority: Medium    DEJAN (acute kidney injury) ([facility] [N17.9]      Priority: Medium    DM (diabetes mellitus), secondary uncontrolled ([facility] [E13.65]      Priority: Medium         Peritonitis - 2nd to bowel perforation, likely due to stercoral/ischemic colitis. General Surgery consulted and appreciated s/p Lap/Sigmoid Colectomy and Colostomy 15 Sept w/out complications. Sepsis - w/ Leukocytosis/Tachycardia/Fever/Elevated Lactate POArrival 2nd to above infection. Continue IVF as appropriate and monitor clinical response w/ ABX as written - currently Zosyn/Diflucan     ARF - w/ elevated BUN/Cr ratio c/w pre-renal azotemia. Given IVF hydration and follow serial labs. Reviewed and documented as above. Nephrology consulted and appreciated started on CRRT 15 Sept w/ vascath placed. Acute Respiratory Failure - post-op w/ hypoxia,l.  Presence of clinical respiratory distress w/ tachypnea/dyspnea/SOB and wheezing w/ use of accessory muscles to breath requiring intubation. Supplemental O2 and wean as tolerated. HypoNatremia - etiology clinically unable to determine but likely hypovolemic. Follow serial labs on IVF. Reviewed and documented as above. Troponin elevation - of unclear clinical significance w/ etiology clinically unable to determine, w/out signs/sxs of active ischemia. Followed serial troponins. Reviewed and documented. DM2 - controlled on home oral antiGlycemics/Insulin - held/continued. Follow FSBS/SSI low regimen. Last HbA1c 9.2% dated this admission. Anticipate resuming/continuing home regimen at discharge.          DVT Prophylaxis: none      Recent Labs     09/18/22  0410 09/19/22  0440 09/20/22  0520   PLT 72* 83* 91*       Diet: Diet NPO  Code Status: Full Code      PT/OT Eval Status: not yet ordered. Dispo - Remains in ICU. Patient is likely to remain in-house at least for the foreseeable future pending clinical course, subspecialty recs and eventual PT/OT eval/recs.   of 55 years preset at bedside when seen 19/20 Sept.  Sister Igor kerr present 20 Sept       Francis Edgar MD

## 2022-09-21 ENCOUNTER — APPOINTMENT (OUTPATIENT)
Dept: GENERAL RADIOLOGY | Age: 72
DRG: 853 | End: 2022-09-21
Payer: MEDICARE

## 2022-09-21 LAB
A/G RATIO: 0.9 (ref 1.1–2.2)
ALBUMIN SERPL-MCNC: 2.4 G/DL (ref 3.4–5)
ALP BLD-CCNC: 175 U/L (ref 40–129)
ALT SERPL-CCNC: 9 U/L (ref 10–40)
ANION GAP SERPL CALCULATED.3IONS-SCNC: 15 MMOL/L (ref 3–16)
AST SERPL-CCNC: 15 U/L (ref 15–37)
BASE EXCESS ARTERIAL: -2.6 MMOL/L (ref -3–3)
BASOPHILS ABSOLUTE: 0 K/UL (ref 0–0.2)
BASOPHILS RELATIVE PERCENT: 0.1 %
BILIRUB SERPL-MCNC: 0.4 MG/DL (ref 0–1)
BUN BLDV-MCNC: 51 MG/DL (ref 7–20)
CALCIUM IONIZED: 1.11 MMOL/L (ref 1.12–1.32)
CALCIUM SERPL-MCNC: 8 MG/DL (ref 8.3–10.6)
CARBOXYHEMOGLOBIN ARTERIAL: 0.4 % (ref 0–1.5)
CHLORIDE BLD-SCNC: 98 MMOL/L (ref 99–110)
CO2: 21 MMOL/L (ref 21–32)
CREAT SERPL-MCNC: 2.5 MG/DL (ref 0.6–1.2)
EOSINOPHILS ABSOLUTE: 0 K/UL (ref 0–0.6)
EOSINOPHILS RELATIVE PERCENT: 0 %
GFR AFRICAN AMERICAN: 23
GFR NON-AFRICAN AMERICAN: 19
GLUCOSE BLD-MCNC: 221 MG/DL (ref 70–99)
GLUCOSE BLD-MCNC: 224 MG/DL (ref 70–99)
GLUCOSE BLD-MCNC: 262 MG/DL (ref 70–99)
GLUCOSE BLD-MCNC: 277 MG/DL (ref 70–99)
GLUCOSE BLD-MCNC: 277 MG/DL (ref 70–99)
GLUCOSE BLD-MCNC: 282 MG/DL (ref 70–99)
HCO3 ARTERIAL: 20.9 MMOL/L (ref 21–29)
HCT VFR BLD CALC: 22.7 % (ref 36–48)
HCT VFR BLD CALC: 24.2 % (ref 36–48)
HEMOGLOBIN, ART, EXTENDED: 8.7 G/DL (ref 12–16)
HEMOGLOBIN: 7.4 G/DL (ref 12–16)
HEMOGLOBIN: 8 G/DL (ref 12–16)
INR BLD: 1.27 (ref 0.87–1.14)
LYMPHOCYTES ABSOLUTE: 0.7 K/UL (ref 1–5.1)
LYMPHOCYTES RELATIVE PERCENT: 2.7 %
MAGNESIUM: 2.4 MG/DL (ref 1.8–2.4)
MCH RBC QN AUTO: 27.3 PG (ref 26–34)
MCHC RBC AUTO-ENTMCNC: 32.7 G/DL (ref 31–36)
MCV RBC AUTO: 83.5 FL (ref 80–100)
METHEMOGLOBIN ARTERIAL: 0.5 %
MONOCYTES ABSOLUTE: 1.4 K/UL (ref 0–1.3)
MONOCYTES RELATIVE PERCENT: 5.4 %
NEUTROPHILS ABSOLUTE: 23.5 K/UL (ref 1.7–7.7)
NEUTROPHILS RELATIVE PERCENT: 91.8 %
O2 SAT, ARTERIAL: 78.2 %
O2 THERAPY: ABNORMAL
PCO2 ARTERIAL: 31 MMHG (ref 35–45)
PDW BLD-RTO: 14.6 % (ref 12.4–15.4)
PERFORMED ON: ABNORMAL
PH ARTERIAL: 7.45 (ref 7.35–7.45)
PH VENOUS: 7.38 (ref 7.35–7.45)
PHOSPHORUS: 3.3 MG/DL (ref 2.5–4.9)
PLATELET # BLD: 87 K/UL (ref 135–450)
PMV BLD AUTO: 8.1 FL (ref 5–10.5)
PO2 ARTERIAL: 40.6 MMHG (ref 75–108)
POTASSIUM SERPL-SCNC: 4.1 MMOL/L (ref 3.5–5.1)
PROTHROMBIN TIME: 15.7 SEC (ref 11.7–14.5)
RBC # BLD: 2.72 M/UL (ref 4–5.2)
SODIUM BLD-SCNC: 134 MMOL/L (ref 136–145)
TCO2 ARTERIAL: 21.8 MMOL/L
TOTAL PROTEIN: 5 G/DL (ref 6.4–8.2)
WBC # BLD: 25.6 K/UL (ref 4–11)

## 2022-09-21 PROCEDURE — 85018 HEMOGLOBIN: CPT

## 2022-09-21 PROCEDURE — 99024 POSTOP FOLLOW-UP VISIT: CPT | Performed by: SURGERY

## 2022-09-21 PROCEDURE — 80053 COMPREHEN METABOLIC PANEL: CPT

## 2022-09-21 PROCEDURE — 6370000000 HC RX 637 (ALT 250 FOR IP)

## 2022-09-21 PROCEDURE — 6360000002 HC RX W HCPCS: Performed by: STUDENT IN AN ORGANIZED HEALTH CARE EDUCATION/TRAINING PROGRAM

## 2022-09-21 PROCEDURE — 2580000003 HC RX 258: Performed by: INTERNAL MEDICINE

## 2022-09-21 PROCEDURE — 85025 COMPLETE CBC W/AUTO DIFF WBC: CPT

## 2022-09-21 PROCEDURE — 6370000000 HC RX 637 (ALT 250 FOR IP): Performed by: INTERNAL MEDICINE

## 2022-09-21 PROCEDURE — 02H633Z INSERTION OF INFUSION DEVICE INTO RIGHT ATRIUM, PERCUTANEOUS APPROACH: ICD-10-PCS | Performed by: INTERNAL MEDICINE

## 2022-09-21 PROCEDURE — 2580000003 HC RX 258: Performed by: STUDENT IN AN ORGANIZED HEALTH CARE EDUCATION/TRAINING PROGRAM

## 2022-09-21 PROCEDURE — 2700000000 HC OXYGEN THERAPY PER DAY

## 2022-09-21 PROCEDURE — 85610 PROTHROMBIN TIME: CPT

## 2022-09-21 PROCEDURE — 94761 N-INVAS EAR/PLS OXIMETRY MLT: CPT

## 2022-09-21 PROCEDURE — 82803 BLOOD GASES ANY COMBINATION: CPT

## 2022-09-21 PROCEDURE — 71045 X-RAY EXAM CHEST 1 VIEW: CPT

## 2022-09-21 PROCEDURE — 36556 INSERT NON-TUNNEL CV CATH: CPT | Performed by: NURSE PRACTITIONER

## 2022-09-21 PROCEDURE — 6360000002 HC RX W HCPCS: Performed by: INTERNAL MEDICINE

## 2022-09-21 PROCEDURE — 83735 ASSAY OF MAGNESIUM: CPT

## 2022-09-21 PROCEDURE — 84100 ASSAY OF PHOSPHORUS: CPT

## 2022-09-21 PROCEDURE — 36556 INSERT NON-TUNNEL CV CATH: CPT

## 2022-09-21 PROCEDURE — 2000000000 HC ICU R&B

## 2022-09-21 PROCEDURE — 82330 ASSAY OF CALCIUM: CPT

## 2022-09-21 PROCEDURE — 99291 CRITICAL CARE FIRST HOUR: CPT | Performed by: INTERNAL MEDICINE

## 2022-09-21 PROCEDURE — 94003 VENT MGMT INPAT SUBQ DAY: CPT

## 2022-09-21 PROCEDURE — 6370000000 HC RX 637 (ALT 250 FOR IP): Performed by: STUDENT IN AN ORGANIZED HEALTH CARE EDUCATION/TRAINING PROGRAM

## 2022-09-21 PROCEDURE — 36592 COLLECT BLOOD FROM PICC: CPT

## 2022-09-21 PROCEDURE — 85014 HEMATOCRIT: CPT

## 2022-09-21 PROCEDURE — C9113 INJ PANTOPRAZOLE SODIUM, VIA: HCPCS | Performed by: STUDENT IN AN ORGANIZED HEALTH CARE EDUCATION/TRAINING PROGRAM

## 2022-09-21 RX ORDER — INSULIN LISPRO 100 [IU]/ML
0-16 INJECTION, SOLUTION INTRAVENOUS; SUBCUTANEOUS EVERY 4 HOURS
Status: DISCONTINUED | OUTPATIENT
Start: 2022-09-21 | End: 2022-10-06 | Stop reason: HOSPADM

## 2022-09-21 RX ADMIN — INSULIN LISPRO 4 UNITS: 100 INJECTION, SOLUTION INTRAVENOUS; SUBCUTANEOUS at 21:01

## 2022-09-21 RX ADMIN — Medication: at 05:49

## 2022-09-21 RX ADMIN — FLUCONAZOLE 200 MG: 2 INJECTION, SOLUTION INTRAVENOUS at 21:22

## 2022-09-21 RX ADMIN — INSULIN LISPRO 8 UNITS: 100 INJECTION, SOLUTION INTRAVENOUS; SUBCUTANEOUS at 10:06

## 2022-09-21 RX ADMIN — INSULIN LISPRO 2 UNITS: 100 INJECTION, SOLUTION INTRAVENOUS; SUBCUTANEOUS at 01:08

## 2022-09-21 RX ADMIN — INSULIN LISPRO 8 UNITS: 100 INJECTION, SOLUTION INTRAVENOUS; SUBCUTANEOUS at 14:35

## 2022-09-21 RX ADMIN — INSULIN LISPRO 4 UNITS: 100 INJECTION, SOLUTION INTRAVENOUS; SUBCUTANEOUS at 18:29

## 2022-09-21 RX ADMIN — FENTANYL CITRATE 25 MCG: 0.05 INJECTION, SOLUTION INTRAMUSCULAR; INTRAVENOUS at 03:21

## 2022-09-21 RX ADMIN — INSULIN LISPRO 2 UNITS: 100 INJECTION, SOLUTION INTRAVENOUS; SUBCUTANEOUS at 05:49

## 2022-09-21 RX ADMIN — PANTOPRAZOLE SODIUM 40 MG: 40 INJECTION, POWDER, FOR SOLUTION INTRAVENOUS at 10:02

## 2022-09-21 RX ADMIN — PIPERACILLIN AND TAZOBACTAM 3375 MG: 3; .375 INJECTION, POWDER, LYOPHILIZED, FOR SOLUTION INTRAVENOUS at 05:49

## 2022-09-21 RX ADMIN — SODIUM CHLORIDE: 9 INJECTION, SOLUTION INTRAVENOUS at 21:22

## 2022-09-21 RX ADMIN — CEFTRIAXONE 2000 MG: 2 INJECTION, POWDER, FOR SOLUTION INTRAMUSCULAR; INTRAVENOUS at 10:01

## 2022-09-21 ASSESSMENT — PULMONARY FUNCTION TESTS
PIF_VALUE: 17
PIF_VALUE: 11
PIF_VALUE: 15
PIF_VALUE: 14
PIF_VALUE: 14
PIF_VALUE: 18
PIF_VALUE: 15
PIF_VALUE: 18
PIF_VALUE: 15
PIF_VALUE: 14
PIF_VALUE: 17
PIF_VALUE: 15

## 2022-09-21 ASSESSMENT — PAIN SCALES - GENERAL: PAINLEVEL_OUTOF10: 0

## 2022-09-21 ASSESSMENT — PAIN SCALES - WONG BAKER: WONGBAKER_NUMERICALRESPONSE: 4

## 2022-09-21 NOTE — PROCEDURES
Bucky Camilo is a 67 y.o. female patient. 1. Lactic acidosis    2. Generalized abdominal pain    3. Non-intractable vomiting with nausea, unspecified vomiting type    4. Acute cystitis with hematuria    5. Septicemia ([facility] [facility]    6. Perforated bowel ([facility] [address]    7. Stercoral colitis      Past Medical History:   Diagnosis Date    Chronic kidney disease     Diabetes mellitus (HCC)     Hyperlipidemia     Hypertension     Neuropathy      Blood pressure (!) 128/56, pulse 76, temperature 99.1 °F (37.3 °C), temperature source Axillary, resp. rate 20, height 5' 9\" (1.753 m), weight 171 lb 11.8 oz (77.9 kg), SpO2 97 %. Temporary Vas Cath Placement    Date/Time: 9/21/2022 3:32 PM  Performed by: BRAEDEN Fink CNP  Authorized by: BRAEDEN Fink CNP   Consent: Verbal consent obtained. Risks and benefits: risks, benefits and alternatives were discussed  Consent given by: spouse  Imaging studies: imaging studies available  Patient identity confirmed: arm band and verbally with patient  Indications: vascular access    Sedation:  Patient sedated: no    Preparation: skin prepped with 2% chlorhexidine  Skin prep agent dried: skin prep agent completely dried prior to procedure  Sterile barriers: all five maximum sterile barriers used - cap, mask, sterile gown, sterile gloves, and large sterile sheet  Hand hygiene: hand hygiene performed prior to central venous catheter insertion  Location details: right internal jugular  Patient position: flat  Catheter type: triple lumen  Catheter size: 13Fr. Ultrasound guidance: no  Number of attempts: 1  Successful placement: yes  Post-procedure: line sutured and dressing applied  Assessment: placement verified by x-ray  Patient tolerance: patient tolerated the procedure well with no immediate complications  Comments: Exchanged R IJ CVC for vas cath, need for HD. Line exchanged over guidewire using sterile precautions as stated above.  No complications and patient tolerated luann.        Kerry Pedroza, BRAEDEN - VICENTE  9/21/2022

## 2022-09-21 NOTE — PROGRESS NOTES
Intensive Care Unit  Intensivist Progress Note    Patient: Devorah Suarez  : 1950  MRN#: [id number]  2022 7:51 AM  ADMISSION DAY:  2022 11:45 AM     Subjective: The patient started to follow commands. The patient is afebrile. She continues to be on mechanical ventilation however requiring only FiO2 30%. She is off sedation since Saturday. WBC started to trend down, today is 20 5K from 30.6 yesterday. Hemoglobin 7.4. The drainage fluid was positive for E. Coli  Still anuric    HPI:   Devorah Suarez is a 67 y.o. female with a history of CKD, diabetes, hyperlipidemia, hypertension, and neuropathy who presents to the ED complaining of abdominal pain/syncope. By EMS report, patient has had constipation over the last several days. .  Patient reportedly had a large bowel movement and shortly thereafter had a syncopal/near syncopal event where she was helped to the floor. EMS was called and arrived to find patient with blood pressure of 50/30.  300 cc of normal saline were infused during truck ride to the ED. Blood pressure shortly after arrival was 134/94. Patient reports lower abdominal discomfort. No additional complaints. No other complaints, modifying factors or associated symptoms. In ER -Patient seen and evaluated. Old records reviewed. Labs and imaging reviewed and results discussed with patient. Patient was given broad-spectrum antibiotics, normal saline, and multiple pressors. Central line was placed. Improvement of symptoms throughout patient's stay in the emergency department. Labs reveal significant leukocytosis with elevated lactic and concern for UTI. Mild DEJAN noted. Imaging shows nonspecific enteritis. Patient will be admitted for further evaluation and treatment. . Patient was reassessed as noted above . Dayana Johnson Plan of care discussed with patient and family. Patient and family in agreement with plan.    Patient was transferred to the ICU for further management  Patient when seen in the ICU was somewhat lethargic but was able to give simple answers and was complaining of abdominal pain, patient was also was found to be significantly hypotensive in spite of patient getting IV Levophed, patient's systolic blood pressure was 52 mmHg when seen, patient's blood sugars were still on the higher side now which was lower as per EMT, patient was given dextrose 50 IV push in the EMS as per documentation, patient was having sinus rhythm on the monitor which was ranging from normal sinus rhythm to sinus tachycardia, patient was given another fluid bolus and also vasopressin infusion was started on the patient, no other pertinent review of system of concern    Patient Vitals for the past 8 hrs:   BP Temp Temp src Pulse Resp SpO2 Weight   09/21/22 0700 132/62 -- -- 81 17 98 % --   09/21/22 0600 (!) 156/67 -- -- 88 18 96 % --   09/21/22 0500 137/65 -- -- 74 (!) 7 97 % 171 lb 11.8 oz (77.9 kg)   09/21/22 0412 -- -- -- 87 14 96 % --   09/21/22 0400 -- 98.9 °F (37.2 °C) Axillary 87 13 96 % --   09/21/22 0300 (!) 124/57 -- -- 71 14 97 % --   09/21/22 0200 130/62 -- -- 70 15 97 % --   09/21/22 0100 (!) 136/57 -- -- 73 17 97 % --   09/21/22 0000 (!) 133/58 98.8 °F (37.1 °C) Axillary 73 19 97 % --         EXAM:  HENT: Endotracheal tube present no thyromegaly. Open eyes to commands, follow commands intermittently. Eyes:  Conjunctivae are normal. Pupils equal, round, and reactive to light. No scleral icterus. Neck: . No tracheal deviation present. No obvious thyroid mass. Cardiovascular: Sinus rhythm normal heart sounds. No right ventricular heave. No lower extremity edema. Pulmonary/Chest: No wheezes. No rales. No accessory muscle usage or stridor. Decreased breath sound bilaterally  Abdominal: Soft. Some significant bowel sounds present. No distension or hernia. Ostomy present  Musculoskeletal: No cyanosis. No clubbing. No obvious joint deformity.    Lymphadenopathy: No cervical or supraclavicular adenopathy. Skin: Skin is warm and dry. No rash or nodules on the exposed extremities. Intake/Output Summary (Last 24 hours) at 9/21/2022 0751  Last data filed at 9/21/2022 0600  Gross per 24 hour   Intake 676.19 ml   Output 425 ml   Net 251.19 ml       I/O last 3 completed shifts: In: 926.2 [I.V.:82; NG/GT:322; IV Piggyback:522.2]  Out: 538 [Urine:13; Emesis/NG output:175; Drains:55; Other:200; Stool:95]   Date 09/21/22 0000 - 09/21/22 2359   Shift 1339-5038 9466-7398 2277-8683 24 Hour Total   INTAKE   NG/GT(mL/kg) 193(2.5)   193(2.5)   IV Piggyback(mL/kg) 52.2(0.7)   52.2(0.7)   Shift Total(mL/kg) 245. 2(3.1)   245. 2(3.1)   OUTPUT   Shift Total(mL/kg)       Weight (kg) 77.9 77.9 77.9 77.9       Wt Readings from Last 3 Encounters:   09/21/22 171 lb 11.8 oz (77.9 kg)   07/14/22 161 lb (73 kg)      Body mass index is 25.36 kg/m².          Scheduled Meds:   fluconazole  200 mg IntraVENous Q24H    hydrocortisone sodium succinate PF  50 mg IntraVENous Q12H    piperacillin-tazobactam  3,375 mg IntraVENous Q8H    pantoprazole  40 mg IntraVENous Daily    insulin lispro  0-4 Units SubCUTAneous Q4H     Continuous Infusions:   prismaSATE BGK 4/2.5 Stopped (09/19/22 1030)    prismaSATE BGK 4/2.5 Stopped (09/19/22 1030)    prismaSATE BGK 4/2.5 Stopped (09/19/22 1030)    sodium chloride      norepinephrine Stopped (09/18/22 0126)    dextrose      sodium chloride      vasopressin (Septic Shock) infusion Stopped (09/16/22 1205)     PRN Meds:heparin (porcine), 3,200 Units, PRN  fentanNYL, 25 mcg, Q4H PRN  midazolam, 2 mg, Q4H PRN  prochlorperazine, 5 mg, Q6H PRN  potassium chloride, 20 mEq, PRN  magnesium sulfate, 1,000 mg, PRN  sodium phosphate IVPB, 6 mmol, PRN   Or  sodium phosphate IVPB, 12 mmol, PRN   Or  sodium phosphate IVPB, 18 mmol, PRN   Or  sodium phosphate IVPB, 24 mmol, PRN  calcium gluconate, 1,000 mg, PRN   Or  calcium gluconate, 2,000 mg, PRN   Or  calcium gluconate, 3,000 mg, PRN Or  calcium gluconate, 4,000 mg, PRN  sodium chloride flush, 5-40 mL, PRN  sodium chloride, 25 mL, PRN  HYDROmorphone, 0.25 mg, Q5 Min PRN  HYDROmorphone, 0.5 mg, Q5 Min PRN  ondansetron, 4 mg, Q10 Min PRN  midazolam, 1 mg, Q5 Min PRN  fentanNYL, 25 mcg, Q1H PRN  glucose, 4 tablet, PRN  dextrose bolus, 125 mL, PRN   Or  dextrose bolus, 250 mL, PRN  glucagon (rDNA), 1 mg, PRN  dextrose, , Continuous PRN  sodium chloride flush, 5-40 mL, PRN  sodium chloride, , PRN  polyethylene glycol, 17 g, Daily PRN  acetaminophen, 650 mg, Q6H PRN   Or  acetaminophen, 650 mg, Q6H PRN      Oxygen Delivery - O2 Flow Rate (L/min): 2 L/min  VENT SETTINGS (Comprehensive)  Vent Information  Equipment Changed: HME  Ventilator Initiate: Yes  Vent Mode: AC/PRVC  Additional Respiratory Assessments  Heart Rate: 81  Resp: 17  SpO2: 98 %  Humidification Source: HME  Circuit Condensation: Drained      DATA:    CBC:   Recent Labs     09/19/22  0440 09/20/22  0520 09/21/22  0500   WBC 30.3* 30.6* 25.6*   RBC 3.06* 2.99* 2.72*   HGB 8.6* 8.3* 7.4*   HCT 25.9* 25.5* 22.7*   MCV 84.6 85.4 83.5   MCH 28.0 27.8 27.3   MCHC 33.1 32.6 32.7   RDW 14.2 14.9 14.6   PLT 83* 91* 87*   MPV 7.8 8.0 8.1        BMP/CMP:   Recent Labs     09/19/22  0440 09/19/22  1212 09/20/22  0520 09/20/22  1200 09/21/22  0500      < > 135* 135* 134*   K 4.1   < > 4.2 3.9 4.1      < > 99 99 98*   CO2 22   < > 19* 21 21   ANIONGAP 14   < > 17* 15 15   BUN 12   < > 39* 30* 51*   CREATININE 0.8   < > 1.9* 1.6* 2.5*   GLUCOSE 138*   < > 247* 208* 282*   CALCIUM 8.7   < > 8.7 7.9* 8.0*   PROT 5.4*  --  4.9*  --  5.0*   LABALBU 2.8*   < > 2.5* 2.7* 2.4*   BILITOT 0.4  --  0.3  --  0.4   ALKPHOS 241*  --  201*  --  175*   AST 38*  --  22  --  15   ALT 16  --  13  --  9*    < > = values in this interval not displayed.         Other Electrolytes:   Recent Labs     09/19/22  0440 09/19/22  1210 09/20/22  0520 09/20/22  1200 09/21/22  0500   CAION 1.07*   < > 1.10* 1.09* 1.11* PHOS 1.8*   < > 3.8 2.8 3.3   MG 2.30  --  2.50*  --  2.40    < > = values in this interval not displayed. Serum Osmolality  No results for input(s): OSMOMEASER in the last 72 hours. Procalcitonin:  No results for input(s): PROCAL in the last 72 hours. Cardiac: No results for input(s): TROPONINT in the last 72 hours. Lipids: No results for input(s): CHOL, HDL in the last 72 hours. Invalid input(s): LDLCALCU  Coagulation:   Recent Labs     09/19/22  0440 09/20/22  0520 09/21/22  0500   INR 1.22* 1.28* 1.27*       Lactic Acid:   No results for input(s): LACTA in the last 72 hours. ABGs:   No results found for: PH, PCO2, PO2, HCO3, O2SAT  No results found for: Titus Bowie    Radiology/Imaging:   XR CHEST PORTABLE [[id number]] Collected: 09/15/22 2226     Order Status: Completed Updated: 09/16/22 1429     Narrative:       EXAMINATION:   ONE XRAY VIEW OF THE CHEST     9/15/2022 8:40 pm     COMPARISON:   09/14/2022     HISTORY:   ORDERING SYSTEM PROVIDED HISTORY: right IJ central line evaluation, intubated   after OR   TECHNOLOGIST PROVIDED HISTORY:   Reason for exam:->right IJ central line evaluation, intubated after OR   Reason for Exam: ett; cvc     FINDINGS:   Endotracheal tube tip is 5.3 cm above the mark. Right IJ CVC catheter tip   overlies the SVC at the level of the thoracic inlet. The heart appears borderline enlarged; however, this may be accentuated by   technique. Possible small right pleural effusion. No pneumothorax is seen. Mild bibasilar airspace opacities. Impression:       Right IJ CVC catheter tip overlies the SVC at the level of the thoracic inlet. Endotracheal tube tip is 5.3 cm above the mark. Mild bibasilar airspace opacities, which could represent as atelectasis   and/or infiltrate. Possible small right pleural effusion.       XR ABDOMEN FOR NG/OG/NE TUBE PLACEMENT [[id number]] Collected: 09/16/22 0856     Order Status: Completed Updated: 09/16/22 0842     Narrative:       EXAMINATION:   ONE SUPINE XRAY VIEW(S) OF THE ABDOMEN     9/16/2022 7:29 am     COMPARISON:   None. HISTORY:   ORDERING SYSTEM PROVIDED HISTORY: Confirmation of course of NG/OG/NE tube and   location of tip of tube   TECHNOLOGIST PROVIDED HISTORY:   Reason for exam:->Confirmation of course of NG/OG/NE tube and location of tip   of tube   Portable? ->Yes   Reason for Exam: NGT placement     FINDINGS:   Tip of NG tube projects in the left upper quadrant in the region of the   gastric fundus     Postsurgical change seen in upper abdomen. Pleuroparenchymal disease is seen at the right lung base. Impression:       Tip of NG tube projects in the left upper quadrant in the region of the   gastric fundus      XR CHEST PORTABLE [[id number]] Collected: 09/16/22 0130     Order Status: Completed Updated: 09/16/22 0137     Narrative:       EXAMINATION:   ONE XRAY VIEW OF THE CHEST     9/15/2022 11:34 pm     COMPARISON:   09/15/2022     HISTORY:   ORDERING SYSTEM PROVIDED HISTORY: central line insertion   TECHNOLOGIST PROVIDED HISTORY:   Reason for exam:->central line insertion   Reason for Exam: central line     FINDINGS:   Endotracheal tube appears in appropriate position. Interval placement of a   jugular venous central catheter which extends into the inferior aspect of the   right atrium. The catheter tip measures approximately 5 cm beyond the   cavoatrial junction. No acute osseous abnormality is identified. Small   right-sided pleural effusion similar appearing basilar airspace disease. Osseous structures appear similar. Impression:       Interval placement of a right-sided central venous catheter which extends   into the inferior aspect of the right atrium, approximately 5 cm beyond the   cavoatrial junction.       CT ABDOMEN PELVIS WO CONTRAST Additional Contrast? None [[id number]] Collected: 09/15/22 1017     Order Status: Completed Updated: 09/15/22 1027 Narrative:       EXAMINATION:   CT OF THE ABDOMEN AND PELVIS WITHOUT CONTRAST 9/15/2022 10:00 am     TECHNIQUE:   CT of the abdomen and pelvis was performed without the administration of   intravenous contrast. Multiplanar reformatted images are provided for review. Automated exposure control, iterative reconstruction, and/or weight based   adjustment of the mA/kV was utilized to reduce the radiation dose to as low   as reasonably achievable. COMPARISON:   09/14/2022 CT     HISTORY:   ORDERING SYSTEM PROVIDED HISTORY: worsening acidosis, hypotension ? ischemia   TECHNOLOGIST PROVIDED HISTORY:   Reason for exam:->worsening acidosis, hypotension ? ischemia   Additional Contrast?->None   Reason for Exam: worsening labs, abd pain     FINDINGS:   Lower Chest:  Interval development of a small right pleural effusion with   dense peripheral airspace opacification in the right lower lobe. Base of the   heart is normal.     Organs: Liver parenchyma is normal on this noncontrast exam.  Slight   nodularity of the capsule may suggest an underlying pattern of chronic liver   disease. There is mild ascites. The gallbladder is absent. Biliary ducts   and pancreas normal.  Calcified granulomata in the spleen. Kidneys and   adrenal glands are normal.     GI/Bowel: The stomach is normal.  Prior imaging described enteritis of the   duodenum and proximal jejunum. This is difficult to evaluate on the current   noncontrast exam but there is no pattern of inflammation. Questionable   mucosal thickening of the distal duodenum and proximal jejunum. No pattern   of obstruction. Of greater suspicion, there is severe inflammatory change in   the right lower quadrant that has progressed since the prior exam.  This   includes mesenteric inflammation and ill-defined free fluid. There is no   pattern of perforation or abscess. Etiology appears to correspond to either   mucosal changes of the distal ileum, terminal ileum or cecum. Nonvisualized   appendix. There is also a pattern of diffuse mucosal thickening of the colon   extending from the splenic flexure through the rectum. Dense stool within   the distal colon also noted. Pelvis: The bladder is decompressed around a Osorio catheter. The uterus and   adnexa are unremarkable. Peritoneum/Retroperitoneum: The aorta tapers normally. No adenopathy. Bones/Soft Tissues: No significant skeletal abnormalities. Impression:       1. Limited evaluation of the GI tract on this noncontrast exam.  Improved   mucosal changes of the duodenum and proximal jejunum that were described on   prior CT. 2. Severe inflammatory change in the right lower quadrant with etiology   uncertain. This could correspond to enteritis of the distal ileum and   terminal ileum. Colitis may also be considered. Infectious or inflammatory   etiologies may be favored. 3. Dense stool and diffuse mucosal thickening of the distal colon which may   represent a pattern of colitis. 4. Small right pleural effusion with airspace changes in the right lower   lobe, new from prior exam.   5. Evidence of chronic liver disease with a small amount of ascites stable. CT ABDOMEN PELVIS WO CONTRAST Additional Contrast? None [[id number]] Collected: 09/14/22 1452     Order Status: Completed Updated: 09/14/22 6607     Narrative:       EXAMINATION:   CT OF THE ABDOMEN AND PELVIS WITHOUT CONTRAST, 9/14/2022 2:46 pm     TECHNIQUE:   CT of the abdomen and pelvis was performed without the administration of   intravenous contrast. Multiplanar reformatted images are provided for review. Automated exposure control, iterative reconstruction, and/or weight based   adjustment of the mA/kV was utilized to reduce the radiation dose to as low   as reasonably achievable.      COMPARISON:   None     HISTORY:   ORDERING SYSTEM PROVIDED HISTORY:  Abdominal pain/ near syncope   TECHNOLOGIST PROVIDED HISTORY:   Additional Contrast? None   Reason for Exam:  Abdominal pain/ near syncope   Decision Support Exception - unselect if not a suspected or confirmed   emergency medical condition->Emergency Medical Condition (MA)   Reason for Exam:  Vomiting and pain     FINDINGS:   Lower Chest: No active disease. Organs: The liver appears unremarkable. Status post cholecystectomy. Pancreas and spleen, adrenals, kidneys, aorta and IVC appear stable. GI/Bowel: There is evidence of thickening of the duodenum as well as jejunal   loops with perijejunal inflammatory changes. These changes are compatible   with acute enteritis. No evidence of bowel obstruction or perforation. Evidence of constipation and significant stool impaction in the rectum. Pelvis: Urinary bladder contains Osorio catheter. The uterus and adnexa   demonstrate no acute abnormality. Peritoneum/Retroperitoneum: No evidence of retroperitoneal lymphadenopathy or   acute peritoneal findings. Mild ascites mostly around the liver and spleen. Bones/Soft Tissues: No acute abnormality. Osteopenia. Moderate multilevel   degenerative disc disease. Impression:       1. Acute enteritis involving the duodenum and jejunal loops with thickening   of the loops and edema. No evidence of bowel obstruction. 2. Constipation with significant stool impaction in the rectum. 3. Mild ascites mostly around the liver and spleen. 4. Adequate position of Osorio catheter in the urinary bladder. XR CHEST PORTABLE [[id number]] Collected: 09/14/22 1334     Order Status: Completed Updated: 09/14/22 1349     Narrative:       EXAMINATION:   ONE XRAY VIEW OF THE CHEST     9/14/2022 10:22 am     COMPARISON:   None.      HISTORY:   ORDERING SYSTEM PROVIDED HISTORY: confirm central line   TECHNOLOGIST PROVIDED HISTORY:   Reason for exam:->confirm central line   Reason for Exam: confirm central line     FINDINGS:   A right internal jugular venous catheter is been placed with the tip at the   cavoatrial junction. No pneumothorax is identified. The lungs and costophrenic angles are clear. The cardiomediastinal   silhouette and pulmonary vessels appear normal.      Impression:       Placement of a right internal jugular central venous catheter without evident   complication. The tip is at approximately the cavoatrial junction. No acute findings in the chest.          Patient Active Problem List   Diagnosis    DKA (diabetic ketoacidosis) ([facility])    Hypotension    Syncope    Hyponatremia    DEJAN (acute kidney injury) ([address]    Abdominal pain    Enteritis    Septic shock ([facility])    Elevated troponin    Leukocytosis    Pyuria    Lactic acidosis    DM (diabetes mellitus), secondary uncontrolled ([address]    Ischemic colitis ([facility])    S/P exploratory laparotomy    Acute postoperative pulmonary insufficiency ([facility])       Assessment:  Acute respiratory failure with hypoxia  Ischemic colitis s/p exploratory laparotomy and sigmoid colectomy and colostomy on 9/15/2022  Septic shock. Acute encephalopathy  Acute renal failure  Syncope  Electrolytes abnormality  Diabetes mellitus type 2. Hyponatremia        Plan:    Passed spontaneous breathing trial, proceed with extubation. WBC is still elevated, but improved from yesterday, down to 25K today. The abdomen is tender, CT scan showed fluid collection that failed to drainage. Stop Zosyn and start ceftriaxone 2 g daily. Continue  Diflucan  Continue mechanical ventilation support, she is getting more awake, hopefully will try to extubate tomorrow if she continues to improve. Mental status precludes extubation. Surgery is following. Plan to repeat CT scan of the abdomen later  Sedation is off since Saturday. Pain control. Pressors are off  Hemodialysis today. The femoral temporary hemodialysis catheter was removed.   Will place new one as IJ in the next hemodialysis session  CT scan of the brain is negative  DVT and GI prophylaxis  Ccm:32    Reviewed with ICU Staff     Seen with multidisciplinary ICU team         Linwood Chamberlain MD,

## 2022-09-21 NOTE — PROGRESS NOTES
[facility] GENERAL SURGERY DAILY PROGRESS NOTE    SUBJECTIVE: extubated    OBJECTIVE: CURRENT VITALS:  BP (!) 122/58   Pulse 76   Temp 99.1 °F (37.3 °C) (Axillary)   Resp 14   Ht 5' 9\" (1.753 m)   Wt 171 lb 11.8 oz (77.9 kg)   SpO2 100%   BMI 25.36 kg/m²          ABD: Soft  BROOKS drainage serous  OSTOMY:  Pink; with some output    LABS:    CBC:   Recent Labs     09/19/22  0440 09/20/22  0520 09/21/22  0500   WBC 30.3* 30.6* 25.6*   RBC 3.06* 2.99* 2.72*   HGB 8.6* 8.3* 7.4*   HCT 25.9* 25.5* 22.7*   MCV 84.6 85.4 83.5   RDW 14.2 14.9 14.6   PLT 83* 91* 87*       BMP:   Recent Labs     09/20/22  0520 09/20/22  1200 09/21/22  0500   * 135* 134*   K 4.2 3.9 4.1   CL 99 99 98*   CO2 19* 21 21   PHOS 3.8 2.8 3.3   BUN 39* 30* 51*   CREATININE 1.9* 1.6* 2.5*       Recent Labs     09/19/22  0440 09/20/22  0520 09/21/22  0500   MG 2.30 2.50* 2.40          ASSESSMENT AND PLAN:  67 y.o. female status post diagnosis of ischemic sigmoid colon s/p exploratory laparotomy, sigmoid colectomy, colostomy formation POD 5     Continue tube feeds  IV abx continued  Follow labs    Victor Manuel Melendez, [address], General Surgery  09/21/22  3:00 PM

## 2022-09-21 NOTE — PROGRESS NOTES
09/21/22 0752   Patient Observation   Heart Rate 81   Resp (!) 0   SpO2 98 %   Breath Sounds   Right Upper Lobe Rhonchi   Right Middle Lobe Diminished   Right Lower Lobe Diminished   Left Upper Lobe Rhonchi   Left Lower Lobe Diminished   Vent Information   Vent Mode AC/PRVC   Ventilator Settings   FiO2  30 %   Vt (Set, mL) 450 mL   Resp Rate (Set) 14 bmp   PEEP/CPAP (cmH2O) 5   Vent Patient Data (Readings)   Vt (Measured) 491 mL   Peak Inspiratory Pressure (cmH2O) 17 cmH2O   Rate Measured 16 br/min   Minute Volume (L/min) 7.35 Liters   Mean Airway Pressure (cmH2O) 7.9 cmH20   I:E Ratio 1:3.20   I Time/ I Time % 0.9 s   Vent Alarm Settings   Low Minute Volume (lpm) 2 L/min   Low Exhaled Vt (ml) 200 mL   RR High (bpm) 40 br/min   Apnea (secs) 20 secs   Additional Respiratoray Assessments   Humidification Source HME   Airway Clearance   Suction ET Tube   Suction Device Inline suction catheter   Sputum Method Obtained Endotracheal   Sputum Amount Small   Sputum Color/Odor Clear   Sputum Consistency Thin   ETT (adult)   Placement Date/Time: 09/15/22 1720   Present on Admission/Arrival: No  Placed By: In surgery; Licensed provider  Placement Verified By: Capnometry;Direct visualization; Auscultation  Preoxygenation: Yes  Mask Ventilation: Ventilated by mask with oral ai. ..    Secured At 23 cm   Measured From Lips   ETT Placement Right   Secured By Commercial tube wills   Cuff Pressure 30 cm H2O

## 2022-09-21 NOTE — PROGRESS NOTES
09/21/22 0000   Patient Observation   Heart Rate 73   Resp 19   SpO2 97 %   Vent Information   Vent Mode AC/PRVC   $Ventilation $Subsequent Day   Ventilator Settings   FiO2  30 %   Vt (Set, mL) 450 mL   Resp Rate (Set) 14 bmp   PEEP/CPAP (cmH2O) 5   Vent Patient Data (Readings)   Vt (Measured) 489 mL   Peak Inspiratory Pressure (cmH2O) 14 cmH2O   Rate Measured 19 br/min   Minute Volume (L/min) 8.85 Liters   Mean Airway Pressure (cmH2O) 7.7 cmH20   I:E Ratio 1:2.80   I Time/ I Time % 0.9 s   Vent Alarm Settings   Low Minute Volume (lpm) 2 L/min   Low Exhaled Vt (ml) 200 mL   RR High (bpm) 40 br/min   Apnea (secs) 20 secs   Additional Respiratoray Assessments   Humidification Source HME   Ambu Bag With Mask At Bedside Yes   ETT (adult)   Placement Date/Time: 09/15/22 1720   Present on Admission/Arrival: No  Placed By: In surgery; Licensed provider  Placement Verified By: Capnometry;Direct visualization; Auscultation  Preoxygenation: Yes  Mask Ventilation: Ventilated by mask with oral ai. ..    Secured At 23 cm   Measured From Lips   ETT Placement Center   Secured By Commercial tube wills   Site Assessment Dry   Cuff Pressure 30 cm H2O

## 2022-09-21 NOTE — PROGRESS NOTES
Hospitalist Progress Note      PCP: Terri Sierra DO,     Date of Admission: 9/14/2022    Chief Complaint: Abdominal Pain    Subjective: no new c/o, now extubated. Medications:  Reviewed    Infusion Medications    prismaSATE BGK 4/2.5 Stopped (09/19/22 1030)    prismaSATE BGK 4/2.5 Stopped (09/19/22 1030)    prismaSATE BGK 4/2.5 Stopped (09/19/22 1030)    sodium chloride      dextrose      sodium chloride       Scheduled Medications    cefTRIAXone (ROCEPHIN) IV  2,000 mg IntraVENous Q24H    insulin lispro  0-16 Units SubCUTAneous Q4H    fluconazole  200 mg IntraVENous Q24H    pantoprazole  40 mg IntraVENous Daily     PRN Meds: heparin (porcine), fentanNYL, prochlorperazine, potassium chloride, magnesium sulfate, sodium phosphate IVPB **OR** sodium phosphate IVPB **OR** sodium phosphate IVPB **OR** sodium phosphate IVPB, calcium gluconate **OR** calcium gluconate **OR** calcium gluconate **OR** calcium gluconate, sodium chloride flush, sodium chloride, HYDROmorphone, HYDROmorphone, ondansetron, fentanNYL, glucose, dextrose bolus **OR** dextrose bolus, glucagon (rDNA), dextrose, sodium chloride flush, sodium chloride, polyethylene glycol, acetaminophen **OR** acetaminophen      Intake/Output Summary (Last 24 hours) at 9/21/2022 1020  Last data filed at 9/21/2022 0946  Gross per 24 hour   Intake 676.19 ml   Output 525 ml   Net 151.19 ml         Physical Exam Performed:    /64   Pulse 81   Temp 99.1 °F (37.3 °C) (Axillary)   Resp 20   Ht 5' 9\" (1.753 m)   Wt 171 lb 11.8 oz (77.9 kg)   SpO2 97%   BMI 25.36 kg/m²     General appearance: No apparent distress, appears stated age and now extubated. HEENT: Pupils equal, round, and reactive to light. Conjunctivae/corneas clear. Neck: Supple, with full range of motion. No jugular venous distention. Trachea midline. Respiratory:  Normal respiratory effort. Clear to auscultation, bilaterally without Rales/Wheezes/Rhonchi.   Cardiovascular: Regular rate and rhythm with normal S1/S2 without murmurs, rubs or gallops. Abdomen: Soft, non-distended with hypoactive bowel sounds. Musculoskeletal: No clubbing, cyanosis or edema bilaterally. Skin: Skin color, texture, turgor normal.  No rashes or lesions. Capillary Refill: Brisk,< 3 seconds   Peripheral Pulses: +2 palpable, equal bilaterally       Labs:   Recent Labs     09/19/22  0440 09/20/22  0520 09/21/22  0500   WBC 30.3* 30.6* 25.6*   HGB 8.6* 8.3* 7.4*   HCT 25.9* 25.5* 22.7*   PLT 83* 91* 87*       Recent Labs     09/20/22  0520 09/20/22  1200 09/21/22  0500   * 135* 134*   K 4.2 3.9 4.1   CL 99 99 98*   CO2 19* 21 21   BUN 39* 30* 51*   CREATININE 1.9* 1.6* 2.5*   CALCIUM 8.7 7.9* 8.0*   PHOS 3.8 2.8 3.3       Recent Labs     09/19/22  0440 09/20/22  0520 09/21/22  0500   AST 38* 22 15   ALT 16 13 9*   BILITOT 0.4 0.3 0.4   ALKPHOS 241* 201* 175*       Recent Labs     09/19/22  0440 09/20/22  0520 09/21/22  0500   INR 1.22* 1.28* 1.27*       No results for input(s): CKTOTAL, TROPONINI in the last 72 hours.       Urinalysis:      Lab Results   Component Value Date/Time    NITRU Negative 09/14/2022 12:08 PM    WBCUA 21-50 09/14/2022 12:08 PM    BACTERIA Rare 09/14/2022 12:08 PM    RBCUA None seen 09/14/2022 12:08 PM    BLOODU Negative 09/14/2022 12:08 PM    SPECGRAV <=1.005 09/14/2022 12:08 PM    GLUCOSEU Negative 09/14/2022 12:08 PM       Consults:    IP CONSULT TO HOSPITALIST  IP CONSULT TO NEPHROLOGY  IP CONSULT TO CRITICAL CARE  IP CONSULT TO PHARMACY  IP CONSULT TO GENERAL SURGERY  IP CONSULT TO SOCIAL WORK  IP CONSULT TO DIETITIAN      Assessment/Plan:    Active Hospital Problems    Diagnosis     Ischemic colitis ([facility] [K55.9]      Priority: Medium    S/P exploratory laparotomy [Z98.890]      Priority: Medium    Acute postoperative pulmonary insufficiency ([facility] [J95.2]      Priority: Medium    Syncope [R55]      Priority: Medium    Hyponatremia [E87.1]      Priority: Medium    Abdominal pain [R10.9]      Priority: Medium    Enteritis [K52.9]      Priority: Medium    Elevated troponin [R77.8]      Priority: Medium    DEJAN (acute kidney injury) ([facility] [N17.9]      Priority: Medium    DM (diabetes mellitus), secondary uncontrolled ([facility] [E13.65]      Priority: Medium         Peritonitis - 2nd to bowel perforation, likely due to stercoral/ischemic colitis. General Surgery consulted and appreciated s/p Lap/Sigmoid Colectomy and Colostomy 15 Sept w/out complications. Sepsis - w/ Leukocytosis/Tachycardia/Fever/Elevated Lactate POArrival 2nd to above infection. Continue IVF as appropriate and monitor clinical response w/ ABX as written - currently Zosyn/Diflucan     ARF - w/ elevated BUN/Cr ratio c/w pre-renal azotemia. Given IVF hydration and follow serial labs. Reviewed and documented as above. Nephrology consulted and appreciated started on CRRT 15 Sept w/ vascath placed. Acute Respiratory Failure - post-op w/ hypoxia,l.  Presence of clinical respiratory distress w/ tachypnea/dyspnea/SOB and wheezing w/ use of accessory muscles to breath requiring intubation. Supplemental O2 and wean as tolerated. HypoNatremia - etiology clinically unable to determine but likely hypovolemic. Follow serial labs on IVF. Reviewed and documented as above. Troponin elevation - of unclear clinical significance w/ etiology clinically unable to determine, w/out signs/sxs of active ischemia. Followed serial troponins. Reviewed and documented. DM2 - controlled on home oral antiGlycemics/Insulin - held/continued. Follow FSBS/SSI low regimen. Last HbA1c 9.2% dated this admission. Anticipate resuming/continuing home regimen at discharge.          DVT Prophylaxis: none      Recent Labs     09/19/22  0440 09/20/22  0520 09/21/22  0500   PLT 83* 91* 87*       Diet: Diet NPO  ADULT TUBE FEEDING; Nasogastric; Peptide Based; Continuous; 10; No; 60; Q 4 hours  Code Status: Full Code      PT/OT Eval Status: not yet ordered. Dispo - Remains in ICU. Patient is likely to remain in-house at least for the foreseeable future pending clinical course, subspecialty recs and eventual PT/OT eval/recs.   of 55 years preset at bedside when seen 19/20/21 Sept.  Sister Conor Daniels present 20/21 Sept       Az Steele MD

## 2022-09-21 NOTE — PROGRESS NOTES
Patient responding to voice and following commands. Respiratory notified of extubation orders. Respiratory and RN at bedside. Patient suctioned and extubated to 4L NC. NG tube remains in place. Patient tolerated well.

## 2022-09-21 NOTE — PROGRESS NOTES
Lactic 12.6 down to 7.6 on repeat     Admit Day #1: Nights (09/14-09/15)  - C-diff sample sent  - Lactic 11.3, 14.1  - Critical CO2 8  - NS @ 125 changed to bicarb gtt @ 150  - 1x amp bicarb given  - LR bolus x2L + 1L  - 2x amps bicarb per nephro, give 2 more amps if pH <7.2  -urine output increasing, but less than <30 ml/hr     Admit Day #2 Days (09/15)  - C-diff Negative  - Lactic 19.3/18  - pH 7.27; 2 amps bicarb given  - BG 50s; x2 D10 boluses given, repeat BG 100s  - Repeat CT ABD; Severe inflammatory change in the right lower quadrant with etiology   uncertain. This could correspond to enteritis of the distal ileum and   terminal ileum. Colitis may also be considered. Infectious or inflammatory   etiologies may be favored      - Nephrology concerned for metformin-related DEJAN; Right fem vascath placed, CRRT started [id number]  - Consult Gen Sx; pt to OR at 1700 for concern for bowel perf     Admit Day #2: Nights (09/15-09/16)  - Back from OR at 2030. Resection of ischemic sigmoid colon with colostomy placement  - New 2 piece appliance applied to colostomy; wound consult placed  - Having BM's  - CRRT restarted at 2130  - Electrolytes being replaced per protocol  - New Right IJ placed  previous was unusable (was pulled out at some point during surgery)     Admit Day #3 Days (09/16)  - Lactic downtrending; 8.6 this AM  - Bicarb gtt d/c'ed  - Increase in pressors; 1L NS bolus given (not included in CRRT running total), albumin added  - Vaso gtt titrated off  - CRRT maintained, keeping net even  - Output noted from ostomy, WOCN rounded; ostomy noted to be brown in color     Admit Day #4 Days (09/17)  - Levo weaned off  - SBT started, returned to vent settings in the late afternoon d/t pt not becoming awake enough for extubation.      Admit Day #4 Nights (09/17-09/18)  - Calcium and phos replaced per protocol  - Pt acutely hypotensive, restarted levo gtt briefly   - Levo weaned off  - Had a small smear  - No output in ostomy  - CRRT FRG -50mL/h     Admit Day #5 Days (09/18)  - SBT @ 0850 10/5  - 1 g Calcium and 12 mmol Na Phos Replaced per PRN protocol.  - Increased FLG from -50mL/h to -100mL/h  - Set changed about 1800     Admit Day #5 Nights (09/18)  -calcium and phos replaced per protocol  -pt more awake, responding to commands  -removed a-line  -no issues with CRRT and tolerated fluid removal    9/19:  -WBC 30, BC and BROOKS drain cultures sent  -CT abd, R sided fluid collection, IR to drain tomorrow    9/20:  -Trophic TF started  -HD, 200 out d/t hypotension  -Osorio removed, pure wick placed, no urine output  -IR unable to access fluid collection, continue to monitor WBC, possible re-scan    9/21:  -BROOKS cultures E Coli positive, abx changed from zosyn to ceftriaxone  -SSI increased  -Extubated to 4LNC, weaned to [facility]  -Exchanged IJ CVC for Vas Cath, plan for HD tomorrow      Lab Data:  CBC:   Recent Labs     09/19/22  0440 09/20/22  0520 09/21/22  0500 09/21/22  1420   WBC 30.3* 30.6* 25.6*  --    HGB 8.6* 8.3* 7.4* 8.0*   HCT 25.9* 25.5* 22.7* 24.2*   MCV 84.6 85.4 83.5  --    PLT 83* 91* 87*  --        BMP:    Recent Labs     09/20/22  0520 09/20/22  1200 09/21/22  0500   * 135* 134*   K 4.2 3.9 4.1   CO2 19* 21 21   BUN 39* 30* 51*   CREATININE 1.9* 1.6* 2.5*   PHOS 3.8 2.8 3.3   CAION 1.10* 1.09* 1.11*       LIVER:   Recent Labs     09/20/22  0520 09/21/22  0500   AST 22 15   ALT 13 9*       PT/INR:   Recent Labs     09/20/22  0520 09/21/22  0500   PROT 4.9* 5.0*   INR 1.28* 1.27*       APTT: No results for input(s): APTT in the last 72 hours. Anti-XA: No results for input(s): ANTIXA in the last 72 hours. ABG:   Recent Labs     09/20/22  0525 09/21/22  0555   PHART 7.406 7.446   IMP9THW 31.8* 31.0*   PO2ART 72.3* 40.6*         Consults (if GI or Nephrology- which group?) :  - Critical Care/Pulmonology  - Hospitalist  - Nephrology [facility]  [place])  - Gen Sx

## 2022-09-21 NOTE — PROGRESS NOTES
are consulted for DEJAN on CKD and related issues. On CKD and also severe lactic acidosis    ROS:     Seen with- sister    RN         PMH/PSH/SH/Family History:     Past Medical History:   Diagnosis Date    Chronic kidney disease     Diabetes mellitus ([address]     Hyperlipidemia     Hypertension     Neuropathy        Past Surgical History:   Procedure Laterality Date    LAPAROTOMY N/A 9/15/2022    DIAGNOSTIC LAPAROSCOPY, EXPLORATORY LAPAROTOMY, SIGMOID COLECTOMY AND COLOSTOMY performed by Benito Schafer MD at [facility]        reports that she has never smoked. She has never used smokeless tobacco. She reports that she does not currently use alcohol. She reports that she does not use drugs. family history is not on file.          Medication:     Current Facility-Administered Medications: cefTRIAXone (ROCEPHIN) 2,000 mg in dextrose 5 % 50 mL IVPB mini-bag, 2,000 mg, IntraVENous, Q24H  insulin lispro (HUMALOG) injection vial 0-16 Units, 0-16 Units, SubCUTAneous, Q4H  fluconazole (DIFLUCAN) in 0.9 % sodium chloride IVPB 200 mg, 200 mg, IntraVENous, Q24H  heparin (porcine) injection 3,200 Units, 3,200 Units, IntraCATHeter, PRN  fentaNYL (SUBLIMAZE) injection 25 mcg, 25 mcg, IntraVENous, Q4H PRN  prochlorperazine (COMPAZINE) injection 5 mg, 5 mg, IntraVENous, Q6H PRN  prismaSATE BGK 4/2.5 dialysis solution, 1,500 mL/hr, Dialysis, Continuous  prismaSATE BGK 4/2.5 dialysis solution, 1,000 mL/hr, Dialysis, Continuous  potassium chloride 20 mEq/50 mL IVPB (Central Line), 20 mEq, IntraVENous, PRN  magnesium sulfate 1000 mg in dextrose 5% 100 mL IVPB, 1,000 mg, IntraVENous, PRN  sodium phosphate 6 mmol in sodium chloride 0.9 % 250 mL IVPB, 6 mmol, IntraVENous, PRN **OR** sodium phosphate 12 mmol in sodium chloride 0.9 % 250 mL IVPB, 12 mmol, IntraVENous, PRN **OR** sodium phosphate 18 mmol in sodium chloride 0.9 % 500 mL IVPB, 18 mmol, IntraVENous, PRN **OR** sodium phosphate 24 mmol in sodium chloride 0.9 % 500 mL IVPB, 24 mmol, IntraVENous, PRN  prismaSATE BGK 4/2.5 dialysis solution, , Dialysis, Continuous  calcium gluconate 1000 mg in sodium chloride 50 mL, 1,000 mg, IntraVENous, PRN **OR** calcium gluconate 2,000 mg in dextrose 5 % 100 mL IVPB, 2,000 mg, IntraVENous, PRN **OR** calcium gluconate 3,000 mg in dextrose 5 % 100 mL IVPB, 3,000 mg, IntraVENous, PRN **OR** calcium gluconate 4,000 mg in dextrose 5 % 100 mL IVPB, 4,000 mg, IntraVENous, PRN  sodium chloride flush 0.9 % injection 5-40 mL, 5-40 mL, IntraVENous, PRN  0.9 % sodium chloride infusion, 25 mL, IntraVENous, PRN  HYDROmorphone (DILAUDID) injection 0.25 mg, 0.25 mg, IntraVENous, Q5 Min PRN  HYDROmorphone (DILAUDID) injection 0.5 mg, 0.5 mg, IntraVENous, Q5 Min PRN  ondansetron (ZOFRAN) injection 4 mg, 4 mg, IntraVENous, Q10 Min PRN  fentaNYL (SUBLIMAZE) injection 25 mcg, 25 mcg, IntraVENous, Q1H PRN  glucose chewable tablet 16 g, 4 tablet, Oral, PRN  dextrose bolus 10% 125 mL, 125 mL, IntraVENous, PRN **OR** dextrose bolus 10% 250 mL, 250 mL, IntraVENous, PRN  glucagon (rDNA) injection 1 mg, 1 mg, SubCUTAneous, PRN  dextrose 10 % infusion, , IntraVENous, Continuous PRN  sodium chloride flush 0.9 % injection 5-40 mL, 5-40 mL, IntraVENous, PRN  0.9 % sodium chloride infusion, , IntraVENous, PRN  polyethylene glycol (GLYCOLAX) packet 17 g, 17 g, Oral, Daily PRN  acetaminophen (TYLENOL) tablet 650 mg, 650 mg, Oral, Q6H PRN **OR** acetaminophen (TYLENOL) suppository 650 mg, 650 mg, Rectal, Q6H PRN  pantoprazole (PROTONIX) injection 40 mg, 40 mg, IntraVENous, Daily       Vitals :     Vitals:    09/21/22 1200   BP:    Pulse: 76   Resp:    Temp: 99.1 °F (37.3 °C)   SpO2:        I & O :       Intake/Output Summary (Last 24 hours) at 9/21/2022 1402  Last data filed at 9/21/2022 0946  Gross per 24 hour   Intake 676.19 ml   Output 325 ml   Net 351.19 ml        Physical Examination :     General appearance: confused,on NC  HEENT: Lips- normal, teeth- ok , oral mucosa- moist  Neck : Mass- no, appears symmetrical, JVD- not visible  Respiratory: Respiratory effort-  comfortable on oxygen, wheeze- no, crackles - no  Cardiovascular: Ausculation- No M/R/G, Edema none  Abdomen: visible mass- no, distention- no, scar- no, tenderness- no                            hepatosplenomegaly-  No--  Musculoskeletal:  clubbing no,cyanosis- no , digital ischemia- no                           muscle strength- patient unable to co-operate     , tone - patient unable to co-operate   Skin: rashes- no , ulcers- no, induration- no, tightening - no  Psychiatric:  confused   Additional findings:         LABS:     Recent Labs     09/19/22  0440 09/20/22  0520 09/21/22  0500   WBC 30.3* 30.6* 25.6*   HGB 8.6* 8.3* 7.4*   HCT 25.9* 25.5* 22.7*   PLT 83* 91* 87*     Recent Labs     09/19/22  0440 09/19/22  1212 09/20/22  0520 09/20/22  1200 09/21/22  0500      < > 135* 135* 134*   K 4.1   < > 4.2 3.9 4.1      < > 99 99 98*   CO2 22   < > 19* 21 21   BUN 12   < > 39* 30* 51*   CREATININE 0.8   < > 1.9* 1.6* 2.5*   GLUCOSE 138*   < > 247* 208* 282*   MG 2.30  --  2.50*  --  2.40   PHOS 1.8*   < > 3.8 2.8 3.3    < > = values in this interval not displayed.

## 2022-09-21 NOTE — PROGRESS NOTES
Ostomy Referral Follow-up Progress Note      NAME:  Rodrigo Cyr RECORD NUMBER:  [id number]  AGE: 67 y.o. GENDER:  female  :  1950  TODAY'S DATE:  2022    Subjective: Intubated, sitting up in bed. Eyes fluttering open and closed. Not following commands. Moving arms. Anjel Cabrera is a 67 y.o. female referred by:   [x] Physician  [] Nursing  [] Other:     Wound Identification:  Wound Type: New surgical staple line. Contributing Factors: edema, diabetes, chronic pressure, decreased mobility, shear force, obesity, and decreased tissue oxygenation    NEW Colostomy:    Went to OR for Exploratory lap, sigmoid colectomy and colostomy on 9/15/22 by Dr Lore Alvarado. Stoma: measures 20 mm (1 3/8 inch) x 38 mm ( 1 1/2 inch). Oval, brown, protrudes but distal edge in a divot/crease from 300 pm to 600 pm.  She may need convex at a later time. For now use flat wafer with drainable bag. Appliance:  Coloplast RED 2 piece flat flange # [id number] with drainable pouch # [id number]. Paste ring around stoma prior to application of flange. Objective  Lethargic. Eyes opening and closing during care. Planning to extubate today. No family present. CRRT off.    /62   Pulse 87   Temp 98.9 °F (37.2 °C) (Axillary)   Resp 20   Ht 5' 9\" (1.753 m)   Wt 171 lb 11.8 oz (77.9 kg)   SpO2 99%   BMI 25.36 kg/m²     Levar Risk Score Levar Scale Score: 13    Assessment  Stoma not as brown today. @ 25% red now and bleeding. Lesli wound intact. Stool loose liquid brown. All infusions off. Stoma now in a crease at distal edge from 300 am to 900 pm.  She still may need a convex wafer when up moving around more. For now will leave on flat with paste ring as current appliance had 5 days wear time. Surgeon Celestine San. Colostomy      Colostomy LLQ (Active)   Stomal Appliance 2 piece 22   Flange Size (inches) 2.25 Inches 22   Stoma  Assessment Red;Bleeding; Other (Comment); Moist 09/21/22 0946   Peristomal Assessment Clean, dry & intact 09/21/22 0946   Treatment Bag change;Site care;Stoma paste; Heat applied 09/21/22 0946   Stool Appearance Loose; Watery 09/21/22 0946   Stool Color Brown 09/21/22 0946   Stool Amount Small 09/21/22 0946   Output (mL) 100 ml 09/21/22 0946   Number of days: 5        Intake/Output Summary (Last 24 hours) at 9/21/2022 0947  Last data filed at 9/21/2022 0946  Gross per 24 hour   Intake 676.19 ml   Output 525 ml   Net 151.19 ml       Plan:   Plan for Ostomy Care:   Appliance removed. Skin cleansed with warm water and patted dry. Stoma measured and pattern created as stoma smaller today. Stoma in divot distal inner edge. Paste ring placed around stoma then flange placed. Bag attached and closed. Ostomy care to follow. Call Ostomy care for problems with seal at [phone] or or call [phone] and leave message      Stoma Care - New colostomy - Patient to empty appliance when 1/3 to 1/2 full with assistance of the staff. Cleanse inside and outside of the drain spout prior to rolling closed. Change appliance every 3-5 days or 1-2 times a week. Call for problems with seal [phone]      Ostomy Plan of Care  [x] Supplies/Instructions left in room bags, wafers, paste rings, scissors, pattern, powder, paste. [] Patient using home supplies  [x] Brand/supplies at bedside Coloplast    Current Diet: Diet NPO  ADULT TUBE FEEDING; Nasogastric; Peptide Based; Continuous; 10; No; 60; Q 4 hours  Dietician consult:  Yes    Discharge Plan:  Placement for patient upon discharge: intermediate care facility    Outpatient visit plan No  Supplies given Yes   Samples requested No    Referrals:  []  following  [] [facility]  [] Supplies  [] Other    Patient/Caregiver Teaching: patient sleepy. Did review steps in process during change with patient.   Written Instructions given to patient/family non present  Teaching provided:  [] Reviewed GI and A&P        [] Supplies  [] Pouch emptying      [] Manipulate closure  [] Routine Care         [] Comment  [] Pouch maintenance           Level of patient/caregiver understanding able to:  [] Indicates understanding       [] Needs reinforcement  [] Unsuccessful      [] Verbal Understanding  [] Demonstrated understanding       [x] No evidence of learning  [] Refused teaching         [x] N/A    Electronically signed by Maida Lynn RN, MSN, Marsha Roots on 9/21/2022 at 9:47 AM

## 2022-09-21 NOTE — PLAN OF CARE
Problem: Discharge Planning  Goal: Discharge to home or other facility with appropriate resources  9/21/2022 1040 by Beverly Alcantara RN  Outcome: Progressing  9/21/2022 0528 by Noemí Kaplan RN  Outcome: Progressing  Flowsheets (Taken 9/20/2022 2005)  Discharge to home or other facility with appropriate resources:   Identify barriers to discharge with patient and caregiver   Arrange for needed discharge resources and transportation as appropriate   Identify discharge learning needs (meds, wound care, etc)   Arrange for interpreters to assist at discharge as needed   Refer to discharge planning if patient needs post-hospital services based on physician order or complex needs related to functional status, cognitive ability or social support system     Problem: Pain  Goal: Verbalizes/displays adequate comfort level or baseline comfort level  9/21/2022 1040 by Beverly Alcantara RN  Outcome: Progressing  9/21/2022 0528 by Noemí Kaplan RN  Outcome: Progressing     Problem: Skin/Tissue Integrity  Goal: Absence of new skin breakdown  Description: 1. Monitor for areas of redness and/or skin breakdown  2. Assess vascular access sites hourly  3. Every 4-6 hours minimum:  Change oxygen saturation probe site  4. Every 4-6 hours:  If on nasal continuous positive airway pressure, respiratory therapy assess nares and determine need for appliance change or resting period.   9/21/2022 1040 by Beverly Alcantara RN  Outcome: Progressing  9/21/2022 0528 by Noemí Kaplan RN  Outcome: Progressing     Problem: Chronic Conditions and Co-morbidities  Goal: Patient's chronic conditions and co-morbidity symptoms are monitored and maintained or improved  9/21/2022 1040 by Beverly Alcantara RN  Outcome: Progressing  9/21/2022 0528 by Noemí Kaplan RN  Outcome: Progressing  Flowsheets (Taken 9/20/2022 2005)  Care Plan - Patient's Chronic Conditions and Co-Morbidity Symptoms are Monitored and Maintained or Improved:   Monitor and assess patient's chronic conditions and comorbid symptoms for stability, deterioration, or improvement   Collaborate with multidisciplinary team to address chronic and comorbid conditions and prevent exacerbation or deterioration   Update acute care plan with appropriate goals if chronic or comorbid symptoms are exacerbated and prevent overall improvement and discharge     Problem: Safety - Medical Restraint  Goal: Remains free of injury from restraints (Restraint for Interference with Medical Device)  Description: INTERVENTIONS:  1. Determine that other, less restrictive measures have been tried or would not be effective before applying the restraint  2. Evaluate the patient's condition at the time of restraint application  3. Inform patient/family regarding the reason for restraint  4. Q2H: Monitor safety, psychosocial status, comfort, nutrition and hydration  9/21/2022 1040 by Renetta Hoang RN  Outcome: Progressing  9/21/2022 0528 by Maryjane Bo RN  Outcome: Progressing     Problem: Nutrition Deficit:  Goal: Optimize nutritional status  9/21/2022 1040 by Renetta Hoang RN  Outcome: Progressing  9/21/2022 0528 by Maryjane Bo RN  Outcome: Progressing     Problem: Safety - Adult  Goal: Free from fall injury  9/21/2022 1040 by Renetta Hoang RN  Outcome: Progressing  9/21/2022 0528 by Maryjane Bo RN  Outcome: Progressing     Problem: Neurosensory - Adult  Goal: Achieves stable or improved neurological status  9/21/2022 1040 by Renetta Hoang RN  Outcome: Progressing  9/21/2022 0528 by Maryjane Bo RN  Outcome: Progressing  Flowsheets (Taken 9/20/2022 2005)  Achieves stable or improved neurological status:   Assess for and report changes in neurological status   Initiate measures to prevent increased intracranial pressure   Monitor temperature, glucose, and sodium.  Initiate appropriate interventions as ordered   Maintain blood pressure and fluid volume within ordered parameters to optimize cerebral perfusion and minimize risk of hemorrhage  Goal: Achieves maximal functionality and self care  9/21/2022 1040 by Beverly Alcantara RN  Outcome: Progressing  9/21/2022 0528 by Noemí Kaplan RN  Outcome: Progressing     Problem: Respiratory - Adult  Goal: Achieves optimal ventilation and oxygenation  9/21/2022 1040 by Beverly Alcantara RN  Outcome: Progressing  9/21/2022 0528 by Noemí Kaplan RN  Outcome: Progressing  Flowsheets (Taken 9/20/2022 2005)  Achieves optimal ventilation and oxygenation:   Assess for changes in respiratory status   Assess for changes in mentation and behavior   Position to facilitate oxygenation and minimize respiratory effort   Oxygen supplementation based on oxygen saturation or arterial blood gases   Initiate smoking cessation protocol as indicated   Encourage broncho-pulmonary hygiene including cough, deep breathe, incentive spirometry   Assess and instruct to report shortness of breath or any respiratory difficulty   Respiratory therapy support as indicated   Assess the need for suctioning and aspirate as needed     Problem: Cardiovascular - Adult  Goal: Maintains optimal cardiac output and hemodynamic stability  9/21/2022 1040 by Beverly Alcantara RN  Outcome: Progressing  9/21/2022 0528 by Noemí Kaplan RN  Outcome: Progressing  Goal: Absence of cardiac dysrhythmias or at baseline  9/21/2022 1040 by Beverly Alcantara RN  Outcome: Progressing  9/21/2022 0528 by Noemí Kaplan RN  Outcome: Progressing     Problem: Skin/Tissue Integrity - Adult  Goal: Incisions, wounds, or drain sites healing without S/S of infection  9/21/2022 1040 by Beverly Alcantara RN  Outcome: Progressing  9/21/2022 0528 by Noemí Kaplan RN  Outcome: Progressing  Flowsheets  Taken 9/20/2022 2132  Incisions, Wounds, or Drain Sites Healing Without Sign and Symptoms of Infection:   ADMISSION and DAILY: Assess and document risk factors for pressure ulcer development   TWICE DAILY: Assess and document skin integrity   TWICE DAILY: Assess and document dressing/incision, wound bed, drain sites and surrounding tissue   Implement wound care per orders   Initiate isolation precautions as appropriate   Initiate pressure ulcer prevention bundle as indicated  Taken 9/20/2022 2005  Incisions, Wounds, or Drain Sites Healing Without Sign and Symptoms of Infection:   ADMISSION and DAILY: Assess and document risk factors for pressure ulcer development   TWICE DAILY: Assess and document skin integrity   TWICE DAILY: Assess and document dressing/incision, wound bed, drain sites and surrounding tissue   Implement wound care per orders   Initiate isolation precautions as appropriate   Initiate pressure ulcer prevention bundle as indicated  Goal: Oral mucous membranes remain intact  9/21/2022 1040 by Charo Metcalf RN  Outcome: Progressing  9/21/2022 0528 by Awilda Menchaca RN  Outcome: Progressing  Flowsheets  Taken 9/20/2022 2132  Oral Mucous Membranes Remain Intact:   Assess oral mucosa and hygiene practices   Implement oral medicated treatments as ordered   Implement preventative oral hygiene regimen  Taken 9/20/2022 2005  Oral Mucous Membranes Remain Intact:   Assess oral mucosa and hygiene practices   Implement preventative oral hygiene regimen   Implement oral medicated treatments as ordered     Problem: Musculoskeletal - Adult  Goal: Return mobility to safest level of function  9/21/2022 1040 by Charo Metcalf RN  Outcome: Progressing  Flowsheets (Taken 9/21/2022 0800 by James Piña RN)  Return Mobility to Safest Level of Function:   Assess patient stability and activity tolerance for standing, transferring and ambulating with or without assistive devices   Ensure adequate protection for wounds/incisions during mobilization  9/21/2022 0528 by Awilda Menchaca RN  Outcome: Progressing  Flowsheets (Taken 9/20/2022 2005)  Return Mobility to Safest Level of Function:   Assess patient stability and activity tolerance for standing, transferring and ambulating with or without assistive devices   Ensure adequate protection for wounds/incisions during mobilization   Obtain physical therapy/occupational therapy consults as needed   Apply continuous passive motion per provider or physical therapy orders to increase flexion toward goal   Instruct patient/family in ordered activity level   Assist with transfers and ambulation using safe patient handling equipment as needed  Goal: Return ADL status to a safe level of function  9/21/2022 1040 by Popeye Doll RN  Outcome: Progressing  Flowsheets (Taken 9/21/2022 0800 by Shi Weathers RN)  Return ADL Status to a Safe Level of Function:   Administer medication as ordered   Assess activities of daily living deficits and provide assistive devices as needed  9/21/2022 0528 by Justyna Luna RN  Outcome: Progressing  Flowsheets (Taken 9/20/2022 2005)  Return ADL Status to a Safe Level of Function:   Administer medication as ordered   Assess activities of daily living deficits and provide assistive devices as needed   Obtain physical therapy/occupational therapy consults as needed   Assist and instruct patient to increase activity and self care as tolerated     Problem: Gastrointestinal - Adult  Goal: Maintains adequate nutritional intake  9/21/2022 1040 by Popeye Doll RN  Outcome: Progressing  9/21/2022 0528 by Justyna Luna RN  Outcome: Progressing  Flowsheets (Taken 9/20/2022 2005)  Maintains adequate nutritional intake:   Monitor percentage of each meal consumed   Identify factors contributing to decreased intake, treat as appropriate   Assist with meals as needed   Monitor intake and output, weight and lab values   Obtain nutritional consult as needed  Goal: Establish and maintain optimal ostomy function  9/21/2022 1040 by Popeye Doll RN  Outcome: Progressing  9/21/2022 0528 by Justyna Luna RN  Outcome: Progressing  Flowsheets (Taken 9/20/2022 2005)  Establish and maintain optimal ostomy function: Monitor output from ostomies   Administer IV fluids and TPN as ordered   Introduce and advance enteral feedings as ordered   Nutrition consult   Gastric suctioning as ordered   Infuse IV Fluids/TPN as ordered     Problem: Genitourinary - Adult  Goal: Urinary catheter remains patent  9/21/2022 1040 by Candace Andrea RN  Outcome: Progressing  9/21/2022 0528 by Heavenly Mendez RN  Outcome: Progressing     Problem: Metabolic/Fluid and Electrolytes - Adult  Goal: Electrolytes maintained within normal limits  9/21/2022 1040 by Candace Andrea RN  Outcome: Progressing  9/21/2022 0528 by Heavenly Mendez RN  Outcome: Progressing  Flowsheets (Taken 9/20/2022 2005)  Electrolytes maintained within normal limits:   Monitor labs and assess patient for signs and symptoms of electrolyte imbalances   Administer electrolyte replacement as ordered   Monitor response to electrolyte replacements, including repeat lab results as appropriate   Fluid restriction as ordered   Instruct patient on fluid and nutrition restrictions as appropriate  Goal: Hemodynamic stability and optimal renal function maintained  9/21/2022 1040 by Candace Andrea RN  Outcome: Progressing  9/21/2022 0528 by Heavenly Mendez RN  Outcome: Progressing  Flowsheets (Taken 9/20/2022 2005)  Hemodynamic stability and optimal renal function maintained:   Monitor labs and assess for signs and symptoms of volume excess or deficit   Monitor intake, output and patient weight   Monitor urine specific gravity, serum osmolarity and serum sodium as indicated or ordered   Monitor response to interventions for patient's volume status, including labs, urine output, blood pressure (other measures as available)   Encourage oral intake as appropriate   Instruct patient on fluid and nutrition restrictions as appropriate  Goal: Glucose maintained within prescribed range  9/21/2022 1040 by Candace Andrea RN  Outcome: Progressing  9/21/2022 0528 by Heavenly Mendez RN  Outcome: Progressing  Flowsheets (Taken 9/20/2022 2005)  Glucose maintained within prescribed range:   Monitor blood glucose as ordered   Assess for signs and symptoms of hyperglycemia and hypoglycemia   Administer ordered medications to maintain glucose within target range   Assess barriers to adequate nutritional intake and initiate nutrition consult as needed   Instruct patient on self management of diabetes and initiate consult as needed

## 2022-09-21 NOTE — PLAN OF CARE
Problem: Discharge Planning  Goal: Discharge to home or other facility with appropriate resources  Outcome: Progressing  Flowsheets (Taken 9/20/2022 2005)  Discharge to home or other facility with appropriate resources:   Identify barriers to discharge with patient and caregiver   Arrange for needed discharge resources and transportation as appropriate   Identify discharge learning needs (meds, wound care, etc)   Arrange for interpreters to assist at discharge as needed   Refer to discharge planning if patient needs post-hospital services based on physician order or complex needs related to functional status, cognitive ability or social support system     Problem: Pain  Goal: Verbalizes/displays adequate comfort level or baseline comfort level  Outcome: Progressing     Problem: Skin/Tissue Integrity  Goal: Absence of new skin breakdown  Description: 1. Monitor for areas of redness and/or skin breakdown  2. Assess vascular access sites hourly  3. Every 4-6 hours minimum:  Change oxygen saturation probe site  4. Every 4-6 hours:  If on nasal continuous positive airway pressure, respiratory therapy assess nares and determine need for appliance change or resting period.   Outcome: Progressing     Problem: Chronic Conditions and Co-morbidities  Goal: Patient's chronic conditions and co-morbidity symptoms are monitored and maintained or improved  Outcome: Progressing  Flowsheets (Taken 9/20/2022 2005)  Care Plan - Patient's Chronic Conditions and Co-Morbidity Symptoms are Monitored and Maintained or Improved:   Monitor and assess patient's chronic conditions and comorbid symptoms for stability, deterioration, or improvement   Collaborate with multidisciplinary team to address chronic and comorbid conditions and prevent exacerbation or deterioration   Update acute care plan with appropriate goals if chronic or comorbid symptoms are exacerbated and prevent overall improvement and discharge     Problem: Safety - Medical Restraint  Goal: Remains free of injury from restraints (Restraint for Interference with Medical Device)  Description: INTERVENTIONS:  1. Determine that other, less restrictive measures have been tried or would not be effective before applying the restraint  2. Evaluate the patient's condition at the time of restraint application  3. Inform patient/family regarding the reason for restraint  4. Q2H: Monitor safety, psychosocial status, comfort, nutrition and hydration  Outcome: Progressing     Problem: Nutrition Deficit:  Goal: Optimize nutritional status  Outcome: Progressing     Problem: Safety - Adult  Goal: Free from fall injury  Outcome: Progressing     Problem: Neurosensory - Adult  Goal: Achieves stable or improved neurological status  Outcome: Progressing  Flowsheets (Taken 9/20/2022 2005)  Achieves stable or improved neurological status:   Assess for and report changes in neurological status   Initiate measures to prevent increased intracranial pressure   Monitor temperature, glucose, and sodium.  Initiate appropriate interventions as ordered   Maintain blood pressure and fluid volume within ordered parameters to optimize cerebral perfusion and minimize risk of hemorrhage  Goal: Achieves maximal functionality and self care  Outcome: Progressing     Problem: Respiratory - Adult  Goal: Achieves optimal ventilation and oxygenation  Outcome: Progressing  Flowsheets (Taken 9/20/2022 2005)  Achieves optimal ventilation and oxygenation:   Assess for changes in respiratory status   Assess for changes in mentation and behavior   Position to facilitate oxygenation and minimize respiratory effort   Oxygen supplementation based on oxygen saturation or arterial blood gases   Initiate smoking cessation protocol as indicated   Encourage broncho-pulmonary hygiene including cough, deep breathe, incentive spirometry   Assess and instruct to report shortness of breath or any respiratory difficulty   Respiratory therapy support as indicated   Assess the need for suctioning and aspirate as needed     Problem: Cardiovascular - Adult  Goal: Maintains optimal cardiac output and hemodynamic stability  Outcome: Progressing  Goal: Absence of cardiac dysrhythmias or at baseline  Outcome: Progressing     Problem: Skin/Tissue Integrity - Adult  Goal: Incisions, wounds, or drain sites healing without S/S of infection  Outcome: Progressing  Flowsheets  Taken 9/20/2022 2132  Incisions, Wounds, or Drain Sites Healing Without Sign and Symptoms of Infection:   ADMISSION and DAILY: Assess and document risk factors for pressure ulcer development   TWICE DAILY: Assess and document skin integrity   TWICE DAILY: Assess and document dressing/incision, wound bed, drain sites and surrounding tissue   Implement wound care per orders   Initiate isolation precautions as appropriate   Initiate pressure ulcer prevention bundle as indicated  Taken 9/20/2022 2005  Incisions, Wounds, or Drain Sites Healing Without Sign and Symptoms of Infection:   ADMISSION and DAILY: Assess and document risk factors for pressure ulcer development   TWICE DAILY: Assess and document skin integrity   TWICE DAILY: Assess and document dressing/incision, wound bed, drain sites and surrounding tissue   Implement wound care per orders   Initiate isolation precautions as appropriate   Initiate pressure ulcer prevention bundle as indicated  Goal: Oral mucous membranes remain intact  Outcome: Progressing  Flowsheets  Taken 9/20/2022 2132  Oral Mucous Membranes Remain Intact:   Assess oral mucosa and hygiene practices   Implement oral medicated treatments as ordered   Implement preventative oral hygiene regimen  Taken 9/20/2022 2005  Oral Mucous Membranes Remain Intact:   Assess oral mucosa and hygiene practices   Implement preventative oral hygiene regimen   Implement oral medicated treatments as ordered     Problem: Musculoskeletal - Adult  Goal: Return mobility to safest level of function  Outcome: Progressing  Flowsheets (Taken 9/20/2022 2005)  Return Mobility to Safest Level of Function:   Assess patient stability and activity tolerance for standing, transferring and ambulating with or without assistive devices   Ensure adequate protection for wounds/incisions during mobilization   Obtain physical therapy/occupational therapy consults as needed   Apply continuous passive motion per provider or physical therapy orders to increase flexion toward goal   Instruct patient/family in ordered activity level   Assist with transfers and ambulation using safe patient handling equipment as needed  Goal: Return ADL status to a safe level of function  Outcome: Progressing  Flowsheets (Taken 9/20/2022 2005)  Return ADL Status to a Safe Level of Function:   Administer medication as ordered   Assess activities of daily living deficits and provide assistive devices as needed   Obtain physical therapy/occupational therapy consults as needed   Assist and instruct patient to increase activity and self care as tolerated     Problem: Gastrointestinal - Adult  Goal: Maintains adequate nutritional intake  Outcome: Progressing  Flowsheets (Taken 9/20/2022 2005)  Maintains adequate nutritional intake:   Monitor percentage of each meal consumed   Identify factors contributing to decreased intake, treat as appropriate   Assist with meals as needed   Monitor intake and output, weight and lab values   Obtain nutritional consult as needed  Goal: Establish and maintain optimal ostomy function  Outcome: Progressing  Flowsheets (Taken 9/20/2022 2005)  Establish and maintain optimal ostomy function:   Monitor output from ostomies   Administer IV fluids and TPN as ordered   Introduce and advance enteral feedings as ordered   Nutrition consult   Gastric suctioning as ordered   Infuse IV Fluids/TPN as ordered     Problem: Genitourinary - Adult  Goal: Urinary catheter remains patent  Outcome: Progressing     Problem:

## 2022-09-22 ENCOUNTER — APPOINTMENT (OUTPATIENT)
Dept: GENERAL RADIOLOGY | Age: 72
DRG: 853 | End: 2022-09-22
Payer: MEDICARE

## 2022-09-22 PROBLEM — G93.40 ACUTE ENCEPHALOPATHY: Status: ACTIVE | Noted: 2022-09-22

## 2022-09-22 PROBLEM — J96.01 ACUTE RESPIRATORY FAILURE WITH HYPOXIA (HCC): Status: ACTIVE | Noted: 2022-09-22

## 2022-09-22 PROBLEM — E44.0 MODERATE MALNUTRITION (HCC): Status: ACTIVE | Noted: 2022-09-22

## 2022-09-22 LAB
ALBUMIN SERPL-MCNC: 2.4 G/DL (ref 3.4–5)
ANION GAP SERPL CALCULATED.3IONS-SCNC: 14 MMOL/L (ref 3–16)
BODY FLUID CULTURE, STERILE: ABNORMAL
BODY FLUID CULTURE, STERILE: ABNORMAL
BUN BLDV-MCNC: 70 MG/DL (ref 7–20)
CALCIUM IONIZED: 1.07 MMOL/L (ref 1.12–1.32)
CALCIUM SERPL-MCNC: 8 MG/DL (ref 8.3–10.6)
CHLORIDE BLD-SCNC: 98 MMOL/L (ref 99–110)
CO2: 21 MMOL/L (ref 21–32)
CREAT SERPL-MCNC: 3.6 MG/DL (ref 0.6–1.2)
GFR AFRICAN AMERICAN: 15
GFR NON-AFRICAN AMERICAN: 12
GLUCOSE BLD-MCNC: 145 MG/DL (ref 70–99)
GLUCOSE BLD-MCNC: 160 MG/DL (ref 70–99)
GLUCOSE BLD-MCNC: 160 MG/DL (ref 70–99)
GLUCOSE BLD-MCNC: 209 MG/DL (ref 70–99)
GLUCOSE BLD-MCNC: 212 MG/DL (ref 70–99)
GLUCOSE BLD-MCNC: 218 MG/DL (ref 70–99)
GLUCOSE BLD-MCNC: 219 MG/DL (ref 70–99)
GLUCOSE BLD-MCNC: 228 MG/DL (ref 70–99)
GRAM STAIN RESULT: ABNORMAL
HCT VFR BLD CALC: 23.8 % (ref 36–48)
HEMOGLOBIN: 7.9 G/DL (ref 12–16)
MCH RBC QN AUTO: 27.7 PG (ref 26–34)
MCHC RBC AUTO-ENTMCNC: 33.1 G/DL (ref 31–36)
MCV RBC AUTO: 83.7 FL (ref 80–100)
ORGANISM: ABNORMAL
ORGANISM: ABNORMAL
PDW BLD-RTO: 14.3 % (ref 12.4–15.4)
PERFORMED ON: ABNORMAL
PH VENOUS: 7.41 (ref 7.35–7.45)
PHOSPHORUS: 3.1 MG/DL (ref 2.5–4.9)
PLATELET # BLD: 106 K/UL (ref 135–450)
PMV BLD AUTO: 8.8 FL (ref 5–10.5)
POTASSIUM SERPL-SCNC: 4.3 MMOL/L (ref 3.5–5.1)
RBC # BLD: 2.85 M/UL (ref 4–5.2)
SODIUM BLD-SCNC: 133 MMOL/L (ref 136–145)
WBC # BLD: 24.1 K/UL (ref 4–11)

## 2022-09-22 PROCEDURE — 2000000000 HC ICU R&B

## 2022-09-22 PROCEDURE — 2580000003 HC RX 258: Performed by: ANESTHESIOLOGY

## 2022-09-22 PROCEDURE — 6360000002 HC RX W HCPCS: Performed by: STUDENT IN AN ORGANIZED HEALTH CARE EDUCATION/TRAINING PROGRAM

## 2022-09-22 PROCEDURE — 71045 X-RAY EXAM CHEST 1 VIEW: CPT

## 2022-09-22 PROCEDURE — 90935 HEMODIALYSIS ONE EVALUATION: CPT

## 2022-09-22 PROCEDURE — 6360000002 HC RX W HCPCS: Performed by: INTERNAL MEDICINE

## 2022-09-22 PROCEDURE — C9113 INJ PANTOPRAZOLE SODIUM, VIA: HCPCS | Performed by: STUDENT IN AN ORGANIZED HEALTH CARE EDUCATION/TRAINING PROGRAM

## 2022-09-22 PROCEDURE — 80074 ACUTE HEPATITIS PANEL: CPT

## 2022-09-22 PROCEDURE — 80069 RENAL FUNCTION PANEL: CPT

## 2022-09-22 PROCEDURE — 85027 COMPLETE CBC AUTOMATED: CPT

## 2022-09-22 PROCEDURE — 86706 HEP B SURFACE ANTIBODY: CPT

## 2022-09-22 PROCEDURE — 86704 HEP B CORE ANTIBODY TOTAL: CPT

## 2022-09-22 PROCEDURE — 2580000003 HC RX 258: Performed by: INTERNAL MEDICINE

## 2022-09-22 PROCEDURE — 99233 SBSQ HOSP IP/OBS HIGH 50: CPT | Performed by: INTERNAL MEDICINE

## 2022-09-22 PROCEDURE — 6370000000 HC RX 637 (ALT 250 FOR IP): Performed by: INTERNAL MEDICINE

## 2022-09-22 PROCEDURE — 82330 ASSAY OF CALCIUM: CPT

## 2022-09-22 PROCEDURE — 99024 POSTOP FOLLOW-UP VISIT: CPT | Performed by: SURGERY

## 2022-09-22 RX ORDER — INSULIN GLARGINE 100 [IU]/ML
10 INJECTION, SOLUTION SUBCUTANEOUS NIGHTLY
Status: DISCONTINUED | OUTPATIENT
Start: 2022-09-22 | End: 2022-09-23

## 2022-09-22 RX ORDER — ALBUMIN (HUMAN) 12.5 G/50ML
25 SOLUTION INTRAVENOUS ONCE
Status: DISCONTINUED | OUTPATIENT
Start: 2022-09-22 | End: 2022-09-23 | Stop reason: SDUPTHER

## 2022-09-22 RX ORDER — ALBUMIN (HUMAN) 12.5 G/50ML
SOLUTION INTRAVENOUS
Status: DISPENSED
Start: 2022-09-22 | End: 2022-09-23

## 2022-09-22 RX ORDER — MIDODRINE HYDROCHLORIDE 5 MG/1
10 TABLET ORAL ONCE
Status: COMPLETED | OUTPATIENT
Start: 2022-09-22 | End: 2022-09-22

## 2022-09-22 RX ADMIN — PANTOPRAZOLE SODIUM 40 MG: 40 INJECTION, POWDER, FOR SOLUTION INTRAVENOUS at 09:32

## 2022-09-22 RX ADMIN — FLUCONAZOLE 200 MG: 2 INJECTION, SOLUTION INTRAVENOUS at 20:24

## 2022-09-22 RX ADMIN — INSULIN LISPRO 4 UNITS: 100 INJECTION, SOLUTION INTRAVENOUS; SUBCUTANEOUS at 00:34

## 2022-09-22 RX ADMIN — SODIUM CHLORIDE 25 ML: 9 INJECTION, SOLUTION INTRAVENOUS at 20:24

## 2022-09-22 RX ADMIN — INSULIN GLARGINE 10 UNITS: 100 INJECTION, SOLUTION SUBCUTANEOUS at 20:14

## 2022-09-22 RX ADMIN — INSULIN LISPRO 4 UNITS: 100 INJECTION, SOLUTION INTRAVENOUS; SUBCUTANEOUS at 05:37

## 2022-09-22 RX ADMIN — MIDODRINE HYDROCHLORIDE 10 MG: 5 TABLET ORAL at 13:41

## 2022-09-22 RX ADMIN — INSULIN LISPRO 4 UNITS: 100 INJECTION, SOLUTION INTRAVENOUS; SUBCUTANEOUS at 09:32

## 2022-09-22 RX ADMIN — CEFTRIAXONE 2000 MG: 2 INJECTION, POWDER, FOR SOLUTION INTRAMUSCULAR; INTRAVENOUS at 09:31

## 2022-09-22 ASSESSMENT — PAIN SCALES - GENERAL
PAINLEVEL_OUTOF10: 0
PAINLEVEL_OUTOF10: 0

## 2022-09-22 NOTE — PROGRESS NOTES
Intensive Care Unit  Intensivist Progress Note    Patient: Patricia Ibrahim  : 1950  MRN#: [id number]  2022 8:09 AM  ADMISSION DAY:  2022 11:45 AM     Subjective: The patient was extubated yesterday, she is more awake today. She was weaned oxygen and currently she is on room air. Blood pressure is acceptable. She feels very weak. Creatinine is at 3.6. She is off CRRT. Potassium is 4.3  WBC still elevated at 24.1  The drainage fluid was positive for E. Coli and Klebsiella  Still anuric    HPI:   Patricia Ibrahim is a 67 y.o. female with a history of CKD, diabetes, hyperlipidemia, hypertension, and neuropathy who presents to the ED complaining of abdominal pain/syncope. By EMS report, patient has had constipation over the last several days. .  Patient reportedly had a large bowel movement and shortly thereafter had a syncopal/near syncopal event where she was helped to the floor. EMS was called and arrived to find patient with blood pressure of 50/30.  300 cc of normal saline were infused during truck ride to the ED. Blood pressure shortly after arrival was 134/94. Patient reports lower abdominal discomfort. No additional complaints. No other complaints, modifying factors or associated symptoms. In ER -Patient seen and evaluated. Old records reviewed. Labs and imaging reviewed and results discussed with patient. Patient was given broad-spectrum antibiotics, normal saline, and multiple pressors. Central line was placed. Improvement of symptoms throughout patient's stay in the emergency department. Labs reveal significant leukocytosis with elevated lactic and concern for UTI. Mild DEJAN noted. Imaging shows nonspecific enteritis. Patient will be admitted for further evaluation and treatment. . Patient was reassessed as noted above . Madi Huertas Plan of care discussed with patient and family. Patient and family in agreement with plan.    Patient was transferred to the ICU for further management  Patient when seen in the ICU was somewhat lethargic but was able to give simple answers and was complaining of abdominal pain, patient was also was found to be significantly hypotensive in spite of patient getting IV Levophed, patient's systolic blood pressure was 52 mmHg when seen, patient's blood sugars were still on the higher side now which was lower as per EMT, patient was given dextrose 50 IV push in the EMS as per documentation, patient was having sinus rhythm on the monitor which was ranging from normal sinus rhythm to sinus tachycardia, patient was given another fluid bolus and also vasopressin infusion was started on the patient, no other pertinent review of system of concern    Patient Vitals for the past 8 hrs:   BP Temp Temp src Pulse Resp SpO2   09/22/22 0600 (!) 139/56 -- -- 82 22 95 %   09/22/22 0500 (!) 139/53 -- -- 74 19 96 %   09/22/22 0400 (!) 145/57 99.8 °F (37.7 °C) Axillary 80 21 95 %   09/22/22 0300 (!) 134/56 -- -- 75 19 96 %   09/22/22 0200 (!) 120/50 -- -- 76 18 95 %   09/22/22 0100 (!) 145/57 -- -- 75 20 96 %         EXAM:  HENT: NG tube in place, head is atraumatic and normocephalic  Eyes:  Conjunctivae are normal. Pupils equal, round, and reactive to light. No scleral icterus. Neck: . No tracheal deviation present. No obvious thyroid mass. Cardiovascular: Sinus rhythm normal heart sounds. No right ventricular heave. No lower extremity edema. Pulmonary/Chest: No wheezes. No rales. No accessory muscle usage or stridor. Decreased breath sound bilaterally  Abdominal: Soft. Tender. No distension or hernia. Ostomy present  Musculoskeletal: No cyanosis. No clubbing. No obvious joint deformity. Lymphadenopathy: No cervical or supraclavicular adenopathy. Skin: Skin is warm and dry. No rash or nodules on the exposed extremities.         Intake/Output Summary (Last 24 hours) at 9/22/2022 0809  Last data filed at 9/22/2022 0549  Gross per 24 hour   Intake 549 ml   Output 125 ml   Net 424 ml       I/O last 3 completed shifts: In: 1143.2 [I.V.:122; NG/GT:749; IV Piggyback:272.2]  Out: 185 [Drains:35; Stool:150]   Date 09/22/22 0000 - 09/22/22 2359   Shift 1271-3346 8692-0216 9017-6271 24 Hour Total   INTAKE   NG/GT(mL/kg) 260(3.3)   260(3.3)   Shift Total(mL/kg) 260(3.3)   260(3.3)   OUTPUT   Drains(mL/kg) 5(0.1)   5(0.1)   Shift Total(mL/kg) 5(0.1)   5(0.1)   Weight (kg) 77.9 77.9 77.9 77.9       Wt Readings from Last 3 Encounters:   09/21/22 171 lb 11.8 oz (77.9 kg)   07/14/22 161 lb (73 kg)      Body mass index is 25.36 kg/m².          Scheduled Meds:   cefTRIAXone (ROCEPHIN) IV  2,000 mg IntraVENous Q24H    insulin lispro  0-16 Units SubCUTAneous Q4H    fluconazole  200 mg IntraVENous Q24H    pantoprazole  40 mg IntraVENous Daily     Continuous Infusions:   prismaSATE BGK 4/2.5 Stopped (09/19/22 1030)    prismaSATE BGK 4/2.5 Stopped (09/19/22 1030)    prismaSATE BGK 4/2.5 Stopped (09/19/22 1030)    sodium chloride      dextrose      sodium chloride 100 mL/hr at 09/21/22 2122     PRN Meds:heparin (porcine), 3,200 Units, PRN  fentanNYL, 25 mcg, Q4H PRN  prochlorperazine, 5 mg, Q6H PRN  potassium chloride, 20 mEq, PRN  magnesium sulfate, 1,000 mg, PRN  sodium phosphate IVPB, 6 mmol, PRN   Or  sodium phosphate IVPB, 12 mmol, PRN   Or  sodium phosphate IVPB, 18 mmol, PRN   Or  sodium phosphate IVPB, 24 mmol, PRN  calcium gluconate, 1,000 mg, PRN   Or  calcium gluconate, 2,000 mg, PRN   Or  calcium gluconate, 3,000 mg, PRN   Or  calcium gluconate, 4,000 mg, PRN  sodium chloride flush, 5-40 mL, PRN  sodium chloride, 25 mL, PRN  HYDROmorphone, 0.25 mg, Q5 Min PRN  HYDROmorphone, 0.5 mg, Q5 Min PRN  ondansetron, 4 mg, Q10 Min PRN  fentanNYL, 25 mcg, Q1H PRN  glucose, 4 tablet, PRN  dextrose bolus, 125 mL, PRN   Or  dextrose bolus, 250 mL, PRN  glucagon (rDNA), 1 mg, PRN  dextrose, , Continuous PRN  sodium chloride flush, 5-40 mL, PRN  sodium chloride, , PRN  polyethylene glycol, 17 g, Daily PRN  acetaminophen, 650 mg, Q6H PRN   Or  acetaminophen, 650 mg, Q6H PRN      Oxygen Delivery - O2 Flow Rate (L/min): 0 L/min  VENT SETTINGS (Comprehensive)  Vent Information  Equipment Changed: HME  Ventilator Initiate: Yes  Vent Mode: (S) CPAP/PS  Additional Respiratory Assessments  Heart Rate: 82  Resp: 22  SpO2: 95 %  Humidification Source: HME  Circuit Condensation: Drained      DATA:    CBC:   Recent Labs     09/20/22  0520 09/21/22  0500 09/21/22  1420 09/22/22  0526   WBC 30.6* 25.6*  --  24.1*   RBC 2.99* 2.72*  --  2.85*   HGB 8.3* 7.4* 8.0* 7.9*   HCT 25.5* 22.7* 24.2* 23.8*   MCV 85.4 83.5  --  83.7   MCH 27.8 27.3  --  27.7   MCHC 32.6 32.7  --  33.1   RDW 14.9 14.6  --  14.3   PLT 91* 87*  --  106*   MPV 8.0 8.1  --  8.8        BMP/CMP:   Recent Labs     09/20/22  0520 09/20/22 1200 09/21/22  0500 09/22/22  0526   * 135* 134* 133*   K 4.2 3.9 4.1 4.3   CL 99 99 98* 98*   CO2 19* 21 21 21   ANIONGAP 17* 15 15 14   BUN 39* 30* 51* 70*   CREATININE 1.9* 1.6* 2.5* 3.6*   GLUCOSE 247* 208* 282* 218*   CALCIUM 8.7 7.9* 8.0* 8.0*   PROT 4.9*  --  5.0*  --    LABALBU 2.5* 2.7* 2.4* 2.4*   BILITOT 0.3  --  0.4  --    ALKPHOS 201*  --  175*  --    AST 22  --  15  --    ALT 13  --  9*  --         Other Electrolytes:   Recent Labs     09/20/22  0520 09/20/22  1200 09/21/22  0500 09/22/22  0526 09/22/22  0532   CAION 1.10* 1.09* 1.11*  --  1.07*   PHOS 3.8 2.8 3.3 3.1  --    MG 2.50*  --  2.40  --   --        Serum Osmolality  No results for input(s): OSMOMEASER in the last 72 hours. Procalcitonin:  No results for input(s): PROCAL in the last 72 hours. Cardiac: No results for input(s): TROPONINT in the last 72 hours. Lipids: No results for input(s): CHOL, HDL in the last 72 hours. Invalid input(s): LDLCALCU  Coagulation:   Recent Labs     09/20/22  0520 09/21/22  0500   INR 1.28* 1.27*       Lactic Acid:   No results for input(s): LACTA in the last 72 hours.      ABGs:   No results found for: PH, PCO2, PO2, HCO3, O2SAT  No results found for: Melvinia Moritz    Radiology/Imaging:   XR CHEST PORTABLE [[id number]] Collected: 09/15/22 2226     Order Status: Completed Updated: 09/16/22 1429     Narrative:       EXAMINATION:   ONE XRAY VIEW OF THE CHEST     9/15/2022 8:40 pm     COMPARISON:   09/14/2022     HISTORY:   ORDERING SYSTEM PROVIDED HISTORY: right IJ central line evaluation, intubated   after OR   TECHNOLOGIST PROVIDED HISTORY:   Reason for exam:->right IJ central line evaluation, intubated after OR   Reason for Exam: ett; cvc     FINDINGS:   Endotracheal tube tip is 5.3 cm above the mark. Right IJ CVC catheter tip   overlies the SVC at the level of the thoracic inlet. The heart appears borderline enlarged; however, this may be accentuated by   technique. Possible small right pleural effusion. No pneumothorax is seen. Mild bibasilar airspace opacities. Impression:       Right IJ CVC catheter tip overlies the SVC at the level of the thoracic inlet. Endotracheal tube tip is 5.3 cm above the mark. Mild bibasilar airspace opacities, which could represent as atelectasis   and/or infiltrate. Possible small right pleural effusion. XR ABDOMEN FOR NG/OG/NE TUBE PLACEMENT [[id number]] Collected: 09/16/22 0839     Order Status: Completed Updated: 09/16/22 0842     Narrative:       EXAMINATION:   ONE SUPINE XRAY VIEW(S) OF THE ABDOMEN     9/16/2022 7:29 am     COMPARISON:   None. HISTORY:   ORDERING SYSTEM PROVIDED HISTORY: Confirmation of course of NG/OG/NE tube and   location of tip of tube   TECHNOLOGIST PROVIDED HISTORY:   Reason for exam:->Confirmation of course of NG/OG/NE tube and location of tip   of tube   Portable? ->Yes   Reason for Exam: NGT placement     FINDINGS:   Tip of NG tube projects in the left upper quadrant in the region of the   gastric fundus     Postsurgical change seen in upper abdomen.      Pleuroparenchymal disease is seen at the right lung base. Impression:       Tip of NG tube projects in the left upper quadrant in the region of the   gastric fundus      XR CHEST PORTABLE [[id number]] Collected: 09/16/22 0130     Order Status: Completed Updated: 09/16/22 0137     Narrative:       EXAMINATION:   ONE XRAY VIEW OF THE CHEST     9/15/2022 11:34 pm     COMPARISON:   09/15/2022     HISTORY:   ORDERING SYSTEM PROVIDED HISTORY: central line insertion   TECHNOLOGIST PROVIDED HISTORY:   Reason for exam:->central line insertion   Reason for Exam: central line     FINDINGS:   Endotracheal tube appears in appropriate position. Interval placement of a   jugular venous central catheter which extends into the inferior aspect of the   right atrium. The catheter tip measures approximately 5 cm beyond the   cavoatrial junction. No acute osseous abnormality is identified. Small   right-sided pleural effusion similar appearing basilar airspace disease. Osseous structures appear similar. Impression:       Interval placement of a right-sided central venous catheter which extends   into the inferior aspect of the right atrium, approximately 5 cm beyond the   cavoatrial junction. CT ABDOMEN PELVIS WO CONTRAST Additional Contrast? None [[id number]] Collected: 09/15/22 1017     Order Status: Completed Updated: 09/15/22 1027     Narrative:       EXAMINATION:   CT OF THE ABDOMEN AND PELVIS WITHOUT CONTRAST 9/15/2022 10:00 am     TECHNIQUE:   CT of the abdomen and pelvis was performed without the administration of   intravenous contrast. Multiplanar reformatted images are provided for review. Automated exposure control, iterative reconstruction, and/or weight based   adjustment of the mA/kV was utilized to reduce the radiation dose to as low   as reasonably achievable. COMPARISON:   09/14/2022 CT     HISTORY:   ORDERING SYSTEM PROVIDED HISTORY: worsening acidosis, hypotension ? ischemia   TECHNOLOGIST PROVIDED HISTORY:   Reason for exam:->worsening acidosis, hypotension ? ischemia   Additional Contrast?->None   Reason for Exam: worsening labs, abd pain     FINDINGS:   Lower Chest:  Interval development of a small right pleural effusion with   dense peripheral airspace opacification in the right lower lobe. Base of the   heart is normal.     Organs: Liver parenchyma is normal on this noncontrast exam.  Slight   nodularity of the capsule may suggest an underlying pattern of chronic liver   disease. There is mild ascites. The gallbladder is absent. Biliary ducts   and pancreas normal.  Calcified granulomata in the spleen. Kidneys and   adrenal glands are normal.     GI/Bowel: The stomach is normal.  Prior imaging described enteritis of the   duodenum and proximal jejunum. This is difficult to evaluate on the current   noncontrast exam but there is no pattern of inflammation. Questionable   mucosal thickening of the distal duodenum and proximal jejunum. No pattern   of obstruction. Of greater suspicion, there is severe inflammatory change in   the right lower quadrant that has progressed since the prior exam.  This   includes mesenteric inflammation and ill-defined free fluid. There is no   pattern of perforation or abscess. Etiology appears to correspond to either   mucosal changes of the distal ileum, terminal ileum or cecum. Nonvisualized   appendix. There is also a pattern of diffuse mucosal thickening of the colon   extending from the splenic flexure through the rectum. Dense stool within   the distal colon also noted. Pelvis: The bladder is decompressed around a Osorio catheter. The uterus and   adnexa are unremarkable. Peritoneum/Retroperitoneum: The aorta tapers normally. No adenopathy. Bones/Soft Tissues: No significant skeletal abnormalities. Impression:       1. Limited evaluation of the GI tract on this noncontrast exam.  Improved   mucosal changes of the duodenum and proximal jejunum that were described on   prior CT. 2. Severe inflammatory change in the right lower quadrant with etiology   uncertain. This could correspond to enteritis of the distal ileum and   terminal ileum. Colitis may also be considered. Infectious or inflammatory   etiologies may be favored. 3. Dense stool and diffuse mucosal thickening of the distal colon which may   represent a pattern of colitis. 4. Small right pleural effusion with airspace changes in the right lower   lobe, new from prior exam.   5. Evidence of chronic liver disease with a small amount of ascites stable. CT ABDOMEN PELVIS WO CONTRAST Additional Contrast? None [[id number]] Collected: 09/14/22 1452     Order Status: Completed Updated: 09/14/22 6487     Narrative:       EXAMINATION:   CT OF THE ABDOMEN AND PELVIS WITHOUT CONTRAST, 9/14/2022 2:46 pm     TECHNIQUE:   CT of the abdomen and pelvis was performed without the administration of   intravenous contrast. Multiplanar reformatted images are provided for review. Automated exposure control, iterative reconstruction, and/or weight based   adjustment of the mA/kV was utilized to reduce the radiation dose to as low   as reasonably achievable. COMPARISON:   None     HISTORY:   ORDERING SYSTEM PROVIDED HISTORY:  Abdominal pain/ near syncope   TECHNOLOGIST PROVIDED HISTORY:   Additional Contrast?  None   Reason for Exam:  Abdominal pain/ near syncope   Decision Support Exception - unselect if not a suspected or confirmed   emergency medical condition->Emergency Medical Condition (MA)   Reason for Exam:  Vomiting and pain     FINDINGS:   Lower Chest: No active disease. Organs: The liver appears unremarkable. Status post cholecystectomy. Pancreas and spleen, adrenals, kidneys, aorta and IVC appear stable. GI/Bowel: There is evidence of thickening of the duodenum as well as jejunal   loops with perijejunal inflammatory changes. These changes are compatible   with acute enteritis.   No evidence of bowel obstruction or perforation. Evidence of constipation and significant stool impaction in the rectum. Pelvis: Urinary bladder contains Osorio catheter. The uterus and adnexa   demonstrate no acute abnormality. Peritoneum/Retroperitoneum: No evidence of retroperitoneal lymphadenopathy or   acute peritoneal findings. Mild ascites mostly around the liver and spleen. Bones/Soft Tissues: No acute abnormality. Osteopenia. Moderate multilevel   degenerative disc disease. Impression:       1. Acute enteritis involving the duodenum and jejunal loops with thickening   of the loops and edema. No evidence of bowel obstruction. 2. Constipation with significant stool impaction in the rectum. 3. Mild ascites mostly around the liver and spleen. 4. Adequate position of Osorio catheter in the urinary bladder. XR CHEST PORTABLE [[id number]] Collected: 09/14/22 1334     Order Status: Completed Updated: 09/14/22 1349     Narrative:       EXAMINATION:   ONE XRAY VIEW OF THE CHEST     9/14/2022 10:22 am     COMPARISON:   None. HISTORY:   ORDERING SYSTEM PROVIDED HISTORY: confirm central line   TECHNOLOGIST PROVIDED HISTORY:   Reason for exam:->confirm central line   Reason for Exam: confirm central line     FINDINGS:   A right internal jugular venous catheter is been placed with the tip at the   cavoatrial junction. No pneumothorax is identified. The lungs and costophrenic angles are clear. The cardiomediastinal   silhouette and pulmonary vessels appear normal.      Impression:       Placement of a right internal jugular central venous catheter without evident   complication. The tip is at approximately the cavoatrial junction.      No acute findings in the chest.          Patient Active Problem List   Diagnosis    DKA (diabetic ketoacidosis) ([address]    Hypotension    Syncope    Hyponatremia    DEJAN (acute kidney injury) ([address]    Abdominal pain    Enteritis    Septic shock (HCC)    Elevated troponin Leukocytosis    Pyuria    Lactic acidosis    DM (diabetes mellitus), secondary uncontrolled (HCC)    Ischemic colitis ([facility]    S/P exploratory laparotomy    Acute postoperative pulmonary insufficiency (HCC)       Assessment:    Ischemic colitis s/p exploratory laparotomy and sigmoid colectomy and colostomy on 9/15/2022  Septic shock. Acute encephalopathy  Acute renal failure  Acute respiratory failure with hypoxia  Syncope  Electrolytes abnormality  Diabetes mellitus type 2. Hyponatremia        Plan:    Extubated on 9/21. Currently on room air. WBC is still elevated, but improved from yesterday, down to 25K today. The abdomen is tender, CT scan showed fluid collection that failed to drainage. Continue ceftriaxone 2 g daily. Continue  Diflucan  Continue trophic tube feed. Surgery is following. Plan to repeat CT scan of the abdomen later  Trial of hemodialysis today. Pain control.   Pressors are off  CT scan of the brain is negative  DVT and GI prophylaxis    Reviewed with ICU Staff     Seen with multidisciplinary ICU team         Sanjay Andrade MD,

## 2022-09-22 NOTE — CARE COORDINATION
This writer received a call from Zackary Pringle regarding potential OP HD. He was contacted by Dr Rishi Sharma. Tentative site will be [place]. Schedule and chair time TBD.   Contact # for [place] [phone]      Nicci Whittaker RN

## 2022-09-22 NOTE — PROGRESS NOTES
Interval History and plan:     Creatinine continues to go up again  Lethargic   Electrolytes are stable  Has edema  Extubated  On room air    Plan:  Do dialysis today  Then continue Tuesday Thursday Saturday for now  We will try to remove 1 L but last time he had difficulty removing fluid  Will give a dose of midodrine  prior to dialysis                     Assessment :     Acidosis  Severe  Requiring 2 vasopressors  Blood pressure okay with the 2 vasopressors but lactic is a still going up to 19 concerning for ischemia   /Possible metformin induced lactic acidosis       CKD Stage IIIb with DEJAN  Creatinine 2.1 on consult  Creatinine 1.6 on 7/22  Likely due to diabetes, hypertension  Renal imaging-normal in the past      hypotension  BP: (131-145)/(50-59)  Heart Rate:  [74-82]   BP goal inpatient [phone] systolic inpatient  Pressures at the time of consult  Normally hypertensive    Due the potential for life-threatening deterioration due to patient's acidosis I spent 45 minutes providing critical care for this individual, excluding procedures on 9/15/22 . [facility] would like to thank Az Steele MD   for opportunity to serve this patient      Please call with questions at-   24 Hrs Answering service [phone] or  7 am- 5 pm via Perfect serve or cell phone  Sincere Tolliver MD          CC/reason for consult :       DEJAN     HPI :     Hellen Guerra is a 67 y.o. female presented to   the hospital on 9/14/2022 with lactic acidosis. She is known to have diabetes and on metformin to 2 days ago  Has constipation and with multiple bowel regimen has had good bowel movement yesterday following that she has syncope. EMS was called and was found to have blood pressure of 50 systolic given IV fluids brought to the emergency room blood pressure was normal initially but later developed hypotension got 3 L of fluid and now on 2 vasopressors and in ICU.   She also has severe acidosis with very high lactate. CT scan was normal initially. We are consulted for DEJAN on CKD and related issues. On CKD and also severe lactic acidosis    ROS:     Seen with- sister    RN         PMH/PSH/SH/Family History:     Past Medical History:   Diagnosis Date    Chronic kidney disease     Diabetes mellitus ([address]     Hyperlipidemia     Hypertension     Neuropathy        Past Surgical History:   Procedure Laterality Date    LAPAROTOMY N/A 9/15/2022    DIAGNOSTIC LAPAROSCOPY, EXPLORATORY LAPAROTOMY, SIGMOID COLECTOMY AND COLOSTOMY performed by Rosalie Basurto MD at [facility]        reports that she has never smoked. She has never used smokeless tobacco. She reports that she does not currently use alcohol. She reports that she does not use drugs. family history is not on file.          Medication:     Current Facility-Administered Medications: insulin glargine (LANTUS) injection vial 10 Units, 10 Units, SubCUTAneous, Nightly  cefTRIAXone (ROCEPHIN) 2,000 mg in dextrose 5 % 50 mL IVPB mini-bag, 2,000 mg, IntraVENous, Q24H  insulin lispro (HUMALOG) injection vial 0-16 Units, 0-16 Units, SubCUTAneous, Q4H  fluconazole (DIFLUCAN) in 0.9 % sodium chloride IVPB 200 mg, 200 mg, IntraVENous, Q24H  heparin (porcine) injection 3,200 Units, 3,200 Units, IntraCATHeter, PRN  fentaNYL (SUBLIMAZE) injection 25 mcg, 25 mcg, IntraVENous, Q4H PRN  prochlorperazine (COMPAZINE) injection 5 mg, 5 mg, IntraVENous, Q6H PRN  potassium chloride 20 mEq/50 mL IVPB (Central Line), 20 mEq, IntraVENous, PRN  magnesium sulfate 1000 mg in dextrose 5% 100 mL IVPB, 1,000 mg, IntraVENous, PRN  calcium gluconate 1000 mg in sodium chloride 50 mL, 1,000 mg, IntraVENous, PRN **OR** calcium gluconate 2,000 mg in dextrose 5 % 100 mL IVPB, 2,000 mg, IntraVENous, PRN **OR** calcium gluconate 3,000 mg in dextrose 5 % 100 mL IVPB, 3,000 mg, IntraVENous, PRN **OR** calcium gluconate 4,000 mg in dextrose 5 % 100 mL IVPB, 4,000 mg, IntraVENous, PRN  sodium chloride flush 0.9 % injection 5-40 mL, 5-40 mL, IntraVENous, PRN  0.9 % sodium chloride infusion, 25 mL, IntraVENous, PRN  HYDROmorphone (DILAUDID) injection 0.25 mg, 0.25 mg, IntraVENous, Q5 Min PRN  HYDROmorphone (DILAUDID) injection 0.5 mg, 0.5 mg, IntraVENous, Q5 Min PRN  ondansetron (ZOFRAN) injection 4 mg, 4 mg, IntraVENous, Q10 Min PRN  fentaNYL (SUBLIMAZE) injection 25 mcg, 25 mcg, IntraVENous, Q1H PRN  glucose chewable tablet 16 g, 4 tablet, Oral, PRN  dextrose bolus 10% 125 mL, 125 mL, IntraVENous, PRN **OR** dextrose bolus 10% 250 mL, 250 mL, IntraVENous, PRN  glucagon (rDNA) injection 1 mg, 1 mg, SubCUTAneous, PRN  dextrose 10 % infusion, , IntraVENous, Continuous PRN  sodium chloride flush 0.9 % injection 5-40 mL, 5-40 mL, IntraVENous, PRN  0.9 % sodium chloride infusion, , IntraVENous, PRN  polyethylene glycol (GLYCOLAX) packet 17 g, 17 g, Oral, Daily PRN  acetaminophen (TYLENOL) tablet 650 mg, 650 mg, Oral, Q6H PRN **OR** acetaminophen (TYLENOL) suppository 650 mg, 650 mg, Rectal, Q6H PRN  pantoprazole (PROTONIX) injection 40 mg, 40 mg, IntraVENous, Daily       Vitals :     Vitals:    09/22/22 1000   BP: (!) 131/50   Pulse: 82   Resp: 20   Temp:    SpO2: 95%       I & O :       Intake/Output Summary (Last 24 hours) at 9/22/2022 1044  Last data filed at 9/22/2022 0800  Gross per 24 hour   Intake 549 ml   Output 25 ml   Net 524 ml        Physical Examination :     General appearance: confused,on NC  HEENT: Lips- normal, teeth- ok , oral mucosa- moist  Neck : Mass- no, appears symmetrical, JVD- not visible  Respiratory: Respiratory effort-  comfortable on oxygen, wheeze- no, crackles - no  Cardiovascular:  Ausculation- No M/R/G, Edema none  Abdomen: visible mass- no, distention- no, scar- no, tenderness- no                            hepatosplenomegaly-  No--  Musculoskeletal:  clubbing no,cyanosis- no , digital ischemia- no                           muscle strength- patient unable to co-operate     , tone - patient unable to co-operate   Skin: rashes- no , ulcers- no, induration- no, tightening - no  Psychiatric:  confused   Additional findings:         LABS:     Recent Labs     09/20/22  0520 09/21/22  0500 09/21/22  1420 09/22/22  0526   WBC 30.6* 25.6*  --  24.1*   HGB 8.3* 7.4* 8.0* 7.9*   HCT 25.5* 22.7* 24.2* 23.8*   PLT 91* 87*  --  106*     Recent Labs     09/20/22  0520 09/20/22  1200 09/21/22  0500 09/22/22  0526   * 135* 134* 133*   K 4.2 3.9 4.1 4.3   CL 99 99 98* 98*   CO2 19* 21 21 21   BUN 39* 30* 51* 70*   CREATININE 1.9* 1.6* 2.5* 3.6*   GLUCOSE 247* 208* 282* 218*   MG 2.50*  --  2.40  --    PHOS 3.8 2.8 3.3 3.1

## 2022-09-22 NOTE — DISCHARGE INSTR - COC
Continuity of Care Form    Patient Name: Devorah Suarez   :  1950  MRN:  [id number]    Admit date:  2022  Discharge date:  10/06/2022    Code Status Order: DNR  Advance Directives:     Admitting Physician:  No admitting provider for patient encounter. PCP: Shyla Duran DO, DO    Discharging Nurse: [facility] Unit/Room#: 4895/8706-54  Discharging Unit Phone Number: [phone]    Emergency Contact:   Extended Emergency Contact Information  Primary Emergency Contact: es talbot  Home Phone: [phone]  Relation: Child  Preferred language: English   needed? No  Secondary Emergency Contact: [address] Phone: [phone]  Relation: Spouse  Preferred language: English   needed?  No    Past Surgical History:  Past Surgical History:   Procedure Laterality Date    LAPAROTOMY N/A 9/15/2022    DIAGNOSTIC LAPAROSCOPY, EXPLORATORY LAPAROTOMY, SIGMOID COLECTOMY AND COLOSTOMY performed by Rosalie Basurto MD at [facility] OR       Immunization History:   Immunization History   Administered Date(s) Administered    COVID-19, PFIZER GRAY top, DO NOT Dilute, (age 15 y+), IM, 30 mcg/0.3 mL 2022    COVID-19, PFIZER PURPLE top, DILUTE for use, (age 15 y+), 30mcg/0.3mL 2021       Active Problems:  Patient Active Problem List   Diagnosis Code    DKA (diabetic ketoacidosis) ([facility]) E11.10    Hypotension I95.9    Syncope R55    Hyponatremia E87.1    DEAJN (acute kidney injury) ([facility] [address] N17.9    Abdominal pain R10.9    Enteritis K52.9    Septic shock ([facility]) A41.9, R65.21    Elevated troponin R77.8    Leukocytosis D72.829    Pyuria R82.81    Lactic acidosis E87.2    DM (diabetes mellitus), secondary uncontrolled ([facility]) E13.65    Ischemic colitis ([facility] [address] K55.9    S/P exploratory laparotomy Z98.890    Acute postoperative pulmonary insufficiency ([facility]) J95.2       Isolation/Infection:   Isolation            No Isolation          Patient Infection Status       Infection Onset Added Last Indicated Last Indicated By Review Planned Expiration Resolved Resolved By    None active    Resolved    C-diff Rule Out 09/15/22 09/15/22 09/15/22 GI Bacterial Pathogens By PCR (Ordered)   09/15/22 Art Lemos RN    C-diff Rule Out 09/14/22 09/14/22 09/14/22 Clostridium difficile toxin/antigen (Ordered)   09/15/22 Rule-Out Test Resulted            Nurse Assessment:  Last Vital Signs: BP (!) 139/56   Pulse 82   Temp 99.8 °F (37.7 °C) (Axillary)   Resp 22   Ht 5' 9\" (1.753 m)   Wt 171 lb 11.8 oz (77.9 kg)   SpO2 95%   BMI 25.36 kg/m²     Last documented pain score (0-10 scale): Pain Level: 0  Last Weight:   Wt Readings from Last 1 Encounters:   09/21/22 171 lb 11.8 oz (77.9 kg)     Mental Status:  oriented and alert    IV Access:  Right Vas Cath, Left forearm    Nursing Mobility/ADLs:  Walking   Dependent  Transfer  Dependent  Bathing  Dependent  Dressing  Dependent  Toileting  Dependent  Feeding  Dependent  Med Admin  Dependent  Med Delivery   none    Wound Care Documentation and Therapy:  Incision 09/15/22 Abdomen Medial (Active)   Dressing Status Clean;Dry; Intact 09/21/22 2000   Dressing Change Due 09/19/22 09/20/22 0800   Incision Cleansed Not Cleansed 09/21/22 2000   Dressing/Treatment Foam 09/21/22 2000   Incision Length (cm) 15 09/16/22 1638   Incision Width (cm) 0 cm 09/16/22 1638   Incision Depth (cm) 0 cm 09/16/22 1638   Closure Staples 09/20/22 0800   Margins Approximated 09/16/22 1638   Incision Assessment Other (Comment) 09/21/22 2000   Drainage Amount None 09/21/22 2000   Drainage Description Serosanguinous 09/20/22 0800   Odor None 09/21/22 2000   Lesli-incision Assessment Intact 09/21/22 2000   Number of days: 7        Elimination:  Continence: Bowel: No  Bladder: No  Urinary Catheter:  Insertion Date: ***   Colostomy/Ileostomy/Ileal Conduit: {YES / DL:64802}  Colostomy LLQ-Stomal Appliance: Clean, dry & intact  Colostomy LLQ-Flange Size (inches): 2.25 Inches  Colostomy LLQ-Stoma Assessment: Moist  Colostomy LLQ-Peristomal Assessment: Clean, dry & intact  Colostomy LLQ-Treatment: Bag change, Site care, Stoma paste, Heat applied  Colostomy LLQ-Stool Appearance: Loose, Watery  [REMOVED] Rectal Tube-Stool Appearance: Loose  Colostomy LLQ-Stool Color: David Dangelo  [REMOVED] Rectal Tube-Stool Color: Brown  Colostomy LLQ-Stool Amount: Smear  [REMOVED] Rectal Tube-Stool Amount: Medium  Colostomy LLQ-Output (mL): 100 ml    NEW Colostomy:    Went to OR for Exploratory lap, sigmoid colectomy and colostomy on 9/15/22 by Dr Lore Alvarado. Stoma: measures 20 mm (1 3/8 inch) x 38 mm ( 1 1/2 inch). Oval, brown, protrudes but distal edge in a divot/crease from 300 pm to 600 pm.  She may need convex at a later time. For now use flat wafer with drainable bag. Appliance:  Coloplast RED 2 piece flat flange # [id number] with drainable pouch # [id number]. Paste ring around stoma prior to application of flange.     Samples requested Yes  Fax to [facility] [phone] and free samples ordered and sent to patients home:   Flat  flange  Lock and roll bag transparent with filter  Closed bag  One piece  flat with lock and roll bag with filter transparent  One piece flat with closed bag with filter transparent     # [id number] Adapt Paste  # 7906 Powder  # 8805 Paste rings  # [id number] Barrier extenders  # 9662 skin prep wipe barrier film  # 7760 Barrier removal wipes  # 13759 Deodorant  #    Belt large / medium  Curved scissors will be provided in packet kit     Fax to [address] [place] [address] and Free samples ordered and sent to patients home:   Sensura Wander flat flange  Drainable bag transparent with filter  Closed bag  One piece flat drainable with filter transparent  One piece closed bag with filter - transparent     # 54292 Deodorant packet  # 09562 Bravo paste seal rings  # 4247 Belt for Sensura Deaver  # [id number] Adhesive remover wipes  # [id number] Barrier film wipes  # [id number] Brava Elastic barrier extender strips  # Small scissors will be provided in packet kit      [place] samples ordred      Coloplast samples ordered:          Date of Last BM: ***    Intake/Output Summary (Last 24 hours) at 2022 09  Last data filed at 2022 0549  Gross per 24 hour   Intake 549 ml   Output 125 ml   Net 424 ml     I/O last 3 completed shifts:   In: 1143.2 [I.V.:122; NG/GT:749; IV Piggyback:272.2]  Out: 185 [Drains:35; Stool:150]    Safety Concerns:     [employer] Safety Concerns:001305249}    Impairments/Disabilities:      [employer] Impairments/Disabilities:182826811}    Nutrition Therapy:  Current Nutrition Therapy:   [employer] Diet List:381533343}    Routes of Feeding: {CHP DME Other Feedings:170949648}  Liquids: {Slp liquid thickness:69121}  Daily Fluid Restriction: {CHP DME Yes amt example:512138625}  Last Modified Barium Swallow with Video (Video Swallowing Test): {Done Not Done MVXF:607594271}    Treatments at the Time of Hospital Discharge:   Respiratory Treatments: ***  Oxygen Therapy:  {Therapy; copd oxygen:59689}  Ventilator:    { CC Vent YGXC:512486150}    Rehab Therapies: {THERAPEUTIC INTERVENTION:1951917350}  Weight Bearing Status/Restrictions: [address]  Weight Bearin}  Other Medical Equipment (for information only, NOT a DME order):  {EQUIPMENT:755045274}  Other Treatments: ***    Patient's personal belongings (please select all that are sent with patient):  {CHP DME Belongings:687801001}    RN SIGNATURE:  {Esignature:430141209}    CASE MANAGEMENT/SOCIAL WORK SECTION    Inpatient Status Date: 2022    Readmission Risk Assessment Score:  Readmission Risk              Risk of Unplanned Readmission:  36         Discharging to Facility/ Agency   [address]  [phone]     / signature: Electronically signed by CANDE Miles on 10/6/22 at 9:50 AM EDT    PHYSICIAN SECTION    Prognosis: {Prognosis:5969919365}    Condition at Discharge: [facility] Patient Condition:506432648}    Rehab Potential (if transferring to Rehab): {Prognosis:7501614689}    Recommended Labs or Other Treatments After Discharge: ***    Physician Certification: I certify the above information and transfer of Tatianna Landry  is necessary for the continuing treatment of the diagnosis listed and that she requires {Admit to Appropriate Level of Care:63744} for {GREATER/LESS:512076977} 30 days.      Update Admission H&P: {CHP DME Changes in FVSJD:035569450}    PHYSICIAN SIGNATURE:  {Esignature:412391431}

## 2022-09-22 NOTE — PROGRESS NOTES
Hospitalist Progress Note      PCP: Malena El DO, DO    Date of Admission: 9/14/2022    Chief Complaint: Abdominal Pain    Subjective: no new c/o, now extubated. Medications:  Reviewed    Infusion Medications    prismaSATE BGK 4/2.5 Stopped (09/19/22 1030)    prismaSATE BGK 4/2.5 Stopped (09/19/22 1030)    prismaSATE BGK 4/2.5 Stopped (09/19/22 1030)    sodium chloride      dextrose      sodium chloride 100 mL/hr at 09/21/22 2122     Scheduled Medications    cefTRIAXone (ROCEPHIN) IV  2,000 mg IntraVENous Q24H    insulin lispro  0-16 Units SubCUTAneous Q4H    fluconazole  200 mg IntraVENous Q24H    pantoprazole  40 mg IntraVENous Daily     PRN Meds: heparin (porcine), fentanNYL, prochlorperazine, potassium chloride, magnesium sulfate, sodium phosphate IVPB **OR** sodium phosphate IVPB **OR** sodium phosphate IVPB **OR** sodium phosphate IVPB, calcium gluconate **OR** calcium gluconate **OR** calcium gluconate **OR** calcium gluconate, sodium chloride flush, sodium chloride, HYDROmorphone, HYDROmorphone, ondansetron, fentanNYL, glucose, dextrose bolus **OR** dextrose bolus, glucagon (rDNA), dextrose, sodium chloride flush, sodium chloride, polyethylene glycol, acetaminophen **OR** acetaminophen      Intake/Output Summary (Last 24 hours) at 9/22/2022 0903  Last data filed at 9/22/2022 0549  Gross per 24 hour   Intake 549 ml   Output 125 ml   Net 424 ml         Physical Exam Performed:    BP (!) 139/56   Pulse 82   Temp 99.8 °F (37.7 °C) (Axillary)   Resp 22   Ht 5' 9\" (1.753 m)   Wt 171 lb 11.8 oz (77.9 kg)   SpO2 95%   BMI 25.36 kg/m²     General appearance: No apparent distress, appears stated age and now extubated. HEENT: Pupils equal, round, and reactive to light. Conjunctivae/corneas clear. Neck: Supple, with full range of motion. No jugular venous distention. Trachea midline. Respiratory:  Normal respiratory effort.  Clear to auscultation, bilaterally without Rales/Wheezes/Rhonchi. Cardiovascular: Regular rate and rhythm with normal S1/S2 without murmurs, rubs or gallops. Abdomen: Soft, non-distended with hypoactive bowel sounds. Musculoskeletal: No clubbing, cyanosis or edema bilaterally. Skin: Skin color, texture, turgor normal.  No rashes or lesions. Capillary Refill: Brisk,< 3 seconds   Peripheral Pulses: +2 palpable, equal bilaterally       Labs:   Recent Labs     09/20/22  0520 09/21/22  0500 09/21/22  1420 09/22/22  0526   WBC 30.6* 25.6*  --  24.1*   HGB 8.3* 7.4* 8.0* 7.9*   HCT 25.5* 22.7* 24.2* 23.8*   PLT 91* 87*  --  106*       Recent Labs     09/20/22  1200 09/21/22  0500 09/22/22  0526   * 134* 133*   K 3.9 4.1 4.3   CL 99 98* 98*   CO2 21 21 21   BUN 30* 51* 70*   CREATININE 1.6* 2.5* 3.6*   CALCIUM 7.9* 8.0* 8.0*   PHOS 2.8 3.3 3.1       Recent Labs     09/20/22  0520 09/21/22  0500   AST 22 15   ALT 13 9*   BILITOT 0.3 0.4   ALKPHOS 201* 175*       Recent Labs     09/20/22  0520 09/21/22  0500   INR 1.28* 1.27*       No results for input(s): CKTOTAL, TROPONINI in the last 72 hours.       Urinalysis:      Lab Results   Component Value Date/Time    NITRU Negative 09/14/2022 12:08 PM    WBCUA 21-50 09/14/2022 12:08 PM    BACTERIA Rare 09/14/2022 12:08 PM    RBCUA None seen 09/14/2022 12:08 PM    BLOODU Negative 09/14/2022 12:08 PM    SPECGRAV <=1.005 09/14/2022 12:08 PM    GLUCOSEU Negative 09/14/2022 12:08 PM       Consults:    IP CONSULT TO HOSPITALIST  IP CONSULT TO NEPHROLOGY  IP CONSULT TO CRITICAL CARE  IP CONSULT TO PHARMACY  IP CONSULT TO GENERAL SURGERY  IP CONSULT TO SOCIAL WORK  IP CONSULT TO DIETITIAN      Assessment/Plan:    Active Hospital Problems    Diagnosis     Ischemic colitis ([facility] [K55.9]      Priority: Medium    S/P exploratory laparotomy [Z98.890]      Priority: Medium    Acute postoperative pulmonary insufficiency ([facility] [J95.2]      Priority: Medium    Syncope [R55]      Priority: Medium    Hyponatremia [E87.1] Priority: Medium    Abdominal pain [R10.9]      Priority: Medium    Enteritis [K52.9]      Priority: Medium    Elevated troponin [R77.8]      Priority: Medium    DEJAN (acute kidney injury) ([address] [N17.9]      Priority: Medium    DM (diabetes mellitus), secondary uncontrolled ([facility] [E13.65]      Priority: Medium         Peritonitis - 2nd to bowel perforation, likely due to stercoral/ischemic colitis. General Surgery consulted and appreciated s/p Lap/Sigmoid Colectomy and Colostomy 15 Sept w/out complications. Sepsis - w/ Leukocytosis/Tachycardia/Fever/Elevated Lactate POArrival 2nd to above infection. Continue IVF as appropriate and monitor clinical response w/ ABX as written - previously Zosyn/Diflucan - changed to Rocephin/Diflucan 21 Sept.      ARF - w/ elevated BUN/Cr ratio c/w pre-renal azotemia. Given IVF hydration and follow serial labs. Reviewed and documented as above. Nephrology consulted and appreciated - started on CRRT 15 Sept w/ vascath placed. Acute Respiratory Failure - post-op w/ hypoxia,l.  Presence of clinical respiratory distress w/ tachypnea/dyspnea/SOB and wheezing w/ use of accessory muscles to breath requiring intubation. Supplemental O2 and wean as tolerated. HypoNatremia - etiology clinically unable to determine but likely hypovolemic. Follow serial labs now off IVF. Reviewed and documented as above. Troponin elevation - of unclear clinical significance w/ etiology clinically unable to determine, w/out signs/sxs of active ischemia. Followed serial troponins. Reviewed and documented. DM2 - controlled on home oral antiGlycemics/Insulin - held/continued. Follow FSBS/SSI low regimen. Last HbA1c 9.2% dated this admission. Anticipate resuming/continuing home regimen at discharge.          DVT Prophylaxis: IPC      Recent Labs     09/20/22  0520 09/21/22  0500 09/22/22  0526   PLT 91* 87* 106*       Diet: Diet NPO  ADULT TUBE FEEDING; Nasogastric; Peptide Based; Continuous; 10; No; 60; Q 4 hours  Code Status: Full Code      PT/OT Eval Status: not yet ordered. Dispo - Remains in ICU. Patient is likely to remain in-house at least for the foreseeable future pending clinical course, subspecialty recs and eventual PT/OT eval/recs.   of 55 years preset at bedside when seen 19/20/21 Sept.  Sister Ancelmo Pittman present 20/21 Sept       Chris Esparza MD

## 2022-09-22 NOTE — PROGRESS NOTES
Comprehensive Nutrition Assessment    Type and Reason for Visit:  Reassess    Nutrition Recommendations/Plan:   Modify Glucerna 1.5 (diabetic formula) at trophic feeds of 10 mL/hr via NG. As medically feasible and as tolerated, increase by 10 mL/hr q 4 hours until goal of 55 mL/hr is met   Recommend 30 mL H20 flush q 4 hours. Monitor IVF infusion, Na labs and need for adjustments in water flush  Monitor TF tolerance (abd distention, bowel habits, N/V, cramping)  Monitor nutrition adequacy, pertinent labs, bowel habits, wt changes, and clinical progress     Malnutrition Assessment:  Malnutrition Status: Moderate malnutrition (09/22/22 1033)    Context:  Acute Illness     Findings of the 6 clinical characteristics of malnutrition:  Energy Intake:  50% or less of estimated energy requirements for 5 or more days  Weight Loss:  1% to 2% over 1 week       Nutrition Assessment:    Follow up: Pt extubated yesterday. TFs off this AM d/t possible transfer to HD. Plans to resume TFs. Pt tolerating tube feeds at trophic rate. No plans to advance today, possible increase to tube feeds tomorrow. Blood sugars elevated, recommend modifying tube feeds to carb controlled formula. Will monitor. Nutrition Related Findings:    +6.6 L per I&Os. Hypoactive BS. 100 mL output from colostomy. -282 past 24 hrs. Na 133. Wound Type: Surgical Incision       Current Nutrition Intake & Therapies:    Average Meal Intake: NPO  Average Supplements Intake: NPO  Diet NPO  ADULT TUBE FEEDING; Nasogastric; Peptide Based; Continuous; 10; No; 60; Q 4 hours  Current Tube Feeding (TF) Orders:  Feeding Route: Nasogastric  Formula: Diabetic  Schedule: Continuous  Goal TF & Flush Orders Provides: Gluerna 1.5 at goal rate of 55 mL/hr x20 hours to provide 1100 mL, 1650 kcal, 90 g protein, and 835 mL free water. 30 mL free water lfush q 4 hrs.     Anthropometric Measures:  Height: 5' 9\" (175.3 cm)  Ideal Body Weight (IBW): 145 lbs (66 kg)    Admission Body Weight: 194 lb (88 kg) (bed scale)  Current Body Weight: 171 lb (77.6 kg), 113.8 % IBW. Weight Source: Bed Scale  Current BMI (kg/m2): 25.2                          BMI Categories: Normal Weight (BMI 22.0 to 24.9) age over 72    Estimated Daily Nutrient Needs:  Energy Requirements Based On: Kcal/kg (25-30)  Weight Used for Energy Requirements: Ideal  Energy (kcal/day): 4835-7554 kcal  Weight Used for Protein Requirements: Ideal (1.0-1.2 g/kg)  Protein (g/day): 66-74 g  Method Used for Fluid Requirements: 1 ml/kcal  Fluid (ml/day): 5171-2124 mL    Nutrition Diagnosis:   Inadequate energy intake related to altered GI structure as evidenced by NPO or clear liquid status due to medical condition, intubation, nutrition support - enteral nutrition    Nutrition Interventions:   Food and/or Nutrient Delivery: Modify Tube Feeding  Nutrition Education/Counseling: No recommendation at this time  Coordination of Nutrition Care: Continue to monitor while inpatient, Interdisciplinary Rounds       Goals:     Goals: Tolerate nutrition support at goal rate, within 2 days       Nutrition Monitoring and Evaluation:   Behavioral-Environmental Outcomes: None Identified  Food/Nutrient Intake Outcomes: Enteral Nutrition Intake/Tolerance  Physical Signs/Symptoms Outcomes: Biochemical Data, GI Status, Nutrition Focused Physical Findings, Weight    Discharge Planning:     Too soon to determine     Андрей Carias 87, RD, LD  Contact: [phone]

## 2022-09-22 NOTE — PROGRESS NOTES
[facility] GENERAL SURGERY DAILY PROGRESS NOTE    SUBJECTIVE: More awake this AM - following commands and answering questions. Started on trophic tube feeds yesterday, paused this AM in anticipation for HD. OBJECTIVE: CURRENT VITALS:  BP (!) 139/56   Pulse 82   Temp 99.8 °F (37.7 °C) (Axillary)   Resp 22   Ht 5' 9\" (1.753 m)   Wt 171 lb 11.8 oz (77.9 kg)   SpO2 95%   BMI 25.36 kg/m²          ABD: Soft, minimally distended, appropriately tender  BROOKS drainage serous  OSTOMY:  Pink; with minimal soft brown stool in bag (100cc recorded in past 24 hours)    LABS:    CBC:   Recent Labs     09/20/22  0520 09/21/22  0500 09/21/22  1420 09/22/22  0526   WBC 30.6* 25.6*  --  24.1*   RBC 2.99* 2.72*  --  2.85*   HGB 8.3* 7.4* 8.0* 7.9*   HCT 25.5* 22.7* 24.2* 23.8*   MCV 85.4 83.5  --  83.7   RDW 14.9 14.6  --  14.3   PLT 91* 87*  --  106*     BMP:   Recent Labs     09/20/22  1200 09/21/22  0500 09/22/22  0526   * 134* 133*   K 3.9 4.1 4.3   CL 99 98* 98*   CO2 21 21 21   PHOS 2.8 3.3 3.1   BUN 30* 51* 70*   CREATININE 1.6* 2.5* 3.6*     Recent Labs     09/20/22  0520 09/21/22  0500   MG 2.50* 2.40        ASSESSMENT AND PLAN:  67 y.o. female status post diagnosis of ischemic sigmoid colon s/p exploratory laparotomy, sigmoid colectomy, colostomy formation POD 7     -Would remain on trophic tube feeds today and monitor colostomy output and tolerance now that patient is more awake  - potentially start titrating tube feeds to goal tomorrow  - leukocytosis continues to improve, monitor daily CBC  - BROOKS drain to remain  - continue IV antibiotics  - medical management per primary    Leon Harris DO  PGY4, General Surgery  09/22/22  7:34 AM    Patient seen and agree with above and more than half of the total time was spent by me on the encounter. Continue tube feeds.   Once more alert then will need speech/swallow eval.  Continue abx and await culture sensitivities    Maxwell Worrell MD

## 2022-09-22 NOTE — PROGRESS NOTES
Vas cath tip in right atrium per initial CXR following placement. Harrison Montgomery NP pulled line back about 2cm, verified placement on F/U CXR at bedside, sutured line back into place. Pigtail flushing w/good blood return.     Jazmín Saleh RN  9/22/2022 9139

## 2022-09-22 NOTE — PROGRESS NOTES
Wound Care stopped to offer education for [facility]. Patient sleeping, daughter, Erick Mccormick in room. Erick Mccormick declined offer, is an STNA and will be assisting patient with care.

## 2022-09-23 LAB
ALBUMIN SERPL-MCNC: 2.4 G/DL (ref 3.4–5)
ANION GAP SERPL CALCULATED.3IONS-SCNC: 16 MMOL/L (ref 3–16)
BASOPHILS ABSOLUTE: 0 K/UL (ref 0–0.2)
BASOPHILS RELATIVE PERCENT: 0.3 %
BLOOD CULTURE, ROUTINE: NORMAL
BUN BLDV-MCNC: 49 MG/DL (ref 7–20)
CALCIUM IONIZED: 1.08 MMOL/L (ref 1.12–1.32)
CALCIUM SERPL-MCNC: 7.9 MG/DL (ref 8.3–10.6)
CHLORIDE BLD-SCNC: 99 MMOL/L (ref 99–110)
CO2: 19 MMOL/L (ref 21–32)
CREAT SERPL-MCNC: 2.7 MG/DL (ref 0.6–1.2)
CULTURE, BLOOD 2: NORMAL
EOSINOPHILS ABSOLUTE: 0 K/UL (ref 0–0.6)
EOSINOPHILS RELATIVE PERCENT: 0.1 %
GFR AFRICAN AMERICAN: 21
GFR NON-AFRICAN AMERICAN: 17
GLUCOSE BLD-MCNC: 189 MG/DL (ref 70–99)
GLUCOSE BLD-MCNC: 210 MG/DL (ref 70–99)
GLUCOSE BLD-MCNC: 215 MG/DL (ref 70–99)
GLUCOSE BLD-MCNC: 217 MG/DL (ref 70–99)
GLUCOSE BLD-MCNC: 219 MG/DL (ref 70–99)
GLUCOSE BLD-MCNC: 222 MG/DL (ref 70–99)
GLUCOSE BLD-MCNC: 233 MG/DL (ref 70–99)
GLUCOSE BLD-MCNC: 241 MG/DL (ref 70–99)
HAV IGM SER IA-ACNC: NORMAL
HBV SURFACE AB TITR SER: >1000 MIU/ML
HCT VFR BLD CALC: 21.3 % (ref 36–48)
HCT VFR BLD CALC: 22.9 % (ref 36–48)
HEMOGLOBIN: 7.1 G/DL (ref 12–16)
HEMOGLOBIN: 7.6 G/DL (ref 12–16)
HEPATITIS B CORE IGM ANTIBODY: NORMAL
HEPATITIS B SURFACE ANTIGEN INTERPRETATION: NORMAL
HEPATITIS C ANTIBODY INTERPRETATION: NORMAL
LYMPHOCYTES ABSOLUTE: 0.7 K/UL (ref 1–5.1)
LYMPHOCYTES RELATIVE PERCENT: 4.5 %
MCH RBC QN AUTO: 27.9 PG (ref 26–34)
MCH RBC QN AUTO: 27.9 PG (ref 26–34)
MCHC RBC AUTO-ENTMCNC: 33.3 G/DL (ref 31–36)
MCHC RBC AUTO-ENTMCNC: 33.3 G/DL (ref 31–36)
MCV RBC AUTO: 83.7 FL (ref 80–100)
MCV RBC AUTO: 83.8 FL (ref 80–100)
MONOCYTES ABSOLUTE: 0.7 K/UL (ref 0–1.3)
MONOCYTES RELATIVE PERCENT: 4.4 %
NEUTROPHILS ABSOLUTE: 14.6 K/UL (ref 1.7–7.7)
NEUTROPHILS RELATIVE PERCENT: 90.7 %
PDW BLD-RTO: 14.4 % (ref 12.4–15.4)
PDW BLD-RTO: 14.8 % (ref 12.4–15.4)
PERFORMED ON: ABNORMAL
PH VENOUS: 7.39 (ref 7.35–7.45)
PHOSPHORUS: 3.6 MG/DL (ref 2.5–4.9)
PLATELET # BLD: 128 K/UL (ref 135–450)
PLATELET # BLD: 151 K/UL (ref 135–450)
PMV BLD AUTO: 8.9 FL (ref 5–10.5)
PMV BLD AUTO: 8.9 FL (ref 5–10.5)
POTASSIUM SERPL-SCNC: 4.2 MMOL/L (ref 3.5–5.1)
RBC # BLD: 2.54 M/UL (ref 4–5.2)
RBC # BLD: 2.73 M/UL (ref 4–5.2)
SODIUM BLD-SCNC: 134 MMOL/L (ref 136–145)
WBC # BLD: 16.1 K/UL (ref 4–11)
WBC # BLD: 7.4 K/UL (ref 4–11)

## 2022-09-23 PROCEDURE — 97110 THERAPEUTIC EXERCISES: CPT

## 2022-09-23 PROCEDURE — 82330 ASSAY OF CALCIUM: CPT

## 2022-09-23 PROCEDURE — 85027 COMPLETE CBC AUTOMATED: CPT

## 2022-09-23 PROCEDURE — 6370000000 HC RX 637 (ALT 250 FOR IP): Performed by: STUDENT IN AN ORGANIZED HEALTH CARE EDUCATION/TRAINING PROGRAM

## 2022-09-23 PROCEDURE — 99024 POSTOP FOLLOW-UP VISIT: CPT | Performed by: SURGERY

## 2022-09-23 PROCEDURE — 2000000000 HC ICU R&B

## 2022-09-23 PROCEDURE — 97166 OT EVAL MOD COMPLEX 45 MIN: CPT

## 2022-09-23 PROCEDURE — P9047 ALBUMIN (HUMAN), 25%, 50ML: HCPCS | Performed by: INTERNAL MEDICINE

## 2022-09-23 PROCEDURE — 97162 PT EVAL MOD COMPLEX 30 MIN: CPT

## 2022-09-23 PROCEDURE — 80069 RENAL FUNCTION PANEL: CPT

## 2022-09-23 PROCEDURE — C9113 INJ PANTOPRAZOLE SODIUM, VIA: HCPCS | Performed by: STUDENT IN AN ORGANIZED HEALTH CARE EDUCATION/TRAINING PROGRAM

## 2022-09-23 PROCEDURE — 2580000003 HC RX 258: Performed by: INTERNAL MEDICINE

## 2022-09-23 PROCEDURE — 97530 THERAPEUTIC ACTIVITIES: CPT

## 2022-09-23 PROCEDURE — 6370000000 HC RX 637 (ALT 250 FOR IP): Performed by: INTERNAL MEDICINE

## 2022-09-23 PROCEDURE — 99233 SBSQ HOSP IP/OBS HIGH 50: CPT | Performed by: INTERNAL MEDICINE

## 2022-09-23 PROCEDURE — 85025 COMPLETE CBC W/AUTO DIFF WBC: CPT

## 2022-09-23 PROCEDURE — 6360000002 HC RX W HCPCS: Performed by: STUDENT IN AN ORGANIZED HEALTH CARE EDUCATION/TRAINING PROGRAM

## 2022-09-23 PROCEDURE — 6360000002 HC RX W HCPCS: Performed by: INTERNAL MEDICINE

## 2022-09-23 RX ORDER — MIDODRINE HYDROCHLORIDE 5 MG/1
10 TABLET ORAL
Status: DISCONTINUED | OUTPATIENT
Start: 2022-09-23 | End: 2022-09-26

## 2022-09-23 RX ORDER — ALBUMIN (HUMAN) 12.5 G/50ML
25 SOLUTION INTRAVENOUS ONCE
Status: COMPLETED | OUTPATIENT
Start: 2022-09-23 | End: 2022-09-23

## 2022-09-23 RX ORDER — INSULIN GLARGINE 100 [IU]/ML
15 INJECTION, SOLUTION SUBCUTANEOUS NIGHTLY
Status: DISCONTINUED | OUTPATIENT
Start: 2022-09-23 | End: 2022-09-25

## 2022-09-23 RX ORDER — 0.9 % SODIUM CHLORIDE 0.9 %
500 INTRAVENOUS SOLUTION INTRAVENOUS ONCE
Status: COMPLETED | OUTPATIENT
Start: 2022-09-23 | End: 2022-09-24

## 2022-09-23 RX ORDER — 0.9 % SODIUM CHLORIDE 0.9 %
500 INTRAVENOUS SOLUTION INTRAVENOUS ONCE
Status: DISCONTINUED | OUTPATIENT
Start: 2022-09-23 | End: 2022-10-06 | Stop reason: HOSPADM

## 2022-09-23 RX ADMIN — INSULIN LISPRO 4 UNITS: 100 INJECTION, SOLUTION INTRAVENOUS; SUBCUTANEOUS at 12:56

## 2022-09-23 RX ADMIN — INSULIN LISPRO 4 UNITS: 100 INJECTION, SOLUTION INTRAVENOUS; SUBCUTANEOUS at 20:22

## 2022-09-23 RX ADMIN — ACETAMINOPHEN 650 MG: 325 TABLET ORAL at 11:18

## 2022-09-23 RX ADMIN — SODIUM CHLORIDE 500 ML: 9 INJECTION, SOLUTION INTRAVENOUS at 23:47

## 2022-09-23 RX ADMIN — PANTOPRAZOLE SODIUM 40 MG: 40 INJECTION, POWDER, FOR SOLUTION INTRAVENOUS at 08:58

## 2022-09-23 RX ADMIN — INSULIN LISPRO 4 UNITS: 100 INJECTION, SOLUTION INTRAVENOUS; SUBCUTANEOUS at 05:02

## 2022-09-23 RX ADMIN — INSULIN GLARGINE 15 UNITS: 100 INJECTION, SOLUTION SUBCUTANEOUS at 20:22

## 2022-09-23 RX ADMIN — FLUCONAZOLE 200 MG: 2 INJECTION, SOLUTION INTRAVENOUS at 20:20

## 2022-09-23 RX ADMIN — INSULIN LISPRO 4 UNITS: 100 INJECTION, SOLUTION INTRAVENOUS; SUBCUTANEOUS at 18:03

## 2022-09-23 RX ADMIN — INSULIN LISPRO 4 UNITS: 100 INJECTION, SOLUTION INTRAVENOUS; SUBCUTANEOUS at 08:58

## 2022-09-23 RX ADMIN — MIDODRINE HYDROCHLORIDE 10 MG: 5 TABLET ORAL at 15:24

## 2022-09-23 RX ADMIN — ALBUMIN (HUMAN) 25 G: 0.25 INJECTION, SOLUTION INTRAVENOUS at 14:41

## 2022-09-23 RX ADMIN — CEFTRIAXONE 2000 MG: 2 INJECTION, POWDER, FOR SOLUTION INTRAMUSCULAR; INTRAVENOUS at 08:57

## 2022-09-23 ASSESSMENT — PAIN DESCRIPTION - DESCRIPTORS: DESCRIPTORS: ACHING

## 2022-09-23 ASSESSMENT — PAIN SCALES - GENERAL
PAINLEVEL_OUTOF10: 0
PAINLEVEL_OUTOF10: 0
PAINLEVEL_OUTOF10: 3
PAINLEVEL_OUTOF10: 0

## 2022-09-23 ASSESSMENT — PAIN DESCRIPTION - LOCATION: LOCATION: ABDOMEN

## 2022-09-23 NOTE — PLAN OF CARE
Problem: Discharge Planning  Goal: Discharge to home or other facility with appropriate resources  Outcome: Progressing     Problem: Pain  Goal: Verbalizes/displays adequate comfort level or baseline comfort level  Outcome: Progressing     Problem: Skin/Tissue Integrity  Goal: Absence of new skin breakdown  Description: 1. Monitor for areas of redness and/or skin breakdown  2. Assess vascular access sites hourly  3. Every 4-6 hours minimum:  Change oxygen saturation probe site  4. Every 4-6 hours:  If on nasal continuous positive airway pressure, respiratory therapy assess nares and determine need for appliance change or resting period. Outcome: Progressing     Problem: Chronic Conditions and Co-morbidities  Goal: Patient's chronic conditions and co-morbidity symptoms are monitored and maintained or improved  Outcome: Progressing     Problem: Safety - Medical Restraint  Goal: Remains free of injury from restraints (Restraint for Interference with Medical Device)  Description: INTERVENTIONS:  1. Determine that other, less restrictive measures have been tried or would not be effective before applying the restraint  2. Evaluate the patient's condition at the time of restraint application  3. Inform patient/family regarding the reason for restraint  4.  Q2H: Monitor safety, psychosocial status, comfort, nutrition and hydration  Outcome: Progressing     Problem: Nutrition Deficit:  Goal: Optimize nutritional status  Outcome: Progressing     Problem: Safety - Adult  Goal: Free from fall injury  Outcome: Progressing     Problem: Neurosensory - Adult  Goal: Achieves stable or improved neurological status  Outcome: Progressing  Goal: Achieves maximal functionality and self care  Outcome: Progressing     Problem: Respiratory - Adult  Goal: Achieves optimal ventilation and oxygenation  Outcome: Progressing     Problem: Cardiovascular - Adult  Goal: Maintains optimal cardiac output and hemodynamic stability  Outcome: Progressing  Goal: Absence of cardiac dysrhythmias or at baseline  Outcome: Progressing     Problem: Skin/Tissue Integrity - Adult  Goal: Incisions, wounds, or drain sites healing without S/S of infection  Outcome: Progressing  Goal: Oral mucous membranes remain intact  Outcome: Progressing     Problem: Musculoskeletal - Adult  Goal: Return mobility to safest level of function  Outcome: Progressing  Goal: Return ADL status to a safe level of function  Outcome: Progressing     Problem: Gastrointestinal - Adult  Goal: Maintains adequate nutritional intake  Outcome: Progressing  Goal: Establish and maintain optimal ostomy function  Outcome: Progressing     Problem: Genitourinary - Adult  Goal: Urinary catheter remains patent  Outcome: Progressing     Problem: Metabolic/Fluid and Electrolytes - Adult  Goal: Electrolytes maintained within normal limits  Outcome: Progressing  Goal: Hemodynamic stability and optimal renal function maintained  Outcome: Progressing  Goal: Glucose maintained within prescribed range  Outcome: Progressing     Problem: ABCDS Injury Assessment  Goal: Absence of physical injury  Outcome: Progressing

## 2022-09-23 NOTE — PLAN OF CARE
Problem: Discharge Planning  Goal: Discharge to home or other facility with appropriate resources  9/23/2022 1927 by Taty Ramos RN  Outcome: Progressing

## 2022-09-23 NOTE — PROGRESS NOTES
Hospitalist Progress Note      PCP: Kirstin Kolb DO, DO    Date of Admission: 9/14/2022    Chief Complaint: Abdominal Pain    Subjective: no new c/o, now extubated. Medications:  Reviewed    Infusion Medications    sodium chloride 25 mL (09/22/22 2024)    dextrose      sodium chloride 100 mL/hr at 09/21/22 2122     Scheduled Medications    insulin glargine  15 Units SubCUTAneous Nightly    albumin human  25 g IntraVENous Once    cefTRIAXone (ROCEPHIN) IV  2,000 mg IntraVENous Q24H    insulin lispro  0-16 Units SubCUTAneous Q4H    fluconazole  200 mg IntraVENous Q24H    pantoprazole  40 mg IntraVENous Daily     PRN Meds: heparin (porcine), fentanNYL, prochlorperazine, potassium chloride, magnesium sulfate, calcium gluconate **OR** calcium gluconate **OR** calcium gluconate **OR** calcium gluconate, sodium chloride flush, sodium chloride, HYDROmorphone, HYDROmorphone, ondansetron, fentanNYL, glucose, dextrose bolus **OR** dextrose bolus, glucagon (rDNA), dextrose, sodium chloride flush, sodium chloride, polyethylene glycol, acetaminophen **OR** acetaminophen      Intake/Output Summary (Last 24 hours) at 9/23/2022 0931  Last data filed at 9/23/2022 0448  Gross per 24 hour   Intake 245 ml   Output 575 ml   Net -330 ml         Physical Exam Performed:    BP (!) 131/55   Pulse 81   Temp 98.5 °F (36.9 °C) (Axillary)   Resp 13   Ht 5' 9\" (1.753 m)   Wt 171 lb 11.8 oz (77.9 kg)   SpO2 93%   BMI 25.36 kg/m²     General appearance: No apparent distress, appears stated age and now extubated. HEENT: Pupils equal, round, and reactive to light. Conjunctivae/corneas clear. Neck: Supple, with full range of motion. No jugular venous distention. Trachea midline. Respiratory:  Normal respiratory effort. Clear to auscultation, bilaterally without Rales/Wheezes/Rhonchi. Cardiovascular: Regular rate and rhythm with normal S1/S2 without murmurs, rubs or gallops.   Abdomen: Soft, non-distended with hypoactive bowel sounds. Musculoskeletal: No clubbing, cyanosis or edema bilaterally. Skin: Skin color, texture, turgor normal.  No rashes or lesions. Capillary Refill: Brisk,< 3 seconds   Peripheral Pulses: +2 palpable, equal bilaterally       Labs:   Recent Labs     09/21/22  0500 09/21/22  1420 09/22/22  0526 09/23/22  0710   WBC 25.6*  --  24.1* 16.1*   HGB 7.4* 8.0* 7.9* 7.1*   HCT 22.7* 24.2* 23.8* 21.3*   PLT 87*  --  106* 128*       Recent Labs     09/21/22  0500 09/22/22  0526 09/23/22  0450   * 133* 134*   K 4.1 4.3 4.2   CL 98* 98* 99   CO2 21 21 19*   BUN 51* 70* 49*   CREATININE 2.5* 3.6* 2.7*   CALCIUM 8.0* 8.0* 7.9*   PHOS 3.3 3.1 3.6       Recent Labs     09/21/22  0500   AST 15   ALT 9*   BILITOT 0.4   ALKPHOS 175*       Recent Labs     09/21/22  0500   INR 1.27*       No results for input(s): CKTOTAL, TROPONINI in the last 72 hours.       Urinalysis:      Lab Results   Component Value Date/Time    NITRU Negative 09/14/2022 12:08 PM    WBCUA 21-50 09/14/2022 12:08 PM    BACTERIA Rare 09/14/2022 12:08 PM    RBCUA None seen 09/14/2022 12:08 PM    BLOODU Negative 09/14/2022 12:08 PM    SPECGRAV <=1.005 09/14/2022 12:08 PM    GLUCOSEU Negative 09/14/2022 12:08 PM       Consults:    IP CONSULT TO HOSPITALIST  IP CONSULT TO NEPHROLOGY  IP CONSULT TO CRITICAL CARE  IP CONSULT TO PHARMACY  IP CONSULT TO GENERAL SURGERY  IP CONSULT TO SOCIAL WORK  IP CONSULT TO DIETITIAN      Assessment/Plan:    Active Hospital Problems    Diagnosis     Acute respiratory failure with hypoxia ([facility] [address] [J96.01]      Priority: Medium    Acute encephalopathy [G93.40]      Priority: Medium    Moderate malnutrition ([address] [E44.0]      Priority: Medium    Ischemic colitis ([address] [K55.9]      Priority: Medium    S/P exploratory laparotomy [Z98.890]      Priority: Medium    Acute postoperative pulmonary insufficiency ([facility] [J95.2]      Priority: Medium    Syncope [R55]      Priority: Medium    Hyponatremia [E87.1]      Priority: Medium Abdominal pain [R10.9]      Priority: Medium    Enteritis [K52.9]      Priority: Medium    Elevated troponin [R77.8]      Priority: Medium    DEJAN (acute kidney injury) ([facility] [address] [N17.9]      Priority: Medium    DM (diabetes mellitus), secondary uncontrolled ([facility] [address] [E13.65]      Priority: Medium         Peritonitis - 2nd to bowel perforation, likely due to stercoral/ischemic colitis. General Surgery consulted and appreciated s/p Lap/Sigmoid Colectomy and Colostomy 15 Sept w/out complications. Sepsis - w/ Leukocytosis/Tachycardia/Fever/Elevated Lactate POArrival 2nd to above infection. Continue IVF as appropriate and monitor clinical response w/ ABX as written - previously Zosyn/Diflucan - changed to Rocephin/Diflucan 21 Sept.      ARF - w/ elevated BUN/Cr ratio c/w pre-renal azotemia. Given IVF hydration and follow serial labs. Reviewed and documented as above. Nephrology consulted and appreciated - started on CRRT 15 Sept w/ vascath placed now on intermittent HD Tues/Thurs/Sat. Acute Respiratory Failure - post-op w/ hypoxia,l.  Presence of clinical respiratory distress w/ tachypnea/dyspnea/SOB and wheezing w/ use of accessory muscles to breath requiring intubation - now extubated. Supplemental O2 and wean as tolerated. HypoNatremia - etiology clinically unable to determine but likely hypovolemic. Follow serial labs now off IVF. Reviewed and documented as above. Troponin elevation - of unclear clinical significance w/ etiology clinically unable to determine, w/out signs/sxs of active ischemia. Followed serial troponins. Reviewed and documented. DM2 - controlled on home oral antiGlycemics/Insulin - held/continued. Follow FSBS/SSI low regimen. Last HbA1c 9.2% dated this admission. Anticipate resuming/continuing home regimen at discharge.          DVT Prophylaxis: IPC      Recent Labs     09/21/22  0500 09/22/22  0526 09/23/22  0710   PLT 87* 106* 128*       Diet: Diet NPO  ADULT TUBE FEEDING; Nasogastric; Diabetic; Continuous; 10; Yes; 10; Q 4 hours; 55; 30; Q 4 hours  Code Status: Full Code      PT/OT Eval Status: not yet ordered. Dispo - Remains in ICU. Patient is likely to remain in-house at least for the foreseeable future pending clinical course, subspecialty recs and eventual PT/OT eval/recs.   of 54 years preset at bedside when seen 19/20/21/22 Sept.  Sister Italo De La Torre present 20/21 Sept. Eldest Daughter Renetta Thao present 23 Sept       Guillermo Alegria MD

## 2022-09-23 NOTE — PROGRESS NOTES
Tolerance  Activity Tolerance: Patient limited by fatigue;Treatment limited secondary to decreased cognition;Treatment limited secondary to medical complications (free text)  Activity Tolerance Comments: Low BP 72/42 supine and then 95/54. RN aware and present to assess pt. Plan   Plan  Times per Week: 4-6x/wk in ICU     Restrictions  Restrictions/Precautions  Restrictions/Precautions: NPO, Up as Tolerated, Fall Risk  Position Activity Restriction  Other position/activity restrictions: NGT, colostomy, R IJ, abdominal drain    Subjective   General  Chart Reviewed: Yes, Orders, Operative Notes, Progress Notes  Patient assessed for rehabilitation services?: Yes  Additional Pertinent Hx: Extubated 9/21/22  Family / Caregiver Present: Yes (spouse and daughter)  Referring Practitioner: Suha Zhao  Diagnosis: enteritis, sepsis, s/p sigmoid colectomy with colostomy 9/15  Subjective  Subjective: Pt resting in bed and will open eyes when spoken to. Reports pain in abdomen but was unable to rate pain. General Comment  Comments: RN approved therapy at bedlevel.      Social/Functional History  Social/Functional History  Lives With: Family (, two adult children)  Type of Home: Mobile home  Home Layout: One level  Home Access: Stairs to enter with rails  Entrance Stairs - Number of Steps: 5  Entrance Stairs - Rails: Both  Bathroom Shower/Tub: Walk-in shower (small lip to step over walk in shower)  Bathroom Toilet: Standard  Home Equipment: [place], rolling  Has the patient had two or more falls in the past year or any fall with injury in the past year?: No  ADL Assistance: [address] [place] [address] Avenue: Independent (shared with , he drove she did laundry)  Ambulation Assistance: Independent  Transfer Assistance: Independent  Active : No  Leisure & Hobbies: watch [employer] movies, crossword puzzles     Objective   Heart Rate: 87  Heart Rate Source: Monitor  BP: (!) 93/51  MAP (Calculated): 65  Resp: 17  SpO2: 98 %  O2 Device: None (Room air)         Safety Devices  Type of Devices: Left in bed;Call light within reach;Nurse notified; Patient at risk for falls; All fall risk precautions in place; All rosina prominences offloaded        AROM: Grossly decreased, non-functional  PROM: Generally decreased, functional  Strength: Grossly decreased, non-functional (General weakness in BUE)  Coordination: Grossly decreased, non-functional  Tone: Normal  Sensation: Intact (Aware of touch)  ADL  Feeding: NPO  Feeding Skilled Clinical Factors: NGT  Grooming: Dependent/Total  UE Bathing: Dependent/Total  LE Bathing: Dependent/Total  UE Dressing: Dependent/Total  LE Dressing: Dependent/Total  Toileting: Dependent/Total     Activity Tolerance  Activity Tolerance: Patient tolerated evaluation without incident;Treatment limited secondary to medical complications;Treatment limited secondary to decreased cognition;Patient limited by endurance  Bed mobility  Bed Mobility Comments: Dependent to position BUE and BLE on pillows. Pt unable to tolerate sitting EOB d/t low BP and low endurance. Vision  Vision: Impaired  Vision Exceptions: Wears glasses for reading  Hearing  Hearing: Within functional limits  Cognition  Overall Cognitive Status: Exceptions  Arousal/Alertness: Inconsistent responses to stimuli  Following Commands: Inconsistently follows commands  Attention Span: Difficulty attending to directions  Memory: Decreased recall of recent events  Insights: Not aware of deficits  Initiation: Requires cues for all  Sequencing: Requires cues for all  Cognition Comment: low level arousal state  Orientation  Overall Orientation Status: Impaired  Orientation Level: Oriented to place;Oriented to situation;Oriented to person;Disoriented to time          PROM Exercises: Performed PROM with LUE 5x each, noting L shoulder limitation today. LUE elevated on pillow  A/AROM Exercises: Pt able to participate in AAROM for RUE 5-10x each.  RUE elevated on pillow. Daughter is STNA and is familiar with ROM exer. Instruction provided to her regarding UE ROM exercises. Education Given To: Patient; Family  Education Provided: Role of Therapy;Plan of Care;Family Education  Education Provided Comments: Disease specific for UE exer: P/AAROM for UE with daughter   Education Method: Verbal;Demonstration  Barriers to Learning: Cognition  Education Outcome: Continued education needed      AM-PAC Score    AM-PAC Inpatient Daily Activity Raw Score: 6 (09/23/22 1603)  AM-PAC Inpatient ADL T-Scale Score : 17.07 (09/23/22 1603)  ADL Inpatient CMS 0-100% Score: 100 (09/23/22 1603)  ADL Inpatient CMS G-Code Modifier : CN (09/23/22 1603)  Goals  Short Term Goals  Time Frame for Short term goals: 2 weeks (10/07) unless noted  Short Term Goal 1: Participate in 2 grooming tasks with min A  Short Term Goal 2: Perform UE exer 10-15x each for strength and endurance by 9/30  Short Term Goal 3: Sit EOB x3 min with min A in prep to transfer  Short Term Goal 4: Participate in functional transfer with mod x2 and AD/Lift  Patient Goals   Patient goals : Pt did not provide     Therapy Time   Individual Concurrent Group Co-treatment   Time In 1327         Time Out 1401         Minutes 34         Timed Code Treatment Minutes: 24 Minutes (10 min eval)   If pt is discharged prior to next OT session, this note will serve as the discharge summary.   Ramesh Santos OT

## 2022-09-23 NOTE — PROGRESS NOTES
Interval History and plan:     Getting dialysis  Last dialysis was done on 9/22/2022  Has Vas-Cath with a slower than expected blood flow  Blood pressure low during dialysis despite of midodrine and needed to albumin therefore no fluid was removed yesterday      Plan:  Continue dialysis dialysis Thursday Friday Saturday  No indication for dialysis today  Will arrange for 1 tomorrow  once cell count improves will need tunneled catheter likely after the weekend  Will do dialysis tomorrow    BP coming up  More alert  Hopefully will tolerate fluid removal  [facility] aware potential outpatient dialysis needs                       Assessment :     Acidosis  Severe  Requiring 2 vasopressors  Blood pressure okay with the 2 vasopressors but lactic is a still going up to 19 concerning for ischemia   /Possible metformin induced lactic acidosis       CKD Stage IIIb with DEJAN  Creatinine 2.1 on consult  Creatinine 1.6 on 7/22  Likely due to diabetes, hypertension  Renal imaging-normal in the past      hypotension  BP: (129-143)/(52-58)  Heart Rate:  [72-81]   BP goal inpatient [phone] systolic inpatient  Pressures at the time of consult  Normally hypertensive    Due the potential for life-threatening deterioration due to patient's acidosis I spent 45 minutes providing critical care for this individual, excluding procedures on 9/15/22 . [facility] would like to thank Guillermo Alegria MD   for opportunity to serve this patient      Please call with questions at-   24 Hrs Answering service [phone] or  7 am- 5 pm via Perfect serve or cell phone  Mohini Adkins MD          CC/reason for consult :       DEJAN     HPI :     Franco Avelar is a 67 y.o. female presented to   the hospital on 9/14/2022 with lactic acidosis. She is known to have diabetes and on metformin to 2 days ago  Has constipation and with multiple bowel regimen has had good bowel movement yesterday following that she has syncope.   EMS was called and was found to have blood pressure of 50 systolic given IV fluids brought to the emergency room blood pressure was normal initially but later developed hypotension got 3 L of fluid and now on 2 vasopressors and in ICU. She also has severe acidosis with very high lactate. CT scan was normal initially. We are consulted for DEJAN on CKD and related issues. On CKD and also severe lactic acidosis    ROS:     Seen with- sister    RN         PMH/PSH/SH/Family History:     Past Medical History:   Diagnosis Date    Chronic kidney disease     Diabetes mellitus ([address]     Hyperlipidemia     Hypertension     Neuropathy        Past Surgical History:   Procedure Laterality Date    LAPAROTOMY N/A 9/15/2022    DIAGNOSTIC LAPAROSCOPY, EXPLORATORY LAPAROTOMY, SIGMOID COLECTOMY AND COLOSTOMY performed by Tam Mercer MD at [facility]        reports that she has never smoked. She has never used smokeless tobacco. She reports that she does not currently use alcohol. She reports that she does not use drugs. family history is not on file.          Medication:     Current Facility-Administered Medications: insulin glargine (LANTUS) injection vial 15 Units, 15 Units, SubCUTAneous, Nightly  albumin human 25 % IV solution 25 g, 25 g, IntraVENous, Once  cefTRIAXone (ROCEPHIN) 2,000 mg in dextrose 5 % 50 mL IVPB mini-bag, 2,000 mg, IntraVENous, Q24H  insulin lispro (HUMALOG) injection vial 0-16 Units, 0-16 Units, SubCUTAneous, Q4H  fluconazole (DIFLUCAN) in 0.9 % sodium chloride IVPB 200 mg, 200 mg, IntraVENous, Q24H  heparin (porcine) injection 3,200 Units, 3,200 Units, IntraCATHeter, PRN  fentaNYL (SUBLIMAZE) injection 25 mcg, 25 mcg, IntraVENous, Q4H PRN  prochlorperazine (COMPAZINE) injection 5 mg, 5 mg, IntraVENous, Q6H PRN  potassium chloride 20 mEq/50 mL IVPB (Central Line), 20 mEq, IntraVENous, PRN  magnesium sulfate 1000 mg in dextrose 5% 100 mL IVPB, 1,000 mg, IntraVENous, PRN  calcium gluconate 1000 mg in sodium chloride 50 mL, 1,000 mg, IntraVENous, PRN **OR** calcium gluconate 2,000 mg in dextrose 5 % 100 mL IVPB, 2,000 mg, IntraVENous, PRN **OR** calcium gluconate 3,000 mg in dextrose 5 % 100 mL IVPB, 3,000 mg, IntraVENous, PRN **OR** calcium gluconate 4,000 mg in dextrose 5 % 100 mL IVPB, 4,000 mg, IntraVENous, PRN  sodium chloride flush 0.9 % injection 5-40 mL, 5-40 mL, IntraVENous, PRN  0.9 % sodium chloride infusion, 25 mL, IntraVENous, PRN  HYDROmorphone (DILAUDID) injection 0.25 mg, 0.25 mg, IntraVENous, Q5 Min PRN  HYDROmorphone (DILAUDID) injection 0.5 mg, 0.5 mg, IntraVENous, Q5 Min PRN  ondansetron (ZOFRAN) injection 4 mg, 4 mg, IntraVENous, Q10 Min PRN  fentaNYL (SUBLIMAZE) injection 25 mcg, 25 mcg, IntraVENous, Q1H PRN  glucose chewable tablet 16 g, 4 tablet, Oral, PRN  dextrose bolus 10% 125 mL, 125 mL, IntraVENous, PRN **OR** dextrose bolus 10% 250 mL, 250 mL, IntraVENous, PRN  glucagon (rDNA) injection 1 mg, 1 mg, SubCUTAneous, PRN  dextrose 10 % infusion, , IntraVENous, Continuous PRN  sodium chloride flush 0.9 % injection 5-40 mL, 5-40 mL, IntraVENous, PRN  0.9 % sodium chloride infusion, , IntraVENous, PRN  polyethylene glycol (GLYCOLAX) packet 17 g, 17 g, Oral, Daily PRN  acetaminophen (TYLENOL) tablet 650 mg, 650 mg, Oral, Q6H PRN **OR** acetaminophen (TYLENOL) suppository 650 mg, 650 mg, Rectal, Q6H PRN  pantoprazole (PROTONIX) injection 40 mg, 40 mg, IntraVENous, Daily       Vitals :     Vitals:    09/23/22 0900   BP: (!) 137/52   Pulse: 79   Resp: 18   Temp:    SpO2: 97%       I & O :       Intake/Output Summary (Last 24 hours) at 9/23/2022 1009  Last data filed at 9/23/2022 0800  Gross per 24 hour   Intake 305 ml   Output 595 ml   Net -290 ml        Physical Examination :     General appearance: confused,on NC  HEENT: Lips- normal, teeth- ok , oral mucosa- moist  Neck : Mass- no, appears symmetrical, JVD- not visible  Respiratory: Respiratory effort-  comfortable on oxygen, wheeze- no, crackles - no  Cardiovascular:  Ausculation- No M/R/G, Edema none  Abdomen: visible mass- no, distention- no, scar- no, tenderness- no                            hepatosplenomegaly-  No--  Musculoskeletal:  clubbing no,cyanosis- no , digital ischemia- no                           muscle strength- patient unable to co-operate     , tone - patient unable to co-operate   Skin: rashes- no , ulcers- no, induration- no, tightening - no  Psychiatric:  confused   Additional findings:         LABS:     Recent Labs     09/21/22  0500 09/21/22  1420 09/22/22  0526 09/23/22  0710   WBC 25.6*  --  24.1* 16.1*   HGB 7.4* 8.0* 7.9* 7.1*   HCT 22.7* 24.2* 23.8* 21.3*   PLT 87*  --  106* 128*     Recent Labs     09/21/22  0500 09/22/22  0526 09/23/22  0450   * 133* 134*   K 4.1 4.3 4.2   CL 98* 98* 99   CO2 21 21 19*   BUN 51* 70* 49*   CREATININE 2.5* 3.6* 2.7*   GLUCOSE 282* 218* 241*   MG 2.40  --   --    PHOS 3.3 3.1 3.6

## 2022-09-23 NOTE — CARE COORDINATION
LOS 9. Care managed by [address] [facility] S/P Colon resect w colos on 9/15. Plan to titrate TF today- eventually seek swallow eval before PO begun. Extub 9/21. Off CRRT- intermittent HD- plan to run pt again tomorrow. May need community HD placement- [facility] following [ Vicky Shanks [phone]]- no chair time yet. Likely to need rehab- tentaive referral to [facility] due to LOS. Per previous notes- pt works at [employer], family had requested referral there if SNF needed at [facility] will follow clinical course for most appropriate dispo. Payer is traditional [facility] so no precert will be needed.   Mario Craft RN

## 2022-09-23 NOTE — PROGRESS NOTES
[facility] GENERAL SURGERY DAILY PROGRESS NOTE    SUBJECTIVE: No acute events overnight. Some reported soft pressures during HD, otherwise HDS. Tolerating trophic tube feeds, denies nausea this morning. Abdominal pain stable.     OBJECTIVE: CURRENT VITALS:  BP (!) 131/55   Pulse 81   Temp 98.5 °F (36.9 °C) (Axillary)   Resp 13   Ht 5' 9\" (1.753 m)   Wt 171 lb 11.8 oz (77.9 kg)   SpO2 93%   BMI 25.36 kg/m²          ABD: Soft, minimally distended, appropriately tender  BROOKS drainage cloudy/serous  OSTOMY:  Pink; with minimal soft brown stool in bag   LABS:    CBC:   Recent Labs     09/21/22  0500 09/21/22  1420 09/22/22  0526   WBC 25.6*  --  24.1*   RBC 2.72*  --  2.85*   HGB 7.4* 8.0* 7.9*   HCT 22.7* 24.2* 23.8*   MCV 83.5  --  83.7   RDW 14.6  --  14.3   PLT 87*  --  106*       BMP:   Recent Labs     09/21/22  0500 09/22/22  0526 09/23/22  0450   * 133* 134*   K 4.1 4.3 4.2   CL 98* 98* 99   CO2 21 21 19*   PHOS 3.3 3.1 3.6   BUN 51* 70* 49*   CREATININE 2.5* 3.6* 2.7*       Recent Labs     09/21/22  0500   MG 2.40          ASSESSMENT AND PLAN:  67 y.o. female status post diagnosis of ischemic sigmoid colon s/p exploratory laparotomy, sigmoid colectomy, colostomy formation POD 8     - OK to start titrating tube feeds to goal today, monitor tolerance  - will need swallow eval prior to initiation of PO intake  - remains afebrile, follow up CBC  - BROOKS drain to remain  - continue IV antibiotics  - medical management per primary    Leon Harris DO  PGY4, General Surgery  09/23/22  6:48 AM

## 2022-09-23 NOTE — PROGRESS NOTES
Physical Therapy  Facility/Department: [facility] C2 CARD TELEMETRY  Physical Therapy Initial Assessment/Treatment    Name: Timothy Flynn  : 1950  MRN: [id number]  Date of Service: 2022    Discharge Recommendations:  [facility], [facility]   PT Equipment Recommendations  Equipment Needed: No      Patient Diagnosis(es): The primary encounter diagnosis was Lactic acidosis. Diagnoses of Generalized abdominal pain, Non-intractable vomiting with nausea, unspecified vomiting type, Acute cystitis with hematuria, Septicemia ([address] Perforated bowel ([address] Stercoral colitis, and DEJAN (acute kidney injury) ([facility] were also pertinent to this visit. Past Medical History:  has a past medical history of Chronic kidney disease, Diabetes mellitus ([facility] [address] Hyperlipidemia, Hypertension, and Neuropathy. Past Surgical History:  has a past surgical history that includes laparotomy (N/A, 9/15/2022). Assessment   Body Structures, Functions, Activity Limitations Requiring Skilled Therapeutic Intervention: Decreased functional mobility ; Decreased endurance;Decreased cognition; Increased pain;Decreased balance;Decreased strength;Decreased safe awareness  Assessment: Pt presents to [facility] with enteritis and sepsis s/p colostomy and required intubation; pt extubated . PTA, pt lives with spouse with 5 MAICO and working part time. Mobility assessment limited this date due to hypotension. Pt completes 5-10 reps of AAROM of BLE with daughter present for education and demo understanding. Pt would benefit from continued skilled PT to  address current deficits. Recommend LTACH upon d/c due to medical complexity.   Treatment Diagnosis: impaired functional mobility  Therapy Prognosis: Fair  Decision Making: High Complexity  Requires PT Follow-Up: Yes  Activity Tolerance  Activity Tolerance: Patient tolerated evaluation without incident;Treatment limited secondary to medical complications;Treatment limited secondary to decreased cognition;Patient limited by endurance     Plan   Plan  Plan: 3-5 times per week  Current Treatment Recommendations: Strengthening, Balance training, Functional mobility training, Transfer training, Endurance training, Neuromuscular re-education, Gait training, Pain management, Safety education & training, Home exercise program, Patient/Caregiver education & training, Therapeutic activities, Equipment evaluation, education, & procurement  Safety Devices  Type of Devices: Left in bed, Call light within reach, Nurse notified, Patient at risk for falls, All fall risk precautions in place, All rosina prominences offloaded     Restrictions  Restrictions/Precautions  Restrictions/Precautions: NPO, Up as Tolerated, Fall Risk  Position Activity Restriction  Other position/activity restrictions: NGT, colostomy, R IJ, abdominal drain     Subjective   Pain: stomach pain, supine BP 72/42 SPO2 98% RN aware of BP  General  Chart Reviewed: Yes  Patient assessed for rehabilitation services?: Yes  Response To Previous Treatment: Not applicable  Family / Caregiver Present: Yes (daughter, spouse, sister)  Referring Practitioner: Heydi Livingston DO  Referral Date : 09/23/22  Diagnosis: Enteritis  Follows Commands: Impaired  General Comment  Comments: RN cleared pt for PT eval  Subjective  Subjective: Pt semi-supine in bed upon arrival, agreeable to PT eval. Pt will open eyes to command, but eyes closed a majority of session.          Social/Functional History  Social/Functional History  Lives With: Family (, two adult children)  Type of Home: Mobile home  Home Layout: One level  Home Access: Stairs to enter with rails  Entrance Stairs - Number of Steps: 5  Entrance Stairs - Rails: Both  Bathroom Shower/Tub: Walk-in shower (small lip to step over walk in shower)  Bathroom Toilet: Standard  Home Equipment: [place], rolling  Has the patient had two or more falls in the past year or any fall with injury in the past year?: No  ADL Assistance: Independent  Homemaking Assistance: Independent (shared with , he drove she did laundry)  Ambulation Assistance: Independent  Transfer Assistance: Independent  Active : No  Leisure & Hobbies: watch western movies, crossword puzzles    Vision/Hearing  Vision  Vision: Impaired  Vision Exceptions: Wears glasses for reading  Hearing  Hearing: Within functional limits      Cognition   Orientation  Overall Orientation Status: Impaired  Orientation Level: Oriented to place;Oriented to situation;Oriented to person;Disoriented to time  Cognition  Overall Cognitive Status: Exceptions  Arousal/Alertness: Inconsistent responses to stimuli  Following Commands: Inconsistently follows commands  Attention Span: Difficulty attending to directions  Memory: Decreased recall of recent events  Insights: Not aware of deficits  Initiation: Requires cues for all  Sequencing: Requires cues for all  Cognition Comment: low level arousal state     Objective   Heart Rate: 87  Heart Rate Source: Monitor  BP: (!) 93/51  MAP (Calculated): 65  Resp: 17  SpO2: 98 %  O2 Device: None (Room air)        Gross Assessment  AROM: Grossly decreased, non-functional  PROM: Grossly decreased, non-functional  Strength: Grossly decreased, non-functional  Coordination: Grossly decreased, non-functional  Tone: Normal  Sensation: Intact     Bed Mobility Training  Bed Mobility Training: No (Unable to tolerate mobility this date due to hypotension)    Exercise Treatment: Pt perform AAROM x10 dorsiflexion/plantarflexion, knee flexion/extension, hip abduction/adduction, hip IR/ER.   Daughter educated on PROM/AAROM and technique    AM-PAC Score  AM-PAC Inpatient Mobility Raw Score : 6 (09/23/22 1346)  AM-PAC Inpatient T-Scale Score : 23.55 (09/23/22 1346)  Mobility Inpatient CMS 0-100% Score: 100 (09/23/22 1346)  Mobility Inpatient CMS G-Code Modifier : CN (09/23/22 1346)    Goals  Short Term Goals  Time Frame for Short term goals: 10 days 10/3/22 unless otherwise noted  Short term

## 2022-09-24 LAB
ACANTHOCYTES: ABNORMAL
ALBUMIN SERPL-MCNC: 2.3 G/DL (ref 3.4–5)
ANION GAP SERPL CALCULATED.3IONS-SCNC: 12 MMOL/L (ref 3–16)
ATYPICAL LYMPHOCYTE RELATIVE PERCENT: 2 % (ref 0–6)
BANDED NEUTROPHILS RELATIVE PERCENT: 37 % (ref 0–7)
BASOPHILS ABSOLUTE: 0 K/UL (ref 0–0.2)
BASOPHILS RELATIVE PERCENT: 0 %
BUN BLDV-MCNC: 68 MG/DL (ref 7–20)
BURR CELLS: ABNORMAL
CALCIUM SERPL-MCNC: 7.8 MG/DL (ref 8.3–10.6)
CHLORIDE BLD-SCNC: 98 MMOL/L (ref 99–110)
CO2: 22 MMOL/L (ref 21–32)
CREAT SERPL-MCNC: 3.4 MG/DL (ref 0.6–1.2)
EOSINOPHILS ABSOLUTE: 0 K/UL (ref 0–0.6)
EOSINOPHILS RELATIVE PERCENT: 0 %
GFR AFRICAN AMERICAN: 16
GFR NON-AFRICAN AMERICAN: 13
GLUCOSE BLD-MCNC: 197 MG/DL (ref 70–99)
GLUCOSE BLD-MCNC: 203 MG/DL (ref 70–99)
GLUCOSE BLD-MCNC: 205 MG/DL (ref 70–99)
GLUCOSE BLD-MCNC: 214 MG/DL (ref 70–99)
GLUCOSE BLD-MCNC: 222 MG/DL (ref 70–99)
GLUCOSE BLD-MCNC: 254 MG/DL (ref 70–99)
GLUCOSE BLD-MCNC: 272 MG/DL (ref 70–99)
HCT VFR BLD CALC: 23 % (ref 36–48)
HEMATOLOGY PATH CONSULT: NO
HEMOGLOBIN: 7.8 G/DL (ref 12–16)
HEPATITIS B CORE TOTAL ANTIBODY: POSITIVE
HYPOCHROMIA: ABNORMAL
LYMPHOCYTES ABSOLUTE: 0.7 K/UL (ref 1–5.1)
LYMPHOCYTES RELATIVE PERCENT: 5 %
MAGNESIUM: 2.4 MG/DL (ref 1.8–2.4)
MCH RBC QN AUTO: 28.3 PG (ref 26–34)
MCHC RBC AUTO-ENTMCNC: 33.8 G/DL (ref 31–36)
MCV RBC AUTO: 83.6 FL (ref 80–100)
METAMYELOCYTES RELATIVE PERCENT: 1 %
MONOCYTES ABSOLUTE: 0.3 K/UL (ref 0–1.3)
MONOCYTES RELATIVE PERCENT: 3 %
NEUTROPHILS ABSOLUTE: 9.3 K/UL (ref 1.7–7.7)
NEUTROPHILS RELATIVE PERCENT: 52 %
PDW BLD-RTO: 14.8 % (ref 12.4–15.4)
PERFORMED ON: ABNORMAL
PHOSPHORUS: 3.5 MG/DL (ref 2.5–4.9)
PLATELET # BLD: 153 K/UL (ref 135–450)
PLATELET SLIDE REVIEW: ADEQUATE
PMV BLD AUTO: 9.4 FL (ref 5–10.5)
POTASSIUM SERPL-SCNC: 4.5 MMOL/L (ref 3.5–5.1)
RBC # BLD: 2.74 M/UL (ref 4–5.2)
SLIDE REVIEW: ABNORMAL
SODIUM BLD-SCNC: 132 MMOL/L (ref 136–145)
WBC # BLD: 10.3 K/UL (ref 4–11)

## 2022-09-24 PROCEDURE — 6370000000 HC RX 637 (ALT 250 FOR IP): Performed by: INTERNAL MEDICINE

## 2022-09-24 PROCEDURE — 99024 POSTOP FOLLOW-UP VISIT: CPT | Performed by: SURGERY

## 2022-09-24 PROCEDURE — 6360000002 HC RX W HCPCS: Performed by: INTERNAL MEDICINE

## 2022-09-24 PROCEDURE — 99233 SBSQ HOSP IP/OBS HIGH 50: CPT | Performed by: INTERNAL MEDICINE

## 2022-09-24 PROCEDURE — 80069 RENAL FUNCTION PANEL: CPT

## 2022-09-24 PROCEDURE — 6370000000 HC RX 637 (ALT 250 FOR IP): Performed by: STUDENT IN AN ORGANIZED HEALTH CARE EDUCATION/TRAINING PROGRAM

## 2022-09-24 PROCEDURE — 85025 COMPLETE CBC W/AUTO DIFF WBC: CPT

## 2022-09-24 PROCEDURE — 90935 HEMODIALYSIS ONE EVALUATION: CPT

## 2022-09-24 PROCEDURE — 6360000002 HC RX W HCPCS: Performed by: STUDENT IN AN ORGANIZED HEALTH CARE EDUCATION/TRAINING PROGRAM

## 2022-09-24 PROCEDURE — 2580000003 HC RX 258: Performed by: INTERNAL MEDICINE

## 2022-09-24 PROCEDURE — C9113 INJ PANTOPRAZOLE SODIUM, VIA: HCPCS | Performed by: STUDENT IN AN ORGANIZED HEALTH CARE EDUCATION/TRAINING PROGRAM

## 2022-09-24 PROCEDURE — 1200000000 HC SEMI PRIVATE

## 2022-09-24 PROCEDURE — 83735 ASSAY OF MAGNESIUM: CPT

## 2022-09-24 PROCEDURE — 6360000002 HC RX W HCPCS

## 2022-09-24 PROCEDURE — P9047 ALBUMIN (HUMAN), 25%, 50ML: HCPCS

## 2022-09-24 RX ORDER — ALBUMIN (HUMAN) 12.5 G/50ML
25 SOLUTION INTRAVENOUS ONCE
Status: COMPLETED | OUTPATIENT
Start: 2022-09-24 | End: 2022-09-24

## 2022-09-24 RX ORDER — ALBUMIN (HUMAN) 12.5 G/50ML
SOLUTION INTRAVENOUS
Status: COMPLETED
Start: 2022-09-24 | End: 2022-09-24

## 2022-09-24 RX ADMIN — MIDODRINE HYDROCHLORIDE 10 MG: 5 TABLET ORAL at 08:45

## 2022-09-24 RX ADMIN — INSULIN LISPRO 4 UNITS: 100 INJECTION, SOLUTION INTRAVENOUS; SUBCUTANEOUS at 17:50

## 2022-09-24 RX ADMIN — FLUCONAZOLE 200 MG: 2 INJECTION, SOLUTION INTRAVENOUS at 21:07

## 2022-09-24 RX ADMIN — MIDODRINE HYDROCHLORIDE 10 MG: 5 TABLET ORAL at 17:51

## 2022-09-24 RX ADMIN — CEFTRIAXONE 2000 MG: 2 INJECTION, POWDER, FOR SOLUTION INTRAMUSCULAR; INTRAVENOUS at 10:22

## 2022-09-24 RX ADMIN — ALBUMIN (HUMAN) 25 G: 0.25 INJECTION, SOLUTION INTRAVENOUS at 15:20

## 2022-09-24 RX ADMIN — INSULIN LISPRO 4 UNITS: 100 INJECTION, SOLUTION INTRAVENOUS; SUBCUTANEOUS at 02:19

## 2022-09-24 RX ADMIN — MIDODRINE HYDROCHLORIDE 10 MG: 5 TABLET ORAL at 12:29

## 2022-09-24 RX ADMIN — INSULIN GLARGINE 15 UNITS: 100 INJECTION, SOLUTION SUBCUTANEOUS at 21:07

## 2022-09-24 RX ADMIN — PANTOPRAZOLE SODIUM 40 MG: 40 INJECTION, POWDER, FOR SOLUTION INTRAVENOUS at 10:22

## 2022-09-24 RX ADMIN — INSULIN LISPRO 4 UNITS: 100 INJECTION, SOLUTION INTRAVENOUS; SUBCUTANEOUS at 14:15

## 2022-09-24 RX ADMIN — ACETAMINOPHEN 650 MG: 325 TABLET ORAL at 14:08

## 2022-09-24 RX ADMIN — INSULIN LISPRO 8 UNITS: 100 INJECTION, SOLUTION INTRAVENOUS; SUBCUTANEOUS at 10:27

## 2022-09-24 RX ADMIN — ALBUMIN (HUMAN) 25 G: 12.5 SOLUTION INTRAVENOUS at 15:20

## 2022-09-24 RX ADMIN — INSULIN LISPRO 4 UNITS: 100 INJECTION, SOLUTION INTRAVENOUS; SUBCUTANEOUS at 06:10

## 2022-09-24 ASSESSMENT — PAIN DESCRIPTION - LOCATION: LOCATION: BACK

## 2022-09-24 ASSESSMENT — PAIN SCALES - GENERAL
PAINLEVEL_OUTOF10: 0
PAINLEVEL_OUTOF10: 7
PAINLEVEL_OUTOF10: 0
PAINLEVEL_OUTOF10: 0

## 2022-09-24 ASSESSMENT — PAIN SCALES - WONG BAKER: WONGBAKER_NUMERICALRESPONSE: 0

## 2022-09-24 NOTE — PROGRESS NOTES
Intensive Care Unit  Intensivist Progress Note    Patient: Luis Alberto Pitts  : 1950  MRN#: [id number]  2022 10:23 AM  ADMISSION DAY:  2022 11:45 AM     Subjective: The patient was extubated on . She was weaned oxygen and currently she is on room air. Continues to be  weak  Blood pressure is acceptable. WBC trended down to normal today  The drainage fluid was positive for E. Coli and Klebsiella on ceftriaxone and Diflucan. HPI:   Luis Alberto Pitts is a 67 y.o. female with a history of CKD, diabetes, hyperlipidemia, hypertension, and neuropathy who presents to the ED complaining of abdominal pain/syncope. By EMS report, patient has had constipation over the last several days. .  Patient reportedly had a large bowel movement and shortly thereafter had a syncopal/near syncopal event where she was helped to the floor. EMS was called and arrived to find patient with blood pressure of 50/30.  300 cc of normal saline were infused during truck ride to the ED. Blood pressure shortly after arrival was 134/94. Patient reports lower abdominal discomfort. No additional complaints. No other complaints, modifying factors or associated symptoms. In ER -Patient seen and evaluated. Old records reviewed. Labs and imaging reviewed and results discussed with patient. Patient was given broad-spectrum antibiotics, normal saline, and multiple pressors. Central line was placed. Improvement of symptoms throughout patient's stay in the emergency department. Labs reveal significant leukocytosis with elevated lactic and concern for UTI. Mild DEJAN noted. Imaging shows nonspecific enteritis. Patient will be admitted for further evaluation and treatment. . Patient was reassessed as noted above . Elida [employer] Plan of care discussed with patient and family. Patient and family in agreement with plan.    Patient was transferred to the ICU for further management  Patient when seen in the ICU was somewhat lethargic but was able to give simple answers and was complaining of abdominal pain, patient was also was found to be significantly hypotensive in spite of patient getting IV Levophed, patient's systolic blood pressure was 52 mmHg when seen, patient's blood sugars were still on the higher side now which was lower as per EMT, patient was given dextrose 50 IV push in the EMS as per documentation, patient was having sinus rhythm on the monitor which was ranging from normal sinus rhythm to sinus tachycardia, patient was given another fluid bolus and also vasopressin infusion was started on the patient, no other pertinent review of system of concern    Patient Vitals for the past 8 hrs:   BP Pulse Resp SpO2   09/24/22 0600 109/60 75 22 95 %   09/24/22 0500 (!) 97/58 72 20 95 %   09/24/22 0400 (!) 100/57 75 20 97 %         EXAM:  HENT: NG tube in place, head is atraumatic and normocephalic  Eyes:  Conjunctivae are normal. Pupils equal, round, and reactive to light. No scleral icterus. Neck: . No tracheal deviation present. No obvious thyroid mass. Cardiovascular: Sinus rhythm normal heart sounds. No right ventricular heave. No lower extremity edema. Pulmonary/Chest: No wheezes. No rales. No accessory muscle usage or stridor. Decreased breath sound bilaterally  Abdominal: Soft. Tender. No distension or hernia. Ostomy present  Musculoskeletal: No cyanosis. No clubbing. No obvious joint deformity. Lymphadenopathy: No cervical or supraclavicular adenopathy. Skin: Skin is warm and dry. No rash or nodules on the exposed extremities. Intake/Output Summary (Last 24 hours) at 9/24/2022 1023  Last data filed at 9/24/2022 0533  Gross per 24 hour   Intake 1050 ml   Output 60 ml   Net 990 ml       I/O last 3 completed shifts:   In: 3939 [I.V.:500; NG/GT:807]  Out: 135 [Emesis/NG output:60; Drains:35; Stool:40]   Date 09/24/22 0000 - 09/24/22 2359   Shift 8758-8197 5207-3183 5835-1352 24 Hour Total   INTAKE   P.O.(mL/kg/hr) 0(0)   0 I.V.(mL/kg) 500(6.4)   500(6.4)   NG/GT(mL/kg) 307(3.9)   307(3.9)   Shift Total(mL/kg) 807(10.4)   807(10.4)   OUTPUT   Urine(mL/kg/hr) 0(0)   0   Emesis/NG output(mL/kg) 60(0.8)   60(0.8)   Drains(mL/kg) 0(0)   0(0)   Other(mL/kg) 0(0)   0(0)   Stool(mL/kg) 0(0)   0(0)   Blood(mL/kg) 0(0)   0(0)   Shift Total(mL/kg) 60(0.8)   60(0.8)   Weight (kg) 77.9 77.9 77.9 77.9       Wt Readings from Last 3 Encounters:   09/22/22 171 lb 11.8 oz (77.9 kg)   07/14/22 161 lb (73 kg)      Body mass index is 25.36 kg/m².          Scheduled Meds:   insulin glargine  15 Units SubCUTAneous Nightly    sodium chloride  500 mL IntraVENous Once    midodrine  10 mg Oral TID WC    cefTRIAXone (ROCEPHIN) IV  2,000 mg IntraVENous Q24H    insulin lispro  0-16 Units SubCUTAneous Q4H    fluconazole  200 mg IntraVENous Q24H    pantoprazole  40 mg IntraVENous Daily     Continuous Infusions:   sodium chloride 25 mL (09/22/22 2024)    dextrose      sodium chloride 100 mL/hr at 09/21/22 2122     PRN Meds:heparin (porcine), 3,200 Units, PRN  fentanNYL, 25 mcg, Q4H PRN  prochlorperazine, 5 mg, Q6H PRN  potassium chloride, 20 mEq, PRN  magnesium sulfate, 1,000 mg, PRN  calcium gluconate, 1,000 mg, PRN   Or  calcium gluconate, 2,000 mg, PRN   Or  calcium gluconate, 3,000 mg, PRN   Or  calcium gluconate, 4,000 mg, PRN  sodium chloride flush, 5-40 mL, PRN  sodium chloride, 25 mL, PRN  HYDROmorphone, 0.25 mg, Q5 Min PRN  HYDROmorphone, 0.5 mg, Q5 Min PRN  ondansetron, 4 mg, Q10 Min PRN  fentanNYL, 25 mcg, Q1H PRN  glucose, 4 tablet, PRN  dextrose bolus, 125 mL, PRN   Or  dextrose bolus, 250 mL, PRN  glucagon (rDNA), 1 mg, PRN  dextrose, , Continuous PRN  sodium chloride flush, 5-40 mL, PRN  sodium chloride, , PRN  polyethylene glycol, 17 g, Daily PRN  acetaminophen, 650 mg, Q6H PRN   Or  acetaminophen, 650 mg, Q6H PRN      Oxygen Delivery - O2 Flow Rate (L/min): 0 L/min  VENT SETTINGS (Comprehensive)  Vent Information  Equipment Changed: HME  Ventilator Initiate: Yes  Vent Mode: (S) CPAP/PS  Additional Respiratory Assessments  Heart Rate: 75  Resp: 22  SpO2: 95 %  Humidification Source: HME  Circuit Condensation: Drained      DATA:    CBC:   Recent Labs     09/23/22  0710 09/23/22  1600 09/24/22  0530   WBC 16.1* 7.4 10.3   RBC 2.54* 2.73* 2.74*   HGB 7.1* 7.6* 7.8*   HCT 21.3* 22.9* 23.0*   MCV 83.7 83.8 83.6   MCH 27.9 27.9 28.3   MCHC 33.3 33.3 33.8   RDW 14.8 14.4 14.8   * 151 153   MPV 8.9 8.9 9.4        BMP/CMP:   Recent Labs     09/22/22  0526 09/23/22  0450 09/24/22  0530   * 134* 132*   K 4.3 4.2 4.5   CL 98* 99 98*   CO2 21 19* 22   ANIONGAP 14 16 12   BUN 70* 49* 68*   CREATININE 3.6* 2.7* 3.4*   GLUCOSE 218* 241* 214*   CALCIUM 8.0* 7.9* 7.8*   LABALBU 2.4* 2.4* 2.3*        Other Electrolytes:   Recent Labs     09/22/22  0526 09/22/22  0532 09/23/22  0450 09/24/22  0530   CAION  --  1.07* 1.08*  --    PHOS 3.1  --  3.6 3.5   MG  --   --   --  2.40       Serum Osmolality  No results for input(s): OSMOMEASER in the last 72 hours. Procalcitonin:  No results for input(s): PROCAL in the last 72 hours. Cardiac: No results for input(s): TROPONINT in the last 72 hours. Lipids: No results for input(s): CHOL, HDL in the last 72 hours. Invalid input(s): LDLCALCU  Coagulation:   No results for input(s): INR in the last 72 hours. Lactic Acid:   No results for input(s): LACTA in the last 72 hours.      ABGs:   No results found for: PH, PCO2, PO2, HCO3, O2SAT  No results found for: Nilson Little    Radiology/Imaging:   XR CHEST PORTABLE [[id number]] Collected: 09/15/22 2226     Order Status: Completed Updated: 09/16/22 1429     Narrative:       EXAMINATION:   ONE XRAY VIEW OF THE CHEST     9/15/2022 8:40 pm     COMPARISON:   09/14/2022     HISTORY:   ORDERING SYSTEM PROVIDED HISTORY: right IJ central line evaluation, intubated   after OR   TECHNOLOGIST PROVIDED HISTORY:   Reason for exam:->right IJ central line evaluation, intubated after OR   Reason for Exam: ett; cvc     FINDINGS:   Endotracheal tube tip is 5.3 cm above the mark. Right IJ CVC catheter tip   overlies the SVC at the level of the thoracic inlet. The heart appears borderline enlarged; however, this may be accentuated by   technique. Possible small right pleural effusion. No pneumothorax is seen. Mild bibasilar airspace opacities. Impression:       Right IJ CVC catheter tip overlies the SVC at the level of the thoracic inlet. Endotracheal tube tip is 5.3 cm above the mark. Mild bibasilar airspace opacities, which could represent as atelectasis   and/or infiltrate. Possible small right pleural effusion. XR ABDOMEN FOR NG/OG/NE TUBE PLACEMENT [[id number]] Collected: 09/16/22 0839     Order Status: Completed Updated: 09/16/22 0842     Narrative:       EXAMINATION:   ONE SUPINE XRAY VIEW(S) OF THE ABDOMEN     9/16/2022 7:29 am     COMPARISON:   None. HISTORY:   ORDERING SYSTEM PROVIDED HISTORY: Confirmation of course of NG/OG/NE tube and   location of tip of tube   TECHNOLOGIST PROVIDED HISTORY:   Reason for exam:->Confirmation of course of NG/OG/NE tube and location of tip   of tube   Portable? ->Yes   Reason for Exam: NGT placement     FINDINGS:   Tip of NG tube projects in the left upper quadrant in the region of the   gastric fundus     Postsurgical change seen in upper abdomen. Pleuroparenchymal disease is seen at the right lung base.       Impression:       Tip of NG tube projects in the left upper quadrant in the region of the   gastric fundus      XR CHEST PORTABLE [[id number]] Collected: 09/16/22 0130     Order Status: Completed Updated: 09/16/22 0137     Narrative:       EXAMINATION:   ONE XRAY VIEW OF THE CHEST     9/15/2022 11:34 pm     COMPARISON:   09/15/2022     HISTORY:   ORDERING SYSTEM PROVIDED HISTORY: central line insertion   TECHNOLOGIST PROVIDED HISTORY:   Reason for exam:->central line insertion   Reason for Exam: central line     FINDINGS:   Endotracheal tube appears in appropriate position. Interval placement of a   jugular venous central catheter which extends into the inferior aspect of the   right atrium. The catheter tip measures approximately 5 cm beyond the   cavoatrial junction. No acute osseous abnormality is identified. Small   right-sided pleural effusion similar appearing basilar airspace disease. Osseous structures appear similar. Impression:       Interval placement of a right-sided central venous catheter which extends   into the inferior aspect of the right atrium, approximately 5 cm beyond the   cavoatrial junction. CT ABDOMEN PELVIS WO CONTRAST Additional Contrast? None [[id number]] Collected: 09/15/22 1017     Order Status: Completed Updated: 09/15/22 1027     Narrative:       EXAMINATION:   CT OF THE ABDOMEN AND PELVIS WITHOUT CONTRAST 9/15/2022 10:00 am     TECHNIQUE:   CT of the abdomen and pelvis was performed without the administration of   intravenous contrast. Multiplanar reformatted images are provided for review. Automated exposure control, iterative reconstruction, and/or weight based   adjustment of the mA/kV was utilized to reduce the radiation dose to as low   as reasonably achievable. COMPARISON:   09/14/2022 CT     HISTORY:   ORDERING SYSTEM PROVIDED HISTORY: worsening acidosis, hypotension ? ischemia   TECHNOLOGIST PROVIDED HISTORY:   Reason for exam:->worsening acidosis, hypotension ? ischemia   Additional Contrast?->None   Reason for Exam: worsening labs, abd pain     FINDINGS:   Lower Chest:  Interval development of a small right pleural effusion with   dense peripheral airspace opacification in the right lower lobe. Base of the   heart is normal.     Organs: Liver parenchyma is normal on this noncontrast exam.  Slight   nodularity of the capsule may suggest an underlying pattern of chronic liver   disease. There is mild ascites. The gallbladder is absent. Biliary ducts   and pancreas normal.  Calcified granulomata in the spleen. Kidneys and   adrenal glands are normal.     GI/Bowel: The stomach is normal.  Prior imaging described enteritis of the   duodenum and proximal jejunum. This is difficult to evaluate on the current   noncontrast exam but there is no pattern of inflammation. Questionable   mucosal thickening of the distal duodenum and proximal jejunum. No pattern   of obstruction. Of greater suspicion, there is severe inflammatory change in   the right lower quadrant that has progressed since the prior exam.  This   includes mesenteric inflammation and ill-defined free fluid. There is no   pattern of perforation or abscess. Etiology appears to correspond to either   mucosal changes of the distal ileum, terminal ileum or cecum. Nonvisualized   appendix. There is also a pattern of diffuse mucosal thickening of the colon   extending from the splenic flexure through the rectum. Dense stool within   the distal colon also noted. Pelvis: The bladder is decompressed around a Osorio catheter. The uterus and   adnexa are unremarkable. Peritoneum/Retroperitoneum: The aorta tapers normally. No adenopathy. Bones/Soft Tissues: No significant skeletal abnormalities. Impression:       1. Limited evaluation of the GI tract on this noncontrast exam.  Improved   mucosal changes of the duodenum and proximal jejunum that were described on   prior CT. 2. Severe inflammatory change in the right lower quadrant with etiology   uncertain. This could correspond to enteritis of the distal ileum and   terminal ileum. Colitis may also be considered. Infectious or inflammatory   etiologies may be favored. 3. Dense stool and diffuse mucosal thickening of the distal colon which may   represent a pattern of colitis.    4. Small right pleural effusion with airspace changes in the right lower   lobe, new from prior exam.   5. Evidence of chronic liver disease with a small amount of ascites stable. CT ABDOMEN PELVIS WO CONTRAST Additional Contrast? None [[id number]] Collected: 09/14/22 1452     Order Status: Completed Updated: 09/14/22 2677     Narrative:       EXAMINATION:   CT OF THE ABDOMEN AND PELVIS WITHOUT CONTRAST, 9/14/2022 2:46 pm     TECHNIQUE:   CT of the abdomen and pelvis was performed without the administration of   intravenous contrast. Multiplanar reformatted images are provided for review. Automated exposure control, iterative reconstruction, and/or weight based   adjustment of the mA/kV was utilized to reduce the radiation dose to as low   as reasonably achievable. COMPARISON:   None     HISTORY:   ORDERING SYSTEM PROVIDED HISTORY:  Abdominal pain/ near syncope   TECHNOLOGIST PROVIDED HISTORY:   Additional Contrast?  None   Reason for Exam:  Abdominal pain/ near syncope   Decision Support Exception - unselect if not a suspected or confirmed   emergency medical condition->Emergency Medical Condition (MA)   Reason for Exam:  Vomiting and pain     FINDINGS:   Lower Chest: No active disease. Organs: The liver appears unremarkable. Status post cholecystectomy. Pancreas and spleen, adrenals, kidneys, aorta and IVC appear stable. GI/Bowel: There is evidence of thickening of the duodenum as well as jejunal   loops with perijejunal inflammatory changes. These changes are compatible   with acute enteritis. No evidence of bowel obstruction or perforation. Evidence of constipation and significant stool impaction in the rectum. Pelvis: Urinary bladder contains Osorio catheter. The uterus and adnexa   demonstrate no acute abnormality. Peritoneum/Retroperitoneum: No evidence of retroperitoneal lymphadenopathy or   acute peritoneal findings. Mild ascites mostly around the liver and spleen. Bones/Soft Tissues: No acute abnormality. Osteopenia. Moderate multilevel   degenerative disc disease. Impression:       1.  Acute enteritis involving the duodenum and jejunal loops with thickening   of the loops and edema. No evidence of bowel obstruction. 2. Constipation with significant stool impaction in the rectum. 3. Mild ascites mostly around the liver and spleen. 4. Adequate position of Osorio catheter in the urinary bladder. XR CHEST PORTABLE [[id number]] Collected: 09/14/22 1334     Order Status: Completed Updated: 09/14/22 0828     Narrative:       EXAMINATION:   ONE XRAY VIEW OF THE CHEST     9/14/2022 10:22 am     COMPARISON:   None. HISTORY:   ORDERING SYSTEM PROVIDED HISTORY: confirm central line   TECHNOLOGIST PROVIDED HISTORY:   Reason for exam:->confirm central line   Reason for Exam: confirm central line     FINDINGS:   A right internal jugular venous catheter is been placed with the tip at the   cavoatrial junction. No pneumothorax is identified. The lungs and costophrenic angles are clear. The cardiomediastinal   silhouette and pulmonary vessels appear normal.      Impression:       Placement of a right internal jugular central venous catheter without evident   complication. The tip is at approximately the cavoatrial junction. No acute findings in the chest.          Patient Active Problem List   Diagnosis    DKA (diabetic ketoacidosis) ([address]    Hypotension    Syncope    Hyponatremia    DEJAN (acute kidney injury) ([address]    Abdominal pain    Enteritis    Septic shock (HCC)    Elevated troponin    Leukocytosis    Pyuria    Lactic acidosis    DM (diabetes mellitus), secondary uncontrolled ([address]    Ischemic colitis ([address]    S/P exploratory laparotomy    Acute postoperative pulmonary insufficiency (HCC)    Acute respiratory failure with hypoxia (HCC)    Acute encephalopathy    Moderate malnutrition (HCC)       Assessment:    Ischemic colitis s/p exploratory laparotomy and sigmoid colectomy and colostomy on 9/15/2022  Septic shock.   Acute encephalopathy  Acute renal failure  Acute respiratory failure with hypoxia  Syncope  Electrolytes abnormality  Diabetes mellitus type 2. Hyponatremia        Plan:    Extubated on 9/21. Currently on room air. WBC  trended down  Continue ceftriaxone 2 g daily. Continue  Diflucan  Continue trophic tube feed. Surgery is following. Pain control. Pressors are off  CT scan of the brain is negative  DVT and GI prophylaxis  Transfer to the floor.     Reviewed with ICU Staff     Seen with multidisciplinary ICU team         Tio Nolen MD,

## 2022-09-24 NOTE — PROGRESS NOTES
Report given to 2 C3 nurses at bedside upon transferring patient to C3. Patient stable and pending HD upon her arrival to C3. Scheduled midodrine seems to be helping sustain BP.

## 2022-09-24 NOTE — PROGRESS NOTES
Patient from [facility] transports via bed to c322 using [employer].  at bedside. Assessment completed and documented. VSS. A/ox4, forgetful at times. States back pain is 7/10, given tylenol at this time. Midline incision covered with foam, C/D/I. Right BROOKS site, C/D/I. NGT with TF running without patient complaints of nausea or abd pain. Bed locked and in lowest position. Bedside table and call light within reach. Denies further needs at this time. Patient currently at dialysis,  aware and would like to be called when she returns.

## 2022-09-24 NOTE — PROGRESS NOTES
[facility] GENERAL SURGERY    Surgery Progress Note           POD # 9    PATIENT NAME: Dean Ariza     TODAY'S DATE: 9/24/2022    INTERVAL HISTORY:    Pt  awake, fatigued, currently in dialysis. Ostomy functioning. OBJECTIVE:   VITALS:  BP (!) 146/80   Pulse 81   Temp 98.4 °F (36.9 °C) (Axillary)   Resp 18   Ht 5' 9\" (1.753 m)   Wt 171 lb 11.8 oz (77.9 kg)   SpO2 96%   BMI 25.36 kg/m²     INTAKE/OUTPUT:    I/O last 3 completed shifts: In: 3557 [I.V.:500; NG/GT:807]  Out: 135 [Emesis/NG output:60; Drains:35; Stool:40]  I/O this shift: In: 438 [NG/GT:438]  Out: 0               CONSTITUTIONAL:  fatigued and somnolent  LUNGS:     ABDOMEN:    , soft, non-distended, non-tender   INCISION: clean, dry, no drainage, healing, ostomy - liquid green stool; BROOKS - serosanguinous output    Data:  CBC:   Recent Labs     09/23/22  0710 09/23/22  1600 09/24/22  0530   WBC 16.1* 7.4 10.3   HGB 7.1* 7.6* 7.8*   HCT 21.3* 22.9* 23.0*   * 151 153     BMP:    Recent Labs     09/22/22  0526 09/23/22  0450 09/24/22  0530   * 134* 132*   K 4.3 4.2 4.5   CL 98* 99 98*   CO2 21 19* 22   BUN 70* 49* 68*   CREATININE 3.6* 2.7* 3.4*   GLUCOSE 218* 241* 214*     Hepatic: No results for input(s): AST, ALT, ALB, BILITOT, ALKPHOS in the last 72 hours. Mag:      Recent Labs     09/24/22  0530   MG 2.40      Phos:     Recent Labs     09/22/22  0526 09/23/22  0450 09/24/22  0530   PHOS 3.1 3.6 3.5      INR: No results for input(s): INR in the last 72 hours.       Radiology Review:       ASSESSMENT AND PLAN:  67 y.o. female status post exploratory laparotomy, sigmoid colectomy, colostomy formation   - still needs Speech Path eval for swallow assessment before beginning PO diet    - cont tube feeds   - cont IV abx for now, probably can d/c at either 10 or 14 days        Electronically signed by Valorie Fletcher MD

## 2022-09-24 NOTE — PROGRESS NOTES
Hospitalist Progress Note  9/24/2022 11:32 AM  Subjective:   Admit Date: 9/14/2022  PCP: Radha Ballesteros, DO, DO Status: Inpatient  Interval History: Hospital Day: 11, Admitted with sepsis and lactic acidosis from peritonitis secondary to bowel perforation s/p sigmoid colectomy and colostomy (9/15) on tube feed. Acute respiratory failure requiring mechanical ventilation (9/15 - 9/21) and now on room air. Metabolic acidosis requiring two vasopressors, discontinued. Antibiotic therapy with ceftriaxone. CKD stage 3b with acute kidney injury, initiated on hemodialysis (9/22, 9/23, and 9/24). History of type 2 diabetes on metformin prior to admission. Swallow evaluation prior to oral intake. May need community hemodialysis. Diet: Tube feed at 55 cc/hr  Left antecubital peripheral IV (9/14, day #11)  Right abdomen closed suction drain (9/15, day #10)  Left nostril NG (9/16, day #9)  LLQ colostomy (9/15, day #10)  Right neck triple lumen hemodialysis catheter (9/21, day #4)  Medications:   Sodium chloride at 100 ml/hr  insulin glargine  15 Units SubCUTAneous Nightly   midodrine  10 mg Oral TID WC   ceftriaxone   2,000 mg IntraVENous Q24H   insulin lispro SSI  0-16 Units SubCUTAneous Q4H   fluconazole  200 mg IntraVENous Q24H   pantoprazole  40 mg IntraVENous Daily     Recent Labs     09/23/22  0710 09/23/22  1600 09/24/22  0530   WBC 16.1* 7.4 10.3   HGB 7.1* 7.6* 7.8*   * 151 153   MCV 83.7 83.8 83.6     Recent Labs     09/22/22  0526 09/23/22  0450 09/24/22  0530   * 134* 132*   K 4.3 4.2 4.5   CL 98* 99 98*   CO2 21 19* 22   BUN 70* 49* 68*   CREATININE 3.6* 2.7* 3.4*   GLUCOSE 218* 241* 214*     Magnesium (9/24) 2.40 mg/dL    Acute hepatitis panel (9/22) non-reactive       POC Glucose:   Recent Labs     09/23/22  1421 09/23/22  1800 09/23/22  1946 09/24/22  0219   POCGLU 222* 217* 215* 205*     Portable CXR (9/22) Temporary dialysis catheter overlies the cavoatrial junction.   Again noted are bibasilar infiltrates and pleural effusions. Similar appearance to the prior exam.    Noncontrast CT head (9/19) No acute intracranial abnormality. Fluid in the mastoids, with trace mucosal thickening in the sinuses. CT abd / pelvis w/ IV contrast (9/19) Ostomy is now seen in the left lower quadrant. There is wall thickening of the colon extending to the ostomy site, which is either due to the partially contracted state of the colon or wall thickening from underlying colitis. Scattered fluid is seen in the pelvis, with a dominant collection on the right that contains a small amount of gas internally. In the absence of clinical signs of infection, this collection of fluid and gas is likely postoperative. Increased pleural effusions with adjacent consolidation at the lung bases. Nonspecific thickening of the endometrial stripe. Recommend follow-up pelvic ultrasound after the patient's acute symptoms have resolved. Mild fat stranding surrounds the bladder. Recommend correlation with urinalysis. Gas is also seen in the bladder. CT abd / pelvis w/o contrast (9/15) Limited evaluation of the GI tract on this noncontrast exam.  Improved  mucosal changes of the duodenum and proximal jejunum that were described on prior CT. Severe inflammatory change in the right lower quadrant with etiology uncertain. This could correspond to enteritis of the distal ileum and terminal ileum. Colitis may also be considered. Infectious or inflammatory etiologies may be favored. Dense stool and diffuse mucosal thickening of the distal colon which may represent a pattern of colitis. Small right pleural effusion with airspace changes in the right lower lobe, new from prior exam. Evidence of chronic liver disease with a small amount of ascites stable.       Objective:   Vitals:  /60   Pulse 78   Temp 98.5 °F (36.9 °C) (Axillary)   Resp 21   Ht 5' 9\" (1.753 m)   Wt 171 lb 11.8 oz (77.9 kg)   SpO2 95%   BMI 25.36 kg/m²   General appearance: alert and cooperative with exam  Lungs: clear to auscultation bilaterally  Heart: regular rate and rhythm, S1, S2 normal, no murmur, click, rub or gallop  Abdomen:  LLQ colostomy, post-op, bowel sounds decreased but audible  Extremities: extremities normal, atraumatic, no cyanosis or edema  Neurologic: No obvious focal neurologic deficits. Assessment and Plan:   Sepsis and lactic acidosis from peritonitis secondary to bowel perforation s/p sigmoid colectomy and colostomy (9/15) on tube feed. Acute respiratory failure requiring mechanical ventilation (9/15 - 9/21) and now on room air. Metabolic acidosis requiring two vasopressors, discontinued. Antibiotic therapy with ceftriaxone. CKD stage 3b with acute kidney injury, initiated on hemodialysis (9/22, 9/23, and 9/24). History of type 2 diabetes on metformin prior to admission. Type 2 Diabetes (uncontrolled, HgbA1c 9.2%). Basal glargine insulin 15 units nightly. Cover with a \"sliding scale\" lispro moderate scale prandial correction insulin. Troponin elevation of unclear etiology. Hyponatremia:  chronic. Advance Directive: Full Code  DVT prophylaxis with enoxaparin 40 mg sub-Q daily. Discharge planning:  Likely to require rehab. Tentative referral to [facility]. Patient works at [employer] and family has requested referral there is SNF needed at discharge. Swallow evaluation prior to oral intake. May need community hemodialysis. Traditional Medicare.        Kiya Ugalde MD  [facility] Hospitalist

## 2022-09-24 NOTE — PROGRESS NOTES
Treatment time: 3 hours     Net UF: +400    Pre weight: 91.1kg   Post weight: 91.5kg   EDW: TBD     Access used: Right neck CVC   Access function: well, tolerated 300 BFR     Medications or blood products given: Albumin, heparin dwells     Regular outpatient schedule: TTS, DEJAN     Summary of response to treatment: hypotensive requiring saline and albumin. BP remained  soft through out the tx despite interventions. Low 80s-low 90s SBP.  Unable to tolerate any fluid removal.     Copy of dialysis treatment record placed in chart, to be scanned into EMR.     09/24/22 1435   Vital Signs   BP (!) 129/55   Temp 97.7 °F (36.5 °C)   Heart Rate 80   Resp 18   Weight 200 lb 13.4 oz (91.1 kg)   Dialysis Bath   K+ (Potassium) 3   Ca+ (Calcium) 2.5   Na+ (Sodium) 135   HCO3 (Bicarb) 30

## 2022-09-24 NOTE — PROGRESS NOTES
4 Eyes Skin Assessment     The patient is being assess for  Transfer to New Unit    I agree that 2 RN's have performed a thorough Head to Toe Skin Assessment on the patient. ALL assessment sites listed below have been assessed. Areas assessed by both nurses: Prosper Caruso RN  [x]   Head, Face, and Ears   [x]   Shoulders, Back, and Chest  [x]   Arms, Elbows, and Hands   [x]   Coccyx, Sacrum, and Ischum  [x]   Legs, Feet, and Heels        Does the Patient have Skin Breakdown?   No         Levar Prevention initiated:  Yes   Wound Care Orders initiated:  NA      [facility] nurse consulted for Pressure Injury (Stage 3,4, Unstageable, DTI, NWPT, and Complex wounds):  NA      Nurse 1 eSignature: Electronically signed by Christofer Pantoja RN on 9/24/22 at 6:12 PM EDT    **SHARE this note so that the co-signing nurse is able to place an eSignature**    Nurse 2 eSignature: {Esignature:[id number]}

## 2022-09-24 NOTE — PROGRESS NOTES
Interval History and plan:     Feels ok     Plan:  Continue dialysis dialysis Thursday Friday Saturday  No indication for dialysis today  Plan for Dialysis today   UF as tolerated   Follow with any signs of Renal recovery   UOP still low  [facility] aware potential outpatient dialysis needs                       Assessment :     Acidosis  Severe  Requiring 2 vasopressors  Blood pressure okay with the 2 vasopressors but lactic is a still going up to 19 concerning for ischemia   /Possible metformin induced lactic acidosis       CKD Stage IIIb with DEJAN  Creatinine 2.1 on consult  Creatinine 1.6 on 7/22  Likely due to diabetes, hypertension  Renal imaging-normal in the past      hypotension  BP: ()/(46-61)  Heart Rate:  [72-75]   BP goal inpatient 684-119 systolic inpatient  Pressures at the time of consult  Normally hypertensive      [facility] would like to thank Nereida Davies MD   for opportunity to serve this patient      Please call with questions at-   24 Hrs Answering service [phone] or  7 am- 5 pm via Perfect serve or cell phone  Elijah Hinojosa MD          CC/reason for consult :       DEJAN     HPI :     Devorah Suarez is a 67 y.o. female presented to   the hospital on 9/14/2022 with lactic acidosis. She is known to have diabetes and on metformin to 2 days ago  Has constipation and with multiple bowel regimen has had good bowel movement yesterday following that she has syncope. EMS was called and was found to have blood pressure of 50 systolic given IV fluids brought to the emergency room blood pressure was normal initially but later developed hypotension got 3 L of fluid and now on 2 vasopressors and in ICU. She also has severe acidosis with very high lactate. CT scan was normal initially. We are consulted for DEJAN on CKD and related issues.     On CKD and also severe lactic acidosis    ROS:     Seen with- sister    RN         PMH/PSH/SH/Family History:     Past Medical History:   Diagnosis Date    Chronic kidney disease     Diabetes mellitus ([facility] [address]     Hyperlipidemia     Hypertension     Neuropathy        Past Surgical History:   Procedure Laterality Date    LAPAROTOMY N/A 9/15/2022    DIAGNOSTIC LAPAROSCOPY, EXPLORATORY LAPAROTOMY, SIGMOID COLECTOMY AND COLOSTOMY performed by Magaly Batista MD at [facility] 1        reports that she has never smoked. She has never used smokeless tobacco. She reports that she does not currently use alcohol. She reports that she does not use drugs. family history is not on file.          Medication:     Current Facility-Administered Medications: insulin glargine (LANTUS) injection vial 15 Units, 15 Units, SubCUTAneous, Nightly  0.9 % sodium chloride bolus, 500 mL, IntraVENous, Once  midodrine (PROAMATINE) tablet 10 mg, 10 mg, Oral, TID WC  cefTRIAXone (ROCEPHIN) 2,000 mg in dextrose 5 % 50 mL IVPB mini-bag, 2,000 mg, IntraVENous, Q24H  insulin lispro (HUMALOG) injection vial 0-16 Units, 0-16 Units, SubCUTAneous, Q4H  fluconazole (DIFLUCAN) in 0.9 % sodium chloride IVPB 200 mg, 200 mg, IntraVENous, Q24H  heparin (porcine) injection 3,200 Units, 3,200 Units, IntraCATHeter, PRN  fentaNYL (SUBLIMAZE) injection 25 mcg, 25 mcg, IntraVENous, Q4H PRN  prochlorperazine (COMPAZINE) injection 5 mg, 5 mg, IntraVENous, Q6H PRN  potassium chloride 20 mEq/50 mL IVPB (Central Line), 20 mEq, IntraVENous, PRN  magnesium sulfate 1000 mg in dextrose 5% 100 mL IVPB, 1,000 mg, IntraVENous, PRN  calcium gluconate 1000 mg in sodium chloride 50 mL, 1,000 mg, IntraVENous, PRN **OR** calcium gluconate 2,000 mg in dextrose 5 % 100 mL IVPB, 2,000 mg, IntraVENous, PRN **OR** calcium gluconate 3,000 mg in dextrose 5 % 100 mL IVPB, 3,000 mg, IntraVENous, PRN **OR** calcium gluconate 4,000 mg in dextrose 5 % 100 mL IVPB, 4,000 mg, IntraVENous, PRN  sodium chloride flush 0.9 % injection 5-40 mL, 5-40 mL, IntraVENous, PRN  0.9 % sodium chloride infusion, 25 mL, IntraVENous, PRN  HYDROmorphone (DILAUDID) injection 0.25 mg, 0.25 mg, IntraVENous, Q5 Min PRN  HYDROmorphone (DILAUDID) injection 0.5 mg, 0.5 mg, IntraVENous, Q5 Min PRN  ondansetron (ZOFRAN) injection 4 mg, 4 mg, IntraVENous, Q10 Min PRN  fentaNYL (SUBLIMAZE) injection 25 mcg, 25 mcg, IntraVENous, Q1H PRN  glucose chewable tablet 16 g, 4 tablet, Oral, PRN  dextrose bolus 10% 125 mL, 125 mL, IntraVENous, PRN **OR** dextrose bolus 10% 250 mL, 250 mL, IntraVENous, PRN  glucagon (rDNA) injection 1 mg, 1 mg, SubCUTAneous, PRN  dextrose 10 % infusion, , IntraVENous, Continuous PRN  sodium chloride flush 0.9 % injection 5-40 mL, 5-40 mL, IntraVENous, PRN  0.9 % sodium chloride infusion, , IntraVENous, PRN  polyethylene glycol (GLYCOLAX) packet 17 g, 17 g, Oral, Daily PRN  acetaminophen (TYLENOL) tablet 650 mg, 650 mg, Oral, Q6H PRN **OR** acetaminophen (TYLENOL) suppository 650 mg, 650 mg, Rectal, Q6H PRN  pantoprazole (PROTONIX) injection 40 mg, 40 mg, IntraVENous, Daily       Vitals :     Vitals:    09/24/22 0600   BP: 109/60   Pulse: 75   Resp: 22   Temp:    SpO2: 95%       I & O :       Intake/Output Summary (Last 24 hours) at 9/24/2022 8742  Last data filed at 9/24/2022 0533  Gross per 24 hour   Intake 1110 ml   Output 80 ml   Net 1030 ml        Physical Examination :     General appearance: confused,on NC  HEENT: Lips- normal, teeth- ok , oral mucosa- moist  Neck : Mass- no, appears symmetrical, JVD- not visible  Respiratory: Respiratory effort-  comfortable on oxygen, wheeze- no, crackles - no  Cardiovascular:  Ausculation- No M/R/G, Edema none  Abdomen: visible mass- no, distention- no, scar- no, tenderness- no                            hepatosplenomegaly-  No--  Musculoskeletal:  clubbing no,cyanosis- no , digital ischemia- no                           muscle strength- patient unable to co-operate     , tone - patient unable to co-operate   Skin: rashes- no , ulcers- no, induration- no, tightening - no  Psychiatric: confused   Additional findings:         LABS:     Recent Labs     09/23/22  0710 09/23/22  1600 09/24/22  0530   WBC 16.1* 7.4 10.3   HGB 7.1* 7.6* 7.8*   HCT 21.3* 22.9* 23.0*   * 151 153     Recent Labs     09/22/22  0526 09/23/22  0450 09/24/22  0530   * 134* 132*   K 4.3 4.2 4.5   CL 98* 99 98*   CO2 21 19* 22   BUN 70* 49* 68*   CREATININE 3.6* 2.7* 3.4*   GLUCOSE 218* 241* 214*   MG  --   --  2.40   PHOS 3.1 3.6 3.5

## 2022-09-25 LAB
GLUCOSE BLD-MCNC: 205 MG/DL (ref 70–99)
GLUCOSE BLD-MCNC: 260 MG/DL (ref 70–99)
GLUCOSE BLD-MCNC: 261 MG/DL (ref 70–99)
GLUCOSE BLD-MCNC: 261 MG/DL (ref 70–99)
GLUCOSE BLD-MCNC: 285 MG/DL (ref 70–99)
PERFORMED ON: ABNORMAL

## 2022-09-25 PROCEDURE — 1200000000 HC SEMI PRIVATE

## 2022-09-25 PROCEDURE — 6370000000 HC RX 637 (ALT 250 FOR IP): Performed by: INTERNAL MEDICINE

## 2022-09-25 PROCEDURE — 6360000002 HC RX W HCPCS: Performed by: INTERNAL MEDICINE

## 2022-09-25 PROCEDURE — 99024 POSTOP FOLLOW-UP VISIT: CPT | Performed by: SURGERY

## 2022-09-25 PROCEDURE — 2580000003 HC RX 258: Performed by: INTERNAL MEDICINE

## 2022-09-25 PROCEDURE — 6370000000 HC RX 637 (ALT 250 FOR IP): Performed by: STUDENT IN AN ORGANIZED HEALTH CARE EDUCATION/TRAINING PROGRAM

## 2022-09-25 PROCEDURE — C9113 INJ PANTOPRAZOLE SODIUM, VIA: HCPCS | Performed by: INTERNAL MEDICINE

## 2022-09-25 RX ORDER — ACETAMINOPHEN 160 MG/5ML
650 SOLUTION ORAL EVERY 4 HOURS PRN
Status: DISCONTINUED | OUTPATIENT
Start: 2022-09-25 | End: 2022-10-06 | Stop reason: HOSPADM

## 2022-09-25 RX ORDER — OXYCODONE HCL 5 MG/5 ML
10 SOLUTION, ORAL ORAL EVERY 4 HOURS PRN
Status: DISCONTINUED | OUTPATIENT
Start: 2022-09-25 | End: 2022-09-26

## 2022-09-25 RX ORDER — INSULIN GLARGINE 100 [IU]/ML
20 INJECTION, SOLUTION SUBCUTANEOUS NIGHTLY
Status: DISCONTINUED | OUTPATIENT
Start: 2022-09-25 | End: 2022-10-06 | Stop reason: HOSPADM

## 2022-09-25 RX ORDER — OXYCODONE HCL 5 MG/5 ML
5 SOLUTION, ORAL ORAL EVERY 4 HOURS PRN
Status: DISCONTINUED | OUTPATIENT
Start: 2022-09-25 | End: 2022-09-26

## 2022-09-25 RX ADMIN — PANTOPRAZOLE SODIUM 40 MG: 40 INJECTION, POWDER, FOR SOLUTION INTRAVENOUS at 09:32

## 2022-09-25 RX ADMIN — INSULIN LISPRO 8 UNITS: 100 INJECTION, SOLUTION INTRAVENOUS; SUBCUTANEOUS at 12:08

## 2022-09-25 RX ADMIN — OXYCODONE HYDROCHLORIDE 5 MG: 5 SOLUTION ORAL at 13:09

## 2022-09-25 RX ADMIN — INSULIN LISPRO 8 UNITS: 100 INJECTION, SOLUTION INTRAVENOUS; SUBCUTANEOUS at 09:09

## 2022-09-25 RX ADMIN — INSULIN LISPRO 8 UNITS: 100 INJECTION, SOLUTION INTRAVENOUS; SUBCUTANEOUS at 20:33

## 2022-09-25 RX ADMIN — INSULIN LISPRO 8 UNITS: 100 INJECTION, SOLUTION INTRAVENOUS; SUBCUTANEOUS at 04:31

## 2022-09-25 RX ADMIN — INSULIN LISPRO 8 UNITS: 100 INJECTION, SOLUTION INTRAVENOUS; SUBCUTANEOUS at 01:04

## 2022-09-25 RX ADMIN — Medication 10 ML: at 08:15

## 2022-09-25 RX ADMIN — Medication 10 ML: at 20:34

## 2022-09-25 RX ADMIN — INSULIN LISPRO 4 UNITS: 100 INJECTION, SOLUTION INTRAVENOUS; SUBCUTANEOUS at 16:42

## 2022-09-25 RX ADMIN — INSULIN GLARGINE 20 UNITS: 100 INJECTION, SOLUTION SUBCUTANEOUS at 20:34

## 2022-09-25 ASSESSMENT — PAIN DESCRIPTION - ONSET: ONSET: UNABLE TO COMMUNICATE

## 2022-09-25 ASSESSMENT — PAIN SCALES - GENERAL
PAINLEVEL_OUTOF10: 0
PAINLEVEL_OUTOF10: 6

## 2022-09-25 ASSESSMENT — PAIN SCALES - WONG BAKER
WONGBAKER_NUMERICALRESPONSE: 4
WONGBAKER_NUMERICALRESPONSE: 0

## 2022-09-25 ASSESSMENT — PAIN DESCRIPTION - DESCRIPTORS: DESCRIPTORS: ACHING

## 2022-09-25 ASSESSMENT — PAIN DESCRIPTION - LOCATION: LOCATION: ABDOMEN

## 2022-09-25 NOTE — PROGRESS NOTES
Interval History and plan:     Feels ok     Plan:  Continue dialysis dialysis Thursday Friday Saturday  Issues with HD and BP otherwise tolerated ok   May need to get her started on Midodrine with HD   Follow with BP rend   No need for RRT today   UF as tolerated   Follow with any signs of Renal recovery   UOP still low  [facility] aware potential outpatient dialysis needs                       Assessment :     Acidosis  Severe  Requiring 2 vasopressors  Blood pressure okay with the 2 vasopressors but lactic is a still going up to 19 concerning for ischemia   /Possible metformin induced lactic acidosis       CKD Stage IIIb with DEJAN  Creatinine 2.1 on consult  Creatinine 1.6 on 7/22  Likely due to diabetes, hypertension  Renal imaging-normal in the past      hypotension  BP: (116-137)/(69-75)  Heart Rate:  [80-82]   BP goal inpatient 744-880 systolic inpatient  Pressures at the time of consult  Normally hypertensive      [facility] would like to thank Jennifer Presley MD   for opportunity to serve this patient      Please call with questions at-   24 Hrs Answering service [phone] or  7 am- 5 pm via Perfect serve or cell phone  Ihsan Sheridan MD          CC/reason for consult :       DEJAN     HPI :     Keith Flowers is a 67 y.o. female presented to   the hospital on 9/14/2022 with lactic acidosis. She is known to have diabetes and on metformin to 2 days ago  Has constipation and with multiple bowel regimen has had good bowel movement yesterday following that she has syncope. EMS was called and was found to have blood pressure of 50 systolic given IV fluids brought to the emergency room blood pressure was normal initially but later developed hypotension got 3 L of fluid and now on 2 vasopressors and in ICU. She also has severe acidosis with very high lactate. CT scan was normal initially. We are consulted for DEJAN on CKD and related issues.     On CKD and also severe lactic acidosis    ROS:     Seen with- sister    RN         PMH/PSH/SH/Family History:     Past Medical History:   Diagnosis Date    Chronic kidney disease     Diabetes mellitus ([address]     Hyperlipidemia     Hypertension     Neuropathy        Past Surgical History:   Procedure Laterality Date    LAPAROTOMY N/A 9/15/2022    DIAGNOSTIC LAPAROSCOPY, EXPLORATORY LAPAROTOMY, SIGMOID COLECTOMY AND COLOSTOMY performed by Meka Andrade MD at [facility] 1        reports that she has never smoked. She has never used smokeless tobacco. She reports that she does not currently use alcohol. She reports that she does not use drugs. family history is not on file.          Medication:     Current Facility-Administered Medications: insulin glargine (LANTUS) injection vial 15 Units, 15 Units, SubCUTAneous, Nightly  0.9 % sodium chloride bolus, 500 mL, IntraVENous, Once  midodrine (PROAMATINE) tablet 10 mg, 10 mg, Oral, TID WC  cefTRIAXone (ROCEPHIN) 2,000 mg in dextrose 5 % 50 mL IVPB mini-bag, 2,000 mg, IntraVENous, Q24H  insulin lispro (HUMALOG) injection vial 0-16 Units, 0-16 Units, SubCUTAneous, Q4H  fluconazole (DIFLUCAN) in 0.9 % sodium chloride IVPB 200 mg, 200 mg, IntraVENous, Q24H  heparin (porcine) injection 3,200 Units, 3,200 Units, IntraCATHeter, PRN  prochlorperazine (COMPAZINE) injection 5 mg, 5 mg, IntraVENous, Q6H PRN  potassium chloride 20 mEq/50 mL IVPB (Central Line), 20 mEq, IntraVENous, PRN  magnesium sulfate 1000 mg in dextrose 5% 100 mL IVPB, 1,000 mg, IntraVENous, PRN  sodium chloride flush 0.9 % injection 5-40 mL, 5-40 mL, IntraVENous, PRN  0.9 % sodium chloride infusion, 25 mL, IntraVENous, PRN  ondansetron (ZOFRAN) injection 4 mg, 4 mg, IntraVENous, Q10 Min PRN  glucose chewable tablet 16 g, 4 tablet, Oral, PRN  dextrose bolus 10% 125 mL, 125 mL, IntraVENous, PRN **OR** dextrose bolus 10% 250 mL, 250 mL, IntraVENous, PRN  glucagon (rDNA) injection 1 mg, 1 mg, SubCUTAneous, PRN  dextrose 10 % infusion, , IntraVENous, Continuous PRN  sodium chloride flush 0.9 % injection 5-40 mL, 5-40 mL, IntraVENous, PRN  0.9 % sodium chloride infusion, , IntraVENous, PRN  polyethylene glycol (GLYCOLAX) packet 17 g, 17 g, Oral, Daily PRN  acetaminophen (TYLENOL) tablet 650 mg, 650 mg, Oral, Q6H PRN **OR** acetaminophen (TYLENOL) suppository 650 mg, 650 mg, Rectal, Q6H PRN  pantoprazole (PROTONIX) injection 40 mg, 40 mg, IntraVENous, Daily       Vitals :     Vitals:    09/25/22 0835   BP: 137/75   Pulse: 80   Resp: 20   Temp: 99 °F (37.2 °C)   SpO2: 92%       I & O :       Intake/Output Summary (Last 24 hours) at 9/25/2022 0855  Last data filed at 9/25/2022 0341  Gross per 24 hour   Intake 1244 ml   Output 530 ml   Net 714 ml        Physical Examination :     General appearance: confused,on NC  HEENT: Lips- normal, teeth- ok , oral mucosa- moist  Neck : Mass- no, appears symmetrical, JVD- not visible  Respiratory: Respiratory effort-  comfortable on oxygen, wheeze- no, crackles - no  Cardiovascular:  Ausculation- No M/R/G, Edema none  Abdomen: visible mass- no, distention- no, scar- no, tenderness- no                            hepatosplenomegaly-  No--  Musculoskeletal:  clubbing no,cyanosis- no , digital ischemia- no                           muscle strength- patient unable to co-operate     , tone - patient unable to co-operate   Skin: rashes- no , ulcers- no, induration- no, tightening - no  Psychiatric:  confused   Additional findings:         LABS:     Recent Labs     09/23/22  0710 09/23/22  1600 09/24/22  0530   WBC 16.1* 7.4 10.3   HGB 7.1* 7.6* 7.8*   HCT 21.3* 22.9* 23.0*   * 151 153     Recent Labs     09/23/22  0450 09/24/22  0530   * 132*   K 4.2 4.5   CL 99 98*   CO2 19* 22   BUN 49* 68*   CREATININE 2.7* 3.4*   GLUCOSE 241* 214*   MG  --  2.40   PHOS 3.6 3.5

## 2022-09-25 NOTE — PROGRESS NOTES
Hospitalist Progress Note  9/25/2022 11:27 AM  Subjective:   Admit Date: 9/14/2022  PCP: Terri Sierra, DO Status: Inpatient  Interval History: Hospital Day: 12, tolerating tube feeds at goal.  Dialysis Saturday (9/24). Urine output remains low. History of present illness:  Admitted with sepsis and lactic acidosis from peritonitis secondary to bowel perforation s/p sigmoid colectomy and colostomy (9/15) on tube feed. Acute respiratory failure requiring mechanical ventilation (9/15 - 9/21) and now on room air. Metabolic acidosis requiring two vasopressors, discontinued. Antibiotic therapy with ceftriaxone. CKD stage 3b with acute kidney injury, initiated on hemodialysis (9/22, 9/23, and 9/24). History of type 2 diabetes on metformin prior to admission. Swallow evaluation prior to oral intake. May need community hemodialysis. Diet: Tube feed Glucerna at 55 cc/hr  Left antecubital peripheral IV (9/14, day #12)  Right abdomen closed suction drain (9/15, day #11)  Left nostril NG (9/16, day #10)  LLQ colostomy (9/15, day #11)  Right neck triple lumen hemodialysis catheter (9/21, day #5)  Medications:   Sodium chloride at 100 ml/hr  insulin glargine  15 Units SubCUTAneous Nightly   midodrine  10 mg Oral TID WC   insulin lispro SSI  0-16 Units SubCUTAneous Q4H   pantoprazole  40 mg IntraVENous Daily     Recent Labs     09/23/22  0710 09/23/22  1600 09/24/22  0530   WBC 16.1* 7.4 10.3   HGB 7.1* 7.6* 7.8*   * 151 153   MCV 83.7 83.8 83.6     Recent Labs     09/23/22  0450 09/24/22  0530   * 132*   K 4.2 4.5   CL 99 98*   CO2 19* 22   BUN 49* 68*   CREATININE 2.7* 3.4*   GLUCOSE 241* 214*     Magnesium (9/24) 2.40 mg/dL   Acute hepatitis panel (9/22) non-reactive     POC Glucose:   Recent Labs     09/24/22  2349 09/25/22  0413 09/25/22  0723 09/25/22  1110   POCGLU 254* 260* 261* 285*     Portable CXR (9/22) Temporary dialysis catheter overlies the cavoatrial junction.   Again noted are bibasilar infiltrates and pleural effusions. Similar appearance to the prior exam.     Noncontrast CT head (9/19) No acute intracranial abnormality. Fluid in the mastoids, with trace mucosal thickening in the sinuses. CT abd / pelvis w/ IV contrast (9/19) Ostomy is now seen in the left lower quadrant. There is wall thickening of the colon extending to the ostomy site, which is either due to the partially contracted state of the colon or wall thickening from underlying colitis. Scattered fluid is seen in the pelvis, with a dominant collection on the right that contains a small amount of gas internally. In the absence of clinical signs of infection, this collection of fluid and gas is likely postoperative. Increased pleural effusions with adjacent consolidation at the lung bases. Nonspecific thickening of the endometrial stripe. Recommend follow-up pelvic ultrasound after the patient's acute symptoms have resolved. Mild fat stranding surrounds the bladder. Recommend correlation with urinalysis. Gas is also seen in the bladder. CT abd / pelvis w/o contrast (9/15) Limited evaluation of the GI tract on this noncontrast exam.  Improved mucosal changes of the duodenum and proximal jejunum that were described on prior CT. Severe inflammatory change in the right lower quadrant with etiology uncertain. This could correspond to enteritis of the distal ileum and terminal ileum. Colitis may also be considered. Infectious or inflammatory etiologies may be favored. Dense stool and diffuse mucosal thickening of the distal colon which may represent a pattern of colitis. Small right pleural effusion with airspace changes in the right lower lobe, new from prior exam. Evidence of chronic liver disease with a small amount of ascites stable.     Objective:   Vitals:  /75   Pulse 80   Temp 99 °F (37.2 °C) (Axillary)   Resp 20   Ht 5' 9\" (1.753 m)   Wt 200 lb 13.4 oz (91.1 kg)   SpO2 92% on RA  BMI 29.66 kg/m² General appearance: alert and cooperative with exam  Lungs: clear to auscultation bilaterally  Heart: regular rate and rhythm, S1, S2 normal, no murmur, click, rub or gallop  Abdomen:  LLQ colostomy, post-op, bowel sounds decreased but audible  Extremities: extremities normal, atraumatic, no cyanosis or edema  Neurologic: No obvious focal neurologic deficits. Assessment and Plan:   Sepsis and lactic acidosis from peritonitis secondary to bowel perforation s/p sigmoid colectomy and colostomy (9/15) on tube feed. Completed course of ceftriaxone. SLP eval ordered. Remove NG (9/26) for swallow eval per surgery. Acute respiratory failure requiring mechanical ventilation (9/15 - 9/21) and now on room air. Metabolic acidosis requiring two vasopressors, discontinued. Antibiotic therapy with ceftriaxone. CKD stage 3b with acute kidney injury, initiated on hemodialysis (9/22, 9/23, and 9/24). History of type 2 diabetes on metformin prior to admission. Type 2 Diabetes (uncontrolled, HgbA1c 9.2%). Basal glargine insulin 15 units nightly (baseline) increase to 20 units. Cover with a \"sliding scale\" lispro moderate scale prandial correction insulin. Troponin elevation of unclear etiology. Hyponatremia:  chronic. Advance Directive: Full Code  DVT prophylaxis with enoxaparin 40 mg sub-Q daily. Discharge planning:  Likely to require rehab. Tentative referral to [facility]. Patient works at [employer] and family has requested referral there is SNF needed at discharge. Swallow evaluation prior to oral intake on Monday. May need community hemodialysis. Traditional Medicare.        Kiya Ugalde MD  Rounding Hospitalist

## 2022-09-25 NOTE — PROGRESS NOTES
Speech Language Pathology  ST attempting dysphagia evaluation. RN reports MD would like eval to be held for Monday morning after NGT removal. Will follow-up tomorrow morning as schedule permits. Thank you.    Adonis Quiroga M.A, ARIAS-SLP/ CBIS

## 2022-09-25 NOTE — PLAN OF CARE
Problem: Skin/Tissue Integrity  Goal: Absence of new skin breakdown    Patient to be turned Q2 hours to prevent skin breakdown. Skin assessment completed with Pelon Powers RN.

## 2022-09-25 NOTE — PROGRESS NOTES
Patient a/ox4. VSS. Ostomy intact with large amount of liquid brown stool. NG with tube feeding at goal rate, patient tolerating.

## 2022-09-25 NOTE — PROGRESS NOTES
[facility] GENERAL SURGERY    Surgery Progress Note           POD # 10    PATIENT NAME: Hellen Guerra     TODAY'S DATE: 9/25/2022    INTERVAL HISTORY: No acute events overnight. Tolerating tube feeds at goal. Complaining of some abdominal pain. Ostomy with good function. OBJECTIVE:   VITALS:  /69   Pulse 81   Temp 98.6 °F (37 °C) (Axillary)   Resp 18   Ht 5' 9\" (1.753 m)   Wt 200 lb 13.4 oz (91.1 kg)   SpO2 92%   BMI 29.66 kg/m²     INTAKE/OUTPUT:    I/O last 3 completed shifts: In: 2111 [I.V.:838; NG/GT:1273]  Out: 590 [Emesis/NG output:60; Drains:30; Stool:500]  No intake/output data recorded. CONSTITUTIONAL: elderly female, ill-appearing, resting in bed  LUNGS:  normal effort, no adventitious breath sounds, on RA   ABDOMEN:    , soft, non-distended, non-tender   INCISION: clean, dry, no drainage, healing, ostomy - bag full of liquid brown stool ; BROOKS - serous output    Data:  CBC:   Recent Labs     09/23/22  0710 09/23/22  1600 09/24/22  0530   WBC 16.1* 7.4 10.3   HGB 7.1* 7.6* 7.8*   HCT 21.3* 22.9* 23.0*   * 151 153       BMP:    Recent Labs     09/23/22  0450 09/24/22  0530   * 132*   K 4.2 4.5   CL 99 98*   CO2 19* 22   BUN 49* 68*   CREATININE 2.7* 3.4*   GLUCOSE 241* 214*       Hepatic: No results for input(s): AST, ALT, ALB, BILITOT, ALKPHOS in the last 72 hours. Mag:      Recent Labs     09/24/22  0530   MG 2.40        Phos:     Recent Labs     09/23/22  0450 09/24/22  0530   PHOS 3.6 3.5        INR: No results for input(s): INR in the last 72 hours. Radiology Review:       ASSESSMENT AND PLAN:  67 y.o. female status post exploratory laparotomy, sigmoid colectomy, colostomy formation    - continue tube feeds at goal, tolerating well  - SLP eval ordered.  OK to remove NG tomorrow morning for swallow eval  - if unable to swallow/have PO diet will place corpak for feeding  - labs tomorrow  - monitor ostomy and BROOKS output  - medical management per primary team      Electronically signed by Phoenix Elias DO

## 2022-09-25 NOTE — PLAN OF CARE
Problem: Discharge Planning  Goal: Discharge to home or other facility with appropriate resources  Outcome: Progressing     Problem: Pain  Goal: Verbalizes/displays adequate comfort level or baseline comfort level  Outcome: Progressing     Problem: Skin/Tissue Integrity  Goal: Absence of new skin breakdown  Description: 1. Monitor for areas of redness and/or skin breakdown  2. Assess vascular access sites hourly  3. Every 4-6 hours minimum:  Change oxygen saturation probe site  4. Every 4-6 hours:  If on nasal continuous positive airway pressure, respiratory therapy assess nares and determine need for appliance change or resting period.   Outcome: Progressing     Problem: Chronic Conditions and Co-morbidities  Goal: Patient's chronic conditions and co-morbidity symptoms are monitored and maintained or improved  Outcome: Progressing     Problem: Nutrition Deficit:  Goal: Optimize nutritional status  Outcome: Progressing     Problem: Safety - Adult  Goal: Free from fall injury  Outcome: Progressing

## 2022-09-25 NOTE — PROGRESS NOTES
Pt rated her pain a 6/10. PRN pain meds given, and patients pain controlled at this time.    Electronically signed by Shai Murray RN on 9/25/2022 at 3:35 PM

## 2022-09-25 NOTE — PROGRESS NOTES
Shift assessment completed and charted. VSS. A/o x4. Standard safety measures in place. SpO2 > 90% on RA. Pt stable and denied needs when writer left room. Tube feed at goal rate of 55ml/hr - pt tolerating well. Ostomy emptied. BROOKS drain emptied.

## 2022-09-26 ENCOUNTER — APPOINTMENT (OUTPATIENT)
Dept: INTERVENTIONAL RADIOLOGY/VASCULAR | Age: 72
DRG: 853 | End: 2022-09-26
Payer: MEDICARE

## 2022-09-26 PROBLEM — K63.1 COLON PERFORATION (HCC): Status: ACTIVE | Noted: 2022-09-26

## 2022-09-26 LAB
ALBUMIN SERPL-MCNC: 2.1 G/DL (ref 3.4–5)
ANION GAP SERPL CALCULATED.3IONS-SCNC: 14 MMOL/L (ref 3–16)
BASOPHILS ABSOLUTE: 0 K/UL (ref 0–0.2)
BASOPHILS RELATIVE PERCENT: 0.1 %
BUN BLDV-MCNC: 78 MG/DL (ref 7–20)
CALCIUM SERPL-MCNC: 7.7 MG/DL (ref 8.3–10.6)
CHLORIDE BLD-SCNC: 96 MMOL/L (ref 99–110)
CO2: 22 MMOL/L (ref 21–32)
CREAT SERPL-MCNC: 3.4 MG/DL (ref 0.6–1.2)
EOSINOPHILS ABSOLUTE: 0 K/UL (ref 0–0.6)
EOSINOPHILS RELATIVE PERCENT: 0.3 %
GFR AFRICAN AMERICAN: 16
GFR NON-AFRICAN AMERICAN: 13
GLUCOSE BLD-MCNC: 160 MG/DL (ref 70–99)
GLUCOSE BLD-MCNC: 218 MG/DL (ref 70–99)
GLUCOSE BLD-MCNC: 225 MG/DL (ref 70–99)
GLUCOSE BLD-MCNC: 226 MG/DL (ref 70–99)
GLUCOSE BLD-MCNC: 232 MG/DL (ref 70–99)
GLUCOSE BLD-MCNC: 241 MG/DL (ref 70–99)
GLUCOSE BLD-MCNC: 247 MG/DL (ref 70–99)
GLUCOSE BLD-MCNC: 259 MG/DL (ref 70–99)
HCT VFR BLD CALC: 23.1 % (ref 36–48)
HEMOGLOBIN: 7.6 G/DL (ref 12–16)
LYMPHOCYTES ABSOLUTE: 0.6 K/UL (ref 1–5.1)
LYMPHOCYTES RELATIVE PERCENT: 6 %
MAGNESIUM: 2.3 MG/DL (ref 1.8–2.4)
MCH RBC QN AUTO: 27.8 PG (ref 26–34)
MCHC RBC AUTO-ENTMCNC: 32.8 G/DL (ref 31–36)
MCV RBC AUTO: 84.8 FL (ref 80–100)
MONOCYTES ABSOLUTE: 0.8 K/UL (ref 0–1.3)
MONOCYTES RELATIVE PERCENT: 7.9 %
NEUTROPHILS ABSOLUTE: 8.9 K/UL (ref 1.7–7.7)
NEUTROPHILS RELATIVE PERCENT: 85.7 %
PDW BLD-RTO: 14.9 % (ref 12.4–15.4)
PERFORMED ON: ABNORMAL
PHOSPHORUS: 3.1 MG/DL (ref 2.5–4.9)
PLATELET # BLD: 196 K/UL (ref 135–450)
PMV BLD AUTO: 9.4 FL (ref 5–10.5)
POTASSIUM SERPL-SCNC: 5 MMOL/L (ref 3.5–5.1)
RBC # BLD: 2.73 M/UL (ref 4–5.2)
SODIUM BLD-SCNC: 132 MMOL/L (ref 136–145)
WBC # BLD: 10.3 K/UL (ref 4–11)

## 2022-09-26 PROCEDURE — 6360000002 HC RX W HCPCS: Performed by: INTERNAL MEDICINE

## 2022-09-26 PROCEDURE — C9113 INJ PANTOPRAZOLE SODIUM, VIA: HCPCS | Performed by: INTERNAL MEDICINE

## 2022-09-26 PROCEDURE — 85025 COMPLETE CBC W/AUTO DIFF WBC: CPT

## 2022-09-26 PROCEDURE — 6370000000 HC RX 637 (ALT 250 FOR IP): Performed by: INTERNAL MEDICINE

## 2022-09-26 PROCEDURE — 6370000000 HC RX 637 (ALT 250 FOR IP): Performed by: STUDENT IN AN ORGANIZED HEALTH CARE EDUCATION/TRAINING PROGRAM

## 2022-09-26 PROCEDURE — 36415 COLL VENOUS BLD VENIPUNCTURE: CPT

## 2022-09-26 PROCEDURE — 92610 EVALUATE SWALLOWING FUNCTION: CPT

## 2022-09-26 PROCEDURE — 1200000000 HC SEMI PRIVATE

## 2022-09-26 PROCEDURE — 97110 THERAPEUTIC EXERCISES: CPT

## 2022-09-26 PROCEDURE — 99024 POSTOP FOLLOW-UP VISIT: CPT | Performed by: SURGERY

## 2022-09-26 PROCEDURE — 80069 RENAL FUNCTION PANEL: CPT

## 2022-09-26 PROCEDURE — 83735 ASSAY OF MAGNESIUM: CPT

## 2022-09-26 RX ORDER — MIDODRINE HYDROCHLORIDE 5 MG/1
5 TABLET ORAL
Status: DISCONTINUED | OUTPATIENT
Start: 2022-09-26 | End: 2022-09-27

## 2022-09-26 RX ORDER — ROSUVASTATIN CALCIUM 10 MG/1
5 TABLET, COATED ORAL DAILY
Status: DISCONTINUED | OUTPATIENT
Start: 2022-09-26 | End: 2022-10-06 | Stop reason: HOSPADM

## 2022-09-26 RX ADMIN — INSULIN LISPRO 4 UNITS: 100 INJECTION, SOLUTION INTRAVENOUS; SUBCUTANEOUS at 08:19

## 2022-09-26 RX ADMIN — INSULIN LISPRO 4 UNITS: 100 INJECTION, SOLUTION INTRAVENOUS; SUBCUTANEOUS at 17:27

## 2022-09-26 RX ADMIN — INSULIN LISPRO 4 UNITS: 100 INJECTION, SOLUTION INTRAVENOUS; SUBCUTANEOUS at 04:37

## 2022-09-26 RX ADMIN — INSULIN GLARGINE 20 UNITS: 100 INJECTION, SOLUTION SUBCUTANEOUS at 19:59

## 2022-09-26 RX ADMIN — PANTOPRAZOLE SODIUM 40 MG: 40 INJECTION, POWDER, FOR SOLUTION INTRAVENOUS at 08:17

## 2022-09-26 RX ADMIN — INSULIN LISPRO 4 UNITS: 100 INJECTION, SOLUTION INTRAVENOUS; SUBCUTANEOUS at 19:59

## 2022-09-26 RX ADMIN — INSULIN LISPRO 8 UNITS: 100 INJECTION, SOLUTION INTRAVENOUS; SUBCUTANEOUS at 00:55

## 2022-09-26 RX ADMIN — OXYCODONE HYDROCHLORIDE 10 MG: 5 SOLUTION ORAL at 00:47

## 2022-09-26 ASSESSMENT — PAIN SCALES - GENERAL
PAINLEVEL_OUTOF10: 7
PAINLEVEL_OUTOF10: 3

## 2022-09-26 ASSESSMENT — PAIN DESCRIPTION - ORIENTATION: ORIENTATION: MID

## 2022-09-26 ASSESSMENT — PAIN DESCRIPTION - LOCATION: LOCATION: ABDOMEN;INCISION

## 2022-09-26 NOTE — PROGRESS NOTES
Hospitalist Progress Note      PCP: Severiano Duncans, DO, DO    Date of Admission: 9/14/2022    Chief Complaint: syncope       Subjective: The patient is very sleepy, can't really wake up to talk to me. Denies complaints. Medications:  Reviewed    Infusion Medications    sodium chloride 25 mL (09/22/22 2024)    dextrose      sodium chloride 100 mL/hr at 09/21/22 2122     Scheduled Medications    midodrine  5 mg Oral TID WC    insulin glargine  20 Units SubCUTAneous Nightly    sodium chloride  500 mL IntraVENous Once    insulin lispro  0-16 Units SubCUTAneous Q4H    pantoprazole  40 mg IntraVENous Daily     PRN Meds: acetaminophen, heparin (porcine), prochlorperazine, potassium chloride, magnesium sulfate, sodium chloride flush, sodium chloride, ondansetron, glucose, dextrose bolus **OR** dextrose bolus, glucagon (rDNA), dextrose, sodium chloride flush, sodium chloride, polyethylene glycol      Intake/Output Summary (Last 24 hours) at 9/26/2022 1534  Last data filed at 9/26/2022 1450  Gross per 24 hour   Intake 1085 ml   Output 661 ml   Net 424 ml       Physical Exam Performed:    BP (!) 93/59   Pulse 70   Temp 97.7 °F (36.5 °C) (Oral)   Resp 16   Ht 5' 9\" (1.753 m)   Wt 191 lb 2.2 oz (86.7 kg)   SpO2 94%   BMI 28.23 kg/m²     General appearance: No apparent distress, appears stated age. HEENT: Pupils equal, round, and reactive to light. Conjunctivae/corneas clear. Neck: Supple, with full range of motion. No jugular venous distention. Trachea midline. Respiratory:  Normal respiratory effort. Clear to auscultation, bilaterally without Rales/Wheezes/Rhonchi. Diminished throughout. Cardiovascular: Regular rate and rhythm with normal S1/S2 without murmurs, rubs or gallops. Abdomen: Soft, mild diffuse tenderness, non-distended with normal bowel sounds. Musculoskeletal: No clubbing, cyanosis. 1+ BLE pitting edema. Full range of motion without deformity.   Skin: Skin color, texture, turgor normal.  Incisions c/d/I. Neurologic:  Neurovascularly intact without any focal sensory/motor deficits. Cranial nerves: II-XII intact, grossly non-focal.  Psychiatric: somnolent, partially oriented, does not have insight. Capillary Refill: Brisk, 3 seconds, normal   Peripheral Pulses: +2 palpable, equal bilaterally       Labs:   Recent Labs     09/23/22  1600 09/24/22  0530 09/26/22  0543   WBC 7.4 10.3 10.3   HGB 7.6* 7.8* 7.6*   HCT 22.9* 23.0* 23.1*    153 196     Recent Labs     09/24/22  0530 09/26/22  0543   * 132*   K 4.5 5.0   CL 98* 96*   CO2 22 22   BUN 68* 78*   CREATININE 3.4* 3.4*   CALCIUM 7.8* 7.7*   PHOS 3.5 3.1     No results for input(s): AST, ALT, BILIDIR, BILITOT, ALKPHOS in the last 72 hours. No results for input(s): INR in the last 72 hours. No results for input(s): Hamp Memory in the last 72 hours. Urinalysis:      Lab Results   Component Value Date/Time    NITRU Negative 09/14/2022 12:08 PM    WBCUA 21-50 09/14/2022 12:08 PM    BACTERIA Rare 09/14/2022 12:08 PM    RBCUA None seen 09/14/2022 12:08 PM    BLOODU Negative 09/14/2022 12:08 PM    SPECGRAV <=1.005 09/14/2022 12:08 PM    GLUCOSEU Negative 09/14/2022 12:08 PM       Radiology:  XR CHEST PORTABLE   Final Result   Temporary dialysis catheter overlies the cavoatrial junction. 1. Again noted are bibasilar infiltrates and pleural effusions. Similar   appearance to the prior exam.         XR CHEST PORTABLE   Final Result   Airspace opacities at the bilateral lung bases, may be related to atelectasis   versus pneumonia. Mild bilateral pleural effusions. CT HEAD WO CONTRAST   Final Result   No acute intracranial abnormality. Fluid in the mastoids, with trace mucosal thickening in the sinuses         CT ABDOMEN PELVIS W IV CONTRAST   Final Result   Ostomy is now seen in the left lower quadrant.   There is wall thickening of   the colon extending to the ostomy site, which is either due to the partially   contracted state of the colon or wall thickening from underlying colitis      Scattered fluid is seen in the pelvis, with a dominant collection on the   right that contains a small amount of gas internally. In the absence of   clinical signs of infection, this collection of fluid and gas is likely   postoperative. Increased pleural effusions with adjacent consolidation at the lung bases      Nonspecific thickening of the endometrial stripe. Recommend follow-up pelvic   ultrasound after the patient's acute symptoms have resolved. Mild fat stranding surrounds the bladder. Recommend correlation with   urinalysis. Gas is also seen in the bladder         XR CHEST PORTABLE   Final Result   1. Small left pleural effusion. 2.  Hazy right lower lobe airspace opacity which could represent atelectasis   or pneumonia. XR ABDOMEN FOR NG/OG/NE TUBE PLACEMENT   Final Result   Tip of NG tube projects in the left upper quadrant in the region of the   gastric fundus         XR CHEST PORTABLE   Final Result   Interval placement of a right-sided central venous catheter which extends   into the inferior aspect of the right atrium, approximately 5 cm beyond the   cavoatrial junction. XR CHEST PORTABLE   Final Result   Right IJ CVC catheter tip overlies the SVC at the level of the thoracic inlet. Endotracheal tube tip is 5.3 cm above the mark. Mild bibasilar airspace opacities, which could represent as atelectasis   and/or infiltrate. Possible small right pleural effusion. CT ABDOMEN PELVIS WO CONTRAST Additional Contrast? None   Final Result   1. Limited evaluation of the GI tract on this noncontrast exam.  Improved   mucosal changes of the duodenum and proximal jejunum that were described on   prior CT. 2. Severe inflammatory change in the right lower quadrant with etiology   uncertain. This could correspond to enteritis of the distal ileum and   terminal ileum. Colitis may also be considered. Infectious or inflammatory   etiologies may be favored. 3. Dense stool and diffuse mucosal thickening of the distal colon which may   represent a pattern of colitis. 4. Small right pleural effusion with airspace changes in the right lower   lobe, new from prior exam.   5. Evidence of chronic liver disease with a small amount of ascites stable. CT ABDOMEN PELVIS WO CONTRAST Additional Contrast? None   Final Result   1. Acute enteritis involving the duodenum and jejunal loops with thickening   of the loops and edema. No evidence of bowel obstruction. 2. Constipation with significant stool impaction in the rectum. 3. Mild ascites mostly around the liver and spleen. 4. Adequate position of Osorio catheter in the urinary bladder. XR CHEST PORTABLE   Final Result   Placement of a right internal jugular central venous catheter without evident   complication. The tip is at approximately the cavoatrial junction.       No acute findings in the chest.         IR TUNNELED CVC PLACE WO SQ PORT/PUMP > 5 YEARS    (Results Pending)           Assessment/Plan:    Active Hospital Problems    Diagnosis     Colon perforation (HCC) [K63.1]      Priority: Medium    Acute respiratory failure with hypoxia (HCC) [J96.01]      Priority: Medium    Acute encephalopathy [G93.40]      Priority: Medium    Moderate malnutrition ([address] [E44.0]      Priority: Medium    Ischemic colitis ([address] [K55.9]      Priority: Medium    S/P exploratory laparotomy [Z98.890]      Priority: Medium    Acute postoperative pulmonary insufficiency (HCC) [J95.2]      Priority: Medium    Syncope [R55]      Priority: Medium    Hyponatremia [E87.1]      Priority: Medium    Abdominal pain [R10.9]      Priority: Medium    Enteritis [K52.9]      Priority: Medium    Elevated troponin [R77.8]      Priority: Medium    DEJAN (acute kidney injury) ([address] [N17.9]      Priority: Medium    DM (diabetes mellitus), secondary uncontrolled ([facility] [E13.65]      Priority: Medium         The patient is a 67 Y F with a h/o HTN, HLD, DM2, and CKD3. She lives at home with her . Prior to this episode she hadn't been hospitalized in 20+ years and was still working part time doing laundry at [employer]. The patient felt constipated for a couple days at home. On 9/14 she finally had a bowel movement but felt very lightheaded during this and her  helped her to the floor as she briefly passed out. When EMS arrived her BP was 50/30. She was brought to the ED and found to be in septic shock from perforated ischemic colitis. She required sigmoid colectomy and colostomy creation on 9/15. She had postoperative pulmonary insufficiency and spent a few days on a ventilator, eventually extubated 9/21. She completed an 11-day course of empiric antibacterials and antifungals. Pressors were weaned off with the help of PO midodrine. Her anuric DEJAN did not improve and she had to be transitioned from CRRT to intermittent HD. Her acute metabolic encephalopathy persisted longer than expected, even after her acute issues had long-since stabilized. Ischemic sigmoid colitis. Likely due to constipation and mesenteric atherosclerosis. S/p resection, now with colostomy. Peritonitis and septic shock have resolved, completed course of abx. Acute hypoxic respiratory failure has resolved. Hypotension. Septic shock resolved, weaned off pressor. She is normally prescribed lisinopril and diuretics (both chlorthalidone and PRN furosemide), and I see that her SBP was in the 160's a month ago. Currently requiring midodrine. DEJAN on CKD3, resulting in new ESRD. Baseline Cr had been 1.5 as of a couple months ago. Now HD TTS. Tunneled line 9/27. Acute metabolic encephalopathy. Due to combination of above issues. Has been off sedation since 9/17.   Hopefully she will start thinking more clearly with time after this critical illness. Stopped the oxycodone here. Stopped her chronic gabapentin 200 TID. F/u VBG, ammonia, TSH, B12. No focal neurological deficits, but if she isn't improving in a few days then consider MRI brain without contrast to r/o CVA. Dysphagia. Due to encephalopathy. Required NG tube feeds for a number of days. Then surgery removed the NG on 9/26 in an attempt to improve her chances of passing a swallow eval.  If she doesn't make progress toward swallowing in the next few days then family will consent to a PEG tube. DM2. A1c was 9.2. Started insulin regimen. Stopped metformin and dulaglutide due to ESRD. Stopped glyburide now that she is on insulin. Anemia of chronic disease and acute blood loss. Hb was 10.8 a couple months ago, steadily drifted downward here. Normocytic. F/u routine labs. HLD. Statin. Thickened endometrium, incidentally found on CT. PCP can consider getting an outpatient US if relevant in a couple months. DVT Prophylaxis: SCDs  Diet: Diet NPO  ADULT TUBE FEEDING; Nasogastric; Diabetic; Continuous; 10; Yes; 10; Q 4 hours; 55; 30; Q 4 hours  Diet NPO  Code Status: Full Code  PT/OT Eval Status: rec'd LTAC    Dispo - Not a good fit for LTAC. If she can safely swallow and if she can take in enough food within the next few days then she can discharge to SNF at that point. Otherwise, if she can't swallow or isn't eating enough, then she can discharge to SNF after PEG is place and she is tolerating feeds. Also will need outpatient HD arranged. I think she might go to SNF around 10/1. It makes sense to press on with aggressive care in this case.       Appropriate for A1 Discharge Unit: Dasha Pickett MD

## 2022-09-26 NOTE — PLAN OF CARE
Problem: Discharge Planning  Goal: Discharge to home or other facility with appropriate resources  Outcome: Progressing     Problem: Pain  Goal: Verbalizes/displays adequate comfort level or baseline comfort level  Outcome: Progressing  Flowsheets (Taken 9/25/2022 2043)  Verbalizes/displays adequate comfort level or baseline comfort level:   Encourage patient to monitor pain and request assistance   Assess pain using appropriate pain scale   Administer analgesics based on type and severity of pain and evaluate response   Consider cultural and social influences on pain and pain management   Notify Licensed Independent Practitioner if interventions unsuccessful or patient reports new pain   Implement non-pharmacological measures as appropriate and evaluate response     Problem: Skin/Tissue Integrity  Goal: Absence of new skin breakdown  Description: 1. Monitor for areas of redness and/or skin breakdown  2. Assess vascular access sites hourly  3. Every 4-6 hours minimum:  Change oxygen saturation probe site  4. Every 4-6 hours:  If on nasal continuous positive airway pressure, respiratory therapy assess nares and determine need for appliance change or resting period. Outcome: Progressing     Problem: Chronic Conditions and Co-morbidities  Goal: Patient's chronic conditions and co-morbidity symptoms are monitored and maintained or improved  Outcome: Progressing     Problem: Safety - Medical Restraint  Goal: Remains free of injury from restraints (Restraint for Interference with Medical Device)  Description: INTERVENTIONS:  1. Determine that other, less restrictive measures have been tried or would not be effective before applying the restraint  2. Evaluate the patient's condition at the time of restraint application  3. Inform patient/family regarding the reason for restraint  4.  Q2H: Monitor safety, psychosocial status, comfort, nutrition and hydration  Outcome: Progressing     Problem: Nutrition Deficit:  Goal: Optimize nutritional status  Outcome: Progressing     Problem: Safety - Adult  Goal: Free from fall injury  9/25/2022 2111 by Kee Yanes RN  Outcome: Progressing     Problem: Neurosensory - Adult  Goal: Achieves stable or improved neurological status  Outcome: Progressing  Goal: Achieves maximal functionality and self care  Outcome: Progressing     Problem: Respiratory - Adult  Goal: Achieves optimal ventilation and oxygenation  Outcome: Progressing     Problem: Cardiovascular - Adult  Goal: Maintains optimal cardiac output and hemodynamic stability  Outcome: Progressing  Goal: Absence of cardiac dysrhythmias or at baseline  Outcome: Progressing     Problem: Skin/Tissue Integrity - Adult  Goal: Incisions, wounds, or drain sites healing without S/S of infection  Outcome: Progressing  Goal: Oral mucous membranes remain intact  Outcome: Progressing     Problem: Musculoskeletal - Adult  Goal: Return mobility to safest level of function  Outcome: Progressing  Goal: Return ADL status to a safe level of function  Outcome: Progressing     Problem: Gastrointestinal - Adult  Goal: Maintains adequate nutritional intake  Outcome: Progressing  Goal: Establish and maintain optimal ostomy function  Outcome: Progressing     Problem: Genitourinary - Adult  Goal: Urinary catheter remains patent  Outcome: Progressing     Problem: Metabolic/Fluid and Electrolytes - Adult  Goal: Electrolytes maintained within normal limits  Outcome: Progressing  Goal: Hemodynamic stability and optimal renal function maintained  Outcome: Progressing  Goal: Glucose maintained within prescribed range  Outcome: Progressing     Problem: ABCDS Injury Assessment  Goal: Absence of physical injury  Outcome: Progressing

## 2022-09-26 NOTE — PROGRESS NOTES
Interval History and plan:     Now out of ICU  Has no significant urine output  Dialysis Tuesday Thursday Saturday  Blood pressure is going up  Very lethargic     Plan:    Allow blood pressure around 1 19-1 50 systolic since we are having difficulty dialyzing will drop in blood pressure  We will do dialysis Tuesday Thursday Saturday  [facility] is looking for placement for outpatient dialysis  Decrease midodrine to 5 mg 3 times daily  Place tunnel line                       Assessment :     Acidosis  Severe  Requiring 2 vasopressors  Blood pressure okay with the 2 vasopressors but lactic is a still going up to 19 concerning for ischemia   /Possible metformin induced lactic acidosis       CKD Stage IIIb with DEJAN  Creatinine 2.1 on consult  Creatinine 1.6 on 7/22  Likely due to diabetes, hypertension  Renal imaging-normal in the past      hypotension  BP: (122-150)/(73-75)  Heart Rate:  [85-87]   BP goal inpatient [phone] systolic inpatient  Pressures at the time of consult  Normally hypertensive      [address]  [facility] Nephrology would like to thank Stefan Lockett MD   for opportunity to serve this patient      Please call with questions at-   24 Hrs Answering service [phone] or  7 am- 5 pm via Perfect serve or cell phone  Haylee Dykes MD          CC/reason for consult :       DEJAN     HPI :     Elonda Cushing is a 67 y.o. female presented to   the hospital on 9/14/2022 with lactic acidosis. She is known to have diabetes and on metformin to 2 days ago  Has constipation and with multiple bowel regimen has had good bowel movement yesterday following that she has syncope. EMS was called and was found to have blood pressure of 50 systolic given IV fluids brought to the emergency room blood pressure was normal initially but later developed hypotension got 3 L of fluid and now on 2 vasopressors and in ICU. She also has severe acidosis with very high lactate. CT scan was normal initially.   We are consulted for DEJAN on CKD and related issues. On CKD and also severe lactic acidosis    ROS:     Seen with- sister    RN         PMH/PSH/SH/Family History:     Past Medical History:   Diagnosis Date    Chronic kidney disease     Diabetes mellitus ([address]     Hyperlipidemia     Hypertension     Neuropathy        Past Surgical History:   Procedure Laterality Date    LAPAROTOMY N/A 9/15/2022    DIAGNOSTIC LAPAROSCOPY, EXPLORATORY LAPAROTOMY, SIGMOID COLECTOMY AND COLOSTOMY performed by Nona Roberts MD at [facility]        reports that she has never smoked. She has never used smokeless tobacco. She reports that she does not currently use alcohol. She reports that she does not use drugs. family history is not on file.          Medication:     Current Facility-Administered Medications: oxyCODONE (ROXICODONE) 5 MG/5ML solution 5 mg, 5 mg, Oral, Q4H PRN **OR** oxyCODONE (ROXICODONE) 5 MG/5ML solution 10 mg, 10 mg, Oral, Q4H PRN  acetaminophen (TYLENOL) 160 MG/5ML solution 650 mg, 650 mg, Oral, Q4H PRN  insulin glargine (LANTUS) injection vial 20 Units, 20 Units, SubCUTAneous, Nightly  0.9 % sodium chloride bolus, 500 mL, IntraVENous, Once  midodrine (PROAMATINE) tablet 10 mg, 10 mg, Oral, TID WC  insulin lispro (HUMALOG) injection vial 0-16 Units, 0-16 Units, SubCUTAneous, Q4H  heparin (porcine) injection 3,200 Units, 3,200 Units, IntraCATHeter, PRN  prochlorperazine (COMPAZINE) injection 5 mg, 5 mg, IntraVENous, Q6H PRN  potassium chloride 20 mEq/50 mL IVPB (Central Line), 20 mEq, IntraVENous, PRN  magnesium sulfate 1000 mg in dextrose 5% 100 mL IVPB, 1,000 mg, IntraVENous, PRN  sodium chloride flush 0.9 % injection 5-40 mL, 5-40 mL, IntraVENous, PRN  0.9 % sodium chloride infusion, 25 mL, IntraVENous, PRN  ondansetron (ZOFRAN) injection 4 mg, 4 mg, IntraVENous, Q10 Min PRN  glucose chewable tablet 16 g, 4 tablet, Oral, PRN  dextrose bolus 10% 125 mL, 125 mL, IntraVENous, PRN **OR** dextrose bolus 10% 250 mL, 250 mL, IntraVENous, PRN  glucagon (rDNA) injection 1 mg, 1 mg, SubCUTAneous, PRN  dextrose 10 % infusion, , IntraVENous, Continuous PRN  sodium chloride flush 0.9 % injection 5-40 mL, 5-40 mL, IntraVENous, PRN  0.9 % sodium chloride infusion, , IntraVENous, PRN  polyethylene glycol (GLYCOLAX) packet 17 g, 17 g, Oral, Daily PRN  pantoprazole (PROTONIX) injection 40 mg, 40 mg, IntraVENous, Daily       Vitals :     Vitals:    09/26/22 0800   BP: (!) 150/73   Pulse: 87   Resp: 16   Temp: 97.8 °F (36.6 °C)   SpO2: 98%       I & O :       Intake/Output Summary (Last 24 hours) at 9/26/2022 0830  Last data filed at 9/26/2022 0600  Gross per 24 hour   Intake 1427 ml   Output 396 ml   Net 1031 ml          Physical Examination :     General appearance: confused,on NC  HEENT: Lips- normal, teeth- ok , oral mucosa- moist  Neck : Mass- no, appears symmetrical, JVD- not visible  Respiratory: Respiratory effort-  comfortable on oxygen, wheeze- no, crackles - no  Cardiovascular:  Ausculation- No M/R/G, Edema none  Abdomen: visible mass- no, distention- no, scar- no, tenderness- no                            hepatosplenomegaly-  No--  Musculoskeletal:  clubbing no,cyanosis- no , digital ischemia- no                           muscle strength- patient unable to co-operate     , tone - patient unable to co-operate   Skin: rashes- no , ulcers- no, induration- no, tightening - no  Psychiatric:  confused   Additional findings:         LABS:     Recent Labs     09/23/22  1600 09/24/22  0530 09/26/22  0543   WBC 7.4 10.3 10.3   HGB 7.6* 7.8* 7.6*   HCT 22.9* 23.0* 23.1*    153 196       Recent Labs     09/24/22  0530 09/26/22  0543   * 132*   K 4.5 5.0   CL 98* 96*   CO2 22 22   BUN 68* 78*   CREATININE 3.4* 3.4*   GLUCOSE 214* 247*   MG 2.40 2.30   PHOS 3.5 3.1

## 2022-09-26 NOTE — PROGRESS NOTES
Physical Therapy  Attempted to see pt for therapy. Per OT and RN, pt mainly non responsive throughout session. Not appropriate for OOB mobility this date. Will hold therapy until pt medically appropriate for therapy. Thank you.   Lizzie Mcfarland, PT, DPT

## 2022-09-26 NOTE — PROGRESS NOTES
Comprehensive Nutrition Assessment    Type and Reason for Visit:  Reassess    Nutrition Recommendations/Plan:   Diet advancement per SLP  Continue Glucerna 1.5 at goal of 55 mL/hr via NG  Recommend 30 mL H20 flush q 4 hours. Monitor IVF infusion, Na labs and need for adjustments in water flush  Monitor TF tolerance (abd distention, bowel habits, N/V, cramping)  Monitor nutrition adequacy, pertinent labs, bowel habits, wt changes, and clinical progress     Malnutrition Assessment:  Malnutrition Status: Moderate malnutrition (09/22/22 1033)    Context:  Acute Illness     Findings of the 6 clinical characteristics of malnutrition:  Energy Intake:  50% or less of estimated energy requirements for 5 or more days  Weight Loss:  1% to 2% over 1 week     Fluid Accumulation:  Mild Extremities    Nutrition Assessment:    Follow up: TF slowly titrated up over the past few days, TF at 40 ml/hr on 9/24, and TF goal reached of 55 ml/hr on 9/25. TF at goal rate of 55 ml/hr today, spoke to RN reports pt it tolerating well at goal rate. Possible SLP eval today if pt is alert. Possible plan to remove NG today per MD. Per general surgery note, if pt is unable to swallow/have PO diet will place corpak for feeding. Wt elevated up to 191 lb today, + 8.6 L since admission. Will monitor for SLP eval. Recommend continuing TF via NGT. Recommendations included, will continue to monitor. Nutrition Related Findings:    Na 132, BUN 78, Cr 3.4, GFR 13. -285 x 24 hours. High dose sliding scale insulin, lantus 20 units daily. + 375 ml output colostomy. +8.6 L since admission.  Wound Type: Surgical Incision       Current Nutrition Intake & Therapies:    Average Meal Intake: NPO  Average Supplements Intake: NPO  Diet NPO  ADULT TUBE FEEDING; Nasogastric; Diabetic; Continuous; 10; Yes; 10; Q 4 hours; 55; 30; Q 4 hours  Current Tube Feeding (TF) Orders:  Feeding Route: Nasogastric  Formula: Diabetic  Schedule: Continuous  Goal TF & Flush Orders Provides: Gluerna 1.5 at goal rate of 55 mL/hr x20 hours to provide 1100 mL, 1650 kcal, 90 g protein, and 835 mL free water. 30 mL free water lfush q 4 hrs. Anthropometric Measures:  Height: 5' 9\" (175.3 cm)  Ideal Body Weight (IBW): 145 lbs (66 kg)    Admission Body Weight: 194 lb (88 kg) (bed scale)  Current Body Weight: 171 lb (77.6 kg), 113.8 % IBW. Weight Source: Bed Scale  Current BMI (kg/m2): 25.2                          BMI Categories: Normal Weight (BMI 22.0 to 24.9) age over 72    Estimated Daily Nutrient Needs:  Energy Requirements Based On: Kcal/kg (25-30)  Weight Used for Energy Requirements: Ideal  Energy (kcal/day): 8305-3958 kcal  Weight Used for Protein Requirements: Ideal (1.0-1.2 g/kg)  Protein (g/day): 66-74 g  Method Used for Fluid Requirements: 1 ml/kcal  Fluid (ml/day): 9267-8960 mL    Nutrition Diagnosis:   Inadequate energy intake related to altered GI structure as evidenced by NPO or clear liquid status due to medical condition, nutrition support - enteral nutrition    Nutrition Interventions:   Food and/or Nutrient Delivery: Continue Current Tube Feeding  Nutrition Education/Counseling: No recommendation at this time  Coordination of Nutrition Care: Continue to monitor while inpatient, Interdisciplinary Rounds       Goals:  Previous Goal Met: Progressing toward Goal(s)  Goals: Tolerate nutrition support at goal rate, within 2 days       Nutrition Monitoring and Evaluation:   Behavioral-Environmental Outcomes: None Identified  Food/Nutrient Intake Outcomes: Enteral Nutrition Intake/Tolerance  Physical Signs/Symptoms Outcomes: Biochemical Data, GI Status, Nutrition Focused Physical Findings, Weight    Discharge Planning:     Too soon to determine     Андрей Sylvester [address] [place] [address],   Contact: Office: [phone]; [address]: [phone]

## 2022-09-26 NOTE — PROGRESS NOTES
[facility] GENERAL SURGERY    Surgery Progress Note           POD # 11    PATIENT NAME: Aleah Garcia     TODAY'S DATE: 9/26/2022    INTERVAL HISTORY: No acute events overnight. Patient continues to tolerate tube feeds at goal. Somnolent this morning, but denies complaints. OBJECTIVE:   VITALS:  /75   Pulse 85   Temp 97.7 °F (36.5 °C) (Oral)   Resp 16   Ht 5' 9\" (1.753 m)   Wt 191 lb 2.2 oz (86.7 kg)   SpO2 95%   BMI 28.23 kg/m²     INTAKE/OUTPUT:    I/O last 3 completed shifts: In: 2293 [I.V.:338; NG/GT:1955]  Out: 156 [Drains:21; Stool:875]  No intake/output data recorded. CONSTITUTIONAL: elderly female, ill-appearing, resting in bed  LUNGS:  normal effort, no adventitious breath sounds, on RA   ABDOMEN: soft, non-distended, non-tender , NG in place with tube feeds running  INCISION: clean, dry, no drainage, healing, ostomy - bag full of liquid brown stool ; BROOKS - serous output    Data:  CBC:   Recent Labs     09/23/22  1600 09/24/22  0530 09/26/22  0543   WBC 7.4 10.3 10.3   HGB 7.6* 7.8* 7.6*   HCT 22.9* 23.0* 23.1*    153 196       BMP:    Recent Labs     09/24/22  0530 09/26/22  0543   * 132*   K 4.5 5.0   CL 98* 96*   CO2 22 22   BUN 68* 78*   CREATININE 3.4* 3.4*   GLUCOSE 214* 247*       Hepatic: No results for input(s): AST, ALT, ALB, BILITOT, ALKPHOS in the last 72 hours. Mag:      Recent Labs     09/24/22  0530 09/26/22  0543   MG 2.40 2.30        Phos:     Recent Labs     09/24/22  0530 09/26/22  0543   PHOS 3.5 3.1        INR: No results for input(s): INR in the last 72 hours. Radiology Review:       ASSESSMENT AND PLAN:  67 y.o. female status post exploratory laparotomy, sigmoid colectomy, colostomy formation    - removed NG this morning to allow for proper swallow eval  - please check glucose more frequently and as clinically indicated while tube feeds are held  - SLP eval ordered.    - if unable to swallow/have PO diet will place corpak for feeding  - monitor ostomy   - will remove BROOKS drain   - medical management per primary team      Electronically signed by Linda Montoya DO     Patient seen and agree with above and more than half of the total time was spent by me on the encounter.      Krystin Alegria MD

## 2022-09-26 NOTE — CARE COORDINATION
Hospital day 12: PAtietn now on C3 re Colon perforation care managed by IM, General surgery, and Nephrology. PAtietn from home IPTA. [facility] for [facility] able [facility] accept, after conversation with MD feels would be better served at [facility]. The [facility] has been following along. Writer mary WARD with update re new HD tentative T TH Sat at [facility] tunneled line being placed 09/27. NG out pending tolerance/ possible peg placement? Patient new to this writer may benefit form Palliative conversation? SW will follow. CANDE Gage

## 2022-09-26 NOTE — PLAN OF CARE
Bedside swallow evaluation completed this date.     Amanda Bustos M.S. [id number] [facility]  Speech-language pathologist  [id number]

## 2022-09-26 NOTE — PROGRESS NOTES
[id number] [facility] Wound Ostomy Continence Nurse  Follow-up Progress Note       NAME:  Rodrigo Cyr RECORD NUMBER:  [id number]  AGE:  67 y.o. GENDER:  female  :  1950  TODAY'S DATE:  2022    Subjective:  Stated name. Other wise non verbal.  No family here at this time   Wound Identification:  Wound Type: New surgical staple line. Contributing Factors: edema, diabetes, chronic pressure, decreased mobility, shear force, obesity. NEW Colostomy:    Went to OR for Exploratory lap, sigmoid colectomy and colostomy on 9/15/22 by Dr Loren Pruett. Stoma: measures Smaller again today at 20 mm (1 3/8 inch) x 35 mm ( 1 3/8 inch). Oval, brown, protrudes but distal edge in a divot/crease circumferentially now. She may need convex at a later time. For now use paste ring then flat wafer with drainable bag. Appliance:  Coloplast RED 2 piece flat flange # [id number] with drainable pouch # [id number]. Paste ring around stoma prior to application of flange. Patient Goal of Care:  [x] Wound Healing  [] Odor Control   [] Palliative Care  [] Pain Control   [x] Other: New colostomy    Objective:  Remains lethargic, hard to arouse. Mumbles a few words here and there. Edematous hand, face, feet. Continue dialysis dialysis     BP (!) 150/73   Pulse 87   Temp 97.8 °F (36.6 °C) (Oral)   Resp 16   Ht 5' 9\" (1.753 m)   Wt 191 lb 2.2 oz (86.7 kg)   SpO2 98%   BMI 28.23 kg/m²   Levar Risk Score: Levar Scale Score: 13  Assessment:  Colostomy more red today 50% red, 50% slough (tan brown). No NG today. No poe. Staple line intact and open to air.    Measurements:     Incision 09/15/22 Abdomen Medial (Active)   Wound Image   22 1007   Dressing Status Other (Comment) 22 1007   Dressing Change Due 22 0839   Incision Cleansed Cleansed with saline 22 1007   Dressing/Treatment Open to air 22 1007   Incision Length (cm) 18 22 1007 Incision Width (cm) 0 cm 09/26/22 1007   Incision Depth (cm) 0 cm 09/26/22 1007   Closure Staples 09/26/22 1007   Margins Approximated 09/26/22 1007   Incision Assessment Dry 09/26/22 1007   Drainage Amount None 09/26/22 1007   Drainage Description Serosanguinous 09/26/22 0839   Odor None 09/26/22 1007   Lesli-incision Assessment Intact 09/26/22 1007   Number of days: 11     Mid line abd staple line:        Colostomy LLQ (Active)   Stomal Appliance 2 piece 09/26/22 1008   Flange Size (inches) 2.25 Inches 09/26/22 1008   Stoma  Assessment Red; Other (Comment) 09/26/22 1008   Peristomal Assessment Clean, dry & intact 09/26/22 1008   Treatment Bag change;Site care;Stoma paste; Heat applied 09/26/22 1008   Stool Appearance Watery; Loose 09/26/22 1008   Stool Color Yellow;Tan (Comment); Other (Comment) 09/26/22 1008   Stool Amount Medium 09/26/22 1008   Output (mL) 250 ml 09/26/22 1008   Number of days: 10     Colostomy:        Intake/Output Summary (Last 24 hours) at 9/26/2022 1020  Last data filed at 9/26/2022 1008  Gross per 24 hour   Intake 1427 ml   Output 596 ml   Net 831 ml     Response to treatment:  Well tolerated by patient. Pain Assessment:  Severity:  0 / 10  Quality of pain: N/A  Wound Pain Timing/Severity: none  Premedicated: No  Plan:   Plan of Care:      Dr Max Tavarez updated on stoma less brown today and now 50% red and moist.    Mid line abd staple line cleansed with normal saline, left open to air. BROOKS site cleansed with normal saline. Foam dressing applied. Plan for Ostomy Care:   Appliance removed. Skin cleansed with warm water and patted dry. Stoma measured and pattern created as stoma smaller today. Stoma in divot distal inner edge. Paste ring placed around stoma then flange placed. Bag attached and closed. Ostomy care to follow.   Call Ostomy care for problems with seal at [phone] or or call [phone] and leave message      Stoma Care - New colostomy - Patient to empty appliance when 1/3 to 1/2 full with assistance of the staff. Cleanse inside and outside of the drain spout prior to rolling closed. Change appliance every 3-5 days or 1-2 times a week. Call for problems with seal [phone]      SUDHIR updated as stoma smaller today      Specialty Bed Required : Yes   [] Low Air Loss   [x] Pressure Redistribution Isoflex gel therapy pressure redistribution mattress in place. [] Fluid Immersion  [] Bariatric  [] Total Pressure Relief  [] Other:     Current Diet: Diet NPO  ADULT TUBE FEEDING; Nasogastric; Diabetic; Continuous; 10; Yes; 10; Q 4 hours; 55; 30; Q 4 hours  Dietician consult:  Yes    Discharge Plan:  Placement for patient upon discharge: intermediate care facility   Patient appropriate for Outpatient [address]: Yes    Referrals:  []  following - plan for nursing home - [facility]. Continue dialysis dialysis Thursday Friday Saturday  [] [address]  [] Supplies  [] Other    Patient/Caregiver Teaching: too sleepy at this time. No family here.   Level of patient/caregiver understanding able to:   [] Indicates understanding       [] Needs reinforcement  [] Unsuccessful      [] Verbal Understanding  [] Demonstrated understanding       [] No evidence of learning  [] Refused teaching         [x] N/A       Electronically signed by Liz Granados, RN, MSN, Pedro Seth on 9/26/2022 at 10:20 AM

## 2022-09-26 NOTE — PROGRESS NOTES
Speech Language Pathology  Clinical Bedside Swallow Assessment  Facility/Department: [facility] C3 TELE/MED SURG/ONC        Recommendations:  Diet recommendation: NPO, meds/nutrition via alternative means  Instrumentation: n/a, will continue to monitor  Risk management: oral care q4 hrs to reduce adverse affects in the event of aspiration, general aspiration precautions  Pt lethargic, required max cues for adequate RAMBO/to follow basic, 1-step commands.   Open mouth posture at rest.  Needs ongoing assessment of oropharyngeal swallow function      Airam Tran  : 1950 (67 y.o.)   MRN: [id number]  ROOM: [address] [place] [address]  ADMISSION DATE: 2022  PATIENT DIAGNOSIS(ES): Enteritis [K52.9]  Septicemia ([address] [A41.9]  Generalized abdominal pain [R10.84]  Acute cystitis with hematuria [N30.01]  Non-intractable vomiting with nausea, unspecified vomiting type [R11.2]  Chief Complaint   Patient presents with    Abdominal Pain     Constipated past 2 days, had a large BM at home, medics found patient on floor in hallway at home, pt was hypotensive in the 50's, blood sugar was 60, oral glucose , and fluids, given by medics,      Patient Active Problem List    Diagnosis Date Noted    Acute respiratory failure with hypoxia ([address] 2022    Acute encephalopathy 2022    Moderate malnutrition ([address] 2022    Ischemic colitis ([address] 2022    S/P exploratory laparotomy 2022    Acute postoperative pulmonary insufficiency ([address] 2022    DKA (diabetic ketoacidosis) ([address] 2022    Hypotension 2022    Syncope 2022    Hyponatremia 2022    DEJAN (acute kidney injury) ([address] 2022    Abdominal pain 2022    Enteritis 2022    Septic shock ([address] 2022    Elevated troponin 2022    Leukocytosis 2022    Pyuria 2022    Lactic acidosis 2022    DM (diabetes mellitus), secondary uncontrolled ([address] 2022     Past Medical History:   Diagnosis Date    Chronic kidney disease     Diabetes mellitus [facility]     Hyperlipidemia     Hypertension     Neuropathy      Past Surgical History:   Procedure Laterality Date    LAPAROTOMY N/A 9/15/2022    DIAGNOSTIC LAPAROSCOPY, EXPLORATORY LAPAROTOMY, SIGMOID COLECTOMY AND COLOSTOMY performed by Jeremias Espinoza MD at [address] [place] [address]   Allergen Reactions    Latex     Lovastatin      Indigestion and Feels bad  Indigestion and Feels bad      Penicillins        DATE ONSET: Pt admitted to [facility] on 9/14/22    Date of Evaluation: 9/26/2022   Evaluating Therapist: Duane Landry, SLP    Chart Reviewed: : [x] Yes [] No    Current Diet: Diet NPO  ADULT TUBE FEEDING; Nasogastric; Diabetic; Continuous; 10; Yes; 10; Q 4 hours; 55; 30; Q 4 hours  Diet NPO    Recent Chest Radiography: [x] Chest XR   [] CT of Chest  Date: 9/22/22  Impressions  Impression   Temporary dialysis catheter overlies the cavoatrial junction. 1. Again noted are bibasilar infiltrates and pleural effusions. Similar   appearance to the prior exam.     Pain: \"a little bit\"; pt unable to elaborate when asked by SLP    Reason for Referral  Leonel Erwin was referred for a bedside swallow evaluation to assess the efficiency of their swallow function, identify signs and symptoms of aspiration and make recommendations regarding safe dietary consistencies, effective compensatory strategies, and safe eating environment. Assessment    Medical record review/interview: Per MD H&P: \"71 y.o. female who presented to [facility] with a several hx of constipation ultimately relieved prior to admission by large BM but subsequently had syncopal episode. Patient was found to be hypotensive and hypoglycemic, poorly responsive to initial tx. Suspicious developed for occult bowel perforation and General Surgery consulted\".       Predisposing dysphagia risk factors: N/A  Clinical signs of possible chronic dysphagia: current use of PEG/TF  Precipitating dysphagia risk factors: reduced physical mobility, recent intubation, suspected disuse atrophy, sepsis, and prolonged intubation    Patient Complaints: pt unable to answer questions regarding swallow function d/t reduced RAMBO, needs ongoing assessment  Odynophagia: [] Yes [] No  Globus Sensation: [] Yes [] No  SOB with PO intake: [] Yes [] No  Increased WOB with PO intake: [] Yes [] No  Reflux Sx's: [] Yes [] No  Weight loss: [] Yes [] No  Coughing/Choking with PO intake: [] Yes [] No  Reduced Appetite: [] Yes [] No    Additional Reported Symptoms/Complaints/Hospital Course: Pt admitted d/ tlactic acidosis, generalized abdominal pain, septicemia, perforated bowel. Pt s/p diagnostic laparoscopy, exploratory laparotomy, sigmoid colectomy and colostomy on 9/15. Pt extubated 9/21 per chart. Pt has had NG in place, removed earlier this AM per general surgery resident for completion of BSE. Pt has no hx of dysphagia per chart review. Vitals/labs:   Temp: n/a  SpO2: 94%  RR: 18/min  BP: 121/74  HR: 83  O2 device: RA    CBC:   Recent Labs     09/26/22  0543   WBC 10.3   HGB 7.6*         BMP:  Recent Labs     09/26/22  0543   *   K 5.0   CL 96*   CO2 22   BUN 78*   CREATININE 3.4*   GLUCOSE 247*          Cranial nerve exam: needs ongoing assessment, pt with reduced RAMBO and unable to follow majority of basic 1-step commands despite max cues & encouragement  CN V (trigeminal): ophthalmic, maxillary, and mandibular facial sensation- JIHAN  CN VII (facial):  JIHAN  CN IX/X (glossopharyngeal/vagus): MPT: JIHAN; pitch range: JIHAN; vocal quality: weak; cough: Non-Productive, Dry, weak (perceptually)  CN XII (hypoglossal): Reduced    Laryngeal function exam:   Secretions: white-tinged secretions noted along lingual surface, labial surfaces, and lateral sulci; cleared with moistened swabs and simultaneous suction  Vocal quality: See CN exam above  MPT: See CN exam above  S/Z ratio: DNT  Pitch range: See CN exam above  Cough: See CN exam above    Oral Care Status:    [] Oral Care [facility]  [x] Poor oral care status  [] Edentulous  [] Upper Dentures  [] Lower Dentures  [] Missing/Broken Teeth  [] Evidence of dental cavities/carries    3 oz water: JIHAN    Impressions / PO trials:  Pt with reduced RAMBO, required max cues/encouragement to participate in BSE albeit limited. Pt followed <50% of basic, 1-step commands despite max cues (I.e. open oral cavity, lingual protrusion). Oral cavity completed with moistened swab and simultaneous use of oral suction to clear white-tinged secretions. Pt required consistent max cues to open cavity throughout, for lingual protrusion, etc.    Pt with open mouth posture at rest and throughout majority of BSE. Pt with minimal verbal output despite cues, stated last name x1, provide first name when cued x1.    <1/2 tsp TL presented to labial surfaces, max cues required for pt to form adequate labial seal.  Minimal-no bolus manipulation noted, no effort at swallow initiation despite max cues. SLP cued cough with pt demonstrated weak, nonproductive cough and eventual swallow noted. BSE discontinued at this time, pt not appropriate for PO trials d/t lethargy. ST to continue to follow. Recommendations:  Diet recommendation: NPO, meds/nutrition via alternative means  Instrumentation: n/a, will continue to monitor  Risk management: oral care q4 hrs to reduce adverse affects in the event of aspiration, general aspiration precautions  Pt lethargic, required max cues for adequate RAMBO/to follow basic, 1-step commands.   Open mouth posture at rest.  Needs ongoing assessment of oropharyngeal swallow function    Prognosis: Guarded - Fair    Recommended Intervention:   [x] Dysphagia tx  [] Videostroboscopy                      [x] NPO   [] MBS       [] Speech/Cog Eval    [x] Therapeutic PO Trials     [] Ice Chips   [] Other:  [] FEES                                                 Dysphagia Therapeutic Intervention:   []  Bolus control Exercises  []  Oral Motor Exercises  []  Pretty Water Protocol  []  Thermal Stimulation  [x]  Oral Care    []  Vital Stim/NMES  []  Laryngeal Exercises  [x]  Patient/Family Education  []  Pharyngeal Exercises  [x]  Therapeutic PO trials with SLP  [x]  Diet tolerance monitoring  []  Other:     Referrals:  [] ENT    [] PT  [] Pulmonology [] GI  [] Neurology  [] RD  [] OT   []     Goals:  Short Term Goals:  Timeframe for Short Term Goals: (5 days, 10/1/22)  Goal 1: The patient will tolerate recommended diet with no clinical s/s of aspiration 5/5  Goal 2: The patient/caregiver will demonstrate understanding of compensatory swallow strategies, for improved swallow safety  Goal 3: The patient will tolerate repeat BSE when able for ongoing assessment  Goal 4: The patient will tolerate instrumental assessment when able     Long Term Goals:   Timeframe for Long Term Goals: (7 days, 10/3/22)  Goal 1: The patient will tolerate least restrictive diet with no clinical s/s of aspiration or worsening respiratory/pulmonary status    Treatment:  Skilled instruction completed with patient re: evidenced based practice regarding recommendations and POC, importance of oral care to reduce adverse affects in the event of aspiration, and instruction of recommended compensatory strategies developed based upon clinical exam. Pt able to recall/demonstrate compensatory strategies with max cues.       Pt Education: SLP educated the patient re: Role of SLP, rationale for completion of assessment, recommendations, and POC  Pt Education Response: no evidence of learning, would benefit from ongoing education, and RN aware    Duration/Frequency of Tx: 3-5x/week for LOS    Individuals Consulted:   [x]  Patient     []  NP         [x]  RN   []  RD                   []  MD      []  Family Member                        []  PA    [x]  Other: DO, General Surgery Resident via phone    Safety Devices / Report:  [x]  All fall risk precautions in place [x]  Safety handoff completed with RN  [x]  Bed alarm in place  [x]  Left in bed     []  Chair alarm in place  []  Left in chair   [x]  Call light in reach   []  Other:        Total Treatment Time / Charges       Time in Time out Total Time / units   Swallow Eval/Tx Time  1118 1133 15 min / 1 unit     Signature:  Herbie Hill M.S. [id number] [facility]  Speech-language pathologist  [id number]

## 2022-09-26 NOTE — PROGRESS NOTES
Pt AOx4, flat affect, VSS, shift assessment completed and charted. Pt c/o 6/10 pain, PRN analgesic not due as HD pt's are to receive PRN oxycodone q8 hours, instead of q4 as ordered. Will reassess when pt reaches the 8 hour freddy since previous dose. Pt LLQ ostomy stoma red and moist with small output, RLQ BROOKS to bulb suction with minimal yellow output, midline c/d/i and MARCUS with staples approximated, and pt's NGT patent at 55 cm with Bridle, and Glucerna tube feeding going at the pt's goal rate of 55 mL. Pt anuric r/t HD. Pt to remain a q4 blood sugar check and SSI administered per order parameters. Pt somnolent from previous dose of PRN analgesic, but does awaken to stimulus, and is resting comfortably in bed. Pt's extremities mildly edematous, pillow offloading and elevation ordered. Pt given oral care. Pt's R triple lumen HD access c/d/i. Pt will be a q2 turn and check and change. Pt denying further needs and was in stable condition when this RN left the room. Bed is low, locked, alarmed, and call light/bedside table within reach. All care per orders, will continue to monitor throughout this shift.  Electronically signed by Estee Askew RN on 9/25/2022 at 9:25 PM

## 2022-09-26 NOTE — PROGRESS NOTES
Occupational Therapy  Facility/Department: [facility] C3 TELE/MED SURG/ONC  Treatment Note    Name: Hellen Guerra  : 1950  MRN: [id number]  Date of Service: 2022    Discharge Recommendations:  LTACH          Patient Diagnosis(es): The primary encounter diagnosis was Lactic acidosis. Diagnoses of Generalized abdominal pain, Non-intractable vomiting with nausea, unspecified vomiting type, Acute cystitis with hematuria, Septicemia ([address] Perforated bowel ([address] Stercoral colitis, and DEJAN (acute kidney injury) ([facility] were also pertinent to this visit. Past Medical History:  has a past medical history of Chronic kidney disease, Diabetes mellitus ([facility] Hyperlipidemia, Hypertension, and Neuropathy. Past Surgical History:  has a past surgical history that includes laparotomy (N/A, 9/15/2022). Treatment Diagnosis: deconditioning      Assessment   Performance deficits / Impairments: Decreased functional mobility ; Decreased ADL status; Decreased safe awareness;Decreased cognition;Decreased balance;Decreased coordination;Decreased endurance;Decreased ROM; Decreased strength;Decreased fine motor control  Assessment: OT eval and tx completed. Pt admitted for enteritis and sepsis. She is s/p sigmoid colectomy with colostomy 9/15/22. Pt was extubated 22. Prior to onset, pt lived with family and was independent, working part-time. She presently requires total assist for BADLs. Pt unable to tolerate bed mobility today d/t low alertness. minimal  verbalizations, unable to follow simple 1 step commands; tolerated 10 reps BUE  PROM exercises. She has significantly declined in function & level of arousal/cognition. OT recommends LTACH for skilled OT to address ADLs, UE function and mobility. Cont OT in acute care.   REQUIRES OT FOLLOW-UP: Yes  Activity Tolerance  Activity Tolerance: Treatment limited secondary to decreased cognition        Plan   Plan  Times per Week: 2-3x/ week  Current Treatment Recommendations: ROM, Positioning, Cognitive reorientation, Balance training, Patient/Caregiver education & training     Restrictions  Restrictions/Precautions  Restrictions/Precautions: NPO, Up as Tolerated, Fall Risk  Position Activity Restriction  Other position/activity restrictions: NGT, colostomy, R IJ, abdominal drain, poe catheter, telemetry; No BP, IV sticks RUE    Subjective   General  Chart Reviewed: Yes  Patient assessed for rehabilitation services?: Yes  Additional Pertinent Hx: Extubated 9/21/22  Family / Caregiver Present: No  Referring Practitioner: Oswaldo Erwin  Diagnosis: enteritis, sepsis, s/p sigmoid colectomy with colostomy 9/15  Subjective  Subjective: Pt with eyes open intermittently, but minimal verbal responses  General Comment  Comments: RN approved therapy at bedlevel. Objective   Heart Rate: 83  Heart Rate Source: Monitor  BP: 121/74  BP Location: Left upper arm  BP Method: Automatic  Patient Position: Semi fowlers  MAP (Calculated): 89.67  Resp: 16  SpO2: 94 %  O2 Device: None (Room air)          Observation/Palpation  Edema: mild edema hands bilaterally  Safety Devices  Type of Devices: All rosina prominences offloaded;Left in bed;Call light within reach;Nurse notified; Bed alarm in place        AROM: Grossly decreased, non-functional  PROM: Generally decreased, functional  Strength: Grossly decreased, non-functional  Coordination: Grossly decreased, non-functional  Tone: Abnormal (low to absent tone d/t decreased cognition/level of alertness)    ADL  Feeding: NPO (NG tube feedings)  Grooming: Dependent/Total  UE Bathing: Dependent/Total  LE Bathing: Dependent/Total  UE Dressing: Dependent/Total  LE Dressing: Dependent/Total  Toileting: Dependent/Total (colostomy, poe catheter/dialysis)                 Cognition  Overall Cognitive Status: Exceptions  Arousal/Alertness: Unresponsive to stimuli  Following Commands: Does not follow commands  Initiation: Requires cues for all  Sequencing: Requires cues for all  Cognition Comment: obtunded, somnolent  Orientation  Overall Orientation Status: Impaired  Orientation Level: Disoriented X4      BUE PROM exercises:  Hand flex/ext: x  10  Reps  Wrist flex/ext:  X 10 Reps  Elbow flex/ext:  x   10 Reps  Forearm sup/pron:  x  10  Reps  Shld flex/ext:  x   10 Reps to 90*  Shld abd/add:  x   10  Reps to 90*                     Education Given To: Patient  Education Provided: Role of Therapy;Plan of Care;Precautions  Education Provided Comments: P/AAROM for UE  Education Method: Verbal  Barriers to Learning: Cognition  Education Outcome: Unable to demonstrate understanding; Unable to verbalize;Continued education needed          AM-PAC Score        AM-PAC Inpatient Daily Activity Raw Score: 6 (09/26/22 1118)  AM-PAC Inpatient ADL T-Scale Score : 17.07 (09/26/22 1118)  ADL Inpatient CMS 0-100% Score: 100 (09/26/22 1118)  ADL Inpatient CMS G-Code Modifier : CN (09/26/22 1118)    Goals  Short Term Goals  Time Frame for Short term goals: 2 weeks (10/07) unless noted  Short Term Goal 1: Participate in 2 grooming tasks with min A; 9/26 dependent  Short Term Goal 2: Perform UE exer 10-15x each for strength and endurance by 9/30; 9/26 dependent, tolerated 10 reps PROM BUE hands to shoulder flexion to 90*  Short Term Goal 3: Sit EOB x3 min with min A in prep to transfer; 9/26 deferred due to decreased level of arousal/poor cognition  Short Term Goal 4: Participate in functional transfer with mod x2 and AD/Lift;  Patient Goals   Patient goals : Pt did not provide       Therapy Time   Individual Concurrent Group Co-treatment   Time In 0830         Time Out 0840         Minutes 190 [facility], OT

## 2022-09-26 NOTE — PROGRESS NOTES
Pt is very lethargic, pt was only about to tell me part of her BD. Pt is not staying awake long enough to carry on a conversation. Pt is very pale, and hands are cold to touch,  asked pt if she was cold and she said no.

## 2022-09-27 ENCOUNTER — APPOINTMENT (OUTPATIENT)
Dept: INTERVENTIONAL RADIOLOGY/VASCULAR | Age: 72
DRG: 853 | End: 2022-09-27
Payer: MEDICARE

## 2022-09-27 LAB
AMMONIA: 51 UMOL/L (ref 11–51)
ANION GAP SERPL CALCULATED.3IONS-SCNC: 15 MMOL/L (ref 3–16)
BASE EXCESS VENOUS: -3.6 MMOL/L (ref -3–3)
BUN BLDV-MCNC: 100 MG/DL (ref 7–20)
CALCIUM SERPL-MCNC: 7.8 MG/DL (ref 8.3–10.6)
CARBOXYHEMOGLOBIN: 4.9 % (ref 0–1.5)
CHLORIDE BLD-SCNC: 98 MMOL/L (ref 99–110)
CO2: 20 MMOL/L (ref 21–32)
CREAT SERPL-MCNC: 4.5 MG/DL (ref 0.6–1.2)
FOLATE: >20 NG/ML (ref 4.78–24.2)
GFR AFRICAN AMERICAN: 12
GFR NON-AFRICAN AMERICAN: 10
GLUCOSE BLD-MCNC: 126 MG/DL (ref 70–99)
GLUCOSE BLD-MCNC: 134 MG/DL (ref 70–99)
GLUCOSE BLD-MCNC: 174 MG/DL (ref 70–99)
GLUCOSE BLD-MCNC: 177 MG/DL (ref 70–99)
GLUCOSE BLD-MCNC: 190 MG/DL (ref 70–99)
GLUCOSE BLD-MCNC: 195 MG/DL (ref 70–99)
HCO3 VENOUS: 19.8 MMOL/L (ref 23–29)
HCT VFR BLD CALC: 22.1 % (ref 36–48)
HEMOGLOBIN: 7.1 G/DL (ref 12–16)
IMMATURE RETIC FRACT: 0.2 (ref 0.21–0.37)
INR BLD: 1.24 (ref 0.87–1.14)
IRON SATURATION: 15 % (ref 15–50)
IRON: 17 UG/DL (ref 37–145)
MCH RBC QN AUTO: 27.8 PG (ref 26–34)
MCHC RBC AUTO-ENTMCNC: 32.1 G/DL (ref 31–36)
MCV RBC AUTO: 86.6 FL (ref 80–100)
METHEMOGLOBIN VENOUS: 0.5 %
O2 SAT, VEN: 98 %
O2 THERAPY: ABNORMAL
PCO2, VEN: 29.1 MMHG (ref 40–50)
PDW BLD-RTO: 15 % (ref 12.4–15.4)
PERFORMED ON: ABNORMAL
PH VENOUS: 7.45 (ref 7.35–7.45)
PLATELET # BLD: 211 K/UL (ref 135–450)
PMV BLD AUTO: 8.8 FL (ref 5–10.5)
PO2, VEN: 105.6 MMHG (ref 25–40)
POTASSIUM REFLEX MAGNESIUM: 5.5 MMOL/L (ref 3.5–5.1)
PROTHROMBIN TIME: 15.4 SEC (ref 11.7–14.5)
RBC # BLD: 2.56 M/UL (ref 4–5.2)
RETICULOCYTE ABSOLUTE COUNT: 0.07 M/UL (ref 0.02–0.1)
RETICULOCYTE COUNT PCT: 2.87 % (ref 0.5–2.18)
SODIUM BLD-SCNC: 133 MMOL/L (ref 136–145)
TCO2 CALC VENOUS: 21 MMOL/L
TOTAL IRON BINDING CAPACITY: 110 UG/DL (ref 260–445)
TSH REFLEX: 1.65 UIU/ML (ref 0.27–4.2)
VITAMIN B-12: >2000 PG/ML (ref 211–911)
WBC # BLD: 9.8 K/UL (ref 4–11)

## 2022-09-27 PROCEDURE — C1750 CATH, HEMODIALYSIS,LONG-TERM: HCPCS

## 2022-09-27 PROCEDURE — 36415 COLL VENOUS BLD VENIPUNCTURE: CPT

## 2022-09-27 PROCEDURE — 85610 PROTHROMBIN TIME: CPT

## 2022-09-27 PROCEDURE — 6360000002 HC RX W HCPCS: Performed by: RADIOLOGY

## 2022-09-27 PROCEDURE — 86901 BLOOD TYPING SEROLOGIC RH(D): CPT

## 2022-09-27 PROCEDURE — 92526 ORAL FUNCTION THERAPY: CPT

## 2022-09-27 PROCEDURE — 2580000003 HC RX 258: Performed by: RADIOLOGY

## 2022-09-27 PROCEDURE — P9016 RBC LEUKOCYTES REDUCED: HCPCS

## 2022-09-27 PROCEDURE — 97530 THERAPEUTIC ACTIVITIES: CPT

## 2022-09-27 PROCEDURE — 86850 RBC ANTIBODY SCREEN: CPT

## 2022-09-27 PROCEDURE — 36558 INSERT TUNNELED CV CATH: CPT

## 2022-09-27 PROCEDURE — 77001 FLUOROGUIDE FOR VEIN DEVICE: CPT

## 2022-09-27 PROCEDURE — 02H633Z INSERTION OF INFUSION DEVICE INTO RIGHT ATRIUM, PERCUTANEOUS APPROACH: ICD-10-PCS | Performed by: RADIOLOGY

## 2022-09-27 PROCEDURE — 85027 COMPLETE CBC AUTOMATED: CPT

## 2022-09-27 PROCEDURE — 83540 ASSAY OF IRON: CPT

## 2022-09-27 PROCEDURE — C9113 INJ PANTOPRAZOLE SODIUM, VIA: HCPCS | Performed by: INTERNAL MEDICINE

## 2022-09-27 PROCEDURE — 97535 SELF CARE MNGMENT TRAINING: CPT

## 2022-09-27 PROCEDURE — 86923 COMPATIBILITY TEST ELECTRIC: CPT

## 2022-09-27 PROCEDURE — 82746 ASSAY OF FOLIC ACID SERUM: CPT

## 2022-09-27 PROCEDURE — 99152 MOD SED SAME PHYS/QHP 5/>YRS: CPT

## 2022-09-27 PROCEDURE — 99153 MOD SED SAME PHYS/QHP EA: CPT

## 2022-09-27 PROCEDURE — 83550 IRON BINDING TEST: CPT

## 2022-09-27 PROCEDURE — 97110 THERAPEUTIC EXERCISES: CPT

## 2022-09-27 PROCEDURE — 0JH63XZ INSERTION OF TUNNELED VASCULAR ACCESS DEVICE INTO CHEST SUBCUTANEOUS TISSUE AND FASCIA, PERCUTANEOUS APPROACH: ICD-10-PCS | Performed by: RADIOLOGY

## 2022-09-27 PROCEDURE — 6360000002 HC RX W HCPCS: Performed by: INTERNAL MEDICINE

## 2022-09-27 PROCEDURE — 86900 BLOOD TYPING SEROLOGIC ABO: CPT

## 2022-09-27 PROCEDURE — 84443 ASSAY THYROID STIM HORMONE: CPT

## 2022-09-27 PROCEDURE — 82140 ASSAY OF AMMONIA: CPT

## 2022-09-27 PROCEDURE — 99024 POSTOP FOLLOW-UP VISIT: CPT | Performed by: SURGERY

## 2022-09-27 PROCEDURE — 82803 BLOOD GASES ANY COMBINATION: CPT

## 2022-09-27 PROCEDURE — 85045 AUTOMATED RETICULOCYTE COUNT: CPT

## 2022-09-27 PROCEDURE — 90935 HEMODIALYSIS ONE EVALUATION: CPT

## 2022-09-27 PROCEDURE — 82607 VITAMIN B-12: CPT

## 2022-09-27 PROCEDURE — 6370000000 HC RX 637 (ALT 250 FOR IP): Performed by: INTERNAL MEDICINE

## 2022-09-27 PROCEDURE — 1200000000 HC SEMI PRIVATE

## 2022-09-27 PROCEDURE — 80048 BASIC METABOLIC PNL TOTAL CA: CPT

## 2022-09-27 RX ORDER — FENTANYL CITRATE 50 UG/ML
INJECTION, SOLUTION INTRAMUSCULAR; INTRAVENOUS
Status: COMPLETED | OUTPATIENT
Start: 2022-09-27 | End: 2022-09-27

## 2022-09-27 RX ORDER — MIDAZOLAM HYDROCHLORIDE 1 MG/ML
INJECTION INTRAMUSCULAR; INTRAVENOUS
Status: COMPLETED | OUTPATIENT
Start: 2022-09-27 | End: 2022-09-27

## 2022-09-27 RX ORDER — HEPARIN SODIUM 1000 [USP'U]/ML
INJECTION, SOLUTION INTRAVENOUS; SUBCUTANEOUS
Status: DISPENSED
Start: 2022-09-27 | End: 2022-09-28

## 2022-09-27 RX ADMIN — CEFAZOLIN 2000 MG: 1 INJECTION, POWDER, FOR SOLUTION INTRAMUSCULAR; INTRAVENOUS at 11:45

## 2022-09-27 RX ADMIN — FENTANYL CITRATE 25 MCG: 50 INJECTION INTRAMUSCULAR; INTRAVENOUS at 11:53

## 2022-09-27 RX ADMIN — FENTANYL CITRATE 25 MCG: 50 INJECTION INTRAMUSCULAR; INTRAVENOUS at 11:46

## 2022-09-27 RX ADMIN — MIDAZOLAM 0.5 MG: 1 INJECTION INTRAMUSCULAR; INTRAVENOUS at 11:54

## 2022-09-27 RX ADMIN — MIDAZOLAM 0.5 MG: 1 INJECTION INTRAMUSCULAR; INTRAVENOUS at 11:46

## 2022-09-27 RX ADMIN — INSULIN GLARGINE 20 UNITS: 100 INJECTION, SOLUTION SUBCUTANEOUS at 21:39

## 2022-09-27 RX ADMIN — PANTOPRAZOLE SODIUM 40 MG: 40 INJECTION, POWDER, FOR SOLUTION INTRAVENOUS at 10:22

## 2022-09-27 ASSESSMENT — PAIN SCALES - GENERAL
PAINLEVEL_OUTOF10: 0
PAINLEVEL_OUTOF10: 7
PAINLEVEL_OUTOF10: 3

## 2022-09-27 ASSESSMENT — PAIN DESCRIPTION - LOCATION: LOCATION: BACK

## 2022-09-27 ASSESSMENT — PAIN SCALES - WONG BAKER: WONGBAKER_NUMERICALRESPONSE: 0

## 2022-09-27 ASSESSMENT — PAIN DESCRIPTION - ORIENTATION: ORIENTATION: MID

## 2022-09-27 ASSESSMENT — PAIN DESCRIPTION - DESCRIPTORS: DESCRIPTORS: ACHING;DISCOMFORT

## 2022-09-27 ASSESSMENT — PAIN - FUNCTIONAL ASSESSMENT: PAIN_FUNCTIONAL_ASSESSMENT: ACTIVITIES ARE NOT PREVENTED

## 2022-09-27 NOTE — PROGRESS NOTES
Speech Language Pathology  Facility/Department: [facility] C3 TELE/MED SURG/ONC  Dysphagia Daily Treatment Note      Recommendations:   NPO exception of low volume ice chips for comfort and to combat disuse atrophy *only when most alert, meds via alternative means. Pt demonstrated improved RAMBO this date when compared to BSE yesterday, however, continued to require consistent max cues/encouragement for limited PO. Needs ongoing assessment. NAME: Elonda Cushing  : 1950  MRN: [id number]    Patient Diagnosis(es):   Patient Active Problem List    Diagnosis Date Noted    Colon perforation ([address] 2022    Acute respiratory failure with hypoxia ([address] 2022    Acute encephalopathy 2022    Moderate malnutrition ([address] 2022    Ischemic colitis ([address] 2022    S/P exploratory laparotomy 2022    Acute postoperative pulmonary insufficiency ([address] 2022    DKA (diabetic ketoacidosis) ([address] 2022    Hypotension 2022    Syncope 2022    Hyponatremia 2022    DEJAN (acute kidney injury) ([address] 2022    Abdominal pain 2022    Enteritis 2022    Septic shock ([address] 2022    Elevated troponin 2022    Leukocytosis 2022    Pyuria 2022    Lactic acidosis 2022    DM (diabetes mellitus), secondary uncontrolled ([address] 2022     Allergies: Allergies   Allergen Reactions    Latex     Lovastatin      Indigestion and Feels bad  Indigestion and Feels bad      Penicillins      Subjective: Pt seen upright in bed, cooperative but fatigued, RN OK'd SLP entry and therapy    Pain: pt denied    Current Diet: Diet NPO    Diet Tolerance:  Pt currently NPO    P.O. Trials: Thin   x X3 ice chips    Nectar / Mildly Thick       Honey / Moderately Thick       Pudding / Extremely Thick       Puree   x X3 <1/2 tsp   Solid         Dysphagia Treatment and Impressions:  Pt on RA with O2 sats 94-95%, RR 16-20/min throughout tx session.    Pt with eyes closed upon SLP entry, woke to SLP's voice. Pt able to provide  when cued this date. Pt intermittently closing her eyes throughout session and requiring consistent max cues to attend to task/SLP. RN reported pt much more alert this AM, however, was NPO for completion of TDC. RN reported pt was recently administered IV medication / prior to SLP tx session. Pt observed with minimal PO d/t reduced RAMBO -- x3 <1/2 tsp puree, x3 ice chips. Mildly reduced A-P bolus transit observed with puree, lingual pumping, and delayed swallow initiation despite cues. No clinical s/s of aspiration with minimal PO. Pt required max cues to accept ice chip off tsp/open oral cavity to accept PO. Weak lingual manipulation with suspected premature bolus loss to pharynx and delayed swallow initiation of ice chip/TL trials. X1 significantly delayed dry cough post-swallow with ice chip. PO trials discontinued at this time d/t reduced RAMBO. Continue NPO at this time pending further assessment. RN aware. ST to continue to follow. Dysphagia Goals:  Timeframe for Long-term Goals: 7 days, 10/3/22  Goal 1: The pt will tolerate safest and least restrictive diet without clinical s/s of aspiration or change in respiratory status. : ongoing, continue NPO pending ongoing assessment     Short-term Goals  Timeframe for Short-term Goals: 5 days, 10/1/22  1) The patient will tolerate recommended diet without observed clinical signs of aspiration. : ongoing, see above. 2) The patient/caregiver will demonstrate understanding of compensatory strategies for improved swallowing safety. : ongoing, needs continued reinforcement  3) The patient will tolerate repeat bedside swallowing evaluation when able. : ongoing, progressing. 4) The patient will tolerate instrumental swallowing procedure.  : will continue to monitor for need    Speech/Language/Cog Goals: n/a    Recommendations:   NPO exception of low volume ice chips for comfort and to combat disuse atrophy *only when most alert, meds via alternative means. Pt demonstrated improved RAMBO this date when compared to BSE yesterday, however, continued to require consistent max cues/encouragement for limited PO. Needs ongoing assessment    Patient/Family/Caregiver Education:  SLP re: role of ST, rationale for PO trials, aspiration precautions. Pt needs continued reinforcement. RN notified of recs. Compensatory Strategies:   NPO, oral care 2-3x/day    Plan:    Continued Dysphagia treatment with goals per plan of care. Discharge Recommendations: per PT/OT recs    If pt discharges from hospital prior to Speech/Swallowing discharge, this note serves as tx and discharge summary.      Total Treatment Time / Charges     Time in Time out Total Time / units   Cognitive Tx         Speech Tx      Dysphagia Tx 1318 1341 23 min / 1 unit     Signature:  Precious Saini M.S. Olinda Barr  Speech-language pathologist  [id number]

## 2022-09-27 NOTE — CARE COORDINATION
Hospital day 13: Patient on C3 care managed by IM, General surgery, and Nephrology. Patient from home IPTA, now with new ostomy, BROOKS drain, on-going discussion of possible PEG, new HD- tunneled line placed this date. Writer placed call to [facility] sent update clinical requested, per intake faxing acceptance letter this date. Per Jair Lopez at [facility] still reviewing, hopeful once chair time obtained can have better idea of ability to accept or not. SW following. CANDE Rashid  1324: Admission letter obtained from [facility] 10:25, ED left with [facility] to update. CANDE Rashid left with family re back up SNF choice as still no response from [facility] on ability to accept. Xi Ferrer, [id number]: [facility] for [facility], request submitted. CANDE Rashid

## 2022-09-27 NOTE — PROGRESS NOTES
2022   [facility] Wound Ostomy Continence Nurse  Follow-up Progress Note         NAME:  Rodrigo Cyr RECORD NUMBER:  [id number]  AGE:  67 y.o. GENDER:  female  :  1950  TODAY'S DATE:  2022     Subjective:  non verbal.  No family here at this time   Wound Identification:  Wound Type: New surgical staple line. Contributing Factors: edema, diabetes, chronic pressure, decreased mobility, shear force, obesity. NEW Colostomy:    Went to OR for Exploratory lap, sigmoid colectomy and colostomy on 9/15/22 by Dr Soni Mckinley. Stoma:  20 mm (1 3/8 inch) x 35 mm ( 1 3/8 inch). Oval, brown, For now use paste ring then flat wafer with drainable bag. Appliance:  Coloplast RED 2 piece flat flange # [id number] with drainable pouch # [id number]. Paste ring around stoma prior to application of flange. Upon checking to do education nursing assistant requested to check colostomy flange. Side toward the staple leaking stool. Changed flange and bag using same measurements and paste ring as previous wound care nurse. Placed heat to help seal in place. Nursing assistant stated patient has had a lot of gas and she has had to burp the bag. Will continue to monitor seal and provide education. At this time patient is drowsy.

## 2022-09-27 NOTE — PROGRESS NOTES
[facility] GENERAL SURGERY    Surgery Progress Note           POD # 12    PATIENT NAME: Elonda Cushing     TODAY'S DATE: 9/27/2022    INTERVAL HISTORY: No acute events overnight. NG removed yesterday and patient underwent swallow eval with SLP, and was unable to be cleared for PO intake. Denies abdominal pain this morning       OBJECTIVE:   VITALS:  BP (!) 162/70   Pulse 75   Temp 98.6 °F (37 °C) (Axillary)   Resp 16   Ht 5' 9\" (1.753 m)   Wt 191 lb 2.2 oz (86.7 kg)   SpO2 96%   BMI 28.23 kg/m²     INTAKE/OUTPUT:    I/O last 3 completed shifts: In: 5111 [NG/GT:1085]  Out: 862 [Urine:1; Drains:11; Stool:850]  No intake/output data recorded. CONSTITUTIONAL: elderly female, ill-appearing, resting in bed  LUNGS:  normal effort, no adventitious breath sounds, on RA   ABDOMEN: soft, non-distended, non-tender ,   INCISION: clean, dry, no drainage, healing, ostomy - bag full of liquid brown stool ; BROOKS - thick, tan , purulent output     Data:  CBC:   Recent Labs     09/26/22  0543   WBC 10.3   HGB 7.6*   HCT 23.1*          BMP:    Recent Labs     09/26/22  0543   *   K 5.0   CL 96*   CO2 22   BUN 78*   CREATININE 3.4*   GLUCOSE 247*       Hepatic: No results for input(s): AST, ALT, ALB, BILITOT, ALKPHOS in the last 72 hours. Mag:      Recent Labs     09/26/22  0543   MG 2.30        Phos:     Recent Labs     09/26/22  0543   PHOS 3.1        INR: No results for input(s): INR in the last 72 hours.       Radiology Review:       ASSESSMENT AND PLAN:  67 y.o. female status post exploratory laparotomy, sigmoid colectomy, colostomy formation      - please check glucose more frequently and as clinically indicated while tube feeds are held  - SLP following, continue to evaluate for PO intake  - primary team planning for PEG, agree that patient may require more long-term feeding access  - monitor ostomy   - BROOKS drain to remain given change in output today  - medical management per primary team      Electronically signed by Donna Solorio DO

## 2022-09-27 NOTE — FLOWSHEET NOTE
09/27/22 1953   Vital Signs   Temp 98.4 °F (36.9 °C)   Temp Source Oral   Heart Rate 84   Heart Rate Source Monitor   Resp 16   /77   BP Location Left lower arm   BP Method Automatic   MAP (Calculated) 97   Patient Position Semi fowlers   Level of Consciousness 0   MEWS Score 1   Pain Assessment   Pain Assessment 0-10   Pain Level 7   Patient's Stated Pain Goal 0 - No pain   Pain Location Back   Pain Orientation Mid   Pain Descriptors Aching;Discomfort   Functional Pain Assessment Activities are not prevented   Opioid-Induced Sedation   POSS Score 1   RASS   Chua Agitation Sedation Scale (RASS) -1   Oxygen Therapy   SpO2 100 %   Pulse Oximeter Device Mode Intermittent   Pulse Oximeter Device Location Right;Finger   O2 Device None (Room air)   Patient Observation   Observations Resting in bed   Patient A/O VSS daughter at bedside, all needs addressed and questions answered

## 2022-09-27 NOTE — PROGRESS NOTES
Hospitalist Progress Note      PCP: Abel Medrano DO,     Date of Admission: 9/14/2022    Chief Complaint: syncope       Subjective:  After stopping her opioids yesterday she is a little more alert today. She at least has her eyes open, making eye contact, answering most questions, smiled once. As long as she improves a little each day I think we need to hold off on the PEG a little longer. Surgery removed the NG yesterday, defer to them regarding replacement for NG feeds vs leaving NPO for another day vs short term TPN. SLP didn't see her today because she is having a procedure? Will ask RN about the patient's schedule for today. Also, none of her AM labs were drawn? Medications:  Reviewed    Infusion Medications    sodium chloride 25 mL (09/22/22 2024)    dextrose      sodium chloride 100 mL/hr at 09/21/22 2122     Scheduled Medications    rosuvastatin  5 mg Oral Daily    insulin glargine  20 Units SubCUTAneous Nightly    sodium chloride  500 mL IntraVENous Once    insulin lispro  0-16 Units SubCUTAneous Q4H    pantoprazole  40 mg IntraVENous Daily     PRN Meds: acetaminophen, heparin (porcine), prochlorperazine, potassium chloride, magnesium sulfate, sodium chloride flush, sodium chloride, ondansetron, glucose, dextrose bolus **OR** dextrose bolus, glucagon (rDNA), dextrose, sodium chloride flush, sodium chloride, polyethylene glycol      Intake/Output Summary (Last 24 hours) at 9/27/2022 1002  Last data filed at 9/27/2022 0905  Gross per 24 hour   Intake 0 ml   Output 651 ml   Net -651 ml       Physical Exam Performed:    BP (!) 150/78   Pulse 77   Temp 98.6 °F (37 °C) (Axillary)   Resp 16   Ht 5' 9\" (1.753 m)   Wt 191 lb 2.2 oz (86.7 kg)   SpO2 95%   BMI 28.23 kg/m²     General appearance: No apparent distress, appears stated age. HEENT: Pupils equal, round, and reactive to light. Conjunctivae/corneas clear. Neck: Supple, with full range of motion. No jugular venous distention. Trachea midline. Respiratory:  Normal respiratory effort. Clear to auscultation, bilaterally without Rales/Wheezes/Rhonchi. Diminished throughout. Cardiovascular: Regular rate and rhythm with normal S1/S2 without murmurs, rubs or gallops. Abdomen: Soft, mild diffuse tenderness, non-distended with normal bowel sounds. Musculoskeletal: No clubbing, cyanosis. 1+ BLE pitting edema. Full range of motion without deformity. Skin: Skin color, texture, turgor normal.  Incisions c/d/I. Neurologic:  Neurovascularly intact without any focal sensory/motor deficits. Cranial nerves: II-XII intact, grossly non-focal.  Psychiatric: somnolent, partially oriented, does not have insight. Capillary Refill: Brisk, 3 seconds, normal   Peripheral Pulses: +2 palpable, equal bilaterally       Labs:   Recent Labs     09/26/22  0543   WBC 10.3   HGB 7.6*   HCT 23.1*        Recent Labs     09/26/22  0543   *   K 5.0   CL 96*   CO2 22   BUN 78*   CREATININE 3.4*   CALCIUM 7.7*   PHOS 3.1     No results for input(s): AST, ALT, BILIDIR, BILITOT, ALKPHOS in the last 72 hours. No results for input(s): INR in the last 72 hours. No results for input(s): Anne Marie Fuse in the last 72 hours. Urinalysis:      Lab Results   Component Value Date/Time    NITRU Negative 09/14/2022 12:08 PM    WBCUA 21-50 09/14/2022 12:08 PM    BACTERIA Rare 09/14/2022 12:08 PM    RBCUA None seen 09/14/2022 12:08 PM    BLOODU Negative 09/14/2022 12:08 PM    SPECGRAV <=1.005 09/14/2022 12:08 PM    GLUCOSEU Negative 09/14/2022 12:08 PM       Radiology:  XR CHEST PORTABLE   Final Result   Temporary dialysis catheter overlies the cavoatrial junction. 1. Again noted are bibasilar infiltrates and pleural effusions. Similar   appearance to the prior exam.         XR CHEST PORTABLE   Final Result   Airspace opacities at the bilateral lung bases, may be related to atelectasis   versus pneumonia. Mild bilateral pleural effusions. CT HEAD WO CONTRAST   Final Result   No acute intracranial abnormality. Fluid in the mastoids, with trace mucosal thickening in the sinuses         CT ABDOMEN PELVIS W IV CONTRAST   Final Result   Ostomy is now seen in the left lower quadrant. There is wall thickening of   the colon extending to the ostomy site, which is either due to the partially   contracted state of the colon or wall thickening from underlying colitis      Scattered fluid is seen in the pelvis, with a dominant collection on the   right that contains a small amount of gas internally. In the absence of   clinical signs of infection, this collection of fluid and gas is likely   postoperative. Increased pleural effusions with adjacent consolidation at the lung bases      Nonspecific thickening of the endometrial stripe. Recommend follow-up pelvic   ultrasound after the patient's acute symptoms have resolved. Mild fat stranding surrounds the bladder. Recommend correlation with   urinalysis. Gas is also seen in the bladder         XR CHEST PORTABLE   Final Result   1. Small left pleural effusion. 2.  Hazy right lower lobe airspace opacity which could represent atelectasis   or pneumonia. XR ABDOMEN FOR NG/OG/NE TUBE PLACEMENT   Final Result   Tip of NG tube projects in the left upper quadrant in the region of the   gastric fundus         XR CHEST PORTABLE   Final Result   Interval placement of a right-sided central venous catheter which extends   into the inferior aspect of the right atrium, approximately 5 cm beyond the   cavoatrial junction. XR CHEST PORTABLE   Final Result   Right IJ CVC catheter tip overlies the SVC at the level of the thoracic inlet. Endotracheal tube tip is 5.3 cm above the mark. Mild bibasilar airspace opacities, which could represent as atelectasis   and/or infiltrate. Possible small right pleural effusion.          CT ABDOMEN PELVIS WO CONTRAST Additional Contrast? None   Final Result   1. Limited evaluation of the GI tract on this noncontrast exam.  Improved   mucosal changes of the duodenum and proximal jejunum that were described on   prior CT. 2. Severe inflammatory change in the right lower quadrant with etiology   uncertain. This could correspond to enteritis of the distal ileum and   terminal ileum. Colitis may also be considered. Infectious or inflammatory   etiologies may be favored. 3. Dense stool and diffuse mucosal thickening of the distal colon which may   represent a pattern of colitis. 4. Small right pleural effusion with airspace changes in the right lower   lobe, new from prior exam.   5. Evidence of chronic liver disease with a small amount of ascites stable. CT ABDOMEN PELVIS WO CONTRAST Additional Contrast? None   Final Result   1. Acute enteritis involving the duodenum and jejunal loops with thickening   of the loops and edema. No evidence of bowel obstruction. 2. Constipation with significant stool impaction in the rectum. 3. Mild ascites mostly around the liver and spleen. 4. Adequate position of Osorio catheter in the urinary bladder. XR CHEST PORTABLE   Final Result   Placement of a right internal jugular central venous catheter without evident   complication. The tip is at approximately the cavoatrial junction.       No acute findings in the chest.         IR TUNNELED CVC PLACE WO SQ PORT/PUMP > 5 YEARS    (Results Pending)           Assessment/Plan:    Active Hospital Problems    Diagnosis     Colon perforation (HCC) [K63.1]      Priority: Medium    Acute respiratory failure with hypoxia ([address] [J96.01]      Priority: Medium    Acute encephalopathy [G93.40]      Priority: Medium    Moderate malnutrition ([address] [E44.0]      Priority: Medium    Ischemic colitis ([address] [K55.9]      Priority: Medium    S/P exploratory laparotomy [U13.552]      Priority: Medium    Acute postoperative pulmonary insufficiency ([address] [J95.2] Priority: Medium    Syncope [R55]      Priority: Medium    Hyponatremia [E87.1]      Priority: Medium    Abdominal pain [R10.9]      Priority: Medium    Enteritis [K52.9]      Priority: Medium    Elevated troponin [R77.8]      Priority: Medium    DEJAN (acute kidney injury) ([facility] [N17.9]      Priority: Medium    DM (diabetes mellitus), secondary uncontrolled ([facility] [E13.65]      Priority: Medium         The patient is a 67 Y F with a h/o HTN, HLD, DM2, and CKD3. She lives at home with her . Prior to this episode she hadn't been hospitalized in 20+ years and was still working part time doing laundry at [employer]. The patient felt constipated for a couple days at home. On 9/14 she finally had a bowel movement but felt very lightheaded during this and her  helped her to the floor as she briefly passed out. When EMS arrived her BP was 50/30. She was brought to the ED and found to be in septic shock from perforated ischemic colitis. She required sigmoid colectomy and colostomy creation on 9/15. She had postoperative pulmonary insufficiency and spent a few days on a ventilator, eventually extubated 9/21. She completed an 11-day course of empiric antibacterials and antifungals. Pressors were weaned off with the help of PO midodrine. Her anuric DEJAN did not improve and she had to be transitioned from CRRT to intermittent HD. Her acute metabolic encephalopathy persisted longer than expected, even after her acute issues had long-since stabilized. Ischemic sigmoid colitis. Likely due to constipation and mesenteric atherosclerosis. S/p resection, now with colostomy. Peritonitis and septic shock have resolved, completed course of abx. Acute hypoxic respiratory failure has resolved. Hypotension. Septic shock resolved, weaned off pressor.   She is normally prescribed lisinopril and diuretics (both chlorthalidone and PRN furosemide), and I see that her SBP was in the 160's a month ago.  Currently requiring midodrine. DEJAN on CKD3, resulting in new ESRD. Baseline Cr had been 1.5 as of a couple months ago. Now HD TTS. Tunneled line 9/27. Acute metabolic encephalopathy. Due to combination of above issues. Has been off sedation since 9/17. Hopefully she will start thinking more clearly with time after this critical illness. Stopped the oxycodone here. Stopped her chronic gabapentin 200 TID. F/u VBG, ammonia, TSH, B12. No focal neurological deficits, but if she isn't improving in a few days then consider MRI brain without contrast to r/o CVA. Dysphagia. Due to encephalopathy. Required NG tube feeds for a number of days. Then surgery removed the NG on 9/26 in an attempt to improve her chances of passing a swallow eval.  If she doesn't make progress toward swallowing in the next few days then family will consent to a PEG tube. DM2. A1c was 9.2. Started insulin regimen. Stopped metformin and dulaglutide due to ESRD. Stopped glyburide now that she is on insulin. Anemia of chronic disease and acute blood loss. Hb was 10.8 a couple months ago, steadily drifted downward here. Normocytic. F/u routine labs. HLD. Statin. Thickened endometrium, incidentally found on CT. PCP can consider getting an outpatient US if relevant in a couple months. DVT Prophylaxis: SCDs  Diet: Diet NPO  Code Status: Full Code  PT/OT Eval Status: rec'd LTAC    Dispo - Not a good fit for LTAC. If she can safely swallow and if she can take in enough food within the next few days then she can discharge to SNF at that point. Otherwise, if she can't swallow or isn't eating enough, then she can discharge to SNF after PEG is place and she is tolerating feeds. Also will need outpatient HD arranged. I think she might go to SNF around 10/1. It makes sense to press on with aggressive care in this case.       Appropriate for A1 Discharge Unit: Dasha Guzman MD

## 2022-09-27 NOTE — PROGRESS NOTES
Interval History and plan:     Blood pressure now getting better  On midodrine decreased to 5 mg -  3 times daily yesterday    Plan:    Allow blood pressure around 1 59-9 50 systolic since we are having difficulty dialyzing will drop in blood pressure  Discontinue midodrine  We will plan on doing dialysis today  Also tunneled catheter requested                         Assessment :     Acidosis  Severe  Requiring 2 vasopressors  Blood pressure okay with the 2 vasopressors but lactic is a still going up to 19 concerning for ischemia   /Possible metformin induced lactic acidosis       CKD Stage IIIb with DEJAN  Creatinine 2.1 on consult  Creatinine 1.6 on 7/22  Likely due to diabetes, hypertension  Renal imaging-normal in the past      hypotension  BP: (155-162)/(70-80)  Heart Rate:  [75-76]   BP goal inpatient 264-124 systolic inpatient  Pressures at the time of consult  Normally hypertensive      [facility] would like to thank Hung Cast MD   for opportunity to serve this patient      Please call with questions at-   24 Hrs Answering service [phone] or  7 am- 5 pm via Perfect serve or cell phone  Ester Randall MD          CC/reason for consult :       DEJAN     HPI :     Zander Alvarez is a 67 y.o. female presented to   the hospital on 9/14/2022 with lactic acidosis. She is known to have diabetes and on metformin to 2 days ago  Has constipation and with multiple bowel regimen has had good bowel movement yesterday following that she has syncope. EMS was called and was found to have blood pressure of 50 systolic given IV fluids brought to the emergency room blood pressure was normal initially but later developed hypotension got 3 L of fluid and now on 2 vasopressors and in ICU. She also has severe acidosis with very high lactate. CT scan was normal initially. We are consulted for DEJAN on CKD and related issues.     On CKD and also severe lactic acidosis    ROS:     Seen with- sister    RN         PMH/PSH/SH/Family History:     Past Medical History:   Diagnosis Date    Chronic kidney disease     Diabetes mellitus ([facility]     Hyperlipidemia     Hypertension     Neuropathy        Past Surgical History:   Procedure Laterality Date    LAPAROTOMY N/A 9/15/2022    DIAGNOSTIC LAPAROSCOPY, EXPLORATORY LAPAROTOMY, SIGMOID COLECTOMY AND COLOSTOMY performed by Evangelist Balderas MD at [facility]        reports that she has never smoked. She has never used smokeless tobacco. She reports that she does not currently use alcohol. She reports that she does not use drugs. family history is not on file.          Medication:     Current Facility-Administered Medications: midodrine (PROAMATINE) tablet 5 mg, 5 mg, Oral, TID WC  rosuvastatin (CRESTOR) tablet 5 mg, 5 mg, Oral, Daily  acetaminophen (TYLENOL) 160 MG/5ML solution 650 mg, 650 mg, Oral, Q4H PRN  insulin glargine (LANTUS) injection vial 20 Units, 20 Units, SubCUTAneous, Nightly  0.9 % sodium chloride bolus, 500 mL, IntraVENous, Once  insulin lispro (HUMALOG) injection vial 0-16 Units, 0-16 Units, SubCUTAneous, Q4H  heparin (porcine) injection 3,200 Units, 3,200 Units, IntraCATHeter, PRN  prochlorperazine (COMPAZINE) injection 5 mg, 5 mg, IntraVENous, Q6H PRN  potassium chloride 20 mEq/50 mL IVPB (Central Line), 20 mEq, IntraVENous, PRN  magnesium sulfate 1000 mg in dextrose 5% 100 mL IVPB, 1,000 mg, IntraVENous, PRN  sodium chloride flush 0.9 % injection 5-40 mL, 5-40 mL, IntraVENous, PRN  0.9 % sodium chloride infusion, 25 mL, IntraVENous, PRN  ondansetron (ZOFRAN) injection 4 mg, 4 mg, IntraVENous, Q10 Min PRN  glucose chewable tablet 16 g, 4 tablet, Oral, PRN  dextrose bolus 10% 125 mL, 125 mL, IntraVENous, PRN **OR** dextrose bolus 10% 250 mL, 250 mL, IntraVENous, PRN  glucagon (rDNA) injection 1 mg, 1 mg, SubCUTAneous, PRN  dextrose 10 % infusion, , IntraVENous, Continuous PRN  sodium chloride flush 0.9 % injection 5-40 mL, 5-40 mL, IntraVENous, PRN  0.9 % sodium chloride infusion, , IntraVENous, PRN  polyethylene glycol (GLYCOLAX) packet 17 g, 17 g, Oral, Daily PRN  pantoprazole (PROTONIX) injection 40 mg, 40 mg, IntraVENous, Daily       Vitals :     Vitals:    09/27/22 0724   BP: (!) 162/70   Pulse: 75   Resp: 16   Temp: 98.6 °F (37 °C)   SpO2: 96%       I & O :       Intake/Output Summary (Last 24 hours) at 9/27/2022 1641  Last data filed at 9/27/2022 2449  Gross per 24 hour   Intake 0 ml   Output 651 ml   Net -651 ml          Physical Examination :     General appearance: confused,on NC  HEENT: Lips- normal, teeth- ok , oral mucosa- moist  Neck : Mass- no, appears symmetrical, JVD- not visible  Respiratory: Respiratory effort-  comfortable on oxygen, wheeze- no, crackles - no  Cardiovascular:  Ausculation- No M/R/G, Edema none  Abdomen: visible mass- no, distention- no, scar- no, tenderness- no                            hepatosplenomegaly-  No--  Musculoskeletal:  clubbing no,cyanosis- no , digital ischemia- no                           muscle strength- patient unable to co-operate     , tone - patient unable to co-operate   Skin: rashes- no , ulcers- no, induration- no, tightening - no  Psychiatric:  confused   Additional findings:         LABS:     Recent Labs     09/26/22  0543   WBC 10.3   HGB 7.6*   HCT 23.1*          Recent Labs     09/26/22  0543   *   K 5.0   CL 96*   CO2 22   BUN 78*   CREATININE 3.4*   GLUCOSE 247*   MG 2.30   PHOS 3.1

## 2022-09-27 NOTE — PROGRESS NOTES
Occupational Therapy  Facility/Department: [facility] C3 TELE/MED SURG/ONC  Daily Treatment Note  NAME: Franco Avelar  : 1950  MRN: [id number]    Date of Service: 2022    Discharge Recommendations:  [id number] (SNF vs [facility], [facility])  OT Equipment Recommendations  Other: Defer to facility to obtain    Patient Diagnosis(es): The primary encounter diagnosis was Lactic acidosis. Diagnoses of Generalized abdominal pain, Non-intractable vomiting with nausea, unspecified vomiting type, Acute cystitis with hematuria, Septicemia ([address] Perforated bowel ([facility] [address] Stercoral colitis, and DEJAN (acute kidney injury) ([facility] [address] were also pertinent to this visit. Assessment    Assessment: Pt tolerated therapy fair this date w/ increased alertness and participation from previous session. Pt completed bed mobility w/ maxAx2 and maintained sitting balance at EOB w/ maxA for 8min w/ BUE support via bed rails. Pt engaged in BUE/BLE therex x10 reps in supine w/ encouragement for participation and requiring rest breaks between exercises. Pt continues to present below her PLOF and is benefiting from inpatient OT services. Cont to rec LTACH vs SNF d/t increased participation levels this date w/ continued medical necessities required at d/c. Activity Tolerance: Patient tolerated treatment well;Patient limited by fatigue;Patient limited by endurance; Patient limited by pain  Discharge Recommendations: LTACH;Subacute/Skilled Nursing Facility (SNF vs [facility], [facility])  Other: Defer to facility to obtain     AM-PAC Daily Activity Inpatient   How much help for putting on and taking off regular lower body clothing?: Total  How much help for Bathing?: Total  How much help for Toileting?: Total  How much help for putting on and taking off regular upper body clothing?: A Lot  How much help for taking care of personal grooming?: A Lot  How much help for eating meals?: Total  AM-PAC Inpatient Daily Activity Raw Score: 8  AM-PAC Inpatient ADL T-Scale Score : 22.86  ADL Inpatient CMS 0-100% Score: 85.69  ADL Inpatient CMS G-Code Modifier : CM      Plan   Plan  Times per Week: 2-3x/ week  Current Treatment Recommendations: ROM; Positioning;Cognitive reorientation;Balance training;Patient/Caregiver education & training;Self-Care / ADL     Restrictions  Restrictions/Precautions  Restrictions/Precautions: NPO;Up as Tolerated; Fall Risk  Position Activity Restriction  Other position/activity restrictions: colostomy, R IJ, abdominal drain, telemetry; No BP, IV sticks RUE    Subjective   Subjective  Subjective: pt resting in bed upon arrivl, increasingly alert and participative this date. greeable to OT tx  Pain: pt reporting 7/10 pain in abdomen  Orientation  Overall Orientation Status: Impaired  Orientation Level: Oriented to person;Oriented to place; Disoriented to time;Disoriented to situation  Pain: pt endorsing 7/10 abdominal pain    Cognition  Overall Cognitive Status: Exceptions  Arousal/Alertness: Delayed responses to stimuli  Following Commands: Follows one step commands with increased time  Attention Span: Attends with cues to redirect  Memory: Decreased recall of recent events  Initiation: Requires cues for some  Sequencing: Requires cues for some      Objective    Vitals  Vitals  Heart Rate: 77  Heart Rate Source: Monitor  BP: (!) 150/78  BP Location: Left upper arm  BP Method: Automatic  Patient Position: Semi fowlers  MAP (Calculated): 102  SpO2: 95 %  O2 Device: None (Room air)    Bed Mobility Training  Bed Mobility Training: Yes  Overall Level of Assistance: Maximum assistance;Assist X2;Additional time  Interventions: Verbal cues;Demonstration; Safety awareness training  Rolling: Maximum assistance;Assist X2;Additional time  Supine to Sit: Maximum assistance; Additional time; Adaptive equipment;Assist X2  Sit to Supine: Assist X2;Additional time; Adaptive equipment;Maximum assistance  Scooting: Maximum assistance; Adaptive equipment; Additional time;Assist X2 (Max(A)x1 to scoot to EOB in sitting; max(A)x2 with trendelenberg to scoot to [facility] in supine)  Balance  Sitting: Impaired  Sitting - Static: Poor (constant support) (Pt tolerates sitting EOB x8 minutes with max(A) support posteriorly and BUE support on bedrails.)     ADL  Feeding: NPO  Grooming: Maximum assistance  Grooming Skilled Clinical Factors: wash face, UE support  LE Bathing: Dependent/Total  LE Bathing Skilled Clinical Factors: wash back seated EOB  LE Dressing: Dependent/Total  LE Dressing Skilled Clinical Factors: don socks  Toileting: Dependent/Total  Toileting Skilled Clinical Factors: colostomy, poe    OT Exercises  A/AROM Exercises: Pt able to participate in AAROM for BUE therex 10x each: gross grasp/release, wrist flexion/extension, wrist pronation/supination, elbow flexion/extension, shoulder flexion     Safety Devices  Type of Devices: All rosina prominences offloaded;Left in bed;Call light within reach;Nurse notified; Bed alarm in place     Patient Education  Education Given To: Patient  Education Provided: Role of Therapy;Plan of Care;Precautions  Education Provided Comments: P/AAROM for UE  Education Method: Verbal  Barriers to Learning: Cognition  Education Outcome: Continued education needed;Verbalized understanding    Disease Specific Education: Pt educated on importance of OOB mobility, prevention of complications of bedrest, and general safety during hospitalization.  Pt verbalized understanding, cont ed needed    Goals  Short Term Goals  Time Frame for Short term goals: 2 weeks (10/07) unless noted  Short Term Goal 1: Participate in 2 grooming tasks with min A; 9/26 dependent  Short Term Goal 2: Perform UE exer 10-15x each for strength and endurance by 9/30; 9/26 dependent, tolerated 10 reps PROM BUE hands to shoulder flexion to 90*  Short Term Goal 3: Sit EOB x3 min with min A in prep to transfer; 9/26 deferred due to decreased level of arousal/poor cognition  Short Term Goal 4: Participate in functional transfer with mod x2 and AD/Lift;  Patient Goals   Patient goals : Pt did not provide     Therapy Time   Individual Concurrent Group Co-treatment   Time In 0918         Time Out 1002         Minutes 44         Timed Code Treatment Minutes: 44 Minutes     If pt is unable to be seen after this session, please let this note serve as discharge summary. Please see case management note for discharge disposition. Thank you.      Jennyfer Quesada, OTR/L

## 2022-09-27 NOTE — OR NURSING
Image guided tunneled dialysis catheter insertion right completed. Dr. Ga Jennings placed 14.5 Spanish 25 cm [employer] tunneled dialysis catheter LOT # [id number] EXP 12/31/2023 in the right. Secured with sutures x2. Dressing clean, dry, and intact. Pt tolerated procedure without any signs or symptoms of distress. Vital signs stable. Report called to [facility]. Pt transported back to room in stable condition via bed by transport.      Vital Signs  Vitals:    09/27/22 1206   BP: 112/61   Pulse: 86   Resp: 16   Temp:    SpO2: 96%    (vital signs in table format)    Post Tung  2 - Able to move 4 extremities voluntarily on command  2 - BP+/- 20mmHg of normal  2 - Fully awake  2 - Able to maintain oxygen saturation >92% on room air  2 - Able to breathe deeply and cough freely

## 2022-09-27 NOTE — PROGRESS NOTES
Speech Language Pathology    Pt currently NPO for procedure this date per chart. ST to continue to follow, re-attempt f/u at a later time as pt is able and appropriate.      Kassidy Rdz M.S. [id number] [facility]  Speech-language pathologist  [id number]

## 2022-09-27 NOTE — PROGRESS NOTES
Unable to obtain consent as pt's  is here having procedure done per pt's daughter. Also, per pt's daughter, they have questions they would like answered before giving consent for tunneled catheter, special procedures made aware.

## 2022-09-27 NOTE — OR NURSING
Pt arrived for image guided tunneled dialysis catheter insertion. Procedure explained including the risk and benefits of the procedure. All questions answered. Pt verbalizes understanding of the procedure and states no more questions. Consent confirmed - consent obtained from . Vital signs stable. Labs, allergies, medications, and code status reviewed. No contraindications noted.      Vital Signs  Vitals:    09/27/22 1153   BP: 126/64   Pulse: 75   Resp: 16   Temp:    SpO2: 100%    (vital signs in table format)    Pre Tung Score  2 - Able to move 4 extremities voluntarily on command  2 - BP+/- 20mmHg of normal  2 - Fully awake  2 - Able to maintain oxygen saturation >92% on room air  2 - Able to breathe deeply and cough freely    Allergies  Latex, Lovastatin, and Penicillins (allergies)    Labs  Lab Results   Component Value Date    INR 1.24 (H) 09/27/2022    PROTIME 15.4 (H) 09/27/2022     Lab Results   Component Value Date    CREATININE 4.5 (H) 09/27/2022     (HH) 09/27/2022     (L) 09/27/2022    K 5.5 (H) 09/27/2022    CL 98 (L) 09/27/2022    CO2 20 (L) 09/27/2022     Lab Results   Component Value Date    WBC 9.8 09/27/2022    HGB 7.1 (L) 09/27/2022    HCT 22.1 (L) 09/27/2022    MCV 86.6 09/27/2022     09/27/2022

## 2022-09-27 NOTE — PROGRESS NOTES
Pt assessment completed and charted. VSS. Pt alert and oriented to name. Esther RN stated pt is more alert than this morning. Pt able to tell if uncomfortable. Pt remains NPO at this time. Pt is Q4  accu check. Pt has midline w/ staples CDI, right froy drain w serous output and left colostomy. Pt turned Q2 hrs. Pt will have tunneled vas cath placed tomorrow. Pt edematous all extremities. Bed alarm on. Bed in lowest position and wheels locked. Call light within reach. Bedside table within reach. Non-skid footwear in place. Pt denies any other needs at this time. Will continue to monitor.

## 2022-09-27 NOTE — PROGRESS NOTES
Reviewing chart prior to tunneled line placement. No labs noted from this morning, STAT PT / INR ordered for 0600 this AM for tunneled line placement - no results. Call placed to pt's RN, spoke with [facility] RN and made aware need labs before tunneled line can be placed. Will continue to monitor and complete once appropriate labs are available and reviewed.

## 2022-09-27 NOTE — PROGRESS NOTES
Physical Therapy  Facility/Department: [facility] C3 TELE/MED SURG/ONC  Daily Treatment Note  NAME: Luis Alberto Pitts  : 1950  MRN: [id number]    Date of Service: 2022    Discharge Recommendations:  LTACH, Subacute/Skilled Nursing Facility   PT Equipment Recommendations  Equipment Needed: No    [facility] 6 Clicks Inpatient Mobility:  AM-PAC Mobility Inpatient   How much difficulty turning over in bed?: A Lot  How much difficulty sitting down on / standing up from a chair with arms?: Unable  How much difficulty moving from lying on back to sitting on side of bed?: A Lot  How much help from another person moving to and from a bed to a chair?: Total  How much help from another person needed to walk in hospital room?: Total  How much help from another person for climbing 3-5 steps with a railing?: Total  AM-PAC Inpatient Mobility Raw Score : 8  AM-PAC Inpatient T-Scale Score : 28.52  Mobility Inpatient CMS 0-100% Score: 86.62  Mobility Inpatient CMS G-Code Modifier : CM    Patient Diagnosis(es): The primary encounter diagnosis was Lactic acidosis. Diagnoses of Generalized abdominal pain, Non-intractable vomiting with nausea, unspecified vomiting type, Acute cystitis with hematuria, Septicemia ([address] Perforated bowel ([address] Stercoral colitis, and DEJAN (acute kidney injury) ([address] were also pertinent to this visit. Assessment   Assessment: Pt seen as cotx with OT to progress functional mobility safely and maximize functional outcomes compared to 1:1 session. Pt demo improved participation in therapy session this date; able to sit EOB x8 minutes with max(A) and participate in supine therex. Pt would benefit from continued skilled PT to address current deficits. Recommend LTACH vs SNF upon d/c  Activity Tolerance: Patient tolerated treatment well;Patient limited by fatigue;Patient limited by endurance; Patient limited by pain  Equipment Needed: No     Plan    Plan  Plan: 3-5 times per week  Current Treatment Recommendations: Strengthening;Balance training;Functional mobility training;Transfer training; Endurance training;Neuromuscular re-education;Gait training;Pain management; Safety education & training;Home exercise program;Patient/Caregiver education & training; Therapeutic activities; Equipment evaluation, education, & procurement     Restrictions  Restrictions/Precautions  Restrictions/Precautions: NPO, Up as Tolerated, Fall Risk  Position Activity Restriction  Other position/activity restrictions: colostomy, R IJ, abdominal drain, telemetry; No BP, IV sticks RUE     Subjective    Subjective  Subjective: Pt semi-supine in bed upon arrival, agreeable to PT tx. Pt more awake this date, able to answer questions  Pain: pt endorsing 7/10 abdominal pain  Orientation  Overall Orientation Status: Impaired  Orientation Level: Oriented to person;Oriented to place; Disoriented to time;Disoriented to situation  Cognition  Overall Cognitive Status: Exceptions  Arousal/Alertness: Delayed responses to stimuli  Following Commands: Follows one step commands with increased time  Attention Span: Attends with cues to redirect  Memory: Decreased recall of recent events  Initiation: Requires cues for some  Sequencing: Requires cues for some     Objective   Vitals  At beginning of session: /78, HR 77 bpm, SpO2 95% on RA  Sitting EOB: /72, HR 77 bpm    Bed Mobility Training  Bed Mobility Training: Yes  Overall Level of Assistance: Maximum assistance;Assist X2;Additional time  Interventions: Verbal cues;Demonstration; Safety awareness training  Rolling: Maximum assistance;Assist X2;Additional time (Pt roll to L x1 rep and to R x2 reps)  Supine to Sit: Maximum assistance; Additional time; Adaptive equipment;Assist X2  Sit to Supine: Assist X2;Additional time; Adaptive equipment;Maximum assistance  Scooting: Maximum assistance; Adaptive equipment; Additional time;Assist X2 (Max(A)x1 to scoot to EOB in sitting; max(A)x2 with trendelenberg to scoot to [facility] in supine)  Balance  Sitting: Impaired  Sitting - Static: Poor (constant support) (Pt tolerates sitting EOB x8 minutes with max(A) support posteriorly and BUE support on bedrails.)     PT Exercises  Exercise Treatment: Pt perform AROM ankle pumps x10, SAQ 2x5, AAROM heel slides x5, hip abduction x5. Rest breaks prn due to fatigue     Safety Devices  Type of Devices: All rosina prominences offloaded;Left in bed;Call light within reach;Nurse notified; Bed alarm in place Pt in R sidelying with wedge posterior and pillows between knees, under ankles and between arms for comfort and pressure relief      Goals  Short Term Goals  Time Frame for Short term goals: 10 days 10/3/22 unless otherwise noted--9/27 All goals progressing  Short term goal 1: Pt will perform supine to sit with mod(A)x2  Short term goal 2: Pt will tolerate transfer from bed to chair  Short term goal 3: Pt will tolerate sitting EOB x7 minutes with mod(A)  Short term goal 4: Pt will perform 12-15 reps of BLE exercises to improve mobility for transfers by 9/26/22  Patient Goals   Patient goals : none stated by pt    Education  Patient Education  Education Given To: Patient  Education Provided: Role of Therapy;Plan of Care;Home Exercise Program  Education Provided Comments: Pt educated on importance of EOB activity and exercises performed this date  Education Method: Verbal;Demonstration  Barriers to Learning: Cognition  Education Outcome: Verbalized understanding;Continued education needed    Therapy Time   Individual Concurrent Group Co-treatment   Time In 0918         Time Out 1002         Minutes 44         Timed Code Treatment Minutes: 44 Minutes     If pt is unable to be seen after this session, please let this note serve as discharge summary. Please see case management note for discharge disposition. Thank you.     Nisha Ferreira, PT

## 2022-09-28 ENCOUNTER — APPOINTMENT (OUTPATIENT)
Dept: CT IMAGING | Age: 72
DRG: 853 | End: 2022-09-28
Payer: MEDICARE

## 2022-09-28 LAB
ABO/RH: NORMAL
ANION GAP SERPL CALCULATED.3IONS-SCNC: 15 MMOL/L (ref 3–16)
ANTIBODY SCREEN: NORMAL
BLOOD BANK DISPENSE STATUS: NORMAL
BLOOD BANK PRODUCT CODE: NORMAL
BPU ID: NORMAL
BUN BLDV-MCNC: 57 MG/DL (ref 7–20)
CALCIUM SERPL-MCNC: 7.8 MG/DL (ref 8.3–10.6)
CHLORIDE BLD-SCNC: 99 MMOL/L (ref 99–110)
CO2: 20 MMOL/L (ref 21–32)
CREAT SERPL-MCNC: 3.1 MG/DL (ref 0.6–1.2)
DESCRIPTION BLOOD BANK: NORMAL
GFR AFRICAN AMERICAN: 18
GFR NON-AFRICAN AMERICAN: 15
GLUCOSE BLD-MCNC: 119 MG/DL (ref 70–99)
GLUCOSE BLD-MCNC: 122 MG/DL (ref 70–99)
GLUCOSE BLD-MCNC: 128 MG/DL (ref 70–99)
GLUCOSE BLD-MCNC: 129 MG/DL (ref 70–99)
GLUCOSE BLD-MCNC: 135 MG/DL (ref 70–99)
GLUCOSE BLD-MCNC: 180 MG/DL (ref 70–99)
GLUCOSE BLD-MCNC: 201 MG/DL (ref 70–99)
HCT VFR BLD CALC: 19.4 % (ref 36–48)
HCT VFR BLD CALC: 21.1 % (ref 36–48)
HCT VFR BLD CALC: 21.6 % (ref 36–48)
HEMOGLOBIN: 6.1 G/DL (ref 12–16)
HEMOGLOBIN: 6.7 G/DL (ref 12–16)
HEMOGLOBIN: 7 G/DL (ref 12–16)
MCH RBC QN AUTO: 26.7 PG (ref 26–34)
MCHC RBC AUTO-ENTMCNC: 31.7 G/DL (ref 31–36)
MCV RBC AUTO: 84.2 FL (ref 80–100)
PDW BLD-RTO: 14.6 % (ref 12.4–15.4)
PERFORMED ON: ABNORMAL
PLATELET # BLD: 195 K/UL (ref 135–450)
PMV BLD AUTO: 9.4 FL (ref 5–10.5)
POTASSIUM REFLEX MAGNESIUM: 4.9 MMOL/L (ref 3.5–5.1)
RBC # BLD: 2.51 M/UL (ref 4–5.2)
SODIUM BLD-SCNC: 134 MMOL/L (ref 136–145)
WBC # BLD: 11.6 K/UL (ref 4–11)

## 2022-09-28 PROCEDURE — 85014 HEMATOCRIT: CPT

## 2022-09-28 PROCEDURE — 6370000000 HC RX 637 (ALT 250 FOR IP): Performed by: STUDENT IN AN ORGANIZED HEALTH CARE EDUCATION/TRAINING PROGRAM

## 2022-09-28 PROCEDURE — 85027 COMPLETE CBC AUTOMATED: CPT

## 2022-09-28 PROCEDURE — 6370000000 HC RX 637 (ALT 250 FOR IP): Performed by: INTERNAL MEDICINE

## 2022-09-28 PROCEDURE — 80048 BASIC METABOLIC PNL TOTAL CA: CPT

## 2022-09-28 PROCEDURE — 92526 ORAL FUNCTION THERAPY: CPT

## 2022-09-28 PROCEDURE — 97110 THERAPEUTIC EXERCISES: CPT

## 2022-09-28 PROCEDURE — 97535 SELF CARE MNGMENT TRAINING: CPT

## 2022-09-28 PROCEDURE — 6360000004 HC RX CONTRAST MEDICATION: Performed by: STUDENT IN AN ORGANIZED HEALTH CARE EDUCATION/TRAINING PROGRAM

## 2022-09-28 PROCEDURE — 36415 COLL VENOUS BLD VENIPUNCTURE: CPT

## 2022-09-28 PROCEDURE — 36430 TRANSFUSION BLD/BLD COMPNT: CPT

## 2022-09-28 PROCEDURE — 97530 THERAPEUTIC ACTIVITIES: CPT

## 2022-09-28 PROCEDURE — 99024 POSTOP FOLLOW-UP VISIT: CPT | Performed by: SURGERY

## 2022-09-28 PROCEDURE — 1200000000 HC SEMI PRIVATE

## 2022-09-28 PROCEDURE — APPSS30 APP SPLIT SHARED TIME 16-30 MINUTES: Performed by: CLINICAL NURSE SPECIALIST

## 2022-09-28 PROCEDURE — C9113 INJ PANTOPRAZOLE SODIUM, VIA: HCPCS | Performed by: INTERNAL MEDICINE

## 2022-09-28 PROCEDURE — 74177 CT ABD & PELVIS W/CONTRAST: CPT

## 2022-09-28 PROCEDURE — 85018 HEMOGLOBIN: CPT

## 2022-09-28 PROCEDURE — 6360000002 HC RX W HCPCS: Performed by: INTERNAL MEDICINE

## 2022-09-28 RX ORDER — SODIUM CHLORIDE 9 MG/ML
INJECTION, SOLUTION INTRAVENOUS PRN
Status: DISCONTINUED | OUTPATIENT
Start: 2022-09-28 | End: 2022-10-06 | Stop reason: HOSPADM

## 2022-09-28 RX ADMIN — PANTOPRAZOLE SODIUM 40 MG: 40 INJECTION, POWDER, FOR SOLUTION INTRAVENOUS at 08:31

## 2022-09-28 RX ADMIN — IOPAMIDOL 75 ML: 755 INJECTION, SOLUTION INTRAVENOUS at 14:59

## 2022-09-28 RX ADMIN — INSULIN GLARGINE 20 UNITS: 100 INJECTION, SOLUTION SUBCUTANEOUS at 20:09

## 2022-09-28 RX ADMIN — ACETAMINOPHEN 650 MG: 650 SOLUTION ORAL at 20:04

## 2022-09-28 RX ADMIN — DIATRIZOATE MEGLUMINE AND DIATRIZOATE SODIUM 12 ML: 660; 100 LIQUID ORAL; RECTAL at 13:19

## 2022-09-28 RX ADMIN — INSULIN LISPRO 4 UNITS: 100 INJECTION, SOLUTION INTRAVENOUS; SUBCUTANEOUS at 20:09

## 2022-09-28 RX ADMIN — ROSUVASTATIN CALCIUM 5 MG: 10 TABLET, FILM COATED ORAL at 08:31

## 2022-09-28 RX ADMIN — ACETAMINOPHEN 650 MG: 650 SOLUTION ORAL at 14:01

## 2022-09-28 ASSESSMENT — PAIN SCALES - GENERAL
PAINLEVEL_OUTOF10: 1
PAINLEVEL_OUTOF10: 4
PAINLEVEL_OUTOF10: 4

## 2022-09-28 ASSESSMENT — PAIN DESCRIPTION - LOCATION
LOCATION: OTHER (COMMENT)
LOCATION: BACK

## 2022-09-28 ASSESSMENT — PAIN DESCRIPTION - ORIENTATION: ORIENTATION: MID;LOWER

## 2022-09-28 ASSESSMENT — PAIN DESCRIPTION - DESCRIPTORS: DESCRIPTORS: ACHING

## 2022-09-28 ASSESSMENT — PAIN - FUNCTIONAL ASSESSMENT: PAIN_FUNCTIONAL_ASSESSMENT: PREVENTS OR INTERFERES SOME ACTIVE ACTIVITIES AND ADLS

## 2022-09-28 NOTE — PROGRESS NOTES
Perfect serve message sent to Carl Ansari, \"pt has already recieved one unit of blood. HH redraw was 6.1 and 19.4. so it had dropped again since the one unit of blood. we are awaiting ct abd results as well. VSS. new orders? \", awaiting response. Addendum; redraw lab to make sure per MD.         Addendum sent at [phone]; redraw  was 7.0 and 21.6    Per MD nothing further. No new orders.

## 2022-09-28 NOTE — PROGRESS NOTES
Assumed care from [facility]. Pt not in room, transported to . Will await pt return.

## 2022-09-28 NOTE — PROGRESS NOTES
Wound Care to provide education on colostomy. Phlebotomy in room for blood collection. Bi lateral arms edema. Daughter at bedside. Patient drowsy. Will readdress at a later date.

## 2022-09-28 NOTE — PLAN OF CARE
Problem: Discharge Planning  Goal: Discharge to home or other facility with appropriate resources  Outcome: Progressing  Flowsheets (Taken 9/28/2022 0835)  Discharge to home or other facility with appropriate resources: Identify barriers to discharge with patient and caregiver     Problem: Pain  Goal: Verbalizes/displays adequate comfort level or baseline comfort level  Outcome: Progressing  Flowsheets  Taken 9/28/2022 1102  Verbalizes/displays adequate comfort level or baseline comfort level: Encourage patient to monitor pain and request assistance  Taken 9/28/2022 0745  Verbalizes/displays adequate comfort level or baseline comfort level: Encourage patient to monitor pain and request assistance     Problem: Skin/Tissue Integrity  Goal: Absence of new skin breakdown  Description: 1. Monitor for areas of redness and/or skin breakdown  2. Assess vascular access sites hourly  3. Every 4-6 hours minimum:  Change oxygen saturation probe site  4. Every 4-6 hours:  If on nasal continuous positive airway pressure, respiratory therapy assess nares and determine need for appliance change or resting period. Outcome: Progressing     Problem: Chronic Conditions and Co-morbidities  Goal: Patient's chronic conditions and co-morbidity symptoms are monitored and maintained or improved  Outcome: Progressing  Flowsheets (Taken 9/28/2022 0835)  Care Plan - Patient's Chronic Conditions and Co-Morbidity Symptoms are Monitored and Maintained or Improved: Monitor and assess patient's chronic conditions and comorbid symptoms for stability, deterioration, or improvement     Problem: Safety - Medical Restraint  Goal: Remains free of injury from restraints (Restraint for Interference with Medical Device)  Description: INTERVENTIONS:  1. Determine that other, less restrictive measures have been tried or would not be effective before applying the restraint  2. Evaluate the patient's condition at the time of restraint application  3. development  Taken 9/28/2022 0835  Incisions, Wounds, or Drain Sites Healing Without Sign and Symptoms of Infection: ADMISSION and DAILY: Assess and document risk factors for pressure ulcer development  Goal: Oral mucous membranes remain intact  Outcome: Progressing  Flowsheets  Taken 9/28/2022 1123  Oral Mucous Membranes Remain Intact: Assess oral mucosa and hygiene practices  Taken 9/28/2022 0835  Oral Mucous Membranes Remain Intact: Assess oral mucosa and hygiene practices     Problem: Musculoskeletal - Adult  Goal: Return mobility to safest level of function  Outcome: Progressing  Flowsheets (Taken 9/28/2022 0835)  Return Mobility to Safest Level of Function: Assess patient stability and activity tolerance for standing, transferring and ambulating with or without assistive devices  Goal: Return ADL status to a safe level of function  Outcome: Progressing  Flowsheets (Taken 9/28/2022 0835)  Return ADL Status to a Safe Level of Function: Administer medication as ordered     Problem: Gastrointestinal - Adult  Goal: Maintains adequate nutritional intake  Outcome: Progressing  Flowsheets (Taken 9/28/2022 0835)  Maintains adequate nutritional intake: Monitor percentage of each meal consumed  Goal: Establish and maintain optimal ostomy function  Outcome: Progressing  Flowsheets (Taken 9/28/2022 0835)  Establish and maintain optimal ostomy function: Monitor output from ostomies     Problem: Genitourinary - Adult  Goal: Urinary catheter remains patent  Outcome: Progressing  Flowsheets (Taken 9/28/2022 0835)  Urinary catheter remains patent: Assess patency of urinary catheter     Problem: Metabolic/Fluid and Electrolytes - Adult  Goal: Electrolytes maintained within normal limits  Outcome: Progressing  Flowsheets (Taken 9/28/2022 0835)  Electrolytes maintained within normal limits: Monitor labs and assess patient for signs and symptoms of electrolyte imbalances  Goal: Hemodynamic stability and optimal renal function maintained  Outcome: Progressing  Flowsheets (Taken 9/28/2022 0835)  Hemodynamic stability and optimal renal function maintained: Monitor labs and assess for signs and symptoms of volume excess or deficit  Goal: Glucose maintained within prescribed range  Outcome: Progressing  Flowsheets (Taken 9/28/2022 0835)  Glucose maintained within prescribed range: Monitor blood glucose as ordered     Problem: ABCDS Injury Assessment  Goal: Absence of physical injury  Outcome: Progressing  Flowsheets (Taken 9/28/2022 1123)  Absence of Physical Injury: Implement safety measures based on patient assessment

## 2022-09-28 NOTE — PROGRESS NOTES
Pt returned from  @ [phone] [id number]. Pt a/o. VSS. Pt c/o 3/10 pain. Colostomy bag full and leaking into wound. PCA and Wound care RN cleaned pt and replaced colostomy bag. BROOKS drain to bulb suction w/ purulent drainage. Pt NPO and complete care. Q2T. Bed in lowest position and wheels locked. Call light within reach. Bedside table within reach. Non-skid footwear in place. Pt denies any other needs at this time. Bed alarm in place. Daughter @ bedside. Will continue to monitor.

## 2022-09-28 NOTE — PROGRESS NOTES
Perfect serve message sent to enrike, \"CT has resulted of pt's abd for eval for fluid collection or leak. thank you \", awaiting response.       Addendum; [id number]

## 2022-09-28 NOTE — PROGRESS NOTES
Physical Therapy  Facility/Department: [facility] C3 TELE/MED SURG/ONC  Daily Treatment Note  NAME: Keith Flowers  : 1950  MRN: [id number]    Date of Service: 2022    Discharge Recommendations:  LTACH, Subacute/Skilled Nursing Facility   PT Equipment Recommendations  Equipment Needed: No    Patient Diagnosis(es): The primary encounter diagnosis was Lactic acidosis. Diagnoses of Generalized abdominal pain, Non-intractable vomiting with nausea, unspecified vomiting type, Acute cystitis with hematuria, Septicemia ([address] Perforated bowel ([address] Stercoral colitis, and DEJAN (acute kidney injury) ([facility] [address] were also pertinent to this visit. Assessment   Assessment: Pt seen as cotx with OT to progress functional mobility safely and maximize functional outcomes compared to 1:1 session. Pt required max Ax2 for B rolling in bed. Unable to sit at EOB today to RN arrival to give pt blood due to low Hg. Required mod Ax2 to perform heel slide and hip abduction today due to increased fatigue. Activity Tolerance: Patient tolerated treatment well;Patient limited by fatigue;Patient limited by endurance; Patient limited by pain  Equipment Needed: No     Plan    Plan  Plan: 3-5 times per week  Current Treatment Recommendations: Strengthening;Balance training;Functional mobility training;Transfer training; Endurance training;Neuromuscular re-education;Gait training;Pain management; Safety education & training;Home exercise program;Patient/Caregiver education & training; Therapeutic activities; Equipment evaluation, education, & procurement     Restrictions  Restrictions/Precautions  Restrictions/Precautions: NPO, Up as Tolerated, Fall Risk  Position Activity Restriction  Other position/activity restrictions: colostomy, R IJ, abdominal drain, telemetry; No BP, IV sticks RUE     Subjective    Subjective  Subjective: Pt. laying in bed upon arrival, agreeable to PT tx. States 70/10 back pain and that she is very tired.   Pain: Reports 7/10 back pain     Objective   Vitals  Comment: Vitals: /69, HR 80, O2 95%  Bed Mobility Training  Bed Mobility Training: Yes  Overall Level of Assistance: Maximum assistance; Additional time;Assist X2  Rolling: Maximum assistance; Additional time;Assist X2  Balance  Sitting:  (unable to sit at EOB today, RN to give blood to pt.)  Transfer Training  Transfer Training: No  Gait Training  Gait Training: No  Wheelchair Management  Wheelchair Management: No     PT Exercises  Exercise Treatment: Pt perform AROM ankle pumps x10,  AAROM heel slides x8 with mod A, hip abduction x10 with mod A. Safety Devices  Type of Devices: All rosina prominences offloaded;Left in bed;Call light within reach;Nurse notified; Bed alarm in place (RN room when leaving)  Restraints  Restraints Initially in Place: No       Goals  Short Term Goals  Time Frame for Short term goals: 10 days 10/3/22 unless otherwise noted--9/27 All goals progressing  Short term goal 1: Pt will perform supine to sit with mod(A)x2  Short term goal 2: Pt will tolerate transfer from bed to chair  Short term goal 3: Pt will tolerate sitting EOB x7 minutes with mod(A)  Short term goal 4: Pt will perform 12-15 reps of BLE exercises to improve mobility for transfers by 9/26/22  Patient Goals   Patient goals : none stated by pt    Education  Patient Education  Education Given To: Patient  Education Provided: Role of Therapy;Plan of Care;Home Exercise Program  Education Provided Comments: Pt educated on importance of EOB activity and exercises performed this date  Education Method: Verbal;Demonstration  Barriers to Learning: Cognition  Education Outcome: Continued education needed    [facility] 6 Clicks Inpatient Mobility:  AM-PAC Mobility Inpatient   How much difficulty turning over in bed?: A Lot  How much difficulty sitting down on / standing up from a chair with arms?: Unable  How much difficulty moving from lying on back to sitting on side of bed?: Unable  How much help from another person moving to and from a bed to a chair?: Total  How much help from another person needed to walk in hospital room?: Total  How much help from another person for climbing 3-5 steps with a railing?: Total  AM-PAC Inpatient Mobility Raw Score : 7  AM-PAC Inpatient T-Scale Score : 26.42  Mobility Inpatient CMS 0-100% Score: 92.36  Mobility Inpatient CMS G-Code Modifier : CM    Therapy Time   Individual Concurrent Group Co-treatment   Time In 1050         Time Out 1113         Minutes 23         Timed Code Treatment Minutes: [address] [facility] [place],[address], PT, DPT    If pt is unable to be seen after this session, please let this note serve as discharge summary. Please see case management note for discharge disposition. Thank you.

## 2022-09-28 NOTE — PROGRESS NOTES
Shift assessment complete and charted. Patient resting with eyes closed and has no needs at this time. VSS. 1 unit of blood given throughout the day for HGB of 6.7. Blood finished infusing at 1515. HGB drawn at 1603 at 6.1 HGB redrawn at 1630, HGB level is at 7.0. HBG redrawn ordered for AM. Patient has complained of 4/10 generalized pain, PRN pain medication given per [facility]. Patient is high fall risk  Call light in reach  Bed in low position with wheels locked   Gripper socks on.

## 2022-09-28 NOTE — PROGRESS NOTES
Occupational Therapy  Facility/Department: [facility] C3 TELE/MED SURG/ONC  Daily Treatment Note  NAME: Belén Mckeon  : 1950  MRN: [id number]    Date of Service: 2022    Discharge Recommendations:  LTACH, [address]         Patient Diagnosis(es): The primary encounter diagnosis was Lactic acidosis. Diagnoses of Generalized abdominal pain, Non-intractable vomiting with nausea, unspecified vomiting type, Acute cystitis with hematuria, Septicemia ([address] Perforated bowel ([facility] [address] Stercoral colitis, and DEJAN (acute kidney injury) ([facility] [address] were also pertinent to this visit. Assessment    Assessment: Pt with fair tolerance of therapy session. Session limited to bed level due to hemoglobin 6.7 with RN initiating blood transfusion at conclusion of therapy session. Pt with improved alertness. Pt tolerates AAROM BUE exercises at bed level. Pt requiring Total A with LB ADLs at bed level. Pt requiring Max A for self feeding purree diet while upright seated in bed. Pt requiring assistance with scooping and bringing food to mouth 85% of the time. Pt requiring Max  Ax2 for rolling to right and left for pressure relief. OT assisting with positioning in bed to prevent pressure wounds, promote skin/joint integrity, promote comfort. Pt continues to demonstrate performance component deficits in balance, activity tolerance, strength, cognition. Recommend continued OT services to increase safety and independence with ADLs and functional transfers. Anticipate pt will require continued therapy servicesin LTACH vs SNF setting upon discharge. Activity Tolerance: Patient tolerated treatment well;Patient limited by fatigue;Patient limited by endurance; Patient limited by pain  Discharge Recommendations: LTACH;Subacute/Skilled [address]  Times per Week: 2-3x/ week  Current Treatment Recommendations: ROM; Positioning;Cognitive reorientation;Balance training;Patient/Caregiver education & training;Self-Care / ADL; Strengthening; Functional mobility training; Endurance training;Pain management; Safety education & training;Equipment evaluation, education, & procurement;Cognitive/Perceptual training     Restrictions  Restrictions/Precautions  Restrictions/Precautions: Up as Tolerated; Fall Risk;Modified Diet;Aspiration Risk  Position Activity Restriction  Other position/activity restrictions: colostomy, R IJ, BROOKS drain, telemetry; No BP, IV sticks RUE, aspiration, purree diet, up as tolerated    Subjective   Subjective  Subjective: Pt supine in bed upon therapy arrival. Pt reports fatigue and back pain. RN agreeable to therapy session; however session limited due to RN starting blood transfusion requiring pt returned to supine in bed. Pain: pt reporting 7/10 pain to back  Orientation  Overall Orientation Status: Impaired  Orientation Level: Oriented to person;Oriented to place; Disoriented to time;Disoriented to situation  Pain: Reports 7/10 back pain  Cognition  Overall Cognitive Status: Exceptions  Arousal/Alertness: Delayed responses to stimuli  Following Commands: Follows one step commands with increased time  Attention Span: Attends with cues to redirect  Memory: Decreased recall of recent events  Problem Solving: Assistance required to implement solutions  Insights: Not aware of deficits  Initiation: Requires cues for some  Sequencing: Requires cues for some        Objective    Vitals 95% SpO2, 73bpm, 115/62     Bed Mobility Training  Bed Mobility Training: Yes  Overall Level of Assistance: Maximum assistance; Additional time;Assist X2  Rolling: Maximum assistance; Additional time;Assist X2  Balance  Sitting:  (unable to sit at EOB today, RN to give blood to pt.)  Transfer Training  Transfer Training: No  Gait Training  Gait Training: No  Wheelchair Management  Wheelchair Management: No     ADL  Feeding: Pureed diet;Maximum assistance; Increased time to complete;Bringing food to mouth assist;Setup;Scoop assist  LE Dressing: Dependent/Total  LE Dressing Skilled Clinical Factors: don socks  Toileting: Dependent/Total  Toileting Skilled Clinical Factors: colostomy, total A at bed level  OT Exercises  A/AROM Exercises: Pt able to participate in AAROM for BUE therex 10x each: gross grasp/release, wrist flexion/extension, wrist pronation/supination, elbow flexion/extension,  Other Specialty Interventions  Other Treatments/Modalities: OT assists with positioning pt at conclusion of session in order to maintain skin integrity, prevent pressure wounds, promote comfort and joint interity. Pt positioned to left side with wedge cushion, pillow between knees with heels elevated, BUE support on pillows, head in neutral position. Safety Devices  Type of Devices: All rosina prominences offloaded;Left in bed;Call light within reach;Nurse notified; Bed alarm in place  Restraints  Restraints Initially in Place: No     Patient Education  Education Given To: Patient  Education Provided: Role of Therapy;Plan of Care;Precautions;Orientation  Education Provided Comments: Paulie Honer, rolling right and left, orientation, positioning to prevent pressure wounds and facilitate comfort.   Education Method: Verbal  Barriers to Learning: Cognition  Education Outcome: Continued education needed;Verbalized understanding    Goals  Short Term Goals  Time Frame for Short term goals: 2 weeks (10/07) unless noted  Short Term Goal 1: Participate in 2 grooming tasks with min A; 9/28 dependent  Short Term Goal 2: Perform UE exer 10-15x each for strength and endurance by 9/30; 9/28 dependent, tolerated 10 reps AAROM BUE hands/wrist/elbow  Short Term Goal 3: Sit EOB x3 min with min A in prep to transfer; 9/28 ongoing  Short Term Goal 4: Participate in functional transfer with mod x2 and AD/Lift; ongoing  Patient Goals   Patient goals : Pt did not provide       Therapy Time   Individual Concurrent Group Co-treatment   Time In 2079         Time Out 9124 Minutes 24         Timed Code Treatment Minutes: 24 Minutes     If pt is discharged prior to next OT session, this note will serve as the discharge summary.     Clara Lau OT

## 2022-09-28 NOTE — PROGRESS NOTES
Speech Language Pathology  Facility/Department: [facility] C3 TELE/MED SURG/ONC  Dysphagia Daily Treatment Note      Recommendations:   Initiate Pureed diet (IDDSI 4) with thin liquids (IDDSI 0), meds whole or crushed in puree, total feed assist, strict aspiration precautions, only feed when most alert  If pt has overt s/s of aspiration or change in respiratory status, recommend downgrade to NPO pending re-assessment by ST      NAME: Tracy Barron  : 1950  MRN: [id number]    Patient Diagnosis(es):   Patient Active Problem List    Diagnosis Date Noted    Colon perforation ([address] 2022    Acute respiratory failure with hypoxia ([address] 2022    Acute encephalopathy 2022    Moderate malnutrition ([address] 2022    Ischemic colitis ([address] 2022    S/P exploratory laparotomy 2022    Acute postoperative pulmonary insufficiency ([address] 2022    DKA (diabetic ketoacidosis) ([address] 2022    Hypotension 2022    Syncope 2022    Hyponatremia 2022    DEJAN (acute kidney injury) ([address] 2022    Abdominal pain 2022    Enteritis 2022    Septic shock ([address] 2022    Elevated troponin 2022    Leukocytosis 2022    Pyuria 2022    Lactic acidosis 2022    DM (diabetes mellitus), secondary uncontrolled ([address] 2022     Allergies: Allergies   Allergen Reactions    Latex     Lovastatin      Indigestion and Feels bad  Indigestion and Feels bad      Penicillins      Subjective: Pt seen upright in bed, alert and cooperative, RN OK'd SLP entry and therapy    Pain: no c/o pain    Current Diet: Diet NPO    Diet Tolerance:  Pt currently NPO    P.O. Trials:   Thin   x >4 oz via tsp (x4), cup (x1) and straw   Nectar / Mildly Thick       Honey / Moderately Thick       Pudding / Extremely Thick       Puree   x >1/2 container applesauce (x1 tsp with med whole in puree administered by RN)     Solid         Dysphagia Treatment and Impressions:  Pt on RA with O2 sats 92-94%, RR 21-24/min throughout tx session. Pt with eyes closed upon SLP entry, woke to SLP's voice. Pt able to provide , oriented to place. She required cues for month and year. Pt benefited from intermittent cues for adequate RAMBO, however, much improved when compared to previous tx sessions. Pt observed with significant trials of puree and TL (see above). X1 significantly delayed dry throat clear post-swallow with TL via straw across all trials. No coughing, throat clearing, wet vocal quality, or change in O2/RR throughout remainder of PO. Pt denied globus sensation post-swallow with PO.    Mildly reduced A-P bolus transit and lingual pumping observed with puree, adequate oral clearance. Pt appeared to fatigue with mastication and as PO trials/session progressed. Recommend upgrade to Pureed diet (IDDSI 4) with thin liquids (IDDSI 0), meds whole or crushed in puree. RN notified. SLP sent Perfect Serve message to MD regarding recs. ST to continue to follow. Dysphagia Goals:  Timeframe for Long-term Goals: 7 days, 10/3/22  Goal 1: The pt will tolerate safest and least restrictive diet without clinical s/s of aspiration or change in respiratory status. : ongoing, progressing. PO diet recommended this date. Short-term Goals  Timeframe for Short-term Goals: 5 days, 10/1/22  1) The patient will tolerate recommended diet without observed clinical signs of aspiration. : ongoing, see above  2) The patient/caregiver will demonstrate understanding of compensatory strategies for improved swallowing safety. : ongoing, needs continued reinforcement  3) The patient will tolerate repeat bedside swallowing evaluation when able. : goal met  4) The patient will tolerate instrumental swallowing procedure.  : will continue to monitor for need    Speech/Language/Cog Goals: n/a    Recommendations:   Initiate Pureed diet (IDDSI 4) with thin liquids (IDDSI 0), meds whole or crushed in puree, total feed assist, strict aspiration precautions, only feed when most alert  If pt has overt s/s of aspiration or change in respiratory status, recommend downgrade to NPO pending re-assessment by ST    Patient/Family/Caregiver Education:  SLP re: role of ST, rationale for PO trials, aspiration precautions. Pt needs continued reinforcement. RN notified of recs. Compensatory Strategies:   HOB 90* and 30\" after meals; small bites/sips; alternate solids/liquids every 3-5 bites; oral care after every meal; total assist feed    Plan:    Continued Dysphagia treatment with goals per plan of care. Discharge Recommendations: per PT/OT recs    If pt discharges from hospital prior to Speech/Swallowing discharge, this note serves as tx and discharge summary.      Total Treatment Time / Charges     Time in Time out Total Time / units   Cognitive Tx         Speech Tx      Dysphagia Tx 0877 0843 30 min / 1 unit     Signature:  Serge Little M.S. Logan Lauren  Speech-language pathologist  [phone]

## 2022-09-28 NOTE — PROGRESS NOTES
[facility] GENERAL SURGERY    Surgery Progress Note           POD # 13    PATIENT NAME: Amos Zhang     TODAY'S DATE: 9/28/2022    INTERVAL HISTORY:   Speech therapy recs noted - pt taking some pureed foods today. Receiving unit of blood today. OBJECTIVE:   VITALS:  BP (!) 148/76   Pulse 71   Temp 98.2 °F (36.8 °C) (Axillary)   Resp 16   Ht 5' 9\" (1.753 m)   Wt 190 lb 7.6 oz (86.4 kg)   SpO2 96%   BMI 28.13 kg/m²     INTAKE/OUTPUT:    I/O last 3 completed shifts: In: 0   Out: 1136 [Urine:1; Drains:360; Stool:775]  I/O this shift:  In: -   Out: 385 [Drains:135; Stool:250]              CONSTITUTIONAL: lethargic  ABDOMEN: soft, non-distended, non-tender, stoma with gas and stool, froy purulent   INCISION: clean, dry, no drainage    Data:  CBC:   Recent Labs     09/26/22  0543 09/27/22  1030 09/28/22  0532   WBC 10.3 9.8 11.6*   HGB 7.6* 7.1* 6.7*   HCT 23.1* 22.1* 21.1*    211 195       BMP:    Recent Labs     09/26/22  0543 09/27/22  1030 09/28/22  0532   * 133* 134*   K 5.0 5.5* 4.9   CL 96* 98* 99   CO2 22 20* 20*   BUN 78* 100* 57*   CREATININE 3.4* 4.5* 3.1*   GLUCOSE 247* 195* 122*       Hepatic: No results for input(s): AST, ALT, ALB, BILITOT, ALKPHOS in the last 72 hours. Mag:      Recent Labs     09/26/22  0543   MG 2.30        Phos:     Recent Labs     09/26/22  0543   PHOS 3.1        INR:   Recent Labs     09/27/22  1030   INR 1.24*       ASSESSMENT AND PLAN:  67 y.o. female status post exploratory laparotomy, sigmoid colectomy, colostomy formation    GI/Nutrition: continue with diet as per speech recs - follow calorie count - PEG if does not take in adequate PO, which is doubtful  Monitor froy output - will likely need repeat CT in several days  PT/OT      Electronically signed by BRAEDEN Domínguez CNP       Patient seen and agree with above and more than half of the total time was spent by me on the encounter.      Jalyn Marshall MD

## 2022-09-28 NOTE — PROGRESS NOTES
Interval History and plan:       Got dialysis yesterday  Unable to remove fluid yesterday  Alert and oriented  Has a tunneled catheter now  Plan for doing CT scan of the abdomen    Plan:    Resume midodrine  May need extra midodrine  During dialysis  Will plan on dialysis tomorrow                             Assessment :     Acidosis  Severe  Requiring 2 vasopressors  Blood pressure okay with the 2 vasopressors but lactic is a still going up to 19 concerning for ischemia   /Possible metformin induced lactic acidosis       CKD Stage IIIb with DEJAN  Creatinine 2.1 on consult  Creatinine 1.6 on 7/22  Likely due to diabetes, hypertension  Renal imaging-normal in the past      hypotension  BP: (106-124)/(62-70)  Heart Rate:  [75]   BP goal inpatient [id number] systolic inpatient  Pressures at the time of consult  Normally hypertensive      [facility] would like to thank Ayanna Suero MD   for opportunity to serve this patient      Please call with questions at-   24 Hrs Answering service [phone] or  7 am- 5 pm via Perfect serve or cell phone  Soraya Saul MD          CC/reason for consult :       DEJAN     HPI :     Mancel Husbands is a 67 y.o. female presented to   the hospital on 9/14/2022 with lactic acidosis. She is known to have diabetes and on metformin to 2 days ago  Has constipation and with multiple bowel regimen has had good bowel movement yesterday following that she has syncope. EMS was called and was found to have blood pressure of 50 systolic given IV fluids brought to the emergency room blood pressure was normal initially but later developed hypotension got 3 L of fluid and now on 2 vasopressors and in ICU. She also has severe acidosis with very high lactate. CT scan was normal initially. We are consulted for DEJAN on CKD and related issues.     On CKD and also severe lactic acidosis    ROS:     Seen with- sister    RN         PMH/PSH/SH/Family History:     Past Medical History:   Diagnosis Date    Chronic kidney disease     Diabetes mellitus ([facility] [address]     Hyperlipidemia     Hypertension     Neuropathy        Past Surgical History:   Procedure Laterality Date    LAPAROTOMY N/A 9/15/2022    DIAGNOSTIC LAPAROSCOPY, EXPLORATORY LAPAROTOMY, SIGMOID COLECTOMY AND COLOSTOMY performed by Paz Truong MD at [facility]        reports that she has never smoked. She has never used smokeless tobacco. She reports that she does not currently use alcohol. She reports that she does not use drugs. family history is not on file.          Medication:     Current Facility-Administered Medications: 0.9 % sodium chloride infusion, , IntraVENous, PRN  ceFAZolin (ANCEF) 2000 mg in dextrose 5 % 100 mL IVPB, 2,000 mg, IntraVENous, Once  rosuvastatin (CRESTOR) tablet 5 mg, 5 mg, Oral, Daily  acetaminophen (TYLENOL) 160 MG/5ML solution 650 mg, 650 mg, Oral, Q4H PRN  insulin glargine (LANTUS) injection vial 20 Units, 20 Units, SubCUTAneous, Nightly  0.9 % sodium chloride bolus, 500 mL, IntraVENous, Once  insulin lispro (HUMALOG) injection vial 0-16 Units, 0-16 Units, SubCUTAneous, Q4H  heparin (porcine) injection 3,200 Units, 3,200 Units, IntraCATHeter, PRN  prochlorperazine (COMPAZINE) injection 5 mg, 5 mg, IntraVENous, Q6H PRN  potassium chloride 20 mEq/50 mL IVPB (Central Line), 20 mEq, IntraVENous, PRN  magnesium sulfate 1000 mg in dextrose 5% 100 mL IVPB, 1,000 mg, IntraVENous, PRN  sodium chloride flush 0.9 % injection 5-40 mL, 5-40 mL, IntraVENous, PRN  0.9 % sodium chloride infusion, 25 mL, IntraVENous, PRN  ondansetron (ZOFRAN) injection 4 mg, 4 mg, IntraVENous, Q10 Min PRN  glucose chewable tablet 16 g, 4 tablet, Oral, PRN  dextrose bolus 10% 125 mL, 125 mL, IntraVENous, PRN **OR** dextrose bolus 10% 250 mL, 250 mL, IntraVENous, PRN  glucagon (rDNA) injection 1 mg, 1 mg, SubCUTAneous, PRN  dextrose 10 % infusion, , IntraVENous, Continuous PRN  sodium chloride flush 0.9 % injection 5-40 mL, 5-40 mL, IntraVENous, PRN  0.9 % sodium chloride infusion, , IntraVENous, PRN  polyethylene glycol (GLYCOLAX) packet 17 g, 17 g, Oral, Daily PRN  pantoprazole (PROTONIX) injection 40 mg, 40 mg, IntraVENous, Daily       Vitals :     Vitals:    09/28/22 0745   BP: 106/62   Pulse: 75   Resp: 16   Temp: 97.7 °F (36.5 °C)   SpO2: 92%       I & O :       Intake/Output Summary (Last 24 hours) at 9/28/2022 3992  Last data filed at 9/28/2022 0539  Gross per 24 hour   Intake 0 ml   Output 835 ml   Net -835 ml          Physical Examination :     General appearance: confused,on NC  HEENT: Lips- normal, teeth- ok , oral mucosa- moist  Neck : Mass- no, appears symmetrical, JVD- not visible  Respiratory: Respiratory effort-  comfortable on oxygen, wheeze- no, crackles - no  Cardiovascular:  Ausculation- No M/R/G, Edema none  Abdomen: visible mass- no, distention- no, scar- no, tenderness- no                            hepatosplenomegaly-  No--  Musculoskeletal:  clubbing no,cyanosis- no , digital ischemia- no                           muscle strength- patient unable to co-operate     , tone - patient unable to co-operate   Skin: rashes- no , ulcers- no, induration- no, tightening - no  Psychiatric:  confused   Additional findings:         LABS:     Recent Labs     09/26/22  0543 09/27/22  1030 09/28/22  0532   WBC 10.3 9.8 11.6*   HGB 7.6* 7.1* 6.7*   HCT 23.1* 22.1* 21.1*    211 195       Recent Labs     09/26/22  0543 09/27/22  1030 09/28/22  0532   * 133* 134*   K 5.0 5.5* 4.9   CL 96* 98* 99   CO2 22 20* 20*   BUN 78* 100* 57*   CREATININE 3.4* 4.5* 3.1*   GLUCOSE 247* 195* 122*   MG 2.30  --   --    PHOS 3.1  --   --

## 2022-09-28 NOTE — PROGRESS NOTES
Hospitalist Progress Note      PCP: Mario Gaines DO,     Date of Admission: 9/14/2022    Chief Complaint: syncope       Hospital course   Patient was admitted with a syncope. Noted to have perforated ischemic colitis. Treated for septic shock. Acute renal failure did not improve and patient became dialysis dependent. Mental status slightly better after narcotics weaned down. PEG plans are currently on hold. Subjective  Sleeping, arousable. No chest pain, no shortness of breath, no nausea or vomiting. Medications:  Reviewed    Infusion Medications    sodium chloride      sodium chloride 25 mL (09/22/22 2024)    dextrose      sodium chloride 100 mL/hr at 09/21/22 2122     Scheduled Medications    ceFAZolin  2,000 mg IntraVENous Once    rosuvastatin  5 mg Oral Daily    insulin glargine  20 Units SubCUTAneous Nightly    sodium chloride  500 mL IntraVENous Once    insulin lispro  0-16 Units SubCUTAneous Q4H    pantoprazole  40 mg IntraVENous Daily     PRN Meds: sodium chloride, diatrizoate meglumine-sodium, acetaminophen, heparin (porcine), prochlorperazine, potassium chloride, magnesium sulfate, sodium chloride flush, sodium chloride, ondansetron, glucose, dextrose bolus **OR** dextrose bolus, glucagon (rDNA), dextrose, sodium chloride flush, sodium chloride, polyethylene glycol      Intake/Output Summary (Last 24 hours) at 9/28/2022 1418  Last data filed at 9/28/2022 1408  Gross per 24 hour   Intake 0 ml   Output 1170 ml   Net -1170 ml         Physical Exam Performed:    BP (!) 148/76   Pulse 71   Temp 98.2 °F (36.8 °C) (Axillary)   Resp 16   Ht 5' 9\" (1.753 m)   Wt 190 lb 7.6 oz (86.4 kg)   SpO2 96%   BMI 28.13 kg/m²     General appearance: No apparent distress, appears stated age. HEENT: Pupils equal, round, and reactive to light. Conjunctivae/corneas clear. Neck: Supple, with full range of motion. No jugular venous distention. Trachea midline.   Respiratory:  Normal respiratory effort. Clear to auscultation, bilaterally without Rales/Wheezes/Rhonchi. Diminished throughout. Cardiovascular: Regular rate and rhythm with normal S1/S2 without murmurs, rubs or gallops. Abdomen: Soft, mild diffuse tenderness, non-distended with normal bowel sounds. Musculoskeletal: No clubbing, cyanosis. 1+ BLE pitting edema. Full range of motion without deformity. Skin: Skin color, texture, turgor normal.  Incisions c/d/I. Neurologic:  Neurovascularly intact without any focal sensory/motor deficits. Cranial nerves: II-XII intact, grossly non-focal.  Psychiatric: somnolent, partially oriented, does not have insight. Capillary Refill: Brisk, 3 seconds, normal   Peripheral Pulses: +2 palpable, equal bilaterally     No significant changes compared to what was reported from yesterday      Labs:   Recent Labs     09/26/22  0543 09/27/22  1030 09/28/22  0532   WBC 10.3 9.8 11.6*   HGB 7.6* 7.1* 6.7*   HCT 23.1* 22.1* 21.1*    211 195       Recent Labs     09/26/22  0543 09/27/22  1030 09/28/22  0532   * 133* 134*   K 5.0 5.5* 4.9   CL 96* 98* 99   CO2 22 20* 20*   BUN 78* 100* 57*   CREATININE 3.4* 4.5* 3.1*   CALCIUM 7.7* 7.8* 7.8*   PHOS 3.1  --   --        No results for input(s): AST, ALT, BILIDIR, BILITOT, ALKPHOS in the last 72 hours. Recent Labs     09/27/22  1030   INR 1.24*     No results for input(s): Labarnold Márquezs in the last 72 hours.     Urinalysis:      Lab Results   Component Value Date/Time    NITRU Negative 09/14/2022 12:08 PM    WBCUA 21-50 09/14/2022 12:08 PM    BACTERIA Rare 09/14/2022 12:08 PM    RBCUA None seen 09/14/2022 12:08 PM    BLOODU Negative 09/14/2022 12:08 PM    SPECGRAV <=1.005 09/14/2022 12:08 PM    GLUCOSEU Negative 09/14/2022 12:08 PM       Radiology:  IR TUNNELED CVC PLACE WO SQ PORT/PUMP > 5 YEARS   Preliminary Result   Status post removal of temporary dialysis catheter followed by   fluoroscopically guided placement of right internal jugular tunneled dialysis   catheter as described above. XR CHEST PORTABLE   Final Result   Temporary dialysis catheter overlies the cavoatrial junction. 1. Again noted are bibasilar infiltrates and pleural effusions. Similar   appearance to the prior exam.         XR CHEST PORTABLE   Final Result   Airspace opacities at the bilateral lung bases, may be related to atelectasis   versus pneumonia. Mild bilateral pleural effusions. CT HEAD WO CONTRAST   Final Result   No acute intracranial abnormality. Fluid in the mastoids, with trace mucosal thickening in the sinuses         CT ABDOMEN PELVIS W IV CONTRAST   Final Result   Ostomy is now seen in the left lower quadrant. There is wall thickening of   the colon extending to the ostomy site, which is either due to the partially   contracted state of the colon or wall thickening from underlying colitis      Scattered fluid is seen in the pelvis, with a dominant collection on the   right that contains a small amount of gas internally. In the absence of   clinical signs of infection, this collection of fluid and gas is likely   postoperative. Increased pleural effusions with adjacent consolidation at the lung bases      Nonspecific thickening of the endometrial stripe. Recommend follow-up pelvic   ultrasound after the patient's acute symptoms have resolved. Mild fat stranding surrounds the bladder. Recommend correlation with   urinalysis. Gas is also seen in the bladder         XR CHEST PORTABLE   Final Result   1. Small left pleural effusion. 2.  Hazy right lower lobe airspace opacity which could represent atelectasis   or pneumonia.          XR ABDOMEN FOR NG/OG/NE TUBE PLACEMENT   Final Result   Tip of NG tube projects in the left upper quadrant in the region of the   gastric fundus         XR CHEST PORTABLE   Final Result   Interval placement of a right-sided central venous catheter which extends   into the inferior aspect of the right atrium, approximately 5 cm beyond the   cavoatrial junction. XR CHEST PORTABLE   Final Result   Right IJ CVC catheter tip overlies the SVC at the level of the thoracic inlet. Endotracheal tube tip is 5.3 cm above the mark. Mild bibasilar airspace opacities, which could represent as atelectasis   and/or infiltrate. Possible small right pleural effusion. CT ABDOMEN PELVIS WO CONTRAST Additional Contrast? None   Final Result   1. Limited evaluation of the GI tract on this noncontrast exam.  Improved   mucosal changes of the duodenum and proximal jejunum that were described on   prior CT. 2. Severe inflammatory change in the right lower quadrant with etiology   uncertain. This could correspond to enteritis of the distal ileum and   terminal ileum. Colitis may also be considered. Infectious or inflammatory   etiologies may be favored. 3. Dense stool and diffuse mucosal thickening of the distal colon which may   represent a pattern of colitis. 4. Small right pleural effusion with airspace changes in the right lower   lobe, new from prior exam.   5. Evidence of chronic liver disease with a small amount of ascites stable. CT ABDOMEN PELVIS WO CONTRAST Additional Contrast? None   Final Result   1. Acute enteritis involving the duodenum and jejunal loops with thickening   of the loops and edema. No evidence of bowel obstruction. 2. Constipation with significant stool impaction in the rectum. 3. Mild ascites mostly around the liver and spleen. 4. Adequate position of Osorio catheter in the urinary bladder. XR CHEST PORTABLE   Final Result   Placement of a right internal jugular central venous catheter without evident   complication. The tip is at approximately the cavoatrial junction.       No acute findings in the chest.         CT ABDOMEN PELVIS W IV CONTRAST Additional Contrast? Oral    (Results Pending)           Assessment/Plan:    Active Hospital Problems Diagnosis     Colon perforation (HCC) [K63.1]      Priority: Medium    Acute respiratory failure with hypoxia (HCC) [J96.01]      Priority: Medium    Acute encephalopathy [G93.40]      Priority: Medium    Moderate malnutrition (HCC) [E44.0]      Priority: Medium    Ischemic colitis ([address] [K55.9]      Priority: Medium    S/P exploratory laparotomy [Z98.890]      Priority: Medium    Acute postoperative pulmonary insufficiency (HCC) [J95.2]      Priority: Medium    Syncope [R55]      Priority: Medium    Hyponatremia [E87.1]      Priority: Medium    Abdominal pain [R10.9]      Priority: Medium    Enteritis [K52.9]      Priority: Medium    Elevated troponin [R77.8]      Priority: Medium    DEJAN (acute kidney injury) ([facility] [N17.9]      Priority: Medium    DM (diabetes mellitus), secondary uncontrolled ([facility] [E13.65]      Priority: Medium       PLAN:    Ischemic sigmoid colitis. Likely due to constipation and mesenteric atherosclerosis. S/p resection, now with colostomy. Peritonitis and septic shock have resolved, completed antibiotic course    Hypotension. Septic shock resolved, weaned off pressor. Usually on antihypertensives. Currently hypotension is a bigger problem. DEJAN on CKD3,   Currently dialysis dependent. Nephrology input appreciated     Dysphagia. Due to encephalopathy. Required NG tube feeds for a number of days. Then surgery removed the NG on 9/26 in an attempt to improve her chances of passing a swallow eval.  If she doesn't make progress toward swallowing in the next few days then family will consent to a PEG tube. Currently supportive approach is preferred      DVT Prophylaxis: SCDs  Diet: ADULT DIET;  Dysphagia - Pureed; 4 carb choices (60 gm/meal)  Code Status: Full Code  PT/OT Eval Status: rec'd LTAC    Dispo -at this point disposition time and location is unclear    Appropriate for A1 Discharge Unit: Dasha Cisneros MD

## 2022-09-28 NOTE — CARE COORDINATION
Hospital day 14: Patient on C3 care managed by IM, General Surgery, and Nephrology. Patient with SNF recs accepted at the [address] [place] [address] ( will need Rapid COVID and no precert) - The [address] [place] [address] working on transport to HD. Patient with new HD need accepted at [facility],F 1045 chair time. Patient with new ostomy- followed by Wound care. Patient diet upgraded this date, pending intake no PEG vs PEG. Patient to get PRBC this date. SW following. CANDE Sky

## 2022-09-29 ENCOUNTER — APPOINTMENT (OUTPATIENT)
Dept: CT IMAGING | Age: 72
DRG: 853 | End: 2022-09-29
Payer: MEDICARE

## 2022-09-29 LAB
ANION GAP SERPL CALCULATED.3IONS-SCNC: 13 MMOL/L (ref 3–16)
BASOPHILS ABSOLUTE: 0 K/UL (ref 0–0.2)
BASOPHILS RELATIVE PERCENT: 0.2 %
BLOOD BANK DISPENSE STATUS: NORMAL
BLOOD BANK PRODUCT CODE: NORMAL
BPU ID: NORMAL
BUN BLDV-MCNC: 69 MG/DL (ref 7–20)
CALCIUM SERPL-MCNC: 7.5 MG/DL (ref 8.3–10.6)
CHLORIDE BLD-SCNC: 97 MMOL/L (ref 99–110)
CO2: 18 MMOL/L (ref 21–32)
CREAT SERPL-MCNC: 4 MG/DL (ref 0.6–1.2)
DESCRIPTION BLOOD BANK: NORMAL
EOSINOPHILS ABSOLUTE: 0.1 K/UL (ref 0–0.6)
EOSINOPHILS RELATIVE PERCENT: 0.5 %
GFR AFRICAN AMERICAN: 13
GFR NON-AFRICAN AMERICAN: 11
GLUCOSE BLD-MCNC: 103 MG/DL (ref 70–99)
GLUCOSE BLD-MCNC: 126 MG/DL (ref 70–99)
GLUCOSE BLD-MCNC: 129 MG/DL (ref 70–99)
GLUCOSE BLD-MCNC: 172 MG/DL (ref 70–99)
GLUCOSE BLD-MCNC: 76 MG/DL (ref 70–99)
GLUCOSE BLD-MCNC: 98 MG/DL (ref 70–99)
HCT VFR BLD CALC: 20.4 % (ref 36–48)
HCT VFR BLD CALC: 30.4 % (ref 36–48)
HEMOGLOBIN: 6.3 G/DL (ref 12–16)
HEMOGLOBIN: 9.9 G/DL (ref 12–16)
INR BLD: 1.37 (ref 0.87–1.14)
LYMPHOCYTES ABSOLUTE: 0.7 K/UL (ref 1–5.1)
LYMPHOCYTES RELATIVE PERCENT: 5.4 %
MCH RBC QN AUTO: 26.2 PG (ref 26–34)
MCHC RBC AUTO-ENTMCNC: 30.9 G/DL (ref 31–36)
MCV RBC AUTO: 84.7 FL (ref 80–100)
MONOCYTES ABSOLUTE: 0.6 K/UL (ref 0–1.3)
MONOCYTES RELATIVE PERCENT: 4.3 %
NEUTROPHILS ABSOLUTE: 11.7 K/UL (ref 1.7–7.7)
NEUTROPHILS RELATIVE PERCENT: 89.6 %
PDW BLD-RTO: 15 % (ref 12.4–15.4)
PERFORMED ON: ABNORMAL
PERFORMED ON: NORMAL
PLATELET # BLD: 186 K/UL (ref 135–450)
PMV BLD AUTO: 8.6 FL (ref 5–10.5)
POTASSIUM REFLEX MAGNESIUM: 4.4 MMOL/L (ref 3.5–5.1)
PROTHROMBIN TIME: 16.7 SEC (ref 11.7–14.5)
RBC # BLD: 2.41 M/UL (ref 4–5.2)
SODIUM BLD-SCNC: 128 MMOL/L (ref 136–145)
WBC # BLD: 13 K/UL (ref 4–11)

## 2022-09-29 PROCEDURE — 87076 CULTURE ANAEROBE IDENT EACH: CPT

## 2022-09-29 PROCEDURE — 85025 COMPLETE CBC W/AUTO DIFF WBC: CPT

## 2022-09-29 PROCEDURE — 99024 POSTOP FOLLOW-UP VISIT: CPT | Performed by: SURGERY

## 2022-09-29 PROCEDURE — 6360000002 HC RX W HCPCS: Performed by: STUDENT IN AN ORGANIZED HEALTH CARE EDUCATION/TRAINING PROGRAM

## 2022-09-29 PROCEDURE — 2709999900 CT ABSCESS DRAINAGE W CATH PLACEMENT S&I

## 2022-09-29 PROCEDURE — 0W9G30Z DRAINAGE OF PERITONEAL CAVITY WITH DRAINAGE DEVICE, PERCUTANEOUS APPROACH: ICD-10-PCS | Performed by: RADIOLOGY

## 2022-09-29 PROCEDURE — 90935 HEMODIALYSIS ONE EVALUATION: CPT

## 2022-09-29 PROCEDURE — 80048 BASIC METABOLIC PNL TOTAL CA: CPT

## 2022-09-29 PROCEDURE — 36430 TRANSFUSION BLD/BLD COMPNT: CPT

## 2022-09-29 PROCEDURE — 97530 THERAPEUTIC ACTIVITIES: CPT

## 2022-09-29 PROCEDURE — 85014 HEMATOCRIT: CPT

## 2022-09-29 PROCEDURE — 2580000003 HC RX 258: Performed by: STUDENT IN AN ORGANIZED HEALTH CARE EDUCATION/TRAINING PROGRAM

## 2022-09-29 PROCEDURE — C9113 INJ PANTOPRAZOLE SODIUM, VIA: HCPCS | Performed by: INTERNAL MEDICINE

## 2022-09-29 PROCEDURE — 87070 CULTURE OTHR SPECIMN AEROBIC: CPT

## 2022-09-29 PROCEDURE — 97110 THERAPEUTIC EXERCISES: CPT

## 2022-09-29 PROCEDURE — 87075 CULTR BACTERIA EXCEPT BLOOD: CPT

## 2022-09-29 PROCEDURE — 87185 SC STD ENZYME DETCJ PER NZM: CPT

## 2022-09-29 PROCEDURE — 36415 COLL VENOUS BLD VENIPUNCTURE: CPT

## 2022-09-29 PROCEDURE — 2500000003 HC RX 250 WO HCPCS: Performed by: STUDENT IN AN ORGANIZED HEALTH CARE EDUCATION/TRAINING PROGRAM

## 2022-09-29 PROCEDURE — 6360000002 HC RX W HCPCS: Performed by: RADIOLOGY

## 2022-09-29 PROCEDURE — 6360000002 HC RX W HCPCS: Performed by: INTERNAL MEDICINE

## 2022-09-29 PROCEDURE — 6370000000 HC RX 637 (ALT 250 FOR IP): Performed by: INTERNAL MEDICINE

## 2022-09-29 PROCEDURE — 6370000000 HC RX 637 (ALT 250 FOR IP): Performed by: STUDENT IN AN ORGANIZED HEALTH CARE EDUCATION/TRAINING PROGRAM

## 2022-09-29 PROCEDURE — 85018 HEMOGLOBIN: CPT

## 2022-09-29 PROCEDURE — 85610 PROTHROMBIN TIME: CPT

## 2022-09-29 PROCEDURE — 1200000000 HC SEMI PRIVATE

## 2022-09-29 PROCEDURE — 87205 SMEAR GRAM STAIN: CPT

## 2022-09-29 PROCEDURE — 77012 CT SCAN FOR NEEDLE BIOPSY: CPT

## 2022-09-29 RX ORDER — SODIUM CHLORIDE 9 MG/ML
INJECTION, SOLUTION INTRAVENOUS PRN
Status: DISCONTINUED | OUTPATIENT
Start: 2022-09-29 | End: 2022-10-06 | Stop reason: HOSPADM

## 2022-09-29 RX ORDER — FENTANYL CITRATE 50 UG/ML
INJECTION, SOLUTION INTRAMUSCULAR; INTRAVENOUS
Status: COMPLETED | OUTPATIENT
Start: 2022-09-29 | End: 2022-09-29

## 2022-09-29 RX ORDER — CASTOR OIL AND BALSAM, PERU 788; 87 MG/G; MG/G
OINTMENT TOPICAL 2 TIMES DAILY
Status: DISCONTINUED | OUTPATIENT
Start: 2022-09-29 | End: 2022-10-06 | Stop reason: HOSPADM

## 2022-09-29 RX ORDER — MIDAZOLAM HYDROCHLORIDE 1 MG/ML
INJECTION INTRAMUSCULAR; INTRAVENOUS
Status: COMPLETED | OUTPATIENT
Start: 2022-09-29 | End: 2022-09-29

## 2022-09-29 RX ORDER — METRONIDAZOLE 500 MG/100ML
500 INJECTION, SOLUTION INTRAVENOUS EVERY 8 HOURS
Status: DISCONTINUED | OUTPATIENT
Start: 2022-09-29 | End: 2022-10-06 | Stop reason: HOSPADM

## 2022-09-29 RX ADMIN — INSULIN GLARGINE 20 UNITS: 100 INJECTION, SOLUTION SUBCUTANEOUS at 21:20

## 2022-09-29 RX ADMIN — FENTANYL CITRATE 25 MCG: 50 INJECTION INTRAMUSCULAR; INTRAVENOUS at 11:39

## 2022-09-29 RX ADMIN — ACETAMINOPHEN 650 MG: 650 SOLUTION ORAL at 09:23

## 2022-09-29 RX ADMIN — PANTOPRAZOLE SODIUM 40 MG: 40 INJECTION, POWDER, FOR SOLUTION INTRAVENOUS at 08:37

## 2022-09-29 RX ADMIN — CASTOR OIL AND BALSAM, PERU: 788; 87 OINTMENT TOPICAL at 21:21

## 2022-09-29 RX ADMIN — MIDAZOLAM 0.5 MG: 1 INJECTION INTRAMUSCULAR; INTRAVENOUS at 11:31

## 2022-09-29 RX ADMIN — ACETAMINOPHEN 650 MG: 650 SOLUTION ORAL at 18:34

## 2022-09-29 RX ADMIN — CEFTRIAXONE SODIUM 1000 MG: 1 INJECTION, POWDER, FOR SOLUTION INTRAMUSCULAR; INTRAVENOUS at 17:29

## 2022-09-29 RX ADMIN — METRONIDAZOLE 500 MG: 500 INJECTION, SOLUTION INTRAVENOUS at 17:27

## 2022-09-29 RX ADMIN — FENTANYL CITRATE 25 MCG: 50 INJECTION INTRAMUSCULAR; INTRAVENOUS at 11:31

## 2022-09-29 ASSESSMENT — PAIN SCALES - GENERAL
PAINLEVEL_OUTOF10: 3
PAINLEVEL_OUTOF10: 7
PAINLEVEL_OUTOF10: 10
PAINLEVEL_OUTOF10: 10

## 2022-09-29 ASSESSMENT — PAIN DESCRIPTION - LOCATION
LOCATION: BACK

## 2022-09-29 ASSESSMENT — PAIN DESCRIPTION - ORIENTATION
ORIENTATION: MID

## 2022-09-29 ASSESSMENT — PAIN DESCRIPTION - DESCRIPTORS
DESCRIPTORS: ACHING
DESCRIPTORS: ACHING

## 2022-09-29 NOTE — PROGRESS NOTES
[facility] GENERAL SURGERY    Surgery Progress Note           POD # 14    PATIENT NAME: Irlanda Snow     TODAY'S DATE: 9/29/2022    INTERVAL HISTORY:   Still somewhat drowsy this AM. Not very interactive during exam. Remains afebrile and HDS. Tolerated some PO intake yesterday. OBJECTIVE:   VITALS:  /69   Pulse 77   Temp 98.8 °F (37.1 °C) (Oral)   Resp 16   Ht 5' 9\" (1.753 m)   Wt 190 lb 7.6 oz (86.4 kg)   SpO2 94%   BMI 28.13 kg/m²     INTAKE/OUTPUT:    I/O last 3 completed shifts: In: 440.9 [P.O.:3; Blood:437.9]  Out: 875 [Drains:475; Stool:400]  No intake/output data recorded. CONSTITUTIONAL: lethargic  ABDOMEN: soft, non-distended, non-tender, stoma with gas and stool, froy purulent   INCISION: clean, dry, no drainage    Data:  CBC:   Recent Labs     09/27/22  1030 09/28/22  0532 09/28/22  1538 09/28/22  1636 09/29/22  0831   WBC 9.8 11.6*  --   --  13.0*   HGB 7.1* 6.7* 6.1* 7.0* 6.3*   HCT 22.1* 21.1* 19.4* 21.6* 20.4*    195  --   --  186       BMP:    Recent Labs     09/27/22  1030 09/28/22  0532 09/29/22  0831   * 134* 128*   K 5.5* 4.9 4.4   CL 98* 99 97*   CO2 20* 20* 18*   * 57* 69*   CREATININE 4.5* 3.1* 4.0*   GLUCOSE 195* 122* 98       Hepatic: No results for input(s): AST, ALT, ALB, BILITOT, ALKPHOS in the last 72 hours. Mag:      No results for input(s): MG in the last 72 hours. Phos:     No results for input(s): PHOS in the last 72 hours.      INR:   Recent Labs     09/27/22  1030 09/29/22  0831   INR 1.24* 1.37*         ASSESSMENT AND PLAN:  67 y.o. female status post exploratory laparotomy, sigmoid colectomy, colostomy formation    GI/Nutrition: continue with diet as per speech recs - follow calorie count - PEG if does not take in adequate PO, which is doubtful  IR drain placement today  Restart DVT ppx per primary given recent PRBC transfusion  Ok for diet after procedure  PT/OT      Electronically signed by Donna Solorio DO

## 2022-09-29 NOTE — CARE COORDINATION
Hospital day 15: Patient on C3 care managed by IM, General Surgery, and Nephrology. Patient IPTA, with SNF recs accepted at [facility]( will need rapid, per Aram Bosworth has transport secured to HD). Patient with BROOKS drain, new ostomy, new HD need [facility] MWF 10:45 chair time). Patient getting blood again this date, with abscess drainage, and HD. SW following. CANDE Cloud

## 2022-09-29 NOTE — PROGRESS NOTES
Assisted pt w/dinner. Pt took 3 very small bites and said \"that is enough\". Pt took 4 very small sips of liquid. Family at bedside.  Electronically signed by Diomedes Hurst RN on 9/29/2022 at 5:45 PM

## 2022-09-29 NOTE — PROGRESS NOTES
Inspected skin under sacral mepilex. SDTI noted to sacral/buttock area. Vianey RN at bedside as 2nd RN to assess skin. Cleansed buttocks with protective wipes and applied new sacral mepilex. Obtained wound care RN eval per protocol. Continue to turn pt every 2 hours and prn.

## 2022-09-29 NOTE — PROGRESS NOTES
Interval History and plan:     Lethargic again   Labs and BP overall stable  Though sodium is low at 128    Plan: Will do dialysis  May need albumin during dialysis                   Assessment :     Acidosis  Severe  Requiring 2 vasopressors  Blood pressure okay with the 2 vasopressors but lactic is a still going up to 19 concerning for ischemia   /Possible metformin induced lactic acidosis       CKD Stage IIIb with DEJAN  Creatinine 2.1 on consult  Creatinine 1.6 on 7/22  Likely due to diabetes, hypertension  Renal imaging-normal in the past      hypotension  BP: (123-137)/(69-70)  Heart Rate:  [77-94]   BP goal inpatient [phone] systolic inpatient  Pressures at the time of consult  Normally hypertensive      [facility] Nephrology would like to thank Pb Wang MD   for opportunity to serve this patient      Please call with questions at-   24 Hrs Answering service [phone] or  7 am- 5 pm via Perfect serve or cell phone  Caren Kendrick MD          CC/reason for consult :       DEJAN     HPI :     Adriel Aquino is a 67 y.o. female presented to   the hospital on 9/14/2022 with lactic acidosis. She is known to have diabetes and on metformin to 2 days ago  Has constipation and with multiple bowel regimen has had good bowel movement yesterday following that she has syncope. EMS was called and was found to have blood pressure of 50 systolic given IV fluids brought to the emergency room blood pressure was normal initially but later developed hypotension got 3 L of fluid and now on 2 vasopressors and in ICU. She also has severe acidosis with very high lactate. CT scan was normal initially. We are consulted for DEJAN on CKD and related issues.     On CKD and also severe lactic acidosis    ROS:     Seen with- sister    RN         PMH/PSH/SH/Family History:     Past Medical History:   Diagnosis Date    Chronic kidney disease     Diabetes mellitus ([facility]     Hyperlipidemia     Hypertension Neuropathy        Past Surgical History:   Procedure Laterality Date    LAPAROTOMY N/A 9/15/2022    DIAGNOSTIC LAPAROSCOPY, EXPLORATORY LAPAROTOMY, SIGMOID COLECTOMY AND COLOSTOMY performed by Eric Mathis MD at [facility] 1        reports that she has never smoked. She has never used smokeless tobacco. She reports that she does not currently use alcohol. She reports that she does not use drugs. family history is not on file.          Medication:     Current Facility-Administered Medications: 0.9 % sodium chloride infusion, , IntraVENous, PRN  0.9 % sodium chloride infusion, , IntraVENous, PRN  diatrizoate meglumine-sodium (GASTROGRAFIN) 66-10 % solution 12 mL, 12 mL, Oral, ONCE PRN  ceFAZolin (ANCEF) 2000 mg in dextrose 5 % 100 mL IVPB, 2,000 mg, IntraVENous, Once  rosuvastatin (CRESTOR) tablet 5 mg, 5 mg, Oral, Daily  acetaminophen (TYLENOL) 160 MG/5ML solution 650 mg, 650 mg, Oral, Q4H PRN  insulin glargine (LANTUS) injection vial 20 Units, 20 Units, SubCUTAneous, Nightly  0.9 % sodium chloride bolus, 500 mL, IntraVENous, Once  insulin lispro (HUMALOG) injection vial 0-16 Units, 0-16 Units, SubCUTAneous, Q4H  heparin (porcine) injection 3,200 Units, 3,200 Units, IntraCATHeter, PRN  prochlorperazine (COMPAZINE) injection 5 mg, 5 mg, IntraVENous, Q6H PRN  potassium chloride 20 mEq/50 mL IVPB (Central Line), 20 mEq, IntraVENous, PRN  magnesium sulfate 1000 mg in dextrose 5% 100 mL IVPB, 1,000 mg, IntraVENous, PRN  sodium chloride flush 0.9 % injection 5-40 mL, 5-40 mL, IntraVENous, PRN  0.9 % sodium chloride infusion, 25 mL, IntraVENous, PRN  ondansetron (ZOFRAN) injection 4 mg, 4 mg, IntraVENous, Q10 Min PRN  glucose chewable tablet 16 g, 4 tablet, Oral, PRN  dextrose bolus 10% 125 mL, 125 mL, IntraVENous, PRN **OR** dextrose bolus 10% 250 mL, 250 mL, IntraVENous, PRN  glucagon (rDNA) injection 1 mg, 1 mg, SubCUTAneous, PRN  dextrose 10 % infusion, , IntraVENous, Continuous PRN  sodium chloride flush 0.9 % injection 5-40 mL, 5-40 mL, IntraVENous, PRN  0.9 % sodium chloride infusion, , IntraVENous, PRN  polyethylene glycol (GLYCOLAX) packet 17 g, 17 g, Oral, Daily PRN  pantoprazole (PROTONIX) injection 40 mg, 40 mg, IntraVENous, Daily       Vitals :     Vitals:    09/29/22 0941   BP: 137/69   Pulse: 77   Resp:    Temp:    SpO2: 94%       I & O :       Intake/Output Summary (Last 24 hours) at 9/29/2022 1031  Last data filed at 9/29/2022 1020  Gross per 24 hour   Intake 440.92 ml   Output 375 ml   Net 65.92 ml          Physical Examination :     General appearance: confused,on NC  HEENT: Lips- normal, teeth- ok , oral mucosa- moist  Neck : Mass- no, appears symmetrical, JVD- not visible  Respiratory: Respiratory effort-  comfortable on oxygen, wheeze- no, crackles - no  Cardiovascular:  Ausculation- No M/R/G, Edema none  Abdomen: visible mass- no, distention- no, scar- no, tenderness- no                            hepatosplenomegaly-  No--  Musculoskeletal:  clubbing no,cyanosis- no , digital ischemia- no                           muscle strength- patient unable to co-operate     , tone - patient unable to co-operate   Skin: rashes- no , ulcers- no, induration- no, tightening - no  Psychiatric:  confused   Additional findings:         LABS:     Recent Labs     09/27/22  1030 09/28/22  0532 09/28/22  1538 09/28/22  1636 09/29/22  0831   WBC 9.8 11.6*  --   --  13.0*   HGB 7.1* 6.7* 6.1* 7.0* 6.3*   HCT 22.1* 21.1* 19.4* 21.6* 20.4*    195  --   --  186       Recent Labs     09/27/22  1030 09/28/22  0532 09/29/22  0831   * 134* 128*   K 5.5* 4.9 4.4   CL 98* 99 97*   CO2 20* 20* 18*   * 57* 69*   CREATININE 4.5* 3.1* 4.0*   GLUCOSE 195* 122* 98

## 2022-09-29 NOTE — PROGRESS NOTES
I have discussed with the patient the rationale for blood component transfusion; its benefits in treating or preventing fatigue, organ damage, or death; and its risk which includes mild transfusion reactions, rare risk of blood borne infection, or more serious but rare reactions. I have discussed the alternatives to transfusion, including the risk and consequences of not receiving transfusion. The patient had an opportunity to ask questions and had agreed to proceed with transfusion of blood components.         Electronically signed by Mariia Cruz MD on 9/29/2022 at 9:01 AM

## 2022-09-29 NOTE — OR NURSING
Time out completed prior to procedure. Pt was place supine on the CT table. Pt was placed on all cardiac monitoring. Pt placed on 2 L NC for sedation.

## 2022-09-29 NOTE — PROGRESS NOTES
RADIOLOGY:  Patient status post CT-guided placement of 10 [place] percutaneous drainage catheter into left sided abdominal fluid collection. Aspirated approximately 580 cc of blood tinged fluid from the collection with resultant significant decrease in size of the collection. Portion of the aspirate sent to the Laboratory for gram stain and culture/sensitivity. Patient tolerated the procedure well. Full report to follow.

## 2022-09-29 NOTE — PROGRESS NOTES
[id number] [facility] Wound Ostomy Continence Nurse  Follow-up Progress Note       NAME:  Rodrigo Cyr RECORD NUMBER:  [id number]  AGE:  67 y.o. GENDER:  female  :  1950  TODAY'S DATE:  2022    Subjective:  Lethargic at this time. Mumbles only. Daughter at bed side. 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 CT scan showed fluid collection, went to IR today for CT-guided placement of 10 Nepali percutaneous drainage catheter into left sided abdominal fluid collection. Aspirated approximately 580 cc of blood tinged fluid from the collection with resultant significant decrease in size of the collection. Portion of the aspirate sent to the Laboratory for gram stain and culture/sensitivity. NEW Colostomy:    Went to OR for Exploratory lap, sigmoid colectomy and colostomy on 9/15/22 by Dr Ramsey Moses. Stoma: measures 20 mm (1 3/8 inch) x 38 mm ( 1 1/2 inch). Oval, brown, protrudes but distal edge in a divot/crease from 300 pm to 600 pm.  She may need convex at a later time. For now use flat wafer with drainable bag. Appliance:  Coloplast RED 2 piece flat flange # [id number] with drainable pouch # [id number]. Paste ring around stoma prior to application of flange. Patient Goal of Care:  [x] Wound Healing  [] Odor Control   [] Palliative Care  [] Pain Control   [] Other:     Objective:  Lying in bed. New Drain left flank. BROOKS creamy white fliud. Colostomy appliance starting to leak.     /71   Pulse 80   Temp 98.5 °F (36.9 °C) (Oral)   Resp 18   Ht 5' 9\" (1.753 m)   Wt 190 lb 7.6 oz (86.4 kg)   SpO2 96%   BMI 28.13 kg/m²   Levar Risk Score: Levar Scale Score: 11  Assessment:  Stoma almost 100% red and moist now.  @ 5% slough on it. Lesli stoma intact. Junction intact. Drainage brown loose stool. New maroon wound right and left buttocks. New drain left flank post Aspiration in IR today. BROOKS Creamy white drainage. Measurements:  Wound 09/29/22 Buttocks Left;Right maroon (Active)   Wound Image   09/29/22 1235   Wound Etiology Deep tissue/Injury 09/29/22 1235   Dressing Status Clean;Dry; Intact 09/29/22 1235   Wound Cleansed Cleansed with saline 09/29/22 1235   Dressing/Treatment Foam 09/29/22 1235   Offloading for Diabetic Foot Ulcers Offloading ordered 09/29/22 1235   Dressing Change Due 09/29/22 09/29/22 1235   Wound Length (cm) 5.5 cm 09/29/22 1235   Wound Width (cm) 7 cm 09/29/22 1235   Wound Depth (cm) 0 cm 09/29/22 1235   Wound Surface Area (cm^2) 38.5 cm^2 09/29/22 1235   Wound Volume (cm^3) 0 cm^3 09/29/22 1235   Distance Tunneling (cm) 0 cm 09/29/22 1235   Tunneling Position ___ O'Clock 0 09/29/22 1235   Undermining Starts ___ O'Clock 0 09/29/22 1235   Undermining Ends___ O'Clock 0 09/29/22 1235   Undermining Maxium Distance (cm) 0 09/29/22 1235   Wound Assessment Purple/maroon 09/29/22 1235   Drainage Amount None 09/29/22 1235   Odor None 09/29/22 1235   Lesli-wound Assessment Blanchable erythema 09/29/22 1235   Margins Attached edges; Defined edges 09/29/22 1235   Number of days: 0     Right and left buttocks:         Incision 09/15/22 Abdomen Medial (Active)   Wound Image   09/29/22 1235   Dressing Status Other (Comment) 09/29/22 1235   Dressing Change Due 09/19/22 09/28/22 0835   Incision Cleansed Not Cleansed 09/28/22 0835   Dressing/Treatment Open to air 09/29/22 1235   Incision Length (cm) 15 09/29/22 1235   Incision Width (cm) 0 cm 09/29/22 1235   Incision Depth (cm) 0 cm 09/29/22 1235   Closure [place] Line 09/29/22 1235   Margins Approximated 09/29/22 1235   Incision Assessment Dry 09/29/22 1235   Drainage Amount None 09/29/22 1235   Drainage Description Serosanguinous 09/26/22 1947   Odor None 09/29/22 1235   Lesli-incision Assessment Intact; Blanchable erythema 09/29/22 1235   Number of days: 14     Colostomy LLQ (Active)   Stomal Appliance 2 piece 09/29/22 1238   Flange Size (inches) 2.25 Inches 09/29/22 1238   Stoma  Assessment Moist;Red 09/29/22 1238   Peristomal Assessment Clean, dry & intact 09/29/22 1238   Treatment Bag change;Site care;Stoma paste; Heat applied 09/29/22 1238   Stool Appearance Loose; Watery 09/29/22 1238   Stool Color Brown 09/29/22 1238   Stool Amount Small 09/29/22 1238   Output (mL) 100 ml 09/29/22 1238   Number of days: 13     Mid line abd staple line and colostomy:         09/29/22 1234   Closed/Suction Drain Right Abdomen Bulb   Placement Date: 09/15/22   Present on Admission/Arrival: No  Description (optional): BROOKS Drain  Inserted by: Dr. Jyoti Davies Number: 1  Orientation: Right  Location: Abdomen  Drain Tube Type: Bulb  Size : 19 Fr, 6.3mm  Tube Shape: Round  [address] .. Site Description Unable to view   Dressing Status Clean, dry & intact   Drainage Appearance Milky; Leonardo   Drain Status To bulb suction   Output (ml) 30 ml          09/29/22 1234   Closed/Suction Drain Left Abdomen   Placement Date/Time: 09/29/22 1137   Present on Admission/Arrival: No  Description (optional): exodus array multipurpose drainage catheter  Inserted by: maricarmen  Tube Number: 1  Orientation: Left  Location: Abdomen  Size : 10f 25cm   Site Description Unable to view   Dressing Status Clean, dry & intact   Drainage Appearance Pink tinged   Drain Status Open to gravity drainage   Output (ml) 225 ml       Intake/Output Summary (Last 24 hours) at 9/29/2022 1245  Last data filed at 9/29/2022 1238  Gross per 24 hour   Intake 440.92 ml   Output 1275 ml   Net -834.08 ml       Response to treatment:  Well tolerated by patient.      Pain Assessment:  Severity:  0 / 10  Quality of pain: N/A  Wound Pain Timing/Severity: none  Premedicated: Yes Just retuned from testing and lethargic from fentanyl and versed. Plan:   Plan of Wound Care: Wound 09/29/22 Buttocks Left;Right maroon-Dressing/Treatment: Foam (venelex ordered)    Dr Tiffanie Kapoor notified of new c3 acquire  DTI pressure injury and requested MD chart occurrence. Recommend:  Clean buttocks with normal saline. Apply Venelex bid to maroon wound on right and left buttocks. Notify wound care if wound opens. Wound care to follow. Follow low Levar protocol. Turn with wedge pillow. Call wound care for deterioration [phone] or call [phone] and leave message. Plan for Ostomy Care:   Appliance removed. Skin cleansed with warm water and patted dry. Stoma measured and pattern created as stoma smaller today. Stoma in divot distal inner edge. Paste ring placed around stoma then flange placed. This has allowed the bag to remain with a secure seal. Bag attached and closed. Ostomy care to follow. Call Ostomy care for problems with seal at [phone] or or call [phone] and leave message      Stoma Care - New colostomy - Patient to empty appliance when 1/3 to 1/2 full with assistance of the staff. Cleanse inside and outside of the drain spout prior to rolling closed. Change appliance every 3-5 days or 1-2 times a week. Call for problems with seal [phone]      Specialty Bed Required : Yes - ordered Sentech stage 4 with low air loss  [x] Low Air Loss   [x] Pressure Redistribution  [] Fluid Immersion  [] Bariatric  [] Total Pressure Relief  [] Other:     Current Diet: ADULT DIET; Dysphagia - Pureed; 4 carb choices (60 gm/meal)  Dietician consult:  Yes    Discharge Plan:  Placement for patient upon discharge: intermediate care facility   Patient appropriate for Outpatient [address]: Yes  Supplies given Yes   Samples requested No    Referrals:  []  following  [] [facility]  [] Supplies  [] Other    Patient/Caregiver Teaching: Daughter updated on new wound and she observed it.  Daughter observed colostomy change.   Written Instructions given to patient/family  Teaching provided:  [] Reviewed GI and A&P        [] Supplies  [] Pouch emptying      [x] Manipulate closure  [x] Routine Care         [] Comment  [] Pouch maintenance           Level of patient/caregiver understanding able to:   [] Indicates understanding       [] Needs reinforcement  [] Unsuccessful      [x] Verbal Understanding  [] Demonstrated understanding       [] No evidence of learning  [] Refused teaching         [] N/A       Electronically signed by Gilford Langdon, RN, MSN, Gina Vega on 9/29/2022 at 12:41 PM

## 2022-09-29 NOTE — PROGRESS NOTES
SLP Attempt Note        Name: Bucky Camilo  : 1950  Medical Diagnosis: Enteritis [K52.9]  Septicemia ([address] [A41.9]  Generalized abdominal pain [R10.84]  Acute cystitis with hematuria [N30.01]  Non-intractable vomiting with nausea, unspecified vomiting type [R11.2]    SLP attempted to complete dysphagia tx. Pt gone for dialysis. No charges filed. Thank you.        Dicky Landau CFY-ALEXX  Clinical Fellow  [facility] [id number]

## 2022-09-29 NOTE — OR NURSING
Image guided abscess drain insertion left completed. Dr. Ga Jennings placed 10 Turkmen 25 cm [employer] Exodus Array multipurpose drainage catheter LOT # [id number] EXP 2/28/2025 in the left. Drain/tube secured at the 22 cm line with sutures. 580 milliliters of pink/blood colored withdrawn immediately. Specimen sent to lab for culture. Drain/tube dressing clean, dry, and intact. Pt tolerated procedure without any signs or symptoms of distress. Vital signs stable. Report called to  RN. Pt transported back to [facility] in stable condition via bed by transport.      Medications  Versed: .5 mg  Fentanyl: 50 mcg    Vital Signs  Vitals:    09/29/22 1148   BP: (!) 106/51   Pulse: 69   Resp: 17   Temp:    SpO2: 95%    (vital signs in table format)    Post Tung  2 - Able to move 4 extremities voluntarily on command  1 - BP+/- 20-50mmHg of normal  1 - Arousable on calling  2 - Able to maintain oxygen saturation >92% on room air  2 - Able to breathe deeply and cough freely

## 2022-09-29 NOTE — PROGRESS NOTES
Physical Therapy  Facility/Department: [facility] C3 TELE/MED SURG/ONC  Daily Treatment Note  NAME: Tamara Carbajal  : 1950  MRN: [id number]    Date of Service: 2022    Discharge Recommendations:  Subacute/Skilled Nursing Facility   PT Equipment Recommendations  Equipment Needed: No  Other: TBD at SNF    Patient Diagnosis(es): The primary encounter diagnosis was Lactic acidosis. Diagnoses of Generalized abdominal pain, Non-intractable vomiting with nausea, unspecified vomiting type, Acute cystitis with hematuria, Septicemia ([address] Perforated bowel ([address] Stercoral colitis, and DEJAN (acute kidney injury) ([facility] [address] were also pertinent to this visit. Assessment   Assessment: Per RN, pt very lethargic and due to receive blood transfusion later today. Pt seen for bed exercises only this session with very limited participation noted. TotalA x 1 needed for scooting in bed with maxA x 1 for supine LE ther ex. Cont. to recommend SNF at D/C in light of current deficits. Activity Tolerance: Patient limited by fatigue; Other (comment); Treatment limited secondary to medical complications (treatment limited to bed activity only as pt is due to receive pRBC's later today)  Equipment Needed: No  Other: TBD at SNF     Plan    Plan: 2-3 times per week  Specific Instructions for Next Treatment: Progress ther ex and mobility as tolerated  Current Treatment Recommendations: Strengthening;Balance training;Functional mobility training;Transfer training; Endurance training;Neuromuscular re-education;Gait training;Pain management; Safety education & training;Home exercise program;Patient/Caregiver education & training; Therapeutic activities; Equipment evaluation, education, & procurement;Positioning     Restrictions  Restrictions/Precautions  Restrictions/Precautions: Up as Tolerated, Fall Risk, Modified Diet, Aspiration Risk, General Precautions  Position Activity Restriction  Other position/activity restrictions: colostomy, R IJ, BROOKS drain, telemetry. No BP or IV sticks to RUE. Aspiration precautions with puree diet. Subjective    Subjective: Pt in bed upon arrival and very sleepy and pale-appearing in bed. RN reports pt is due to receive a blood transfusion later today. Requests bed exercises only for pt today. Pain: Pt c/o 7/10 back pain at rest.  Overall Orientation Status: Impaired  Orientation Level: Oriented to person;Oriented to place; Disoriented to time;Disoriented to situation (pt thought it was October and could not state current year when asked)     Objective   Vitals  Heart Rate: 77  Heart Rate Source: Monitor  BP: 137/69  BP Location: Left lower arm  BP Method: Automatic  Patient Position: Semi fowlers  MAP (Calculated): 91.67  SpO2: 94 %  O2 Device: None (Room air)    Bed Mobility Training  Bed Mobility Training: No (RN requests no OOB activity today due to pt needing to receive pRBC's later today)  Scooting: Total assistance (to scoot up in bed)    PT Exercises  A/AROM Exercises: x 12 BLE: Ankle pumps, hip ABD/ADD, heel slides, hip IR/ER (exercises performed with maxA today)    Safety Devices  Type of Devices: All rosina prominences offloaded;Left in bed;Call light within reach;Nurse notified; Bed alarm in place; All fall risk precautions in place; Patient at risk for falls; Heels elevated for pressure relief     [facility] 6 Clicks Inpatient Mobility:  AM-PAC Mobility Inpatient   How much difficulty turning over in bed?: A Lot  How much difficulty sitting down on / standing up from a chair with arms?: Unable  How much difficulty moving from lying on back to sitting on side of bed?: Unable  How much help from another person moving to and from a bed to a chair?: Total  How much help from another person needed to walk in hospital room?: Total  How much help from another person for climbing 3-5 steps with a railing?: Total  AM-PAC Inpatient Mobility Raw Score : 7  AM-PAC Inpatient T-Scale Score : 26.42  Mobility Inpatient CMS 0-100% Score: 92.36  Mobility Inpatient CMS G-Code Modifier : CM    Goals  Short Term Goals  Time Frame for Short term goals: 10 days, 10/03/22, unless otherwise noted  Short term goal 1: Pt will perform supine to sit with mod(A) x 2  Short term goal 2: Pt will tolerate transfer from bed to chair with mod(A) x 2  Short term goal 3: Pt will tolerate sitting EOB x7 minutes with mod(A) x 1  Short term goal 4: Pt will perform 12-15 reps of BLE exercises to improve mobility for transfers by 9/26/22 - MET 9/29/22, goal ongoing to continue to build LE ROM/strength  Patient Goals   Patient goals : None stated by pt due to lethargy    Education  Patient Education  Education Given To: Patient  Education Provided: Role of Therapy;Plan of Care;Home Exercise Program  Education Method: Demonstration;Verbal  Barriers to Learning: Cognition (and lethargy)  Education Outcome: Unable to demonstrate understanding;Continued education needed    Therapy Time   Individual Concurrent Group Co-treatment   Time In 0930         Time Out 0955         Minutes 25         Timed Code Treatment Minutes: [address] DPT #[id number]    If pt is unable to be seen after this session, please let this note serve as discharge summary. Please see case management note for discharge disposition. Thank you.

## 2022-09-29 NOTE — PROGRESS NOTES
Treatment time: 3hrs    Net UF: 1000ml     Pre weight: 86.4kg  Post weight: 85.4kg    Access used: R Chest CVC  Access function: well     Medications or blood products given: 1 unit prbc    Regular outpatient schedule: tts    Summary of response to treatment: pt tolerated tx well     Copy of dialysis treatment record placed in chart, to be scanned into EMR.

## 2022-09-29 NOTE — CONSULTS
Consulted by Dr. Yarelis Stewart to dose antibiotics given patients kidney function    Ceftriaxone 1000 mg q24h  Metronidazole 500 mg q8h    No dose adjustment necessary for either    Thank you for the consult.     Olu DenneyD 9/29/2022 5:13 PM

## 2022-09-29 NOTE — OR NURSING
Pt arrived for image guided abscess drain insertion left . Procedure explained including the risk and benefits of the procedure. All questions answered. Pt verbalizes understanding of the procedure and states no more questions. Consent confirmed. Vital signs stable. Labs, allergies, medications, and code status reviewed. No contraindications noted.      Vital Signs  Vitals:    09/29/22 1117   BP: 132/62   Pulse: 77   Resp: 13   Temp:    SpO2: 92%    (vital signs in table format)    Pre Tung Score  2 - Able to move 4 extremities voluntarily on command  2 - BP+/- 20mmHg of normal  2 - Fully awake  2 - Able to maintain oxygen saturation >92% on room air  2 - Able to breathe deeply and cough freely    Allergies  Latex, Lovastatin, and Penicillins (allergies)    Labs  Lab Results   Component Value Date    INR 1.37 (H) 09/29/2022    PROTIME 16.7 (H) 09/29/2022     Lab Results   Component Value Date    CREATININE 4.0 (H) 09/29/2022    BUN 69 (H) 09/29/2022     (L) 09/29/2022    K 4.4 09/29/2022    CL 97 (L) 09/29/2022    CO2 18 (L) 09/29/2022     Lab Results   Component Value Date    WBC 13.0 (H) 09/29/2022    HGB 6.3 (LL) 09/29/2022    HCT 20.4 (LL) 09/29/2022    MCV 84.7 09/29/2022     09/29/2022

## 2022-09-29 NOTE — PROGRESS NOTES
Hospitalist Progress Note      PCP: Amy Quintero DO,     Date of Admission: 9/14/2022    Chief Complaint: syncope       Hospital course   Patient was admitted with a syncope. Noted to have perforated ischemic colitis. Treated for septic shock. Acute renal failure did not improve and patient became dialysis dependent. Mental status slightly better after narcotics weaned down. PEG plans are currently on hold. Noted lower h/h  Ct abdomen shows fluid collection. IR drainage today    Subjective  Sleeping, arousable. No chest pain, no shortness of breath, no nausea or vomiting. No changes from yesterday      Medications:  Reviewed    Infusion Medications    sodium chloride      sodium chloride      sodium chloride 25 mL (09/22/22 2024)    dextrose      sodium chloride 100 mL/hr at 09/21/22 2122     Scheduled Medications    ceFAZolin  2,000 mg IntraVENous Once    rosuvastatin  5 mg Oral Daily    insulin glargine  20 Units SubCUTAneous Nightly    sodium chloride  500 mL IntraVENous Once    insulin lispro  0-16 Units SubCUTAneous Q4H    pantoprazole  40 mg IntraVENous Daily     PRN Meds: sodium chloride, sodium chloride, diatrizoate meglumine-sodium, acetaminophen, heparin (porcine), prochlorperazine, potassium chloride, magnesium sulfate, sodium chloride flush, sodium chloride, ondansetron, glucose, dextrose bolus **OR** dextrose bolus, glucagon (rDNA), dextrose, sodium chloride flush, sodium chloride, polyethylene glycol      Intake/Output Summary (Last 24 hours) at 9/29/2022 0909  Last data filed at 9/29/2022 0600  Gross per 24 hour   Intake 440.92 ml   Output 725 ml   Net -284.08 ml         Physical Exam Performed:    /70   Pulse 94   Temp 98.8 °F (37.1 °C) (Oral)   Resp 16   Ht 5' 9\" (1.753 m)   Wt 190 lb 7.6 oz (86.4 kg)   SpO2 96%   BMI 28.13 kg/m²     General appearance: No apparent distress, appears stated age. HEENT: Pupils equal, round, and reactive to light.  Conjunctivae/corneas clear.  Neck: Supple, with full range of motion. No jugular venous distention. Trachea midline. Respiratory:  Normal respiratory effort. Clear to auscultation, bilaterally without Rales/Wheezes/Rhonchi. Diminished throughout. Cardiovascular: Regular rate and rhythm with normal S1/S2 without murmurs, rubs or gallops. Abdomen: Soft, mild diffuse tenderness, non-distended with normal bowel sounds. Musculoskeletal: No clubbing, cyanosis. 1+ BLE pitting edema. Full range of motion without deformity. Skin: Skin color, texture, turgor normal.  Incisions c/d/I. Neurologic:  Neurovascularly intact without any focal sensory/motor deficits. Cranial nerves: II-XII intact, grossly non-focal.  Psychiatric: sleeping, arousable, oriented when awake. Able to engage in short meaningful conversation  Capillary Refill: Brisk, 3 seconds, normal   Peripheral Pulses: +2 palpable, equal bilaterally         Labs:   Recent Labs     09/27/22  1030 09/28/22  0532 09/28/22  1538 09/28/22  1636 09/29/22  0831   WBC 9.8 11.6*  --   --  13.0*   HGB 7.1* 6.7* 6.1* 7.0* 6.3*   HCT 22.1* 21.1* 19.4* 21.6* 20.4*    195  --   --  186       Recent Labs     09/27/22  1030 09/28/22  0532 09/29/22  0831   * 134* 128*   K 5.5* 4.9 4.4   CL 98* 99 97*   CO2 20* 20* 18*   * 57* 69*   CREATININE 4.5* 3.1* 4.0*   CALCIUM 7.8* 7.8* 7.5*       No results for input(s): AST, ALT, BILIDIR, BILITOT, ALKPHOS in the last 72 hours. Recent Labs     09/27/22  1030 09/29/22  0831   INR 1.24* 1.37*       No results for input(s): [place] Bounds in the last 72 hours.     Urinalysis:      Lab Results   Component Value Date/Time    NITRU Negative 09/14/2022 12:08 PM    WBCUA 21-50 09/14/2022 12:08 PM    BACTERIA Rare 09/14/2022 12:08 PM    RBCUA None seen 09/14/2022 12:08 PM    BLOODU Negative 09/14/2022 12:08 PM    SPECGRAV <=1.005 09/14/2022 12:08 PM    GLUCOSEU Negative 09/14/2022 12:08 PM       Radiology:  CT ABDOMEN PELVIS W IV CONTRAST Additional Contrast? Oral   Final Result   1. Prior sigmoid colectomy with an end colostomy and rectal pouch. There are   persistent collections of fluid and air near the proximal aspect of the   rectal pouch measuring as much as 5.9 cm which raises the question of a leak   at the suture line. There are several new focal areas of fluid attenuation   that have a least partial rim enhancement and may reflect developing   abscesses with the largest in the left pericolic gutter and lateral to the   spleen measuring up to 20.9 cm.   2. Thickening of the distal descending colon suggesting colitis and   thickening of the rectal pouch which may reflect pouchitis. 3. Increased size of moderate bilateral pleural effusions with adjacent   consolidation lower lobes a could reflect associated atelectasis or pneumonia. IR TUNNELED CVC PLACE WO SQ PORT/PUMP > 5 YEARS   Preliminary Result   Status post removal of temporary dialysis catheter followed by   fluoroscopically guided placement of right internal jugular tunneled dialysis   catheter as described above. XR CHEST PORTABLE   Final Result   Temporary dialysis catheter overlies the cavoatrial junction. 1. Again noted are bibasilar infiltrates and pleural effusions. Similar   appearance to the prior exam.         XR CHEST PORTABLE   Final Result   Airspace opacities at the bilateral lung bases, may be related to atelectasis   versus pneumonia. Mild bilateral pleural effusions. CT HEAD WO CONTRAST   Final Result   No acute intracranial abnormality. Fluid in the mastoids, with trace mucosal thickening in the sinuses         CT ABDOMEN PELVIS W IV CONTRAST   Final Result   Ostomy is now seen in the left lower quadrant.   There is wall thickening of   the colon extending to the ostomy site, which is either due to the partially   contracted state of the colon or wall thickening from underlying colitis      Scattered fluid is seen in the pelvis, with a dominant collection on the   right that contains a small amount of gas internally. In the absence of   clinical signs of infection, this collection of fluid and gas is likely   postoperative. Increased pleural effusions with adjacent consolidation at the lung bases      Nonspecific thickening of the endometrial stripe. Recommend follow-up pelvic   ultrasound after the patient's acute symptoms have resolved. Mild fat stranding surrounds the bladder. Recommend correlation with   urinalysis. Gas is also seen in the bladder         XR CHEST PORTABLE   Final Result   1. Small left pleural effusion. 2.  Hazy right lower lobe airspace opacity which could represent atelectasis   or pneumonia. XR ABDOMEN FOR NG/OG/NE TUBE PLACEMENT   Final Result   Tip of NG tube projects in the left upper quadrant in the region of the   gastric fundus         XR CHEST PORTABLE   Final Result   Interval placement of a right-sided central venous catheter which extends   into the inferior aspect of the right atrium, approximately 5 cm beyond the   cavoatrial junction. XR CHEST PORTABLE   Final Result   Right IJ CVC catheter tip overlies the SVC at the level of the thoracic inlet. Endotracheal tube tip is 5.3 cm above the mark. Mild bibasilar airspace opacities, which could represent as atelectasis   and/or infiltrate. Possible small right pleural effusion. CT ABDOMEN PELVIS WO CONTRAST Additional Contrast? None   Final Result   1. Limited evaluation of the GI tract on this noncontrast exam.  Improved   mucosal changes of the duodenum and proximal jejunum that were described on   prior CT. 2. Severe inflammatory change in the right lower quadrant with etiology   uncertain. This could correspond to enteritis of the distal ileum and   terminal ileum. Colitis may also be considered. Infectious or inflammatory   etiologies may be favored.    3. Dense stool and diffuse mucosal thickening of the distal colon which may   represent a pattern of colitis. 4. Small right pleural effusion with airspace changes in the right lower   lobe, new from prior exam.   5. Evidence of chronic liver disease with a small amount of ascites stable. CT ABDOMEN PELVIS WO CONTRAST Additional Contrast? None   Final Result   1. Acute enteritis involving the duodenum and jejunal loops with thickening   of the loops and edema. No evidence of bowel obstruction. 2. Constipation with significant stool impaction in the rectum. 3. Mild ascites mostly around the liver and spleen. 4. Adequate position of Osorio catheter in the urinary bladder. XR CHEST PORTABLE   Final Result   Placement of a right internal jugular central venous catheter without evident   complication. The tip is at approximately the cavoatrial junction. No acute findings in the chest.                 Assessment/Plan:    Active Hospital Problems    Diagnosis     Colon perforation (HCC) [K63.1]      Priority: Medium    Acute respiratory failure with hypoxia (HCC) [J96.01]      Priority: Medium    Acute encephalopathy [G93.40]      Priority: Medium    Moderate malnutrition (HCC) [E44.0]      Priority: Medium    Ischemic colitis ([facility] [K55.9]      Priority: Medium    S/P exploratory laparotomy [Z98.890]      Priority: Medium    Acute postoperative pulmonary insufficiency (HCC) [J95.2]      Priority: Medium    Syncope [R55]      Priority: Medium    Hyponatremia [E87.1]      Priority: Medium    Abdominal pain [R10.9]      Priority: Medium    Enteritis [K52.9]      Priority: Medium    Elevated troponin [R77.8]      Priority: Medium    DEJAN (acute kidney injury) ([facility] [N17.9]      Priority: Medium    DM (diabetes mellitus), secondary uncontrolled ([facility] [address] [E13.65]      Priority: Medium       PLAN:    Ischemic sigmoid colitis. Likely due to constipation and mesenteric atherosclerosis. S/p resection, now with colostomy. Peritonitis and septic shock have resolved, completed antibiotic course. Noted increased fluid collection in the abdomen. Plans for drainage today with IR    Hypotension. Septic shock resolved, weaned off pressor. Usually on antihypertensives. Currently hypotension is a bigger problem. BP is normal today    Anemia  Chronic illness. Unclear if intraabdominal bleeding a factor. Deferred to general surgery. Abdominal fluid collection to be drained today. Transfusing 1 unit PRBC today. DEJAN on CKD3,   Currently dialysis dependent. Nephrology input appreciated     Dysphagia. Due to encephalopathy. Required NG tube feeds for a number of days. Then surgery removed the NG on 9/26 in an attempt to improve her chances of passing a swallow eval.  If she doesn't make progress toward swallowing in the next few days then family will consent to a PEG tube. Currently supportive approach is preferred    D/w nursing    D/w patient.  Transfusion ordered    DVT Prophylaxis: SCDs  Diet: Diet NPO  Code Status: Full Code  PT/OT Eval Status: rec'd LTAC    Dispo -at this point disposition time and location is unclear    Appropriate for A1 Discharge Unit: Dasha Hylton MD

## 2022-09-29 NOTE — PROGRESS NOTES
Shift assessment completed. Pt A&O x4; responds to voice but lethargic. VSS. Pt NPO per order for IR. Pt anuric. L colostomy CDI, watery brown stool present. RLQ Naga CDI, draining tan/milky drainage to bulb suction. SCDS placed. Midline abd incision CDI. Pt repositioned and turned on R side with use of wedge, foam silicone in place. Heels floated off bed. Denies any needs at this time. Bed locked and in lowest position. Call light within reach. Will continue to monitor.  Electronically signed by Shilo Gamino RN on 9/29/2022 at 10:30 AM

## 2022-09-29 NOTE — PROGRESS NOTES
Pt transferred to dialysis. 1 U PRBC verified w/dialysis nurse.  Electronically signed by Priscilla York RN on 9/29/2022 at 1:20 PM

## 2022-09-30 LAB
A/G RATIO: 0.6 (ref 1.1–2.2)
ALBUMIN SERPL-MCNC: 2.3 G/DL (ref 3.4–5)
ALP BLD-CCNC: 107 U/L (ref 40–129)
ALT SERPL-CCNC: <5 U/L (ref 10–40)
ANION GAP SERPL CALCULATED.3IONS-SCNC: 14 MMOL/L (ref 3–16)
AST SERPL-CCNC: 21 U/L (ref 15–37)
BASOPHILS ABSOLUTE: 0 K/UL (ref 0–0.2)
BASOPHILS RELATIVE PERCENT: 0.2 %
BILIRUB SERPL-MCNC: <0.2 MG/DL (ref 0–1)
BUN BLDV-MCNC: 41 MG/DL (ref 7–20)
CALCIUM SERPL-MCNC: 8 MG/DL (ref 8.3–10.6)
CHLORIDE BLD-SCNC: 99 MMOL/L (ref 99–110)
CO2: 20 MMOL/L (ref 21–32)
CREAT SERPL-MCNC: 2.9 MG/DL (ref 0.6–1.2)
EOSINOPHILS ABSOLUTE: 0 K/UL (ref 0–0.6)
EOSINOPHILS RELATIVE PERCENT: 0.2 %
GFR AFRICAN AMERICAN: 19
GFR NON-AFRICAN AMERICAN: 16
GLUCOSE BLD-MCNC: 134 MG/DL (ref 70–99)
GLUCOSE BLD-MCNC: 136 MG/DL (ref 70–99)
GLUCOSE BLD-MCNC: 150 MG/DL (ref 70–99)
GLUCOSE BLD-MCNC: 199 MG/DL (ref 70–99)
GLUCOSE BLD-MCNC: 55 MG/DL (ref 70–99)
GLUCOSE BLD-MCNC: 58 MG/DL (ref 70–99)
GLUCOSE BLD-MCNC: 61 MG/DL (ref 70–99)
GLUCOSE BLD-MCNC: 66 MG/DL (ref 70–99)
GLUCOSE BLD-MCNC: 69 MG/DL (ref 70–99)
GLUCOSE BLD-MCNC: 70 MG/DL (ref 70–99)
GLUCOSE BLD-MCNC: 86 MG/DL (ref 70–99)
GLUCOSE BLD-MCNC: 88 MG/DL (ref 70–99)
HCT VFR BLD CALC: 26 % (ref 36–48)
HCT VFR BLD CALC: 26 % (ref 36–48)
HEMOGLOBIN: 8.4 G/DL (ref 12–16)
HEMOGLOBIN: 8.5 G/DL (ref 12–16)
LYMPHOCYTES ABSOLUTE: 0.8 K/UL (ref 1–5.1)
LYMPHOCYTES RELATIVE PERCENT: 6.2 %
MAGNESIUM: 2.1 MG/DL (ref 1.8–2.4)
MCH RBC QN AUTO: 27.6 PG (ref 26–34)
MCHC RBC AUTO-ENTMCNC: 32.3 G/DL (ref 31–36)
MCV RBC AUTO: 85.5 FL (ref 80–100)
MONOCYTES ABSOLUTE: 0.6 K/UL (ref 0–1.3)
MONOCYTES RELATIVE PERCENT: 4.8 %
NEUTROPHILS ABSOLUTE: 11.1 K/UL (ref 1.7–7.7)
NEUTROPHILS RELATIVE PERCENT: 88.6 %
PDW BLD-RTO: 14.5 % (ref 12.4–15.4)
PERFORMED ON: ABNORMAL
PERFORMED ON: NORMAL
PLATELET # BLD: 200 K/UL (ref 135–450)
PMV BLD AUTO: 8.3 FL (ref 5–10.5)
POTASSIUM SERPL-SCNC: 4.2 MMOL/L (ref 3.5–5.1)
RBC # BLD: 3.04 M/UL (ref 4–5.2)
SODIUM BLD-SCNC: 133 MMOL/L (ref 136–145)
TOTAL PROTEIN: 6 G/DL (ref 6.4–8.2)
WBC # BLD: 12.5 K/UL (ref 4–11)

## 2022-09-30 PROCEDURE — 85025 COMPLETE CBC W/AUTO DIFF WBC: CPT

## 2022-09-30 PROCEDURE — 85018 HEMOGLOBIN: CPT

## 2022-09-30 PROCEDURE — 80053 COMPREHEN METABOLIC PANEL: CPT

## 2022-09-30 PROCEDURE — 1200000000 HC SEMI PRIVATE

## 2022-09-30 PROCEDURE — C9113 INJ PANTOPRAZOLE SODIUM, VIA: HCPCS | Performed by: INTERNAL MEDICINE

## 2022-09-30 PROCEDURE — 6370000000 HC RX 637 (ALT 250 FOR IP): Performed by: INTERNAL MEDICINE

## 2022-09-30 PROCEDURE — 83735 ASSAY OF MAGNESIUM: CPT

## 2022-09-30 PROCEDURE — 2500000003 HC RX 250 WO HCPCS: Performed by: STUDENT IN AN ORGANIZED HEALTH CARE EDUCATION/TRAINING PROGRAM

## 2022-09-30 PROCEDURE — 92526 ORAL FUNCTION THERAPY: CPT

## 2022-09-30 PROCEDURE — 6360000002 HC RX W HCPCS: Performed by: INTERNAL MEDICINE

## 2022-09-30 PROCEDURE — 6370000000 HC RX 637 (ALT 250 FOR IP): Performed by: STUDENT IN AN ORGANIZED HEALTH CARE EDUCATION/TRAINING PROGRAM

## 2022-09-30 PROCEDURE — 6360000002 HC RX W HCPCS: Performed by: STUDENT IN AN ORGANIZED HEALTH CARE EDUCATION/TRAINING PROGRAM

## 2022-09-30 PROCEDURE — 36415 COLL VENOUS BLD VENIPUNCTURE: CPT

## 2022-09-30 PROCEDURE — 85014 HEMATOCRIT: CPT

## 2022-09-30 PROCEDURE — 2580000003 HC RX 258: Performed by: STUDENT IN AN ORGANIZED HEALTH CARE EDUCATION/TRAINING PROGRAM

## 2022-09-30 PROCEDURE — 2580000003 HC RX 258: Performed by: INTERNAL MEDICINE

## 2022-09-30 PROCEDURE — 99024 POSTOP FOLLOW-UP VISIT: CPT | Performed by: SURGERY

## 2022-09-30 RX ORDER — DEXTROSE MONOHYDRATE 25 G/50ML
25 INJECTION, SOLUTION INTRAVENOUS PRN
Status: DISCONTINUED | OUTPATIENT
Start: 2022-09-30 | End: 2022-09-30

## 2022-09-30 RX ORDER — DEXTROSE MONOHYDRATE 25 G/50ML
25 INJECTION, SOLUTION INTRAVENOUS ONCE
Status: DISCONTINUED | OUTPATIENT
Start: 2022-09-30 | End: 2022-10-06 | Stop reason: HOSPADM

## 2022-09-30 RX ADMIN — CASTOR OIL AND BALSAM, PERU: 788; 87 OINTMENT TOPICAL at 08:24

## 2022-09-30 RX ADMIN — Medication 16 G: at 04:43

## 2022-09-30 RX ADMIN — ROSUVASTATIN CALCIUM 5 MG: 10 TABLET, FILM COATED ORAL at 08:24

## 2022-09-30 RX ADMIN — INSULIN GLARGINE 20 UNITS: 100 INJECTION, SOLUTION SUBCUTANEOUS at 19:49

## 2022-09-30 RX ADMIN — PANTOPRAZOLE SODIUM 40 MG: 40 INJECTION, POWDER, FOR SOLUTION INTRAVENOUS at 08:24

## 2022-09-30 RX ADMIN — METRONIDAZOLE 500 MG: 500 INJECTION, SOLUTION INTRAVENOUS at 18:00

## 2022-09-30 RX ADMIN — DEXTROSE MONOHYDRATE 125 ML: 100 INJECTION, SOLUTION INTRAVENOUS at 05:40

## 2022-09-30 RX ADMIN — METRONIDAZOLE 500 MG: 500 INJECTION, SOLUTION INTRAVENOUS at 08:24

## 2022-09-30 RX ADMIN — CEFTRIAXONE SODIUM 1000 MG: 1 INJECTION, POWDER, FOR SOLUTION INTRAMUSCULAR; INTRAVENOUS at 18:01

## 2022-09-30 RX ADMIN — ACETAMINOPHEN 650 MG: 650 SOLUTION ORAL at 12:15

## 2022-09-30 RX ADMIN — CASTOR OIL AND BALSAM, PERU: 788; 87 OINTMENT TOPICAL at 19:49

## 2022-09-30 RX ADMIN — METRONIDAZOLE 500 MG: 500 INJECTION, SOLUTION INTRAVENOUS at 01:22

## 2022-09-30 RX ADMIN — Medication 10 ML: at 19:48

## 2022-09-30 ASSESSMENT — PAIN SCALES - GENERAL
PAINLEVEL_OUTOF10: 10
PAINLEVEL_OUTOF10: 6
PAINLEVEL_OUTOF10: 4

## 2022-09-30 ASSESSMENT — PAIN DESCRIPTION - DESCRIPTORS: DESCRIPTORS: ACHING

## 2022-09-30 ASSESSMENT — PAIN DESCRIPTION - LOCATION: LOCATION: GENERALIZED

## 2022-09-30 NOTE — PLAN OF CARE
Problem: Discharge Planning  Goal: Discharge to home or other facility with appropriate resources  9/29/2022 2122 by Cinhtya Sellers RN  Outcome: Progressing  9/29/2022 1025 by Krunal Nguyễn RN  Outcome: Progressing     Problem: Pain  Goal: Verbalizes/displays adequate comfort level or baseline comfort level  9/29/2022 2122 by Cinthya Sellers RN  Outcome: Progressing  9/29/2022 1025 by Krunal Nguyễn RN  Outcome: Progressing     Problem: Skin/Tissue Integrity  Goal: Absence of new skin breakdown  Description: 1. Monitor for areas of redness and/or skin breakdown  2. Assess vascular access sites hourly  3. Every 4-6 hours minimum:  Change oxygen saturation probe site  4. Every 4-6 hours:  If on nasal continuous positive airway pressure, respiratory therapy assess nares and determine need for appliance change or resting period. 9/29/2022 2122 by Cinthya Sellers RN  Outcome: Progressing  9/29/2022 1025 by Krunal Nguyễn RN  Outcome: Progressing     Problem: Chronic Conditions and Co-morbidities  Goal: Patient's chronic conditions and co-morbidity symptoms are monitored and maintained or improved  9/29/2022 2122 by Cinthya Sellers RN  Outcome: Progressing  9/29/2022 1025 by Krunal Nguyễn RN  Outcome: Progressing     Problem: Safety - Medical Restraint  Goal: Remains free of injury from restraints (Restraint for Interference with Medical Device)  Description: INTERVENTIONS:  1. Determine that other, less restrictive measures have been tried or would not be effective before applying the restraint  2. Evaluate the patient's condition at the time of restraint application  3. Inform patient/family regarding the reason for restraint  4.  Q2H: Monitor safety, psychosocial status, comfort, nutrition and hydration  9/29/2022 2122 by Cinthya Sellers RN  Outcome: Progressing  9/29/2022 1025 by Krunal Nguyễn RN  Outcome: Progressing     Problem: Nutrition Deficit:  Goal: Optimize nutritional status  9/29/2022 2122 by Shelia Salinas RN  Outcome: Progressing  9/29/2022 1025 by Shilo Gamino RN  Outcome: Progressing     Problem: Safety - Adult  Goal: Free from fall injury  9/29/2022 2122 by Shelia Salinas RN  Outcome: Progressing  9/29/2022 1025 by Shilo Gamino RN  Outcome: Progressing     Problem: Neurosensory - Adult  Goal: Achieves stable or improved neurological status  9/29/2022 2122 by Shelia Salinas RN  Outcome: Progressing  9/29/2022 1025 by Shilo Gamino RN  Outcome: Progressing  Goal: Achieves maximal functionality and self care  9/29/2022 2122 by Shelia Salinas RN  Outcome: Progressing  9/29/2022 1025 by Shilo Gamino RN  Outcome: Progressing     Problem: Respiratory - Adult  Goal: Achieves optimal ventilation and oxygenation  9/29/2022 2122 by Shelia Salinas RN  Outcome: Progressing  9/29/2022 1025 by Shilo Gamino RN  Outcome: Progressing     Problem: Cardiovascular - Adult  Goal: Maintains optimal cardiac output and hemodynamic stability  9/29/2022 2122 by Shelia Salinas RN  Outcome: Progressing  9/29/2022 1025 by Shilo Gamino RN  Outcome: Progressing  Goal: Absence of cardiac dysrhythmias or at baseline  9/29/2022 2122 by Shelia Salinas RN  Outcome: Progressing  9/29/2022 1025 by Shilo Gamino RN  Outcome: Progressing     Problem: Skin/Tissue Integrity - Adult  Goal: Incisions, wounds, or drain sites healing without S/S of infection  9/29/2022 2122 by Shelia Salinas RN  Outcome: Progressing  9/29/2022 1025 by Shilo Gamino RN  Outcome: Progressing  Goal: Oral mucous membranes remain intact  9/29/2022 2122 by Shelia Salinas RN  Outcome: Progressing  9/29/2022 1025 by Shlio Gamino RN  Outcome: Progressing     Problem: Musculoskeletal - Adult  Goal: Return mobility to safest level of function  9/29/2022 2122 by Shelia Salinas RN  Outcome: Progressing  9/29/2022 1025 by Shilo Gamino RN  Outcome: Progressing  Goal: Return ADL status to a safe level of function  9/29/2022 2122 by Carolyn Lee RN  Outcome: Progressing  9/29/2022 1025 by Cb Art RN  Outcome: Progressing     Problem: Gastrointestinal - Adult  Goal: Maintains adequate nutritional intake  9/29/2022 2122 by Carolyn Lee RN  Outcome: Progressing  9/29/2022 1025 by Cb Art RN  Outcome: Progressing  Goal: Establish and maintain optimal ostomy function  9/29/2022 2122 by Carolyn Lee RN  Outcome: Progressing  9/29/2022 1025 by Cb Art RN  Outcome: Progressing     Problem: Genitourinary - Adult  Goal: Urinary catheter remains patent  9/29/2022 2122 by Carolyn Lee RN  Outcome: Progressing  9/29/2022 1025 by Cb Art RN  Outcome: Progressing     Problem: Metabolic/Fluid and Electrolytes - Adult  Goal: Electrolytes maintained within normal limits  9/29/2022 2122 by Carolyn Lee RN  Outcome: Progressing  9/29/2022 1025 by Cb Art RN  Outcome: Progressing  Goal: Hemodynamic stability and optimal renal function maintained  9/29/2022 2122 by Carolyn Lee RN  Outcome: Progressing  9/29/2022 1025 by Cb Art RN  Outcome: Progressing  Goal: Glucose maintained within prescribed range  9/29/2022 2122 by Carolyn Lee RN  Outcome: Progressing  9/29/2022 1025 by Cb Art RN  Outcome: Progressing     Problem: ABCDS Injury Assessment  Goal: Absence of physical injury  9/29/2022 2122 by Carolyn Lee RN  Outcome: Progressing  9/29/2022 1025 by Cb Art RN  Outcome: Progressing

## 2022-09-30 NOTE — PLAN OF CARE
Problem: Discharge Planning  Goal: Discharge to home or other facility with appropriate resources  9/30/2022 1056 by Nico Mcgregor RN  Outcome: Progressing  9/29/2022 2122 by Lorie Rene RN  Outcome: Progressing  Flowsheets (Taken 9/29/2022 2108)  Discharge to home or other facility with appropriate resources: Identify barriers to discharge with patient and caregiver     Problem: Pain  Goal: Verbalizes/displays adequate comfort level or baseline comfort level  9/30/2022 1056 by Nico Mcgregor RN  Outcome: Progressing  9/29/2022 2122 by Lorie Rene RN  Outcome: Progressing     Problem: Skin/Tissue Integrity  Goal: Absence of new skin breakdown  Description: 1. Monitor for areas of redness and/or skin breakdown  2. Assess vascular access sites hourly  3. Every 4-6 hours minimum:  Change oxygen saturation probe site  4. Every 4-6 hours:  If on nasal continuous positive airway pressure, respiratory therapy assess nares and determine need for appliance change or resting period. 9/30/2022 1056 by Nico Mcgregor RN  Outcome: Progressing  9/29/2022 2122 by Lorie Rene RN  Outcome: Progressing     Problem: Chronic Conditions and Co-morbidities  Goal: Patient's chronic conditions and co-morbidity symptoms are monitored and maintained or improved  9/30/2022 1056 by Nico Mcgregor RN  Outcome: Progressing  9/29/2022 2122 by Lorie Rene RN  Outcome: Progressing  Flowsheets (Taken 9/29/2022 2108)  Care Plan - Patient's Chronic Conditions and Co-Morbidity Symptoms are Monitored and Maintained or Improved: Monitor and assess patient's chronic conditions and comorbid symptoms for stability, deterioration, or improvement     Problem: Safety - Medical Restraint  Goal: Remains free of injury from restraints (Restraint for Interference with Medical Device)  Description: INTERVENTIONS:  1.  Determine that other, less restrictive measures have been tried or would not be effective before applying the restraint  2. Evaluate the patient's condition at the time of restraint application  3. Inform patient/family regarding the reason for restraint  4.  Q2H: Monitor safety, psychosocial status, comfort, nutrition and hydration  9/30/2022 1056 by Arianna Henderson RN  Outcome: Progressing  9/29/2022 2122 by Britany Julio RN  Outcome: Progressing     Problem: Nutrition Deficit:  Goal: Optimize nutritional status  9/30/2022 1056 by Arianna Henderson RN  Outcome: Progressing  9/29/2022 2122 by Britany Julio RN  Outcome: Progressing     Problem: Safety - Adult  Goal: Free from fall injury  9/30/2022 1056 by Arianna Henderson RN  Outcome: Progressing  9/29/2022 2122 by Britany Julio RN  Outcome: Progressing     Problem: Neurosensory - Adult  Goal: Achieves stable or improved neurological status  9/30/2022 1056 by Arianna Henderson RN  Outcome: Progressing  9/29/2022 2122 by Britany Julio RN  Outcome: Progressing  Flowsheets (Taken 9/29/2022 2108)  Achieves stable or improved neurological status:   Initiate measures to prevent increased intracranial pressure   Assess for and report changes in neurological status  Goal: Achieves maximal functionality and self care  9/30/2022 1056 by Arianna Henderson RN  Outcome: Progressing  9/29/2022 2122 by Britany Julio RN  Outcome: Progressing     Problem: Respiratory - Adult  Goal: Achieves optimal ventilation and oxygenation  9/30/2022 1056 by Arianna Henedrson RN  Outcome: Progressing  9/29/2022 2122 by Britany Julio RN  Outcome: Progressing  Flowsheets (Taken 9/29/2022 2108)  Achieves optimal ventilation and oxygenation:   Assess for changes in respiratory status   Assess for changes in mentation and behavior     Problem: Cardiovascular - Adult  Goal: Maintains optimal cardiac output and hemodynamic stability  9/30/2022 1056 by Arianna Henderson RN  Outcome: Progressing  9/29/2022 2122 by Britany Julio RN  Outcome: Progressing  Flowsheets (Taken 9/29/2022 2108)  Maintains optimal cardiac output and hemodynamic stability:   Monitor blood pressure and heart rate   Monitor urine output and notify Licensed Independent Practitioner for values outside of normal range   Assess for signs of decreased cardiac output  Goal: Absence of cardiac dysrhythmias or at baseline  9/30/2022 1056 by Krunal Nguyễn RN  Outcome: Progressing  9/29/2022 2122 by Cinthya Sellers RN  Outcome: Progressing     Problem: Skin/Tissue Integrity - Adult  Goal: Incisions, wounds, or drain sites healing without S/S of infection  9/30/2022 1056 by Krunal Nguyễn RN  Outcome: Progressing  9/29/2022 2122 by Cinthya Sellers RN  Outcome: Progressing  Flowsheets (Taken 9/29/2022 2108)  Incisions, Wounds, or Drain Sites Healing Without Sign and Symptoms of Infection: ADMISSION and DAILY: Assess and document risk factors for pressure ulcer development  Goal: Oral mucous membranes remain intact  9/30/2022 1056 by Krunal Nguyễn RN  Outcome: Progressing  9/29/2022 2122 by Cinthya Sellers RN  Outcome: Progressing  Flowsheets (Taken 9/29/2022 2108)  Oral Mucous Membranes Remain Intact:   Assess oral mucosa and hygiene practices   Implement preventative oral hygiene regimen     Problem: Musculoskeletal - Adult  Goal: Return mobility to safest level of function  9/30/2022 1056 by Krunal Nguyễn RN  Outcome: Progressing  9/29/2022 2122 by Cinthya Sellers RN  Outcome: Progressing  Goal: Return ADL status to a safe level of function  9/30/2022 1056 by Krunal Nguyễn RN  Outcome: Progressing  9/29/2022 2122 by Cinthya Sellers RN  Outcome: Progressing     Problem: Gastrointestinal - Adult  Goal: Maintains adequate nutritional intake  9/30/2022 1056 by Krunal Nguyễn RN  Outcome: Progressing  9/29/2022 2122 by Cinthya Sellers RN  Outcome: Progressing  Goal: Establish and maintain optimal ostomy function  9/30/2022 1056 by Suann Severin Wayne Vaughan RN  Outcome: Progressing  9/29/2022 2122 by Erik Monsalve RN  Outcome: Progressing     Problem: Genitourinary - Adult  Goal: Urinary catheter remains patent  9/30/2022 1056 by Ho Evans RN  Outcome: Progressing  9/29/2022 2122 by Erik Monsalve RN  Outcome: Progressing     Problem: Metabolic/Fluid and Electrolytes - Adult  Goal: Electrolytes maintained within normal limits  9/30/2022 1056 by Ho Evans RN  Outcome: Progressing  9/29/2022 2122 by Erik Monsalve RN  Outcome: Progressing  Flowsheets (Taken 9/29/2022 2108)  Electrolytes maintained within normal limits: Monitor labs and assess patient for signs and symptoms of electrolyte imbalances  Goal: Hemodynamic stability and optimal renal function maintained  9/30/2022 1056 by Ho Evans RN  Outcome: Progressing  9/29/2022 2122 by Erik Monsalve RN  Outcome: Progressing  Flowsheets (Taken 9/29/2022 2108)  Hemodynamic stability and optimal renal function maintained:   Monitor labs and assess for signs and symptoms of volume excess or deficit   Monitor intake, output and patient weight  Goal: Glucose maintained within prescribed range  9/30/2022 1056 by Ho Evans RN  Outcome: Progressing  9/29/2022 2122 by Erik Monsalve RN  Outcome: Progressing  Flowsheets (Taken 9/29/2022 2108)  Glucose maintained within prescribed range: Monitor blood glucose as ordered     Problem: ABCDS Injury Assessment  Goal: Absence of physical injury  9/30/2022 1056 by Ho Evans RN  Outcome: Progressing  9/29/2022 2122 by Erik Monsalve RN  Outcome: Progressing

## 2022-09-30 NOTE — PROGRESS NOTES
[facility] GENERAL SURGERY    Surgery Progress Note           POD # 15    PATIENT NAME: Yon Pearce     TODAY'S DATE: 9/30/2022    INTERVAL HISTORY: No acute events overnight. Remains afebrile, HDS. Moaning in response to questions this morning. OBJECTIVE:   VITALS:  /75   Pulse 81   Temp 98.7 °F (37.1 °C) (Axillary)   Resp 18   Ht 5' 9\" (1.753 m)   Wt 190 lb 7.6 oz (86.4 kg)   SpO2 95%   BMI 28.13 kg/m²     INTAKE/OUTPUT:    I/O last 3 completed shifts: In: 243 [P.O.:243]  Out: 1675 [Drains:1245; Stool:430]  I/O this shift:  In: 120 [P.O.:120]  Out: -               CONSTITUTIONAL: lethargic  ABDOMEN: soft, non-distended, non-tender, stoma with gas and stool, brooks with thick feculent/purulent output, IR drain with serous output in bag  INCISION: clean, dry, staples intact, no drainage    Data:  CBC:   Recent Labs     09/28/22  0532 09/28/22  1538 09/29/22  0831 09/29/22  1847 09/30/22  0058 09/30/22  0532   WBC 11.6*  --  13.0*  --   --  12.5*   HGB 6.7*   < > 6.3* 9.9* 8.5* 8.4*   HCT 21.1*   < > 20.4* 30.4* 26.0* 26.0*     --  186  --   --  200    < > = values in this interval not displayed. BMP:    Recent Labs     09/28/22  0532 09/29/22  0831 09/30/22  0532   * 128* 133*   K 4.9 4.4 4.2   CL 99 97* 99   CO2 20* 18* 20*   BUN 57* 69* 41*   CREATININE 3.1* 4.0* 2.9*   GLUCOSE 122* 98 61*       Hepatic:   Recent Labs     09/30/22  0532   AST 21   ALT <5*   BILITOT <0.2   ALKPHOS 107     Mag:      Recent Labs     09/30/22  0532   MG 2.10        Phos:     No results for input(s): PHOS in the last 72 hours.      INR:   Recent Labs     09/29/22  0831   INR 1.37*         ASSESSMENT AND PLAN:  67 y.o. female status post exploratory laparotomy, sigmoid colectomy, colostomy formation    GI/Nutrition: continue with diet as per speech recs - follow calorie count - PEG if does not take in adequate PO, which is doubtful  S/P IR drain, monitor output  Continue BROOKS drain  Antibiotics re-started yesterday, will continue for another 7 days  Restart DVT ppx per primary given recent PRBC transfusion  PT/OT      Electronically signed by Mary Beth Martel DO

## 2022-09-30 NOTE — PROGRESS NOTES
Ostomy Referral Follow-up Progress Note      NAME:  Rodrigo Cyr RECORD NUMBER:  [id number]  AGE: 67 y.o. GENDER:  female  :  1950  TODAY'S DATE:  2022    Subjective:  Patient lethargic and sleeping. Daughter and spouse at bed side. Jordan Sorto is a 67 y.o. female referred by:   [x] Physician  [] Nursing  [] Other:     Wound Type: New Acquired C3 - Deep Tissue Pressure injury not assessed today     NEW Colostomy:    Went to OR for Exploratory lap, sigmoid colectomy and colostomy on 9/15/22 by Dr Ilsa Urena. Stoma: measures 20 mm (1 3/8 inch) x 38 mm ( 1 1/2 inch). Oval, brown, protrudes but distal edge in a divot/crease from 300 pm to 600 pm.  She may need convex at a later time. For now use flat wafer with drainable bag. Appliance:  Coloplast RED 2 piece flat flange # [id number] with drainable pouch # [id number]. Paste ring around stoma prior to application of flange. Objective lying in bed. Appliance leaking. Lethargic, patient not participating in care today. HD catheter. /69   Pulse 75   Temp 98.7 °F (37.1 °C) (Axillary)   Resp 18   Ht 5' 9\" (1.753 m)   Wt 190 lb 7.6 oz (86.4 kg)   SpO2 96%   BMI 28.13 kg/m²     Levar Risk Score Levar Scale Score: 11    Assessment  Stoma red, moist, stool loose brown. Lesli stoma intact. Surgeon     Colostomy      Colostomy LLQ (Active)   Stomal Appliance 2 piece 22 1530   Flange Size (inches) 2.25 Inches 22 1530   Stoma  Assessment Moist;Red 22 1530   Peristomal Assessment Clean, dry & intact 22 1530   Treatment Bag change;Site care;Stoma paste; Heat applied 22 1530   Stool Appearance Watery 22 1530   Stool Color Brown 22 1530   Stool Amount Medium 22 1530   Output (mL) 100 ml 22 1530   Number of days: 14         Intake/Output Summary (Last 24 hours) at 2022 1632  Last data filed at 2022 1530  Gross per 24 hour   Intake 660 ml   Output 805 ml   Net -145 ml Plan:   Plan for Ostomy Care: Demonstrated Ewa Brake to daughter and spouse   Appliance removed. Skin cleansed with warm water and patted dry. Stoma measured and pattern created as stoma smaller today. Stoma in divot distal inner edge. Paste ring placed around stoma then flange placed. This has allowed the bag to remain with a secure seal. Bag attached and closed. Ostomy care to follow. Call Ostomy care for problems with seal at [phone] or or call [phone] and leave message      Stoma Care - New colostomy - Patient to empty appliance when 1/3 to 1/2 full with assistance of the staff. Cleanse inside and outside of the drain spout prior to rolling closed. Change appliance every 3-5 days or 1-2 times a week. Call for problems with seal [phone]      Ostomy Plan of Care  [x] Supplies/Instructions left in room bags, flanges, pattern, scissors, paste, powder, barrier wipes. [] Patient using home supplies  [x] Brand/supplies at bedside Coloplast    Current Diet: ADULT DIET;  Dysphagia - Pureed  ADULT ORAL NUTRITION SUPPLEMENT; Breakfast, PM Snack; Standard High Calorie/High Protein Oral Supplement  ADULT ORAL NUTRITION SUPPLEMENT; Lunch, Dinner; Frozen Oral Supplement  Dietician consult:  Yes    Discharge Plan:  Placement for patient upon discharge: intermediate care facility    Outpatient visit plan No  Supplies given Yes   Samples requested Yes    Fax to [facility] [phone] and free samples ordered and sent to patients home:   Flat  flange  Lock and roll bag transparent with filter  Closed bag  One piece  flat with lock and roll bag with filter transparent  One piece flat with closed bag with filter transparent     # [id number] Adapt Paste  # [id number] Powder  # 8805 Paste rings  # [id number] Barrier extenders  # 2786 skin prep wipe barrier film  # 2160 Barrier removal wipes  # 73894 Deodorant  #    Belt large / medium  Curved scissors will be provided in packet kit     Fax to [facility] [phone] and Free samples ordered and sent to patients home:   Sensura Wander flat flange  Drainable bag transparent with filter  Closed bag  One piece flat drainable with filter transparent  One piece closed bag with filter - transparent     # 58879 Deodorant packet  # 39899 Bravo paste seal rings  # 2930 Belt for Sensura Antler  # [id number] Adhesive remover wipes  # [id number] Barrier film wipes  # [id number] Brava Elastic barrier extender strips  # Small scissors will be provided in packet kit      [facility] samples ordered:        Referrals:  []   [] [address] [employer] [facility]  [] Supplies  [] Other      Caregiver Daughter and spouse Teaching: Reviewed how to change pouch with daughter and spouse. Reviewed how to order supplies.   Catalog marked and given to patient  Written Instructions given to patient/family  Teaching provided:  [] Reviewed GI and A&P        [x] Supplies  [x] Pouch emptying      [x] Manipulate closure  [x] Routine Care         [x] Comment How to order  [] Pouch maintenance           Level of patient/caregiver understanding able to:  [] Indicates understanding       [] Needs reinforcement  [] Unsuccessful      [x] Verbal Understanding  [] Demonstrated understanding       [] No evidence of learning  [] Refused teaching         [] N/A    Electronically signed by Diego Olivera, RN, MSN, Emily Ty on 9/30/2022 at 4:32 PM

## 2022-09-30 NOTE — CONSULTS
PALLIATIVE MEDICINE CONSULTATION     Patient name:Sierra Regan   DPU:[id number]    PUU:[birth year]  Room/Bed:0322/0322-01   LOS: 16 days         Date of consult:9/30/2022    Consult Information  Palliative Medicine Consult performed by: BRAEDEN Jimenez CNP      Inpatient consult to Palliative Care  Consult performed by: BRAEDEN Booth CNP  Consult ordered by: Cata Bucio MD  Reason for consult: goals of care, code status discussion, family support    Number of admissions past 12 months: 1      ASSESSMENT/RECOMMENDATIONS     67 y.o. female with ischemic colitis, s/p colostomy, DEJAN on CKD now requiring iHD, dysphagia and protein-calorie malnutrition      Symptom Management:  Goals of Care - Family wants to remain aggressive with medical care at this time and are very encouraged by patient's improved mental status. Family believes patient will make a nearly full recovery with more time and nutrition. They are hopeful they can avoid feeding tube placement, but ultimately expect they will consent to PEG if her intake does not improve over the next few days . Want patient to remain a full code for now, and spouse will talk to daughters about this over the weekend, but he doesn't anticipate a code status change. Palliative will continue to follow this admission as [address] conversations will need to be slow and ongoing with family. Dysphagia -  due to weakness and encephalopathy. On pureed diet and family assisting with feeding. Family hopeful that dysphagia will improve. Aware of the risks with dysphagia as it pertains to her overall prognosis. Protein-calorie malnutrition - Albumin 2.3, total protein 6.0. BMI 28. Unintentional weight loss of 20 lbs in past 2 months (per family, she was afraid of eating most foods because of the misunderstanding of her diabetic and kidney diet restrictions).   Family feels if nutrition status can improve she has a very good shot at recovery and are likely to consent to PEG    Patient/Family Goals of Care :    9/30/22    Met with patient, spouse, daughter and patient's sister in law. Patient opens eyes to name and does answer questions appropriately, but she is very lethargic and quickly drifts back to sleep. Had extensive conversation with spouse, daughter and KRISTINE at the bedside. Spouse states he and patient never really discussed topics like advanced directives. They never talked about her feelings around dialysis (with her CKD). He is not really sure how she would feel about these things. Patient has never really been in the hospital before. This is the sickest she has ever been. He and his daughter are very hopeful that this is a temporary situation and patient will bounce back with nutrition and time. They are hopeful the colostomy will be reversed, the dialysis will only be temporary and the SNF will only be temporary- if needed at all. They are very motivated by the improvement in her mental status and that she has taken several bites of apple sauce and jello today. They will attempt to get some restaurant food she likes and puree it at home and bring it in to see if that will entice her. We talked about her need for nutrition and talked at length about a PEG tube. They believe they will go that route if they have to, but they hope they don't. They understand that they will need to make a decision about the PEG very soon and understand the need for nutrition. Discussed potential SNF and even LTC.  will make accommodations to their mobile home for patient if needed as the goal will be to get her home. They intend on doing dialysis as long as its needed, but hope it won't be permament. They are hopeful that patient can recover enough strength and function that  could drive her to dialysis.    The expectation is that patient is going to have a very rough road ahead, with some possible lingering health issues that family is happy to accommodate for, and will be able to eventually return home to a meaningful life with her family. Patient enjoys crossword puzzles and watching westerns on TV. She worked part time  in [employer] at [employer]. She had been becoming more fatigued lately and losing weight. Family suspects the new PCP has her on too much medicine and this is what was causing the recent decline. Concerned about all of her diabetic medication she was given. Discussed CODE STATUS: Explained that although in some cases it does save lives, CPR (cardiopulmonary resuscitation) frequently is not successful or does not benefit those who receive it, especially elderly people or those with serious medical conditions. Even if revived, the person can be left with painful injuries, or in a debilitated state, or with brain damage resulting from oxygen deprivation. Resuscitation can involve such things as drugs, forcefully pressing on the chest, giving electric shocks to restart the heart or placing a tube down the nose or throat to provide artificial breathing. People with terminal illnesses or other serious health conditions may prefer not to be resuscitated. Spouse elects full code. Disposition/Discharge Plan:   - pending medical course  -An outpatient [facility] referral can be sent for post-discharge follow up when patient returns home if appropriate    Advance Directives:  Surrogate Decision Maker: Spouse Anabell Perdomo  Code status:  Full Code      Palliative Performance Scale:     [] 60%  Amb reduced; Sig dz. Can't do hobbies/housework; Intake normal or reduced, Occasional assist; LOC full/confusion   [] 50%  Mainly sit/lie; Extensive disease. Mainly assist, Intake normal or reduced; Occasional assist; LOC full/confusion   [] 40%  Mainly in bed; Extensive disease; Mainly assist; Intake normal or reduced; Occasional assist; LOC full/confusion   [] 30%  Bed bound, Extensive disease;  Total care; Intake reduced; LOC full/confusion   [] 20%  Bed bound; Extensive disease; Total care; Intake minimal; Drowsy/coma   [x] 10%  Bed bound; Extensive disease; Total care; Mouth care only; Drowsy/coma   []  0%   Death      Case discussed with: patient, family, floor RN, case management  Thank you for allowing us to participate in the care of this patient. HISTORY     CC: abdominal pain  HPI: The patient is a 67 y.o. female with a history of CKD, diabetes, hyperlipidemia, hypertension, and neuropathy who presents to the ED complaining of abdominal pain/syncope. By EMS report, patient has had constipation over the last several days. .  Patient reportedly had a large bowel movement and shortly thereafter had a syncopal/near syncopal event where she was helped to the floor. EMS was called and arrived to find patient with blood pressure of 50/30.  300 cc of normal saline were infused during truck ride to the ED. Blood pressure shortly after arrival was 134/94. Patient reports lower abdominal discomfort. No additional complaints. No other complaints, modifying factors or associated symptoms. In ER -Patient seen and evaluated. Old records reviewed. Labs and imaging reviewed and results discussed with patient. Patient was given broad-spectrum antibiotics, normal saline, and multiple pressors. Central line was placed. Improvement of symptoms throughout patient's stay in the emergency department. Labs reveal significant leukocytosis with elevated lactic and concern for UTI. Mild DEJAN noted. Imaging shows nonspecific enteritis. Patient will be admitted for further evaluation and treatment. . Patient was reassessed as noted above . Tessapeter Pappas Plan of care discussed with patient and family. Patient and family in agreement with plan. Palliative Medicine SymptomScreening/ROS:    Review of Systems   Unable to perform ROS: Other (very weak, falls to sleep)      A complete 10 count ROS was obtained.  Pertinent positives mentioned above in HPI/ROS. All others if not mentioned are negative. Home med list and hospital medications reviewed in chart as of 9/30/2022     EXAM     Vitals:    09/30/22 1208   BP: 122/75   Pulse: 81   Resp: 18   Temp: 98.7 °F (37.1 °C)   SpO2: 95%       Physical Examination:   Physical Exam  Vitals reviewed. Constitutional:       General: She is not in acute distress. Appearance: She is obese. She is ill-appearing. Comments: Lying in bed, daughter rubbing her arms and legs   HENT:      Head: Normocephalic and atraumatic. Nose: Nose normal. No congestion or rhinorrhea. Mouth/Throat:      Mouth: Mucous membranes are moist.      Pharynx: Oropharynx is clear. No oropharyngeal exudate or posterior oropharyngeal erythema. Eyes:      General: No scleral icterus. Right eye: No discharge. Left eye: No discharge. Extraocular Movements: Extraocular movements intact. Conjunctiva/sclera: Conjunctivae normal.      Pupils: Pupils are equal, round, and reactive to light. Cardiovascular:      Rate and Rhythm: Normal rate. Pulses: Normal pulses. Pulmonary:      Effort: Pulmonary effort is normal. No respiratory distress. Breath sounds: No stridor. No wheezing. Comments: Room air  Abdominal:      Palpations: Abdomen is soft. Musculoskeletal:         General: Normal range of motion. Cervical back: Normal range of motion and neck supple. Right lower leg: Edema present. Left lower leg: Edema present. Skin:     General: Skin is warm and dry. Capillary Refill: Capillary refill takes 2 to 3 seconds. Findings: Bruising present. Neurological:      Mental Status: She is oriented to person, place, and time. Psychiatric:         Mood and Affect: Mood normal.         Behavior: Behavior normal.         Thought Content:  Thought content normal.         Judgment: Judgment normal.           Current labs in the epic chart reviewed as of 9/30/2022   Review of previous notes, admits, labs, radiology and testing relevant to this consult done in this chart today 9/30/2022      Total time: 130 minutes  >50% of time spent counseling patient at bedside or POA/family member if applicable , reviewing information and discussing care, coordinating with care team  Signed By: Electronically signed by BRAEDEN Joaquin CNP on 9/30/2022 at 2:35 PM  Palliative Medicine   [phone]    September 30, 2022

## 2022-09-30 NOTE — PROGRESS NOTES
Speech Language Pathology  Facility/Department: [facility] C3 TELE/MED SURG/ONC  Dysphagia Daily Treatment Note      Recommendations:   Pureed diet (IDDSI 4) with thin liquids (IDDSI 0), meds whole or crushed in puree, total feed assist, strict aspiration precautions, only feed when most alert  If pt has overt s/s of aspiration or change in respiratory status, recommend downgrade to NPO pending re-assessment by ST      NAME: Yousif Fuentes  : 1950  MRN: [id number]    Patient Diagnosis(es):   Patient Active Problem List    Diagnosis Date Noted    Colon perforation ([address] 2022    Acute respiratory failure with hypoxia ([address] 2022    Acute encephalopathy 2022    Moderate malnutrition ([address] 2022    Ischemic colitis ([address] 2022    S/P exploratory laparotomy 2022    Acute postoperative pulmonary insufficiency ([address] 2022    DKA (diabetic ketoacidosis) ([address] 2022    Hypotension 2022    Syncope 2022    Hyponatremia 2022    DEJAN (acute kidney injury) ([address] 2022    Abdominal pain 2022    Enteritis 2022    Septic shock ([address] 2022    Elevated troponin 2022    Leukocytosis 2022    Pyuria 2022    Lactic acidosis 2022    DM (diabetes mellitus), secondary uncontrolled ([address] 2022     Allergies: Allergies   Allergen Reactions    Latex     Lovastatin      Indigestion and Feels bad  Indigestion and Feels bad      Penicillins      Subjective: Pt seen upright in bed, alert and cooperative, RN OK'd SLP entry and therapy    Pain: no c/o pain    Current Diet: ADULT DIET; Dysphagia - Pureed; 4 carb choices (60 gm/meal)    Diet Tolerance:  Pt grossly tolerating pureed diet. P.O. Trials:   Thin   X  X2 sips via straw  X5 bites of jello     Nectar / Mildly Thick       Honey / Moderately Thick       Pudding / Extremely Thick       Puree   X  Declined PO trials      Solid         Dysphagia Treatment and Impressions:  RN ok'd SLP entry. Pt on RA with O2 sats 91-95%, RR 17-20/min throughout tx session. Pt with eyes closed upon SLP entry, woke to SLP's voice. Pt benefited from intermittent cues for adequate RAMBO. Pt trialed thin liquids via straw and jello. Pt declined further PO trials. No overt s/s of aspiration. Pt presented with halitosis, SLP performed oral care with ok from pt. Pt denied any pain or discomfort throughout PO trials. SLP recommend cont with Pureed diet (IDDSI 4) with thin liquids (IDDSI 0), meds whole or crushed in puree. RN aware and in agreement. ST to continue to follow. Could consider alt means of nutrition via PEG to aid in nutrition. Per dietitians note, similar recs are noted. Dysphagia Goals:  Timeframe for Long-term Goals: 7 days, 10/3/22  Goal 1: The pt will tolerate safest and least restrictive diet without clinical s/s of aspiration or change in respiratory status. 9/30/2022 : Ongoing, progressing. Short-term Goals  Timeframe for Short-term Goals: 5 days, 10/1/22  Goal 1: The patient will tolerate recommended diet without observed clinical signs of aspiration. 9/30/2022 : Goal addressed, see above. Goal 2: The patient/caregiver will demonstrate understanding of compensatory strategies for improved swallowing safety. 9/30/2022 : Goal addressed, see above. Needs continued reinforcement    Goal 3: The patient will tolerate repeat bedside swallowing evaluation when able. 9/30/2022 : Goal met. 09/28     4) The patient will tolerate instrumental swallowing procedure. 9/30/2022 : Will cont to monitor for need.       Speech/Language/Cog Goals: n/a    Recommendations:   Pureed diet (IDDSI 4) with thin liquids (IDDSI 0), meds whole or crushed in puree, total feed assist, strict aspiration precautions, only feed when most alert  If pt has overt s/s of aspiration or change in respiratory status, recommend downgrade to NPO pending re-assessment by ST    Patient/Family/Caregiver Education:  SLP re: role of ST, rationale for PO trials, aspiration precautions. Pt needs continued reinforcement. RN notified of recs. Compensatory Strategies:   HOB 90* and 30\" after meals; small bites/sips; alternate solids/liquids every 3-5 bites; oral care after every meal; total assist feed    Plan:    Continued Dysphagia treatment with goals per plan of care. Discharge Recommendations: per PT/OT recs    If pt discharges from hospital prior to Speech/Swallowing discharge, this note serves as tx and discharge summary.      Total Treatment Time / Charges     Time in Time out Total Time / units   Cognitive Tx         Speech Tx      Dysphagia Tx 1033 1058 25 min / 1 unit     Signature:  Prasad BEGUM-SLP  Clinical Fellow  [id number]

## 2022-09-30 NOTE — PROGRESS NOTES
Interval History and plan:     She is a still lethargic  Sodium is 133  CO2 on the low side at 20  Got dialysis yesterday  Vitals is stable  1 L off yesterday  Had percutaneous drainage catheter placed in the left-sided abdominal fluid collection-600 cc fluid removed yesterday    Plan: We will continue dialysis THS for now  No indication for dialysis today  Medication removed-no medication likely causing her altered mental status at this time                   Assessment :     Acidosis  Severe  Requiring 2 vasopressors  Blood pressure okay with the 2 vasopressors but lactic is a still going up to 19 concerning for ischemia   /Possible metformin induced lactic acidosis       CKD Stage IIIb with DEJAN  Creatinine 2.1 on consult  Creatinine 1.6 on 7/22  Likely due to diabetes, hypertension  Renal imaging-normal in the past      hypotension  BP: (119-135)/(65-81)  Heart Rate:  [80-84]   BP goal inpatient 053-122 systolic inpatient  Pressures at the time of consult  Normally hypertensive      [facility] would like to thank Wil Patel MD   for opportunity to serve this patient      Please call with questions at-   24 Hrs Answering service [phone] or  7 am- 5 pm via Perfect serve or cell phone  Mohini Adkins MD          CC/reason for consult :       DEJAN     HPI :     Franco Avelar is a 67 y.o. female presented to   the hospital on 9/14/2022 with lactic acidosis. She is known to have diabetes and on metformin to 2 days ago  Has constipation and with multiple bowel regimen has had good bowel movement yesterday following that she has syncope. EMS was called and was found to have blood pressure of 50 systolic given IV fluids brought to the emergency room blood pressure was normal initially but later developed hypotension got 3 L of fluid and now on 2 vasopressors and in ICU. She also has severe acidosis with very high lactate. CT scan was normal initially.   We are consulted for DEJAN on CKD and related issues. On CKD and also severe lactic acidosis    ROS:     Seen with- sister    RN         PMH/PSH/SH/Family History:     Past Medical History:   Diagnosis Date    Chronic kidney disease     Diabetes mellitus ([address]     Hyperlipidemia     Hypertension     Neuropathy        Past Surgical History:   Procedure Laterality Date    LAPAROTOMY N/A 9/15/2022    DIAGNOSTIC LAPAROSCOPY, EXPLORATORY LAPAROTOMY, SIGMOID COLECTOMY AND COLOSTOMY performed by Evangelist Balderas MD at [facility]        reports that she has never smoked. She has never used smokeless tobacco. She reports that she does not currently use alcohol. She reports that she does not use drugs. family history is not on file.          Medication:     Current Facility-Administered Medications: dextrose 50 % IV solution, 25 g, IntraVENous, Once  0.9 % sodium chloride infusion, , IntraVENous, PRN  Venelex ointment, , Topical, BID  cefTRIAXone (ROCEPHIN) 1,000 mg in dextrose 5 % 50 mL IVPB mini-bag, 1,000 mg, IntraVENous, Q24H  metronidazole (FLAGYL) 500 mg in 0.9% NaCl 100 mL IVPB premix, 500 mg, IntraVENous, Q8H  0.9 % sodium chloride infusion, , IntraVENous, PRN  diatrizoate meglumine-sodium (GASTROGRAFIN) 66-10 % solution 12 mL, 12 mL, Oral, ONCE PRN  ceFAZolin (ANCEF) 2000 mg in dextrose 5 % 100 mL IVPB, 2,000 mg, IntraVENous, Once  rosuvastatin (CRESTOR) tablet 5 mg, 5 mg, Oral, Daily  acetaminophen (TYLENOL) 160 MG/5ML solution 650 mg, 650 mg, Oral, Q4H PRN  insulin glargine (LANTUS) injection vial 20 Units, 20 Units, SubCUTAneous, Nightly  0.9 % sodium chloride bolus, 500 mL, IntraVENous, Once  insulin lispro (HUMALOG) injection vial 0-16 Units, 0-16 Units, SubCUTAneous, Q4H  prochlorperazine (COMPAZINE) injection 5 mg, 5 mg, IntraVENous, Q6H PRN  potassium chloride 20 mEq/50 mL IVPB (Central Line), 20 mEq, IntraVENous, PRN  magnesium sulfate 1000 mg in dextrose 5% 100 mL IVPB, 1,000 mg, IntraVENous, PRN  sodium chloride flush 0.9 % 20.4* 30.4* 26.0* 26.0*     --  186  --   --  200    < > = values in this interval not displayed.        Recent Labs     09/28/22  0532 09/29/22  0831 09/30/22  0532   * 128* 133*   K 4.9 4.4 4.2   CL 99 97* 99   CO2 20* 18* 20*   BUN 57* 69* 41*   CREATININE 3.1* 4.0* 2.9*   GLUCOSE 122* 98 61*   MG  --   --  2.10

## 2022-09-30 NOTE — CARE COORDINATION
Hospital day 16: Patient on C3 care managed by IM, General Surgery, and Nephrology. Patient from home IPTA, care needs extensive, SNF recs. Patient accepted at [facility] will need rapid COVID to admit. Patient with new HD outpatient slot at  [facility] chair time, when medically ready, SNF has obtained transport. Patient with new ostomy, BROOKS with out put, on  IV ATB. Poor PO intake per Primary RN. Patient with extensive need could possibly benefit from Palliative consult. Writer spoke with spouse this date, denies needs. SW will ct to follow. CANDE Anne

## 2022-09-30 NOTE — PROGRESS NOTES
Patient transferred to specialty bed. Wound care done with N/Saline and venelex as ordered. Patient turned. Patient fed, she ate about 5 bites of food and drank one-third cup of juice.

## 2022-09-30 NOTE — PROGRESS NOTES
Shift assessment completed. Pt lethargic, responds to voice; will open eyes and immediately close them. VSS. AM medications admin per MAR. Assisted pt w/ breakfast, pt intake consisted of 5-6 very small bites of food and 120ml of orange juice over 30 mins. Pt anuric. L colostomy CDI, watery brown stool present. RLQ [place]aga CDI, draining tan/milky drainage, foul smell; to bulb suction. SCDS placed. Midline abd incision CDI. Pt repositioned and turned on R side with use of wedge pillow and foam silicone in place. Heels floated off bed. Denies any needs at this time. Bed locked and in lowest position. Call light within reach. Will continue to monitor.  Electronically signed by Alison Malik RN on 9/30/2022 at 11:02 AM

## 2022-09-30 NOTE — PROGRESS NOTES
Comprehensive Nutrition Assessment    Type and Reason for Visit:  Reassess    Nutrition Recommendations/Plan:   Liberalize diet to pureed   Add Ensure and Magic Cups BID   Requires feeding assistance   Due to inadequate intake, discussion of palliative care vs nutrition support should be considered. Consult dietitian if tube feeding is desired and consistent with patient's plan of care. If tube feeds consistent with POC, recommend Nepro (renal formula) at 10 mL/hr and as tolerated, increase by 10 mL/hr q 4 hours until goal of 50 mL/hr. Recommend 30 mL H20 q 4 hours. Recommend reevaluate IV fluids at this time. Increase flush if Na+ increases greater than 145 mEq/L. Monitor closely and correct lytes (K, Phos, Mg) before progressing TF to goal d/t risk of refeeding syndrome (hx extended inadequate nutritional intake). Monitor for tolerance (bowel habits, N/V, cramping, abdominal exam findings: distended, firm, tense, guarded, discomfort). Monitor nutrition adequacy, pertinent labs, bowel habits, wt changes, and clinical progress    Malnutrition Assessment:  Malnutrition Status: Moderate malnutrition (09/22/22 1033)    Context:  Acute Illness     Findings of the 6 clinical characteristics of malnutrition:  Energy Intake:  50% or less of estimated energy requirements for 5 or more days  Weight Loss:  1% to 2% over 1 week       Nutrition Assessment:    Follow up: Diet advanced to pureed diet with thin liquids per SLP recommendations. Pt with minimal PO intakes since diet advancement, 0-25%. Pt lethargic at time of visit, unlikely pt will be able to consume adequate nutrition via PO intakes only. If consistent with GOC, recommend PEG tube placement. Will monitor. Nutrition Related Findings:    BROOKS drain. +Ostomy. Na 133. BG  past 24 hrs.  Wound Type: Surgical Incision, Deep Tissue Injury       Current Nutrition Intake & Therapies:    Average Meal Intake: 0%, 1-25%  Average Supplements Intake: None Ordered  ADULT DIET; Dysphagia - Pureed; 4 carb choices (60 gm/meal)  Current Tube Feeding (TF) Orders:  Feeding Route: PEG  Formula: Renal Formula  Schedule: Continuous  Goal TF & Flush Orders Provides: Nepro at goal rate of 50 mL/hr x20 hours to provide 1000 mL TV, 1800 kcal, 81 g protein, and 727 mL free water. +30 mL free water flush q 4 hrs. Anthropometric Measures:  Height: 5' 9\" (175.3 cm)  Ideal Body Weight (IBW): 145 lbs (66 kg)    Admission Body Weight: 194 lb (88 kg) (bed scale)  Current Body Weight: 190 lb (86.2 kg), 113.8 % IBW. Weight Source: Bed Scale  Current BMI (kg/m2): 28                          BMI Categories: Normal Weight (BMI 22.0 to 24.9) age over 72    Estimated Daily Nutrient Needs:  Energy Requirements Based On: Kcal/kg (25-30)  Weight Used for Energy Requirements: Ideal  Energy (kcal/day): 7544-6481 kcal  Weight Used for Protein Requirements: Ideal (1.0-1.2 g/kg)  Protein (g/day): 66-74 g  Method Used for Fluid Requirements: 1 ml/kcal  Fluid (ml/day): 1496-2089 mL    Nutrition Diagnosis:   Inadequate oral intake related to altered GI structure, swallowing difficulty as evidenced by intake 0-25%, swallow study results    Nutrition Interventions:   Food and/or Nutrient Delivery: Continue Current Diet, Start Oral Nutrition Supplement, Start Tube Feeding  Nutrition Education/Counseling: No recommendation at this time  Coordination of Nutrition Care: Continue to monitor while inpatient       Goals:  Previous Goal Met: Progressing toward Goal(s)  Goals: PO intake 50% or greater, within 2 days       Nutrition Monitoring and Evaluation:   Behavioral-Environmental Outcomes: None Identified  Food/Nutrient Intake Outcomes: Diet Advancement/Tolerance, Food and Nutrient Intake, Supplement Intake  Physical Signs/Symptoms Outcomes: Biochemical Data, Chewing or Swallowing, GI Status, Nutrition Focused Physical Findings, Weight    Discharge Planning:     Too soon to determine     Anatoliy Bocanegra MS, RD, LD  Contact: [phone]

## 2022-09-30 NOTE — PROGRESS NOTES
Blood sugar was 69, patient refuses to take anything by mouth except for liquid, hence her juice was given. BS down to 66, glucose tabs crushed and she drank it mixed with little water. Current BS is 55. Pt is edematous and anuric. She had HD yesterday. Dr. Cole Myles informed via perfect serve, awaiting response.

## 2022-10-01 LAB
GLUCOSE BLD-MCNC: 148 MG/DL (ref 70–99)
GLUCOSE BLD-MCNC: 56 MG/DL (ref 70–99)
GLUCOSE BLD-MCNC: 65 MG/DL (ref 70–99)
GLUCOSE BLD-MCNC: 68 MG/DL (ref 70–99)
GLUCOSE BLD-MCNC: 79 MG/DL (ref 70–99)
GLUCOSE BLD-MCNC: 82 MG/DL (ref 70–99)
GLUCOSE BLD-MCNC: 82 MG/DL (ref 70–99)
GLUCOSE BLD-MCNC: 83 MG/DL (ref 70–99)
PERFORMED ON: ABNORMAL
PERFORMED ON: NORMAL

## 2022-10-01 PROCEDURE — 6360000002 HC RX W HCPCS: Performed by: INTERNAL MEDICINE

## 2022-10-01 PROCEDURE — 6370000000 HC RX 637 (ALT 250 FOR IP): Performed by: STUDENT IN AN ORGANIZED HEALTH CARE EDUCATION/TRAINING PROGRAM

## 2022-10-01 PROCEDURE — C9113 INJ PANTOPRAZOLE SODIUM, VIA: HCPCS | Performed by: INTERNAL MEDICINE

## 2022-10-01 PROCEDURE — P9047 ALBUMIN (HUMAN), 25%, 50ML: HCPCS | Performed by: INTERNAL MEDICINE

## 2022-10-01 PROCEDURE — 6360000002 HC RX W HCPCS

## 2022-10-01 PROCEDURE — 6360000002 HC RX W HCPCS: Performed by: STUDENT IN AN ORGANIZED HEALTH CARE EDUCATION/TRAINING PROGRAM

## 2022-10-01 PROCEDURE — 1200000000 HC SEMI PRIVATE

## 2022-10-01 PROCEDURE — 6370000000 HC RX 637 (ALT 250 FOR IP): Performed by: INTERNAL MEDICINE

## 2022-10-01 PROCEDURE — 2500000003 HC RX 250 WO HCPCS: Performed by: STUDENT IN AN ORGANIZED HEALTH CARE EDUCATION/TRAINING PROGRAM

## 2022-10-01 PROCEDURE — P9047 ALBUMIN (HUMAN), 25%, 50ML: HCPCS

## 2022-10-01 PROCEDURE — 90935 HEMODIALYSIS ONE EVALUATION: CPT

## 2022-10-01 PROCEDURE — 2580000003 HC RX 258: Performed by: STUDENT IN AN ORGANIZED HEALTH CARE EDUCATION/TRAINING PROGRAM

## 2022-10-01 PROCEDURE — 99024 POSTOP FOLLOW-UP VISIT: CPT | Performed by: SURGERY

## 2022-10-01 PROCEDURE — 92526 ORAL FUNCTION THERAPY: CPT

## 2022-10-01 RX ORDER — ALBUMIN (HUMAN) 12.5 G/50ML
SOLUTION INTRAVENOUS
Status: COMPLETED
Start: 2022-10-01 | End: 2022-10-01

## 2022-10-01 RX ORDER — HEPARIN SODIUM 1000 [USP'U]/ML
INJECTION, SOLUTION INTRAVENOUS; SUBCUTANEOUS
Status: DISPENSED
Start: 2022-10-01 | End: 2022-10-01

## 2022-10-01 RX ORDER — HEPARIN SODIUM 1000 [USP'U]/ML
3600 INJECTION, SOLUTION INTRAVENOUS; SUBCUTANEOUS PRN
Status: DISCONTINUED | OUTPATIENT
Start: 2022-10-01 | End: 2022-10-06 | Stop reason: HOSPADM

## 2022-10-01 RX ORDER — ALBUMIN (HUMAN) 12.5 G/50ML
25 SOLUTION INTRAVENOUS ONCE
Status: COMPLETED | OUTPATIENT
Start: 2022-10-01 | End: 2022-10-01

## 2022-10-01 RX ADMIN — ALBUMIN (HUMAN) 25 G: 0.25 INJECTION, SOLUTION INTRAVENOUS at 13:18

## 2022-10-01 RX ADMIN — CEFTRIAXONE SODIUM 1000 MG: 1 INJECTION, POWDER, FOR SOLUTION INTRAMUSCULAR; INTRAVENOUS at 17:52

## 2022-10-01 RX ADMIN — ROSUVASTATIN CALCIUM 5 MG: 10 TABLET, FILM COATED ORAL at 11:36

## 2022-10-01 RX ADMIN — ACETAMINOPHEN 650 MG: 650 SOLUTION ORAL at 15:32

## 2022-10-01 RX ADMIN — METRONIDAZOLE 500 MG: 500 INJECTION, SOLUTION INTRAVENOUS at 01:55

## 2022-10-01 RX ADMIN — ALBUMIN (HUMAN) 25 G: 0.25 INJECTION, SOLUTION INTRAVENOUS at 09:41

## 2022-10-01 RX ADMIN — ACETAMINOPHEN 650 MG: 650 SOLUTION ORAL at 06:41

## 2022-10-01 RX ADMIN — PANTOPRAZOLE SODIUM 40 MG: 40 INJECTION, POWDER, FOR SOLUTION INTRAVENOUS at 11:33

## 2022-10-01 RX ADMIN — CASTOR OIL AND BALSAM, PERU: 788; 87 OINTMENT TOPICAL at 11:31

## 2022-10-01 RX ADMIN — METRONIDAZOLE 500 MG: 500 INJECTION, SOLUTION INTRAVENOUS at 20:12

## 2022-10-01 RX ADMIN — ACETAMINOPHEN 650 MG: 650 SOLUTION ORAL at 20:07

## 2022-10-01 RX ADMIN — INSULIN GLARGINE 20 UNITS: 100 INJECTION, SOLUTION SUBCUTANEOUS at 20:13

## 2022-10-01 RX ADMIN — CASTOR OIL AND BALSAM, PERU: 788; 87 OINTMENT TOPICAL at 20:10

## 2022-10-01 RX ADMIN — METRONIDAZOLE 500 MG: 500 INJECTION, SOLUTION INTRAVENOUS at 11:35

## 2022-10-01 ASSESSMENT — PAIN SCALES - GENERAL
PAINLEVEL_OUTOF10: 10
PAINLEVEL_OUTOF10: 7
PAINLEVEL_OUTOF10: 10
PAINLEVEL_OUTOF10: 6

## 2022-10-01 ASSESSMENT — PAIN DESCRIPTION - LOCATION
LOCATION: BACK
LOCATION: BACK

## 2022-10-01 ASSESSMENT — PAIN DESCRIPTION - ORIENTATION: ORIENTATION: LOWER

## 2022-10-01 ASSESSMENT — PAIN DESCRIPTION - DESCRIPTORS
DESCRIPTORS: ACHING
DESCRIPTORS: ACHING

## 2022-10-01 NOTE — PLAN OF CARE
Problem: Safety - Adult  Goal: Free from fall injury  10/1/2022 1044 by Linden Sanon, RN  Outcome: Progressing  Flowsheets (Taken 9/28/2022 1123 by Gaby Patel RN)  Free From Fall Injury: Precious Braun family/caregiver on patient safety  9/30/2022 2133 by Roxie Duverney, RN  Outcome: Progressing

## 2022-10-01 NOTE — PROGRESS NOTES
Interval History and plan:     Hemodialysis is in progress  The patient is about 1 kg above her dry weight  The patient has a lot of edema  A same time, patient blood pressure is low, at the time I saw the patient, her systolic blood pressure was only 101. However before she was put on the machine, her blood pressure 143/65  So we will support her blood pressure with albumin and ProAmatine    The patient was seen and examined on hemodialysis. The patient was awake,  The patient looked pale and frail, but under no acute distress  We are using the right upper chest tunneled HD line  The patient is edematous  The patient's vital signs prior to the initiation of dialysis showed a temperature 90.4, heart rate 81 and blood pressure 143/65    Her lab test still pending                     Assessment :     Acidosis  Severe  Requiring 2 vasopressors  Blood pressure okay with the 2 vasopressors but lactic is a still going up to 19 concerning for ischemia   /Possible metformin induced lactic acidosis       CKD Stage IIIb with DEJAN  Creatinine 2.1 on consult  Creatinine 1.6 on 7/22  Likely due to diabetes, hypertension  Renal imaging-normal in the past      hypotension  BP: (118-143)/(65-77)  Heart Rate:  [80-83]   BP goal inpatient 940-467 systolic inpatient  Pressures at the time of consult  Normally hypertensive      [address]  [facility] would like to thank Camacho Feldman MD   for opportunity to serve this patient      Please call with questions at-   24 Hrs Answering service [phone] or  7 am- 5 pm via Perfect serve or cell phone  Thalia Fu MD          CC/reason for consult :       DEJAN     HPI :     Patricia Ibrahim is a 67 y.o. female presented to   the hospital on 9/14/2022 with lactic acidosis. She is known to have diabetes and on metformin to 2 days ago  Has constipation and with multiple bowel regimen has had good bowel movement yesterday following that she has syncope.   EMS was called and was found to have blood pressure of 50 systolic given IV fluids brought to the emergency room blood pressure was normal initially but later developed hypotension got 3 L of fluid and now on 2 vasopressors and in ICU. She also has severe acidosis with very high lactate. CT scan was normal initially. We are consulted for DEJAN on CKD and related issues. On CKD and also severe lactic acidosis    ROS:     Seen with- sister    RN         PMH/PSH/SH/Family History:     Past Medical History:   Diagnosis Date    Chronic kidney disease     Diabetes mellitus ([address]     Hyperlipidemia     Hypertension     Neuropathy        Past Surgical History:   Procedure Laterality Date    IR TUNNELED CATHETER PLACEMENT GREATER THAN 5 YEARS  9/27/2022    IR TUNNELED CATHETER PLACEMENT GREATER THAN 5 YEARS 9/27/2022 [place] [facility]    LAPAROTOMY N/A 9/15/2022    DIAGNOSTIC LAPAROSCOPY, EXPLORATORY LAPAROTOMY, SIGMOID COLECTOMY AND COLOSTOMY performed by Adelbert Duane, MD at [facility] 1        reports that she has never smoked. She has never used smokeless tobacco. She reports that she does not currently use alcohol. She reports that she does not use drugs. family history is not on file.          Medication:     Current Facility-Administered Medications: heparin (porcine) injection 3,600 Units, 3,600 Units, IntraCATHeter, PRN  albumin human 25 % IV solution 25 g, 25 g, IntraVENous, Once  dextrose 50 % IV solution, 25 g, IntraVENous, Once  0.9 % sodium chloride infusion, , IntraVENous, PRN  Venelex ointment, , Topical, BID  cefTRIAXone (ROCEPHIN) 1,000 mg in dextrose 5 % 50 mL IVPB mini-bag, 1,000 mg, IntraVENous, Q24H  metronidazole (FLAGYL) 500 mg in 0.9% NaCl 100 mL IVPB premix, 500 mg, IntraVENous, Q8H  0.9 % sodium chloride infusion, , IntraVENous, PRN  diatrizoate meglumine-sodium (GASTROGRAFIN) 66-10 % solution 12 mL, 12 mL, Oral, ONCE PRN  ceFAZolin (ANCEF) 2000 mg in dextrose 5 % 100 mL IVPB, 2,000 mg, IntraVENous, Once  rosuvastatin (CRESTOR) tablet 5 mg, 5 mg, Oral, Daily  acetaminophen (TYLENOL) 160 MG/5ML solution 650 mg, 650 mg, Oral, Q4H PRN  insulin glargine (LANTUS) injection vial 20 Units, 20 Units, SubCUTAneous, Nightly  0.9 % sodium chloride bolus, 500 mL, IntraVENous, Once  insulin lispro (HUMALOG) injection vial 0-16 Units, 0-16 Units, SubCUTAneous, Q4H  prochlorperazine (COMPAZINE) injection 5 mg, 5 mg, IntraVENous, Q6H PRN  potassium chloride 20 mEq/50 mL IVPB (Central Line), 20 mEq, IntraVENous, PRN  magnesium sulfate 1000 mg in dextrose 5% 100 mL IVPB, 1,000 mg, IntraVENous, PRN  sodium chloride flush 0.9 % injection 5-40 mL, 5-40 mL, IntraVENous, PRN  0.9 % sodium chloride infusion, 25 mL, IntraVENous, PRN  ondansetron (ZOFRAN) injection 4 mg, 4 mg, IntraVENous, Q10 Min PRN  glucose chewable tablet 16 g, 4 tablet, Oral, PRN  dextrose bolus 10% 125 mL, 125 mL, IntraVENous, PRN **OR** dextrose bolus 10% 250 mL, 250 mL, IntraVENous, PRN  glucagon (rDNA) injection 1 mg, 1 mg, SubCUTAneous, PRN  dextrose 10 % infusion, , IntraVENous, Continuous PRN  sodium chloride flush 0.9 % injection 5-40 mL, 5-40 mL, IntraVENous, PRN  0.9 % sodium chloride infusion, , IntraVENous, PRN  polyethylene glycol (GLYCOLAX) packet 17 g, 17 g, Oral, Daily PRN  pantoprazole (PROTONIX) injection 40 mg, 40 mg, IntraVENous, Daily       Vitals :     Vitals:    10/01/22 0808   BP: (!) 143/65   Pulse: 81   Resp: 16   Temp: 98.4 °F (36.9 °C)   SpO2:        I & O :       Intake/Output Summary (Last 24 hours) at 10/1/2022 3833  Last data filed at 10/1/2022 8230  Gross per 24 hour   Intake 520 ml   Output 695 ml   Net -175 ml          Physical Examination :     General appearance: confused,on NC  HEENT: Lips- normal, teeth- ok , oral mucosa- moist  Neck : Mass- no, appears symmetrical, JVD- not visible  Respiratory: Respiratory effort-  comfortable on oxygen, wheeze- no, crackles - no  Cardiovascular:  Ausculation- No M/R/G, Edema none  Abdomen: visible mass- no, distention- no, scar- no, tenderness- no                            hepatosplenomegaly-  No--  Musculoskeletal:  clubbing no,cyanosis- no , digital ischemia- no                           muscle strength- patient unable to co-operate     , tone - patient unable to co-operate   Skin: rashes- no , ulcers- no, induration- no, tightening - no  Psychiatric:  confused   Additional findings:         LABS:     Recent Labs     09/29/22  0831 09/29/22  1847 09/30/22  0058 09/30/22  0532   WBC 13.0*  --   --  12.5*   HGB 6.3* 9.9* 8.5* 8.4*   HCT 20.4* 30.4* 26.0* 26.0*     --   --  200       Recent Labs     09/29/22  0831 09/30/22  0532   * 133*   K 4.4 4.2   CL 97* 99   CO2 18* 20*   BUN 69* 41*   CREATININE 4.0* 2.9*   GLUCOSE 98 61*   MG  --  2.10

## 2022-10-01 NOTE — PROGRESS NOTES
Treatment time: 3 hours  Net UF: 1600 ml     Pre weight: 83.1 kg  Post weight:81.5 kg    Access used: RTDC    Access function: WELL with  ml/min     Medications or blood products given: HEPARIN DWELLS     Regular outpatient schedule: TTS     Summary of response to treatment: Patient tolerated treatment WELL and without any complications.  Patient remained stable throughout entire treatment        10/01/22 0808 10/01/22 1115   Treatment   Time On 0811  --    Time Off  --  1111   Treatment Goal  --  2000  (goal increased per MD)   Vital Signs   BP (!) 143/65 (!) 137/58   Temp 98.4 °F (36.9 °C) 98.1 °F (36.7 °C)   Heart Rate 81 86   Resp  --  16   Weight 183 lb 3.2 oz (83.1 kg) 179 lb 10.8 oz (81.5 kg)   Dialysis Bath   K+ (Potassium) 3  --    Ca+ (Calcium) 2.5  --    Na+ (Sodium) 135  --    HCO3 (Bicarb) 30  --

## 2022-10-01 NOTE — PROGRESS NOTES
[facility] GENERAL SURGERY    Surgery Progress Note           POD #  16    PATIENT NAME: Aleah Garcia     TODAY'S DATE: 10/1/2022    INTERVAL HISTORY:    Pt with no significant complaints today. OBJECTIVE:   VITALS:  /75   Pulse 91   Temp 98.8 °F (37.1 °C) (Axillary)   Resp 16   Ht 5' 9\" (1.753 m)   Wt 179 lb 10.8 oz (81.5 kg)   SpO2 93%   BMI 26.53 kg/m²     INTAKE/OUTPUT:    I/O last 3 completed shifts: In: 760 [P.O.:760]  Out: 3003 [Drains:520; Stool:605]  No intake/output data recorded. CONSTITUTIONAL:  fatigued and somnolent  ABDOMEN:   normal bowel sounds, soft, non-distended, ostomy functional  INCISION: Clean    Data:  CBC:   Recent Labs     09/29/22  0831 09/29/22  1847 09/30/22  0058 09/30/22  0532   WBC 13.0*  --   --  12.5*   HGB 6.3* 9.9* 8.5* 8.4*   HCT 20.4* 30.4* 26.0* 26.0*     --   --  200     BMP:    Recent Labs     09/29/22  0831 09/30/22  0532   * 133*   K 4.4 4.2   CL 97* 99   CO2 18* 20*   BUN 69* 41*   CREATININE 4.0* 2.9*   GLUCOSE 98 61*     Hepatic:   Recent Labs     09/30/22  0532   AST 21   ALT <5*   BILITOT <0.2   ALKPHOS 107     Mag:      Recent Labs     09/30/22  0532   MG 2.10      Phos:   No results for input(s): PHOS in the last 72 hours. INR:   Recent Labs     09/29/22  0831   INR 1.37*         Radiology Review: Percutaneous drain placed    ASSESSMENT AND PLAN:  67 y.o. female status post Avitia's procedure with subsequent percutaneous drain for intra-abdominal fluid collection. Continue antibiotics and supportive care. CT early next week to assess status of fluid collection.   If oral intake does not improve, she may need a feeding tube placed        Electronically signed by Mathew Snyder MD

## 2022-10-01 NOTE — PROGRESS NOTES
Hospitalist Progress Note      PCP: Kandi Mosqueda DO,     Date of Admission: 9/14/2022    Chief Complaint: syncope       Hospital course   Patient was admitted with a syncope. Noted to have perforated ischemic colitis. Treated for septic shock. Acute renal failure did not improve and patient became dialysis dependent. Mental status slightly better but not back to baseline after narcotics weaned down. PEG plans are currently on hold. Noted lower h/h  Ct abdomen showed fluid collection which was drained and a drain was placed tolerated well. Overall clinical improvement is not satisfactory. Having dialysis today. Seen during this is. Subjective  Sleeping, arousable. No chest pain, no shortness of breath, no nausea or vomiting.   Overall no changes or complaints      Medications:  Reviewed    Infusion Medications    sodium chloride      sodium chloride      sodium chloride 25 mL (09/22/22 2024)    dextrose      sodium chloride 100 mL/hr at 09/21/22 2122     Scheduled Medications    albumin human  25 g IntraVENous Once    heparin (porcine)        dextrose  25 g IntraVENous Once    Venelex   Topical BID    cefTRIAXone (ROCEPHIN) IV  1,000 mg IntraVENous Q24H    metroNIDAZOLE  500 mg IntraVENous Q8H    ceFAZolin  2,000 mg IntraVENous Once    rosuvastatin  5 mg Oral Daily    insulin glargine  20 Units SubCUTAneous Nightly    sodium chloride  500 mL IntraVENous Once    insulin lispro  0-16 Units SubCUTAneous Q4H    pantoprazole  40 mg IntraVENous Daily     PRN Meds: heparin (porcine), sodium chloride, sodium chloride, diatrizoate meglumine-sodium, acetaminophen, prochlorperazine, potassium chloride, magnesium sulfate, sodium chloride flush, sodium chloride, ondansetron, glucose, dextrose bolus **OR** dextrose bolus, glucagon (rDNA), dextrose, sodium chloride flush, sodium chloride, polyethylene glycol      Intake/Output Summary (Last 24 hours) at 10/1/2022 1115  Last data filed at 10/1/2022 0531  Gross per 24 hour   Intake 400 ml   Output 695 ml   Net -295 ml         Physical Exam Performed:    BP (!) 143/65   Pulse 81   Temp 98.4 °F (36.9 °C)   Resp 16   Ht 5' 9\" (1.753 m)   Wt 183 lb 3.2 oz (83.1 kg)   SpO2 94%   BMI 27.05 kg/m²     General appearance: No apparent distress, appears stated age. HEENT: Pupils equal, round, and reactive to light. Conjunctivae/corneas clear. Neck: Supple, with full range of motion. No jugular venous distention. Trachea midline. Respiratory:  Normal respiratory effort. Clear to auscultation, bilaterally without Rales/Wheezes/Rhonchi. Diminished throughout. Cardiovascular: Regular rate and rhythm with normal S1/S2 without murmurs, rubs or gallops. Abdomen: Soft, mild diffuse tenderness, non-distended with normal bowel sounds. Musculoskeletal: No clubbing, cyanosis. 1+ BLE pitting edema. Full range of motion without deformity. Skin: Skin color, texture, turgor normal.  Incisions c/d/I. Neurologic:  Neurovascularly intact without any focal sensory/motor deficits. Cranial nerves: II-XII intact, grossly non-focal.  Psychiatric: sleeping, arousable, oriented when awake. Able to engage in short meaningful conversation  Capillary Refill: Brisk, 3 seconds, normal   Peripheral Pulses: +2 palpable, equal bilaterally     I examined the patient today (10/01/22). Physical exam is similar to yesterday (9/30)      Labs:   Recent Labs     09/29/22  0831 09/29/22  1847 09/30/22  0058 09/30/22  0532   WBC 13.0*  --   --  12.5*   HGB 6.3* 9.9* 8.5* 8.4*   HCT 20.4* 30.4* 26.0* 26.0*     --   --  200       Recent Labs     09/29/22  0831 09/30/22  0532   * 133*   K 4.4 4.2   CL 97* 99   CO2 18* 20*   BUN 69* 41*   CREATININE 4.0* 2.9*   CALCIUM 7.5* 8.0*       Recent Labs     09/30/22  0532   AST 21   ALT <5*   BILITOT <0.2   ALKPHOS 107       Recent Labs     09/29/22  0831   INR 1.37*       No results for input(s): CKTOTAL, TROPONINI in the last 72 hours.     Urinalysis: Lab Results   Component Value Date/Time    NITRU Negative 09/14/2022 12:08 PM    [address] 21-50 09/14/2022 12:08 PM    BACTERIA Rare 09/14/2022 12:08 PM    RBCUA None seen 09/14/2022 12:08 PM    BLOODU Negative 09/14/2022 12:08 PM    SPECGRAV <=1.005 09/14/2022 12:08 PM    GLUCOSEU Negative 09/14/2022 12:08 PM       Radiology:  CT ABSCESS DRAINAGE W CATH PLACEMENT S&I   Final Result   Status post successful CT-guided placement of 10 [place] percutaneous drainage   catheter into fluid collection within the left lateral aspect of the abdomen   as described above. CT GUIDED NEEDLE PLACEMENT   Final Result   Status post successful CT-guided placement of 10 [place] percutaneous drainage   catheter into fluid collection within the left lateral aspect of the abdomen   as described above. CT ABDOMEN PELVIS W IV CONTRAST Additional Contrast? Oral   Final Result   1. Prior sigmoid colectomy with an end colostomy and rectal pouch. There are   persistent collections of fluid and air near the proximal aspect of the   rectal pouch measuring as much as 5.9 cm which raises the question of a leak   at the suture line. There are several new focal areas of fluid attenuation   that have a least partial rim enhancement and may reflect developing   abscesses with the largest in the left pericolic gutter and lateral to the   spleen measuring up to 20.9 cm.   2. Thickening of the distal descending colon suggesting colitis and   thickening of the rectal pouch which may reflect pouchitis. 3. Increased size of moderate bilateral pleural effusions with adjacent   consolidation lower lobes a could reflect associated atelectasis or pneumonia. IR TUNNELED CVC PLACE WO SQ PORT/PUMP > 5 YEARS   Final Result   Status post removal of temporary dialysis catheter followed by   fluoroscopically guided placement of right internal jugular tunneled dialysis   catheter as described above.          XR CHEST PORTABLE   Final Result Temporary dialysis catheter overlies the cavoatrial junction. 1. Again noted are bibasilar infiltrates and pleural effusions. Similar   appearance to the prior exam.         XR CHEST PORTABLE   Final Result   Airspace opacities at the bilateral lung bases, may be related to atelectasis   versus pneumonia. Mild bilateral pleural effusions. CT HEAD WO CONTRAST   Final Result   No acute intracranial abnormality. Fluid in the mastoids, with trace mucosal thickening in the sinuses         CT ABDOMEN PELVIS W IV CONTRAST   Final Result   Ostomy is now seen in the left lower quadrant. There is wall thickening of   the colon extending to the ostomy site, which is either due to the partially   contracted state of the colon or wall thickening from underlying colitis      Scattered fluid is seen in the pelvis, with a dominant collection on the   right that contains a small amount of gas internally. In the absence of   clinical signs of infection, this collection of fluid and gas is likely   postoperative. Increased pleural effusions with adjacent consolidation at the lung bases      Nonspecific thickening of the endometrial stripe. Recommend follow-up pelvic   ultrasound after the patient's acute symptoms have resolved. Mild fat stranding surrounds the bladder. Recommend correlation with   urinalysis. Gas is also seen in the bladder         XR CHEST PORTABLE   Final Result   1. Small left pleural effusion. 2.  Hazy right lower lobe airspace opacity which could represent atelectasis   or pneumonia. XR ABDOMEN FOR NG/OG/NE TUBE PLACEMENT   Final Result   Tip of NG tube projects in the left upper quadrant in the region of the   gastric fundus         XR CHEST PORTABLE   Final Result   Interval placement of a right-sided central venous catheter which extends   into the inferior aspect of the right atrium, approximately 5 cm beyond the   cavoatrial junction.          XR CHEST PORTABLE   Final Result   Right IJ CVC catheter tip overlies the SVC at the level of the thoracic inlet. Endotracheal tube tip is 5.3 cm above the mark. Mild bibasilar airspace opacities, which could represent as atelectasis   and/or infiltrate. Possible small right pleural effusion. CT ABDOMEN PELVIS WO CONTRAST Additional Contrast? None   Final Result   1. Limited evaluation of the GI tract on this noncontrast exam.  Improved   mucosal changes of the duodenum and proximal jejunum that were described on   prior CT. 2. Severe inflammatory change in the right lower quadrant with etiology   uncertain. This could correspond to enteritis of the distal ileum and   terminal ileum. Colitis may also be considered. Infectious or inflammatory   etiologies may be favored. 3. Dense stool and diffuse mucosal thickening of the distal colon which may   represent a pattern of colitis. 4. Small right pleural effusion with airspace changes in the right lower   lobe, new from prior exam.   5. Evidence of chronic liver disease with a small amount of ascites stable. CT ABDOMEN PELVIS WO CONTRAST Additional Contrast? None   Final Result   1. Acute enteritis involving the duodenum and jejunal loops with thickening   of the loops and edema. No evidence of bowel obstruction. 2. Constipation with significant stool impaction in the rectum. 3. Mild ascites mostly around the liver and spleen. 4. Adequate position of Osorio catheter in the urinary bladder. XR CHEST PORTABLE   Final Result   Placement of a right internal jugular central venous catheter without evident   complication. The tip is at approximately the cavoatrial junction.       No acute findings in the chest.                 Assessment/Plan:    Active Hospital Problems    Diagnosis     Colon perforation ([address] [K63.1]      Priority: Medium    Acute respiratory failure with hypoxia ([address] [J96.01]      Priority: Medium    Acute encephalopathy [G93.40]      Priority: Medium    Moderate malnutrition ([facility] [E44.0]      Priority: Medium    Ischemic colitis ([facility] [K55.9]      Priority: Medium    S/P exploratory laparotomy [Z98.890]      Priority: Medium    Acute postoperative pulmonary insufficiency (HCC) [J95.2]      Priority: Medium    Syncope [R55]      Priority: Medium    Hyponatremia [E87.1]      Priority: Medium    Abdominal pain [R10.9]      Priority: Medium    Enteritis [K52.9]      Priority: Medium    Elevated troponin [R77.8]      Priority: Medium    DEJAN (acute kidney injury) ([facility] [N17.9]      Priority: Medium    DM (diabetes mellitus), secondary uncontrolled [NFL9278]      Priority: Medium       PLAN:    Ischemic sigmoid colitis. Likely due to constipation and mesenteric atherosclerosis. S/p resection, now with colostomy. Peritonitis and septic shock have resolved, completed antibiotic course. Noted increased fluid collection in the abdomen. IR placed a drain. On antibiotics. Preliminary results do not show any growth. Hypotension. Septic shock resolved, weaned off pressor. Usually on antihypertensives. Currently hypotension is a bigger problem. BP is stable today and patient is able to tolerate hemodialysis. Anemia  Chronic illness. Hemoglobin remains stable after last transfusion. DEJAN on CKD3,   Currently dialysis dependent. Nephrology input appreciated     Dysphagia. Due to encephalopathy. Required NG tube feeds for a number of days. Then surgery removed the NG on 9/26 in an attempt to improve her chances of passing a swallow eval.  If she doesn't make progress toward swallowing in the next few days then family will consent to a PEG tube. Currently supportive approach is preferred. May require PEG tube if fails to improve over the next few days    Palliative care consult requested due to apparent poor prognosis and failure to improve. DVT Prophylaxis: SCDs  Diet: ADULT DIET;  Dysphagia - Pureed  ADULT ORAL NUTRITION SUPPLEMENT; Breakfast, PM Snack; Standard High Calorie/High Protein Oral Supplement  ADULT ORAL NUTRITION SUPPLEMENT; Lunch, Dinner; Frozen Oral Supplement  Code Status: Full Code  PT/OT Eval Status: rec'd LTAC    Dispo -at this point disposition time and location is unclear. Palliative care consulted.     Appropriate for A1 Discharge Unit: Dasha Vang MD

## 2022-10-01 NOTE — CONSULTS
Consult Call Back    [address]  Date:10/1/2022,  Time:7:27 AM    Electronically signed by Gordo Ramirez on 10/1/22 at 7:27 AM EDT

## 2022-10-01 NOTE — PROGRESS NOTES
Speech Language Pathology    SLP attempted follow-up. Per RN, pt currently off the floor at dialysis. SLP to re-attempt follow up at a later time as schedule allows.     Antonio Loyd MA, CF-SLP

## 2022-10-01 NOTE — PROGRESS NOTES
further trials of thin liquids   Nectar / Mildly Thick       Honey / Moderately Thick       Pudding / Extremely Thick       Puree   X  Declined PO trials      Solid         Dysphagia Treatment and Impressions:  RN Ok'd SLP entry. Pt was sleeping upon ST entry. Pt's family present at bedside. Per pt's family, pt ate a little bit of her lunch and was eating applesauce earlier today. Pt was easily awoken. SLP offered trials of thins and puree, however, pt declined. Pt also very fatigued on this date. Pt offered moistened swab to help with oral cavity dryness. Pt was awake for all trials of moistened swab. Due to pt being in/out of sleeping, SLP discontinued further trials after moistened swabs. Rest of session focused on education of family member about diet recommendations, safe swallow precautions, and avoiding PO when very fatigued. Pt's family member verbalized understanding. **Treatment was limited on this date due to pt's fatigue. Session focused mostly on family education**    Could consider alt means of nutrition via PEG to aid in nutrition. Per dietitians note, similar recs are noted. Dysphagia Goals:  Timeframe for Long-term Goals: 7 days, 10/3/22  Goal 1: The pt will tolerate safest and least restrictive diet without clinical s/s of aspiration or change in respiratory status. 10/1/2022 : Ongoing, progressing. Short-term Goals  Timeframe for Short-term Goals: 5 days, 10/1/22  Goal 1: The patient will tolerate recommended diet without observed clinical signs of aspiration. 10/1/2022 : Goal partially addressed, see above. Goal 2: The patient/caregiver will demonstrate understanding of compensatory strategies for improved swallowing safety. 10/1/2022 : Goal addressed, see above. Needs continued reinforcement    Goal 3: The patient will tolerate repeat bedside swallowing evaluation when able.   10/1/2022 : Goal met. 09/28     4) The patient will tolerate instrumental swallowing procedure. 10/1/2022 : Will cont to monitor for need. Speech/Language/Cog Goals: n/a    Recommendations:   Pureed diet (IDDSI 4) with thin liquids (IDDSI 0), meds whole or crushed in puree, total feed assist, strict aspiration precautions, only feed when most alert  If pt has overt s/s of aspiration or change in respiratory status, recommend downgrade to NPO pending re-assessment by ST    Patient/Family/Caregiver Education:  SLP re: role of ST, diet recs, safe swallow precautions, rationale for PO trials, aspiration precautions. Pt needs continued reinforcement, pt's family member verbalized understanding. RN notified of recs. Compensatory Strategies:   HOB 90* and 30\" after meals; small bites/sips; alternate solids/liquids every 3-5 bites; oral care after every meal; total assist feed    Plan:    Continued Dysphagia treatment with goals per plan of care. Discharge Recommendations: per PT/OT recs    If pt discharges from hospital prior to Speech/Swallowing discharge, this note serves as tx and discharge summary.      Total Treatment Time / Charges     Time in Time out Total Time / units   Cognitive Tx         Speech Tx      Dysphagia Tx 1253 1302 9 min / 1 unit     Signature:  Jenae Varela MA, CF-SLP

## 2022-10-01 NOTE — PLAN OF CARE
Problem: Discharge Planning  Goal: Discharge to home or other facility with appropriate resources  9/30/2022 2133 by Allison Joyner RN  Outcome: Progressing  9/30/2022 1056 by Atanacio Sandhoff, RN  Outcome: Progressing     Problem: Pain  Goal: Verbalizes/displays adequate comfort level or baseline comfort level  9/30/2022 2133 by Allison Joyner RN  Outcome: Progressing  9/30/2022 1056 by Atanacio Sandhoff, RN  Outcome: Progressing     Problem: Skin/Tissue Integrity  Goal: Absence of new skin breakdown  Description: 1. Monitor for areas of redness and/or skin breakdown  2. Assess vascular access sites hourly  3. Every 4-6 hours minimum:  Change oxygen saturation probe site  4. Every 4-6 hours:  If on nasal continuous positive airway pressure, respiratory therapy assess nares and determine need for appliance change or resting period. 9/30/2022 2133 by Allison Joyner RN  Outcome: Progressing  9/30/2022 1056 by Atanacio Sandhoff, RN  Outcome: Progressing     Problem: Chronic Conditions and Co-morbidities  Goal: Patient's chronic conditions and co-morbidity symptoms are monitored and maintained or improved  9/30/2022 2133 by Allison Joyner RN  Outcome: Progressing  9/30/2022 1056 by Atanacio Sandhoff, RN  Outcome: Progressing     Problem: Safety - Medical Restraint  Goal: Remains free of injury from restraints (Restraint for Interference with Medical Device)  Description: INTERVENTIONS:  1. Determine that other, less restrictive measures have been tried or would not be effective before applying the restraint  2. Evaluate the patient's condition at the time of restraint application  3. Inform patient/family regarding the reason for restraint  4.  Q2H: Monitor safety, psychosocial status, comfort, nutrition and hydration  9/30/2022 2133 by Allison Joyner RN  Outcome: Progressing  9/30/2022 1056 by Atanacio Sandhoff, RN  Outcome: Progressing     Problem: Nutrition Deficit:  Goal: Optimize nutritional status  9/30/2022 2133 by Una Goodpasture, RN  Outcome: Progressing  9/30/2022 1238 by Trevor Caceres MS, RD, LD  Outcome: Not Progressing  Flowsheets (Taken 9/30/2022 1228)  Nutrient intake appropriate for improving, restoring, or maintaining nutritional needs:   Assess nutritional status and recommend course of action   Monitor oral intake, labs, and treatment plans   Recommend appropriate diets, oral nutritional supplements, and vitamin/mineral supplements   Recommend, monitor, and adjust tube feedings and TPN/PPN based on assessed needs  9/30/2022 1056 by Emerson Wheat RN  Outcome: Progressing     Problem: Safety - Adult  Goal: Free from fall injury  9/30/2022 2133 by Una Goodpasture, RN  Outcome: Progressing  9/30/2022 1056 by Emerson Wheat RN  Outcome: Progressing     Problem: Neurosensory - Adult  Goal: Achieves stable or improved neurological status  9/30/2022 2133 by Una Goodpasture, RN  Outcome: Progressing  9/30/2022 1056 by Emerson Wheat RN  Outcome: Progressing  Goal: Achieves maximal functionality and self care  9/30/2022 2133 by Una Goodpasture, RN  Outcome: Progressing  9/30/2022 1056 by Emerson Wheat RN  Outcome: Progressing     Problem: Respiratory - Adult  Goal: Achieves optimal ventilation and oxygenation  9/30/2022 2133 by Una Goodpasture, RN  Outcome: Progressing  9/30/2022 1056 by Emerson Wheat RN  Outcome: Progressing     Problem: Cardiovascular - Adult  Goal: Maintains optimal cardiac output and hemodynamic stability  9/30/2022 2133 by Una Goodpasture, RN  Outcome: Progressing  9/30/2022 1056 by Emerson Wheat RN  Outcome: Progressing  Goal: Absence of cardiac dysrhythmias or at baseline  9/30/2022 2133 by Una Goodpasture, RN  Outcome: Progressing  9/30/2022 1056 by Emerson Wheat RN  Outcome: Progressing     Problem: Skin/Tissue Integrity - Adult  Goal: Incisions, wounds, or drain sites healing without S/S of infection  9/30/2022 2133 by oR Mcintyre RN  Outcome: Progressing  9/30/2022 1056 by Nick Membreno RN  Outcome: Progressing  Goal: Oral mucous membranes remain intact  9/30/2022 2133 by Ro Mcintyre RN  Outcome: Progressing  9/30/2022 1056 by Nick Membreno RN  Outcome: Progressing     Problem: Musculoskeletal - Adult  Goal: Return mobility to safest level of function  9/30/2022 2133 by Ro Mcintyre RN  Outcome: Progressing  9/30/2022 1056 by Nick Membreno RN  Outcome: Progressing  Goal: Return ADL status to a safe level of function  9/30/2022 2133 by Ro Mcintyre RN  Outcome: Progressing  9/30/2022 1056 by Nick Membreno RN  Outcome: Progressing     Problem: Gastrointestinal - Adult  Goal: Maintains adequate nutritional intake  9/30/2022 2133 by Ro Mcintyre RN  Outcome: Progressing  9/30/2022 1056 by Nick Membreno RN  Outcome: Progressing  Goal: Establish and maintain optimal ostomy function  9/30/2022 2133 by Ro Mcintyre RN  Outcome: Progressing  9/30/2022 1056 by Nick Membreno RN  Outcome: Progressing     Problem: Genitourinary - Adult  Goal: Urinary catheter remains patent  9/30/2022 2133 by Ro Mcintyre RN  Outcome: Progressing  9/30/2022 1056 by Nick Membreno RN  Outcome: Progressing     Problem: Metabolic/Fluid and Electrolytes - Adult  Goal: Electrolytes maintained within normal limits  9/30/2022 2133 by Ro Mcintyre RN  Outcome: Progressing  9/30/2022 1056 by Nick Membreno RN  Outcome: Progressing  Goal: Hemodynamic stability and optimal renal function maintained  9/30/2022 2133 by Ro Mcintyre RN  Outcome: Progressing  9/30/2022 1056 by Nick Membreno RN  Outcome: Progressing  Goal: Glucose maintained within prescribed range  9/30/2022 2133 by Ro Mcintyre RN  Outcome: Progressing  9/30/2022 1056 by Nick Membreno RN  Outcome: Progressing     Problem: ABCDS Injury Assessment  Goal: Absence of physical injury  9/30/2022 2133 by Trisha Way RN  Outcome: Progressing  9/30/2022 1056 by Va Sears RN  Outcome: Progressing     Problem: Nutrition Deficit:  Goal: Optimize nutritional status  9/30/2022 2133 by Trisha Way RN  Outcome: Progressing  9/30/2022 1238 by Blayne Butt MS, RD, LD  Outcome: Not Progressing  Flowsheets (Taken 9/30/2022 1228)  Nutrient intake appropriate for improving, restoring, or maintaining nutritional needs:   Assess nutritional status and recommend course of action   Monitor oral intake, labs, and treatment plans   Recommend appropriate diets, oral nutritional supplements, and vitamin/mineral supplements   Recommend, monitor, and adjust tube feedings and TPN/PPN based on assessed needs  9/30/2022 1056 by Va Sears RN  Outcome: Progressing

## 2022-10-02 LAB
ANION GAP SERPL CALCULATED.3IONS-SCNC: 14 MMOL/L (ref 3–16)
BASOPHILS ABSOLUTE: 0 K/UL (ref 0–0.2)
BASOPHILS RELATIVE PERCENT: 0.2 %
BUN BLDV-MCNC: 37 MG/DL (ref 7–20)
CALCIUM SERPL-MCNC: 7.6 MG/DL (ref 8.3–10.6)
CHLORIDE BLD-SCNC: 97 MMOL/L (ref 99–110)
CO2: 21 MMOL/L (ref 21–32)
CREAT SERPL-MCNC: 3.1 MG/DL (ref 0.6–1.2)
EOSINOPHILS ABSOLUTE: 0.1 K/UL (ref 0–0.6)
EOSINOPHILS RELATIVE PERCENT: 0.7 %
GFR AFRICAN AMERICAN: 18
GFR NON-AFRICAN AMERICAN: 15
GLUCOSE BLD-MCNC: 139 MG/DL (ref 70–99)
GLUCOSE BLD-MCNC: 176 MG/DL (ref 70–99)
GLUCOSE BLD-MCNC: 186 MG/DL (ref 70–99)
GLUCOSE BLD-MCNC: 324 MG/DL (ref 70–99)
GLUCOSE BLD-MCNC: 54 MG/DL (ref 70–99)
GLUCOSE BLD-MCNC: 61 MG/DL (ref 70–99)
GLUCOSE BLD-MCNC: 64 MG/DL (ref 70–99)
GLUCOSE BLD-MCNC: 64 MG/DL (ref 70–99)
GLUCOSE BLD-MCNC: 92 MG/DL (ref 70–99)
GLUCOSE BLD-MCNC: 98 MG/DL (ref 70–99)
HCT VFR BLD CALC: 21.8 % (ref 36–48)
HEMOGLOBIN: 7.1 G/DL (ref 12–16)
LYMPHOCYTES ABSOLUTE: 0.9 K/UL (ref 1–5.1)
LYMPHOCYTES RELATIVE PERCENT: 9.2 %
MCH RBC QN AUTO: 27.6 PG (ref 26–34)
MCHC RBC AUTO-ENTMCNC: 32.7 G/DL (ref 31–36)
MCV RBC AUTO: 84.6 FL (ref 80–100)
MONOCYTES ABSOLUTE: 0.9 K/UL (ref 0–1.3)
MONOCYTES RELATIVE PERCENT: 8.9 %
NEUTROPHILS ABSOLUTE: 7.8 K/UL (ref 1.7–7.7)
NEUTROPHILS RELATIVE PERCENT: 81 %
PDW BLD-RTO: 14.8 % (ref 12.4–15.4)
PERFORMED ON: ABNORMAL
PERFORMED ON: NORMAL
PERFORMED ON: NORMAL
PLATELET # BLD: 174 K/UL (ref 135–450)
PMV BLD AUTO: 7.5 FL (ref 5–10.5)
POTASSIUM SERPL-SCNC: 3.6 MMOL/L (ref 3.5–5.1)
RBC # BLD: 2.58 M/UL (ref 4–5.2)
SODIUM BLD-SCNC: 132 MMOL/L (ref 136–145)
WBC # BLD: 9.6 K/UL (ref 4–11)

## 2022-10-02 PROCEDURE — 80048 BASIC METABOLIC PNL TOTAL CA: CPT

## 2022-10-02 PROCEDURE — 6370000000 HC RX 637 (ALT 250 FOR IP): Performed by: INTERNAL MEDICINE

## 2022-10-02 PROCEDURE — 85025 COMPLETE CBC W/AUTO DIFF WBC: CPT

## 2022-10-02 PROCEDURE — 6360000002 HC RX W HCPCS: Performed by: INTERNAL MEDICINE

## 2022-10-02 PROCEDURE — 2500000003 HC RX 250 WO HCPCS: Performed by: STUDENT IN AN ORGANIZED HEALTH CARE EDUCATION/TRAINING PROGRAM

## 2022-10-02 PROCEDURE — 6360000002 HC RX W HCPCS: Performed by: STUDENT IN AN ORGANIZED HEALTH CARE EDUCATION/TRAINING PROGRAM

## 2022-10-02 PROCEDURE — 6370000000 HC RX 637 (ALT 250 FOR IP): Performed by: STUDENT IN AN ORGANIZED HEALTH CARE EDUCATION/TRAINING PROGRAM

## 2022-10-02 PROCEDURE — 36415 COLL VENOUS BLD VENIPUNCTURE: CPT

## 2022-10-02 PROCEDURE — 99024 POSTOP FOLLOW-UP VISIT: CPT | Performed by: SURGERY

## 2022-10-02 PROCEDURE — C9113 INJ PANTOPRAZOLE SODIUM, VIA: HCPCS | Performed by: INTERNAL MEDICINE

## 2022-10-02 PROCEDURE — 1200000000 HC SEMI PRIVATE

## 2022-10-02 PROCEDURE — 2580000003 HC RX 258: Performed by: STUDENT IN AN ORGANIZED HEALTH CARE EDUCATION/TRAINING PROGRAM

## 2022-10-02 PROCEDURE — 2580000003 HC RX 258: Performed by: INTERNAL MEDICINE

## 2022-10-02 RX ORDER — DEXTROSE MONOHYDRATE 100 MG/ML
INJECTION, SOLUTION INTRAVENOUS CONTINUOUS
Status: DISCONTINUED | OUTPATIENT
Start: 2022-10-02 | End: 2022-10-02

## 2022-10-02 RX ADMIN — ROSUVASTATIN CALCIUM 5 MG: 10 TABLET, FILM COATED ORAL at 07:52

## 2022-10-02 RX ADMIN — ACETAMINOPHEN 650 MG: 650 SOLUTION ORAL at 16:25

## 2022-10-02 RX ADMIN — Medication 16 G: at 10:07

## 2022-10-02 RX ADMIN — CASTOR OIL AND BALSAM, PERU: 788; 87 OINTMENT TOPICAL at 07:48

## 2022-10-02 RX ADMIN — PANTOPRAZOLE SODIUM 40 MG: 40 INJECTION, POWDER, FOR SOLUTION INTRAVENOUS at 07:46

## 2022-10-02 RX ADMIN — METRONIDAZOLE 500 MG: 500 INJECTION, SOLUTION INTRAVENOUS at 12:08

## 2022-10-02 RX ADMIN — METRONIDAZOLE 500 MG: 500 INJECTION, SOLUTION INTRAVENOUS at 03:52

## 2022-10-02 RX ADMIN — CASTOR OIL AND BALSAM, PERU: 788; 87 OINTMENT TOPICAL at 20:22

## 2022-10-02 RX ADMIN — ACETAMINOPHEN 650 MG: 650 SOLUTION ORAL at 07:47

## 2022-10-02 RX ADMIN — Medication 10 ML: at 20:18

## 2022-10-02 RX ADMIN — METRONIDAZOLE 500 MG: 500 INJECTION, SOLUTION INTRAVENOUS at 20:21

## 2022-10-02 RX ADMIN — INSULIN LISPRO 12 UNITS: 100 INJECTION, SOLUTION INTRAVENOUS; SUBCUTANEOUS at 20:22

## 2022-10-02 RX ADMIN — CEFTRIAXONE SODIUM 1000 MG: 1 INJECTION, POWDER, FOR SOLUTION INTRAMUSCULAR; INTRAVENOUS at 16:25

## 2022-10-02 RX ADMIN — ACETAMINOPHEN 650 MG: 650 SOLUTION ORAL at 12:12

## 2022-10-02 ASSESSMENT — PAIN SCALES - WONG BAKER
WONGBAKER_NUMERICALRESPONSE: 0
WONGBAKER_NUMERICALRESPONSE: 0

## 2022-10-02 ASSESSMENT — PAIN SCALES - GENERAL
PAINLEVEL_OUTOF10: 7

## 2022-10-02 NOTE — PROGRESS NOTES
[facility] GENERAL SURGERY    Surgery Progress Note           POD #  17    PATIENT NAME: Tracy Barron     TODAY'S DATE: 10/2/2022    INTERVAL HISTORY:    Pt without complaints. Not much oral intake. OBJECTIVE:   VITALS:  /74   Pulse 80   Temp 98.6 °F (37 °C) (Axillary)   Resp 16   Ht 5' 9\" (1.753 m)   Wt 179 lb 10.8 oz (81.5 kg)   SpO2 94%   BMI 26.53 kg/m²     INTAKE/OUTPUT:    I/O last 3 completed shifts: In: 100 [P.O.:100]  Out: 1020 [Drains:320; Stool:700]  I/O this shift:  In: -   Out: 130 [Drains:80; Stool:50]              CONSTITUTIONAL:  awake and fatigued  ABDOMEN:   normal bowel sounds, soft, non-distended, ostomy functional, and both drains functional.  1 with foul-smelling fluid. The other with serous fluid  INCISION: Lean    Data:  CBC:   Recent Labs     09/30/22  0058 09/30/22  0532 10/02/22  0551   WBC  --  12.5* 9.6   HGB 8.5* 8.4* 7.1*   HCT 26.0* 26.0* 21.8*   PLT  --  200 174     BMP:    Recent Labs     09/30/22  0532 10/02/22  0551   * 132*   K 4.2 3.6   CL 99 97*   CO2 20* 21   BUN 41* 37*   CREATININE 2.9* 3.1*   GLUCOSE 61* 54*     Hepatic:   Recent Labs     09/30/22  0532   AST 21   ALT <5*   BILITOT <0.2   ALKPHOS 107     Mag:      Recent Labs     09/30/22  0532   MG 2.10      Phos:   No results for input(s): PHOS in the last 72 hours. INR: No results for input(s): INR in the last 72 hours. Radiology Review: N/A    ASSESSMENT AND PLAN:  67 y.o. female status post Avitia's procedure with subsequent percutaneous drain placed for intra-abdominal fluid collection. Continue antibiotics and supportive care. CT early this week to assess status of fluid collection.   Given her minimal oral intake, we may need to revisit PEG tube placement with the family        Electronically signed by Sandra Pereira MD

## 2022-10-02 NOTE — PROGRESS NOTES
Hospitalist Progress Note      PCP: Essence Hernandez, DO, DO    Date of Admission: 9/14/2022    Chief Complaint: syncope       Hospital course   Patient was admitted with a syncope. Noted to have perforated ischemic colitis. Treated for septic shock. Acute renal failure did not improve and patient became dialysis dependent. Mental status slightly better but not back to baseline after narcotics weaned down. PEG plans are currently on hold as family hoping patient's oral intake will increase. Does not seem that patient has sufficient improvement with oral intake as of today. Noted lower h/h  Ct abdomen showed fluid collection which was drained and a drain was placed tolerated well. Overall clinical improvement is not satisfactory. Noted hypoglycemia that required intervention. Subjective  Sleeping, arousable. No chest pain, no shortness of breath, no nausea or vomiting.   Overall no changes or complaints      Medications:  Reviewed    Infusion Medications    sodium chloride      sodium chloride      sodium chloride 25 mL (09/22/22 2024)    dextrose      sodium chloride 100 mL/hr at 09/21/22 2122     Scheduled Medications    dextrose  25 g IntraVENous Once    Venelex   Topical BID    cefTRIAXone (ROCEPHIN) IV  1,000 mg IntraVENous Q24H    metroNIDAZOLE  500 mg IntraVENous Q8H    ceFAZolin  2,000 mg IntraVENous Once    rosuvastatin  5 mg Oral Daily    insulin glargine  20 Units SubCUTAneous Nightly    sodium chloride  500 mL IntraVENous Once    insulin lispro  0-16 Units SubCUTAneous Q4H    pantoprazole  40 mg IntraVENous Daily     PRN Meds: heparin (porcine), sodium chloride, sodium chloride, diatrizoate meglumine-sodium, acetaminophen, prochlorperazine, potassium chloride, magnesium sulfate, sodium chloride flush, sodium chloride, ondansetron, glucose, dextrose bolus **OR** dextrose bolus, glucagon (rDNA), dextrose, sodium chloride flush, sodium chloride, polyethylene glycol      Intake/Output Summary (Last 24 hours) at 10/2/2022 1019  Last data filed at 10/2/2022 1004  Gross per 24 hour   Intake --   Output 720 ml   Net -720 ml         Physical Exam Performed:    /74   Pulse 80   Temp 98.6 °F (37 °C) (Axillary)   Resp 16   Ht 5' 9\" (1.753 m)   Wt 179 lb 10.8 oz (81.5 kg)   SpO2 94%   BMI 26.53 kg/m²     General appearance: No apparent distress, appears stated age. HEENT: Pupils equal, round, and reactive to light. Conjunctivae/corneas clear. Neck: Supple, with full range of motion. No jugular venous distention. Trachea midline. Respiratory:  Normal respiratory effort. Clear to auscultation, bilaterally without Rales/Wheezes/Rhonchi. Diminished throughout. Cardiovascular: Regular rate and rhythm with normal S1/S2 without murmurs, rubs or gallops. Abdomen: Soft, mild diffuse tenderness, non-distended with normal bowel sounds. Musculoskeletal: No clubbing, cyanosis. 1+ BLE pitting edema. Full range of motion without deformity. Skin: Skin color, texture, turgor normal.  Incisions c/d/I. Neurologic:  Neurovascularly intact without any focal sensory/motor deficits. Cranial nerves: II-XII intact, grossly non-focal.  Psychiatric: sleeping, arousable, oriented when awake. Able to engage in short meaningful conversation  Capillary Refill: Brisk, 3 seconds, normal   Peripheral Pulses: +2 palpable, equal bilaterally     I examined the patient today (10/02/22). Physical exam is similar to yesterday (10/01)      Labs:   Recent Labs     09/30/22  0058 09/30/22  0532 10/02/22  0551   WBC  --  12.5* 9.6   HGB 8.5* 8.4* 7.1*   HCT 26.0* 26.0* 21.8*   PLT  --  200 174       Recent Labs     09/30/22  0532 10/02/22  0551   * 132*   K 4.2 3.6   CL 99 97*   CO2 20* 21   BUN 41* 37*   CREATININE 2.9* 3.1*   CALCIUM 8.0* 7.6*       Recent Labs     09/30/22  0532   AST 21   ALT <5*   BILITOT <0.2   ALKPHOS 107       No results for input(s): INR in the last 72 hours.     No results for input(s): CKTOTAL, TROPONINI in the last 72 hours. Urinalysis:      Lab Results   Component Value Date/Time    NITRU Negative 09/14/2022 12:08 PM    WBCUA 21-50 09/14/2022 12:08 PM    BACTERIA Rare 09/14/2022 12:08 PM    RBCUA None seen 09/14/2022 12:08 PM    BLOODU Negative 09/14/2022 12:08 PM    SPECGRAV <=1.005 09/14/2022 12:08 PM    GLUCOSEU Negative 09/14/2022 12:08 PM       Radiology:  CT ABSCESS DRAINAGE W CATH PLACEMENT S&I   Final Result   Status post successful CT-guided placement of 10 [place] percutaneous drainage   catheter into fluid collection within the left lateral aspect of the abdomen   as described above. CT GUIDED NEEDLE PLACEMENT   Final Result   Status post successful CT-guided placement of 10 [place] percutaneous drainage   catheter into fluid collection within the left lateral aspect of the abdomen   as described above. CT ABDOMEN PELVIS W IV CONTRAST Additional Contrast? Oral   Final Result   1. Prior sigmoid colectomy with an end colostomy and rectal pouch. There are   persistent collections of fluid and air near the proximal aspect of the   rectal pouch measuring as much as 5.9 cm which raises the question of a leak   at the suture line. There are several new focal areas of fluid attenuation   that have a least partial rim enhancement and may reflect developing   abscesses with the largest in the left pericolic gutter and lateral to the   spleen measuring up to 20.9 cm.   2. Thickening of the distal descending colon suggesting colitis and   thickening of the rectal pouch which may reflect pouchitis. 3. Increased size of moderate bilateral pleural effusions with adjacent   consolidation lower lobes a could reflect associated atelectasis or pneumonia.          IR TUNNELED CVC PLACE WO SQ PORT/PUMP > 5 YEARS   Final Result   Status post removal of temporary dialysis catheter followed by   fluoroscopically guided placement of right internal jugular tunneled dialysis   catheter as described above.         XR CHEST PORTABLE   Final Result   Temporary dialysis catheter overlies the cavoatrial junction. 1. Again noted are bibasilar infiltrates and pleural effusions. Similar   appearance to the prior exam.         XR CHEST PORTABLE   Final Result   Airspace opacities at the bilateral lung bases, may be related to atelectasis   versus pneumonia. Mild bilateral pleural effusions. CT HEAD WO CONTRAST   Final Result   No acute intracranial abnormality. Fluid in the mastoids, with trace mucosal thickening in the sinuses         CT ABDOMEN PELVIS W IV CONTRAST   Final Result   Ostomy is now seen in the left lower quadrant. There is wall thickening of   the colon extending to the ostomy site, which is either due to the partially   contracted state of the colon or wall thickening from underlying colitis      Scattered fluid is seen in the pelvis, with a dominant collection on the   right that contains a small amount of gas internally. In the absence of   clinical signs of infection, this collection of fluid and gas is likely   postoperative. Increased pleural effusions with adjacent consolidation at the lung bases      Nonspecific thickening of the endometrial stripe. Recommend follow-up pelvic   ultrasound after the patient's acute symptoms have resolved. Mild fat stranding surrounds the bladder. Recommend correlation with   urinalysis. Gas is also seen in the bladder         XR CHEST PORTABLE   Final Result   1. Small left pleural effusion. 2.  Hazy right lower lobe airspace opacity which could represent atelectasis   or pneumonia.          XR ABDOMEN FOR NG/OG/NE TUBE PLACEMENT   Final Result   Tip of NG tube projects in the left upper quadrant in the region of the   gastric fundus         XR CHEST PORTABLE   Final Result   Interval placement of a right-sided central venous catheter which extends   into the inferior aspect of the right atrium, approximately 5 cm beyond the   cavoatrial junction. XR CHEST PORTABLE   Final Result   Right IJ CVC catheter tip overlies the SVC at the level of the thoracic inlet. Endotracheal tube tip is 5.3 cm above the mark. Mild bibasilar airspace opacities, which could represent as atelectasis   and/or infiltrate. Possible small right pleural effusion. CT ABDOMEN PELVIS WO CONTRAST Additional Contrast? None   Final Result   1. Limited evaluation of the GI tract on this noncontrast exam.  Improved   mucosal changes of the duodenum and proximal jejunum that were described on   prior CT. 2. Severe inflammatory change in the right lower quadrant with etiology   uncertain. This could correspond to enteritis of the distal ileum and   terminal ileum. Colitis may also be considered. Infectious or inflammatory   etiologies may be favored. 3. Dense stool and diffuse mucosal thickening of the distal colon which may   represent a pattern of colitis. 4. Small right pleural effusion with airspace changes in the right lower   lobe, new from prior exam.   5. Evidence of chronic liver disease with a small amount of ascites stable. CT ABDOMEN PELVIS WO CONTRAST Additional Contrast? None   Final Result   1. Acute enteritis involving the duodenum and jejunal loops with thickening   of the loops and edema. No evidence of bowel obstruction. 2. Constipation with significant stool impaction in the rectum. 3. Mild ascites mostly around the liver and spleen. 4. Adequate position of Osorio catheter in the urinary bladder. XR CHEST PORTABLE   Final Result   Placement of a right internal jugular central venous catheter without evident   complication. The tip is at approximately the cavoatrial junction.       No acute findings in the chest.                 Assessment/Plan:    Active Hospital Problems    Diagnosis     Colon perforation (HCC) [K63.1]      Priority: Medium    Acute respiratory failure with hypoxia ([facility] [J96.01]      Priority: Medium    Acute encephalopathy [G93.40]      Priority: Medium    Ischemic colitis ([facility] [K55.9]      Priority: Medium    S/P exploratory laparotomy [Z98.890]      Priority: Medium    Acute postoperative pulmonary insufficiency (HCC) [J95.2]      Priority: Medium    Syncope [R55]      Priority: Medium    Hyponatremia [E87.1]      Priority: Medium    Abdominal pain [R10.9]      Priority: Medium    Enteritis [K52.9]      Priority: Medium    Elevated troponin [R77.8]      Priority: Medium    DEJAN (acute kidney injury) ([facility] [N17.9]      Priority: Medium    DM (diabetes mellitus), secondary uncontrolled [DEQ8760]      Priority: Medium       PLAN:    Ischemic sigmoid colitis. Likely due to constipation and mesenteric atherosclerosis. S/p resection, now with colostomy. Peritonitis and septic shock have resolved, completed antibiotic course. Noted increased fluid collection in the abdomen. IR placed a drain. On antibiotics. Preliminary results do not show any growth. Repeat CT scan early next week under general surgery recommendations. Hypotension. Septic shock resolved, weaned off pressor. Usually on antihypertensives. Currently hypotension is a bigger problem. BP is stable today. Patient cannot tolerate dialysis with current blood pressure. Anemia  Chronic illness. Hemoglobin remains acceptable and is being monitored. DEJAN on CKD3,   Currently dialysis dependent. Nephrology input appreciated     Dysphagia. Due to encephalopathy. Required NG tube feeds for a number of days. Then surgery removed the NG on 9/26 in an attempt to improve her chances of passing a swallow eval.  If she doesn't make progress toward swallowing in the next few days then family will consent to a PEG tube. Currently supportive approach is preferred. May require PEG tube soon. Hypoglycemia  Poor oral intake. Requires interventions.   Dextrose fluids are not recommended as the patient is already edematous and is end-stage renal disease with hemodialysis. We may try continuous dextrose fluids if approved by nephrology. Palliative care consult requested due to apparent poor prognosis and failure to improve. DVT Prophylaxis: SCDs  Diet: ADULT DIET; Dysphagia - Pureed  ADULT ORAL NUTRITION SUPPLEMENT; Breakfast, PM Snack; Standard High Calorie/High Protein Oral Supplement  ADULT ORAL NUTRITION SUPPLEMENT; Lunch, Dinner; Frozen Oral Supplement  Code Status: Full Code  PT/OT Eval Status: Ordered    Dispo -at this point disposition time and location is unclear. Palliative care consulted.     Appropriate for A1 Discharge Unit: Dasah Lewis MD

## 2022-10-02 NOTE — PROGRESS NOTES
Pt's blood sugar is 64 this am. I was able to get pt eat a little breakfast and ensure, and when blood sugar was checked again, it dropped to 61. Dr. Janes Toth paged and notified and asked if pt would benefit from dextrose fluids to try to help with low PO intake? Awaiting response.

## 2022-10-02 NOTE — PROGRESS NOTES
Interval History and plan:   I have received a message from the patient nurse, the patient running persistent hypoglycemia, in the range of 54-64  The patient very lethargic  The patient able to take some p.o., but not a whole lot  Therefore the patient should be started on D10 solution and to keep her glucose level around 100    I have reviewed the patient's lab works and vital signs, the patient will not require dialysis for today the patient's sodium was 132, potassium 3.6, bicarb 21    The patient was seen examined in her room  She was very lethargic, arousable, she looked pale and frail, but she did not have any new discomfort  Her most recent set of vital signs showed temperature 98.6, heart rate 80 and blood pressure 132/74    Lab work from this morning showed sodium 132, potassium 3.6, bicarb 21, BUN 37, creatinine was 3.1, glucose initially was 54, the most recent glucose was 64                     Assessment :     Acidosis  Severe  Requiring 2 vasopressors  Blood pressure okay with the 2 vasopressors but lactic is a still going up to 19 concerning for ischemia   /Possible metformin induced lactic acidosis       CKD Stage IIIb with DEJAN  Creatinine 2.1 on consult  Creatinine 1.6 on 7/22  Likely due to diabetes, hypertension  Renal imaging-normal in the past      hypotension  BP: (130-132)/(68-74)  Heart Rate:  [80-82]   BP goal inpatient [phone] systolic inpatient  Pressures at the time of consult  Normally hypertensive      [facility] would like to thank Warden Claudy MD   for opportunity to serve this patient      Please call with questions at-   24 Hrs Answering service [phone] or  7 am- 5 pm via Perfect serve or cell phone  Jalil Mckinney MD          CC/reason for consult :       DEJAN     HPI :     Bucky Camilo is a 67 y.o. female presented to   the hospital on 9/14/2022 with lactic acidosis.   She is known to have diabetes and on metformin to 2 days ago  Has constipation and with multiple bowel regimen has had good bowel movement yesterday following that she has syncope. EMS was called and was found to have blood pressure of 50 systolic given IV fluids brought to the emergency room blood pressure was normal initially but later developed hypotension got 3 L of fluid and now on 2 vasopressors and in ICU. She also has severe acidosis with very high lactate. CT scan was normal initially. We are consulted for DEJAN on CKD and related issues. On CKD and also severe lactic acidosis    ROS:     Seen with- sister    RN         PMH/PSH/SH/Family History:     Past Medical History:   Diagnosis Date    Chronic kidney disease     Diabetes mellitus ([facility]     Hyperlipidemia     Hypertension     Neuropathy        Past Surgical History:   Procedure Laterality Date    IR TUNNELED CATHETER PLACEMENT GREATER THAN 5 YEARS  9/27/2022    IR TUNNELED CATHETER PLACEMENT GREATER THAN 5 YEARS 9/27/2022 [facility]    LAPAROTOMY N/A 9/15/2022    DIAGNOSTIC LAPAROSCOPY, EXPLORATORY LAPAROTOMY, SIGMOID COLECTOMY AND COLOSTOMY performed by Gladys Fernandez MD at [facility]        reports that she has never smoked. She has never used smokeless tobacco. She reports that she does not currently use alcohol. She reports that she does not use drugs. family history is not on file.          Medication:     Current Facility-Administered Medications: heparin (porcine) injection 3,600 Units, 3,600 Units, IntraCATHeter, PRN  dextrose 50 % IV solution, 25 g, IntraVENous, Once  0.9 % sodium chloride infusion, , IntraVENous, PRN  Venelex ointment, , Topical, BID  cefTRIAXone (ROCEPHIN) 1,000 mg in dextrose 5 % 50 mL IVPB mini-bag, 1,000 mg, IntraVENous, Q24H  metronidazole (FLAGYL) 500 mg in 0.9% NaCl 100 mL IVPB premix, 500 mg, IntraVENous, Q8H  0.9 % sodium chloride infusion, , IntraVENous, PRN  diatrizoate meglumine-sodium (GASTROGRAFIN) 66-10 % solution 12 mL, 12 mL, Oral, ONCE PRN  ceFAZolin (ANCEF) 2000 mg in dextrose 5 % 100 mL IVPB, 2,000 mg, IntraVENous, Once  rosuvastatin (CRESTOR) tablet 5 mg, 5 mg, Oral, Daily  acetaminophen (TYLENOL) 160 MG/5ML solution 650 mg, 650 mg, Oral, Q4H PRN  insulin glargine (LANTUS) injection vial 20 Units, 20 Units, SubCUTAneous, Nightly  0.9 % sodium chloride bolus, 500 mL, IntraVENous, Once  insulin lispro (HUMALOG) injection vial 0-16 Units, 0-16 Units, SubCUTAneous, Q4H  prochlorperazine (COMPAZINE) injection 5 mg, 5 mg, IntraVENous, Q6H PRN  potassium chloride 20 mEq/50 mL IVPB (Central Line), 20 mEq, IntraVENous, PRN  magnesium sulfate 1000 mg in dextrose 5% 100 mL IVPB, 1,000 mg, IntraVENous, PRN  sodium chloride flush 0.9 % injection 5-40 mL, 5-40 mL, IntraVENous, PRN  0.9 % sodium chloride infusion, 25 mL, IntraVENous, PRN  ondansetron (ZOFRAN) injection 4 mg, 4 mg, IntraVENous, Q10 Min PRN  glucose chewable tablet 16 g, 4 tablet, Oral, PRN  dextrose bolus 10% 125 mL, 125 mL, IntraVENous, PRN **OR** dextrose bolus 10% 250 mL, 250 mL, IntraVENous, PRN  glucagon (rDNA) injection 1 mg, 1 mg, SubCUTAneous, PRN  dextrose 10 % infusion, , IntraVENous, Continuous PRN  sodium chloride flush 0.9 % injection 5-40 mL, 5-40 mL, IntraVENous, PRN  0.9 % sodium chloride infusion, , IntraVENous, PRN  polyethylene glycol (GLYCOLAX) packet 17 g, 17 g, Oral, Daily PRN  pantoprazole (PROTONIX) injection 40 mg, 40 mg, IntraVENous, Daily       Vitals :     Vitals:    10/02/22 0742   BP: 132/74   Pulse: 80   Resp: 16   Temp: 98.6 °F (37 °C)   SpO2:        I & O :       Intake/Output Summary (Last 24 hours) at 10/2/2022 1025  Last data filed at 10/2/2022 1004  Gross per 24 hour   Intake --   Output 720 ml   Net -720 ml          Physical Examination :     General appearance: confused,on NC  HEENT: Lips- normal, teeth- ok , oral mucosa- moist  Neck : Mass- no, appears symmetrical, JVD- not visible  Respiratory: Respiratory effort-  comfortable on oxygen, wheeze- no, crackles - no  Cardiovascular:  Ausculation- No M/R/G, Edema none  Abdomen: visible mass- no, distention- no, scar- no, tenderness- no                            hepatosplenomegaly-  No--  Musculoskeletal:  clubbing no,cyanosis- no , digital ischemia- no                           muscle strength- patient unable to co-operate     , tone - patient unable to co-operate   Skin: rashes- no , ulcers- no, induration- no, tightening - no  Psychiatric:  confused   Additional findings:         LABS:     Recent Labs     09/30/22  0058 09/30/22  0532 10/02/22  0551   WBC  --  12.5* 9.6   HGB 8.5* 8.4* 7.1*   HCT 26.0* 26.0* 21.8*   PLT  --  200 174       Recent Labs     09/30/22  0532 10/02/22  0551   * 132*   K 4.2 3.6   CL 99 97*   CO2 20* 21   BUN 41* 37*   CREATININE 2.9* 3.1*   GLUCOSE 61* 54*   MG 2.10  --

## 2022-10-02 NOTE — PLAN OF CARE
Problem: Discharge Planning  Goal: Discharge to home or other facility with appropriate resources  Outcome: Progressing     Problem: Pain  Goal: Verbalizes/displays adequate comfort level or baseline comfort level  Outcome: Progressing  Flowsheets (Taken 10/1/2022 2006)  Verbalizes/displays adequate comfort level or baseline comfort level:   Encourage patient to monitor pain and request assistance   Assess pain using appropriate pain scale     Problem: Skin/Tissue Integrity  Goal: Absence of new skin breakdown  Description: 1. Monitor for areas of redness and/or skin breakdown  2. Assess vascular access sites hourly  3. Every 4-6 hours minimum:  Change oxygen saturation probe site  4. Every 4-6 hours:  If on nasal continuous positive airway pressure, respiratory therapy assess nares and determine need for appliance change or resting period. Outcome: Progressing     Problem: Chronic Conditions and Co-morbidities  Goal: Patient's chronic conditions and co-morbidity symptoms are monitored and maintained or improved  Outcome: Progressing     Problem: Safety - Medical Restraint  Goal: Remains free of injury from restraints (Restraint for Interference with Medical Device)  Description: INTERVENTIONS:  1. Determine that other, less restrictive measures have been tried or would not be effective before applying the restraint  2. Evaluate the patient's condition at the time of restraint application  3. Inform patient/family regarding the reason for restraint  4.  Q2H: Monitor safety, psychosocial status, comfort, nutrition and hydration  Outcome: Progressing     Problem: Nutrition Deficit:  Goal: Optimize nutritional status  Outcome: Progressing     Problem: Safety - Adult  Goal: Free from fall injury  10/1/2022 2012 by Duane Flowers RN  Outcome: Progressing  10/1/2022 1044 by Griffin Cornejo RN  Outcome: Progressing  Flowsheets (Taken 9/28/2022 1123 by Js Vines RN)  Free From Fall Injury: Seamus Jacobs family/caregiver on patient safety     Problem: Neurosensory - Adult  Goal: Achieves stable or improved neurological status  Outcome: Progressing  Goal: Achieves maximal functionality and self care  Outcome: Progressing     Problem: Respiratory - Adult  Goal: Achieves optimal ventilation and oxygenation  Outcome: Progressing     Problem: Cardiovascular - Adult  Goal: Maintains optimal cardiac output and hemodynamic stability  Outcome: Progressing  Goal: Absence of cardiac dysrhythmias or at baseline  Outcome: Progressing     Problem: Skin/Tissue Integrity - Adult  Goal: Incisions, wounds, or drain sites healing without S/S of infection  Outcome: Progressing  Goal: Oral mucous membranes remain intact  Outcome: Progressing     Problem: Musculoskeletal - Adult  Goal: Return mobility to safest level of function  Outcome: Progressing  Goal: Return ADL status to a safe level of function  Outcome: Progressing     Problem: Gastrointestinal - Adult  Goal: Maintains adequate nutritional intake  Outcome: Progressing  Goal: Establish and maintain optimal ostomy function  Outcome: Progressing     Problem: Genitourinary - Adult  Goal: Urinary catheter remains patent  Outcome: Progressing     Problem: Metabolic/Fluid and Electrolytes - Adult  Goal: Electrolytes maintained within normal limits  Outcome: Progressing  Goal: Hemodynamic stability and optimal renal function maintained  Outcome: Progressing  Goal: Glucose maintained within prescribed range  Outcome: Progressing     Problem: ABCDS Injury Assessment  Goal: Absence of physical injury  Outcome: Progressing

## 2022-10-02 NOTE — PLAN OF CARE
Problem: Safety - Adult  Goal: Free from fall injury  Outcome: Progressing  Flowsheets (Taken 9/28/2022 1123 by Gisell Hogan RN)  Free From Fall Injury: Instruct family/caregiver on patient safety

## 2022-10-03 LAB
GLUCOSE BLD-MCNC: 122 MG/DL (ref 70–99)
GLUCOSE BLD-MCNC: 124 MG/DL (ref 70–99)
GLUCOSE BLD-MCNC: 125 MG/DL (ref 70–99)
GLUCOSE BLD-MCNC: 171 MG/DL (ref 70–99)
GLUCOSE BLD-MCNC: 172 MG/DL (ref 70–99)
GLUCOSE BLD-MCNC: 177 MG/DL (ref 70–99)
PERFORMED ON: ABNORMAL

## 2022-10-03 PROCEDURE — 99024 POSTOP FOLLOW-UP VISIT: CPT | Performed by: SURGERY

## 2022-10-03 PROCEDURE — 6370000000 HC RX 637 (ALT 250 FOR IP): Performed by: NURSE PRACTITIONER

## 2022-10-03 PROCEDURE — 6360000002 HC RX W HCPCS: Performed by: INTERNAL MEDICINE

## 2022-10-03 PROCEDURE — 97535 SELF CARE MNGMENT TRAINING: CPT

## 2022-10-03 PROCEDURE — 6370000000 HC RX 637 (ALT 250 FOR IP): Performed by: INTERNAL MEDICINE

## 2022-10-03 PROCEDURE — 1200000000 HC SEMI PRIVATE

## 2022-10-03 PROCEDURE — 97530 THERAPEUTIC ACTIVITIES: CPT

## 2022-10-03 PROCEDURE — APPSS30 APP SPLIT SHARED TIME 16-30 MINUTES: Performed by: CLINICAL NURSE SPECIALIST

## 2022-10-03 PROCEDURE — 6370000000 HC RX 637 (ALT 250 FOR IP): Performed by: STUDENT IN AN ORGANIZED HEALTH CARE EDUCATION/TRAINING PROGRAM

## 2022-10-03 PROCEDURE — 97605 NEG PRS WND THER DME<=50SQCM: CPT

## 2022-10-03 PROCEDURE — C9113 INJ PANTOPRAZOLE SODIUM, VIA: HCPCS | Performed by: INTERNAL MEDICINE

## 2022-10-03 PROCEDURE — 6360000002 HC RX W HCPCS: Performed by: STUDENT IN AN ORGANIZED HEALTH CARE EDUCATION/TRAINING PROGRAM

## 2022-10-03 PROCEDURE — 2500000003 HC RX 250 WO HCPCS: Performed by: STUDENT IN AN ORGANIZED HEALTH CARE EDUCATION/TRAINING PROGRAM

## 2022-10-03 PROCEDURE — 2580000003 HC RX 258: Performed by: STUDENT IN AN ORGANIZED HEALTH CARE EDUCATION/TRAINING PROGRAM

## 2022-10-03 PROCEDURE — 92526 ORAL FUNCTION THERAPY: CPT

## 2022-10-03 RX ORDER — TRAMADOL HYDROCHLORIDE 50 MG/1
50 TABLET ORAL EVERY 6 HOURS PRN
Status: DISCONTINUED | OUTPATIENT
Start: 2022-10-03 | End: 2022-10-06 | Stop reason: HOSPADM

## 2022-10-03 RX ADMIN — CASTOR OIL AND BALSAM, PERU: 788; 87 OINTMENT TOPICAL at 19:43

## 2022-10-03 RX ADMIN — TRAMADOL HYDROCHLORIDE 50 MG: 50 TABLET ORAL at 19:53

## 2022-10-03 RX ADMIN — METRONIDAZOLE 500 MG: 500 INJECTION, SOLUTION INTRAVENOUS at 02:43

## 2022-10-03 RX ADMIN — CASTOR OIL AND BALSAM, PERU: 788; 87 OINTMENT TOPICAL at 11:24

## 2022-10-03 RX ADMIN — PANTOPRAZOLE SODIUM 40 MG: 40 INJECTION, POWDER, FOR SOLUTION INTRAVENOUS at 11:24

## 2022-10-03 RX ADMIN — METRONIDAZOLE 500 MG: 500 INJECTION, SOLUTION INTRAVENOUS at 19:45

## 2022-10-03 RX ADMIN — METRONIDAZOLE 500 MG: 500 INJECTION, SOLUTION INTRAVENOUS at 11:27

## 2022-10-03 RX ADMIN — ACETAMINOPHEN 650 MG: 650 SOLUTION ORAL at 16:05

## 2022-10-03 RX ADMIN — CEFTRIAXONE SODIUM 1000 MG: 1 INJECTION, POWDER, FOR SOLUTION INTRAMUSCULAR; INTRAVENOUS at 17:30

## 2022-10-03 RX ADMIN — ROSUVASTATIN CALCIUM 5 MG: 10 TABLET, FILM COATED ORAL at 11:26

## 2022-10-03 ASSESSMENT — PAIN SCALES - WONG BAKER
WONGBAKER_NUMERICALRESPONSE: 0

## 2022-10-03 ASSESSMENT — PAIN DESCRIPTION - LOCATION: LOCATION: ABDOMEN

## 2022-10-03 ASSESSMENT — PAIN SCALES - GENERAL: PAINLEVEL_OUTOF10: 5

## 2022-10-03 NOTE — PROGRESS NOTES
[facility] GENERAL SURGERY    Surgery Progress Note           POD # 18    PATIENT NAME: Tatianna Landry     TODAY'S DATE: 10/3/2022    INTERVAL HISTORY:    Pt minimally interactive this AM - hasn't eaten much of breakfast tray. Discussed with staff. OBJECTIVE:   VITALS:  /73   Pulse 77   Temp 98 °F (36.7 °C) (Oral)   Resp 16   Ht 5' 9\" (1.753 m)   Wt 179 lb 10.8 oz (81.5 kg)   SpO2 97%   BMI 26.53 kg/m²     INTAKE/OUTPUT:    I/O last 3 completed shifts: In: 200 [P.O.:200]  Out: 1350 [Drains:275; FRESX:8035]  No intake/output data recorded. CONSTITUTIONAL:  pale, lethargic  ABDOMEN:   normal bowel sounds, soft, non-distended, ostomy with gas and liquid stool, froy drains in place - purulent fluid and more serous in second. INCISION: no erythema, Staples intact, with tan creamy drainage midline    Data:  CBC:   Recent Labs     10/02/22  0551   WBC 9.6   HGB 7.1*   HCT 21.8*          BMP:    Recent Labs     10/02/22  0551   *   K 3.6   CL 97*   CO2 21   BUN 37*   CREATININE 3.1*   GLUCOSE 54*         ASSESSMENT AND PLAN:  67 y.o. female status post Avitia's procedure with subsequent percutaneous drain placed for intra-abdominal fluid collection. GI/Nutrition: oral intake remains low - discussed with nursing that palliative care is supposed to discuss PEG and goals of care with family today. Continue with IV antibiotics and full supportive care for now. Electronically signed by BRAEDEN Hernandez CNP     Patient seen and agree with above and more than half of the total time was spent by me on the encounter. Several staples removed and vac to be applied to midline.   Jeannette Wheeler MD

## 2022-10-03 NOTE — PROGRESS NOTES
Physical Therapy  Facility/Department: [facility] C3 TELE/MED SURG/ONC  Daily Treatment Note  NAME: Bucky Camilo  : 1950  MRN: [id number]    Date of Service: 10/3/2022    Discharge Recommendations:  Subacute/Skilled Nursing Facility   PT Equipment Recommendations  Equipment Needed: No  Other: TBD at SNF    [facility] 6 Clicks Inpatient Mobility:  AM-PAC Mobility Inpatient   How much difficulty turning over in bed?: A Lot  How much difficulty sitting down on / standing up from a chair with arms?: Unable  How much difficulty moving from lying on back to sitting on side of bed?: A Lot  How much help from another person moving to and from a bed to a chair?: Total  How much help from another person needed to walk in hospital room?: Total  How much help from another person for climbing 3-5 steps with a railing?: Total  AM-PAC Inpatient Mobility Raw Score : 8  AM-PAC Inpatient T-Scale Score : 28.52  Mobility Inpatient CMS 0-100% Score: 86.62  Mobility Inpatient CMS G-Code Modifier :      Patient Diagnosis(es): The primary encounter diagnosis was Lactic acidosis. Diagnoses of Generalized abdominal pain, Non-intractable vomiting with nausea, unspecified vomiting type, Acute cystitis with hematuria, Septicemia ([address] Perforated bowel ([address] Stercoral colitis, and DEJAN (acute kidney injury) ([address] were also pertinent to this visit.     Assessment   Assessment: pt is progressing slowly toward meeting Acute PT goals as evidenced by increased tolerance for sitting eob to 10 mins with fluctuating need for assist, pt will engage core when releasing Max A support, pt able to participate in minimal seated therex, tolerated supine therex well with verbal cues to complete, pt required 2 skilled therapists to progress to higher level of function with maximal safety, pt will benefit from continued Acute Inpatient Skilled PT to address functional mobility deficits, agree with previous PT rec for SNF at [place]  Activity Tolerance: Patient limited by fatigue;Patient limited by pain; Patient limited by endurance  Equipment Needed: No  Other: TBD at [place]: 2-3 times per week  Specific Instructions for Next Treatment: Progress ther ex and mobility as tolerated  Current Treatment Recommendations: Strengthening;Balance training;Functional mobility training;Transfer training; Endurance training;Neuromuscular re-education;Gait training;Pain management; Safety education & training;Home exercise program;Patient/Caregiver education & training; Therapeutic activities; Equipment evaluation, education, & procurement;Positioning     Restrictions  Restrictions/Precautions  Restrictions/Precautions: Up as Tolerated, Fall Risk, Modified Diet, Aspiration Risk, General Precautions  Position Activity Restriction  Other position/activity restrictions: colostomy, R IJ, BROOKS drain, telemetry. No BP or IV sticks to RUE. Aspiration precautions with puree diet. Subjective    Subjective  Subjective: pt agreeable to PT  Pain: none specifically reported throughout session  Orientation  Overall Orientation Status: Impaired  Orientation Level: Oriented to person;Oriented to place; Disoriented to time;Disoriented to situation  Cognition  Overall Cognitive Status: Exceptions  Arousal/Alertness: Delayed responses to stimuli  Following Commands: Follows one step commands with increased time  Attention Span: Attends with cues to redirect  Memory: Decreased recall of recent events  Safety Judgement: Decreased awareness of need for assistance;Decreased awareness of need for safety  Problem Solving: Assistance required to implement solutions  Insights: Not aware of deficits  Initiation: Requires cues for some  Sequencing: Requires cues for some     Objective   Vitals: 112/67, 88 bpm, 95% on room air      Bed Mobility Training  Bed Mobility Training: Yes  Overall Level of Assistance: Maximum assistance; Additional time;Assist X2  Interventions: Verbal cues;Demonstration; Safety awareness training  Rolling: Maximum assistance; Additional time;Assist X2  Supine to Sit: Maximum assistance; Additional time; Adaptive equipment;Assist X2  Sit to Supine: Assist X2;Additional time; Adaptive equipment;Maximum assistance  Scooting: Total assistance  Balance  Sitting: Impaired  Sitting - Static: Poor (constant support) (pt requires primarily Max A to maintain midline balance in sitting, 3 moments of CGA when pt engaged core, pt tolerated 10 mins sitting eob)  Sitting - Dynamic: Poor (constant support) (Max A)  Transfer Training  Transfer Training: No  Gait Training  Gait Training: No     PT Exercises  Exercise Treatment: supine B LE ankle DF/PF, quad sets x 10 reps each ; sitting eob knee ext x 10 reps total, performs 2 prior to needing rest break     Safety Devices  Type of Devices: All fall risk precautions in place; Bed alarm in place;Call light within reach; Left in bed;Nurse notified       Goals  Short Term Goals  Time Frame for Short Term Goals: 10 days, 10/03/22, unless otherwise noted  Short Term Goal 1: Pt will perform supine to sit with mod(A) x 2  Short Term Goal 2: Pt will tolerate transfer from bed to chair with mod(A) x 2  Short Term Goal 3: Pt will tolerate sitting EOB x7 minutes with mod(A) x 1  Short Term Goal 4: Pt will perform 12-15 reps of BLE exercises to improve mobility for transfers by 9/26/22 - MET 9/29/22, goal ongoing to continue to build LE ROM/strength  Patient Goals   Patient Goals : None stated by pt due to lethargy    Education  Patient Education  Education Given To: Patient  Education Provided: Role of Therapy;Plan of Care;Home Exercise Program  Education Provided Comments: Pt educated on importance of EOB activity and exercises performed this date  Education Method: Demonstration;Verbal  Barriers to Learning: Cognition  Education Outcome: Unable to demonstrate understanding;Continued education needed    Therapy Time   Individual Concurrent

## 2022-10-03 NOTE — PROGRESS NOTES
Occupational Therapy  Facility/Department: [facility] C3 TELE/MED SURG/ONC  Daily Treatment Note  NAME: Tracy Barron  : 1950  MRN: [id number]    Date of Service: 10/3/2022    Discharge Recommendations:  LTACH, [address]  OT Equipment Recommendations  Other: Defer to facility to obtain      Patient Diagnosis(es): The primary encounter diagnosis was Lactic acidosis. Diagnoses of Generalized abdominal pain, Non-intractable vomiting with nausea, unspecified vomiting type, Acute cystitis with hematuria, Septicemia ([address] Perforated bowel ([address] Stercoral colitis, and DEJAN (acute kidney injury) ([facility] [address] were also pertinent to this visit. Assessment      Pt agreeable to PT/OT co-tx with encouragement. Pt requires co-tx secondary to complexity of condition, decreased activity tolerance and increased assistance for ADL/mobility. Pt benefits from x 2 skilled hands to provide increased cues/feedback and promote improved safety and performance with ADLs. Assessment: Pt continues to require high level of assist x2 with bed mobility and ADLs. Pt tolerated sitting EOB ~10 mins for self-feeding in order to promote AROM in UB. Pt continues to report fatigue and benefit from encouragement to persist with therapy. Anticipate pt would benefit from ongoing OT in order to increase functional status prior to returning home. Activity Tolerance: Patient limited by fatigue;Patient limited by pain; Patient limited by endurance  Discharge Recommendations: LTACH;Subacute/Skilled Nursing Facility  Other: Defer to facility to obtain      Plan   Occupational Therapy Plan  Times Per Week: 2-3x/ week  Current Treatment Recommendations: ROM; Positioning;Cognitive reorientation;Balance training;Patient/Caregiver education & training;Self-Care / ADL; Strengthening; Functional mobility training; Endurance training;Pain management; Safety education & training;Equipment evaluation, education, & procurement;Cognitive/Perceptual training     Restrictions  Restrictions/Precautions  Restrictions/Precautions: Up as Tolerated; Fall Risk;Modified Diet;Aspiration Risk;General Precautions  Position Activity Restriction  Other position/activity restrictions: colostomy, R IJ, BROOKS drain, telemetry. No BP or IV sticks to RUE. Aspiration precautions with puree diet. Subjective   Subjective  Subjective: Pt supine in bed upon therapy arrival. Pt reports fatigue and back pain. RN agreeable to therapy session;  Pain: Pt did not rate back pain number  Orientation  Overall Orientation Status: Impaired  Orientation Level: Oriented to person;Oriented to place; Disoriented to time;Disoriented to situation  Pain: none specifically reported throughout session  Cognition  Overall Cognitive Status: Exceptions  Arousal/Alertness: Delayed responses to stimuli  Following Commands: Follows one step commands with increased time  Attention Span: Attends with cues to redirect  Memory: Decreased recall of recent events  Safety Judgement: Decreased awareness of need for assistance;Decreased awareness of need for safety  Problem Solving: Assistance required to implement solutions  Insights: Not aware of deficits  Initiation: Requires cues for some  Sequencing: Requires cues for some        Objective    Vitals  Vitals  Heart Rate: 88  BP: 112/67  BP Location: Left lower arm  MAP (Calculated): 82  SpO2: 95 %  Bed Mobility Training  Bed Mobility Training: Yes  Overall Level of Assistance: Maximum assistance; Additional time;Assist X2  Interventions: Verbal cues;Demonstration; Safety awareness training  Rolling: Maximum assistance; Additional time;Assist X2  Supine to Sit: Maximum assistance; Additional time; Adaptive equipment;Assist X2  Sit to Supine: Assist X2;Additional time; Adaptive equipment;Maximum assistance  Scooting:  Total assistance  Balance  Sitting: Impaired  Sitting - Static: Poor (constant support) (pt requires primarily Max A to maintain midline balance in sitting, 3 moments of CGA when pt engaged core, pt tolerated 10 mins sitting eob)  Sitting - Dynamic: Poor (constant support) (Max A)  Transfer Training  Transfer Training: No  Gait Training  Gait Training: No     ADL  Feeding: Pureed diet;Maximum assistance; Increased time to complete;Bringing food to mouth assist;Setup;Scoop assist  Feeding Skilled Clinical Factors: sitting on side of bed  Grooming: Maximum assistance  Grooming Skilled Clinical Factors: washing face  LE Bathing: Dependent/Total  LE Dressing: Dependent/Total  Toileting: Dependent/Total  Toileting Skilled Clinical Factors: colostomy, total A at bed level  OT Exercises  A/AROM Exercises: Pt able to participate in AAROM/AROM/PROM for BUE therex 10x each: gross grasp/release, wrist flexion/extension, wrist pronation/supination, elbow flexion/extension,  Other Specialty Interventions  Other Treatments/Modalities: OT assists with positioning pt at conclusion of session in order to maintain skin integrity, prevent pressure wounds, promote comfort and joint interity. Pt positioned to left side with wedge cushion, pillow between knees with heels elevated, BUE support on pillows, head in neutral position. Safety Devices  Type of Devices: All fall risk precautions in place; Bed alarm in place;Call light within reach; Left in bed;Nurse notified     Patient Education  Education Given To: Patient  Education Provided: Role of Therapy;Plan of Care;Precautions;Orientation  Education Provided Comments: AAROM BUE, rolling right and left, orientation, positioning to prevent pressure wounds and facilitate comfort.   Education Method: Verbal  Barriers to Learning: Cognition  Education Outcome: Continued education needed;Verbalized understanding    Goals  Short Term Goals  Time Frame for Short Term Goals: 2 weeks (10/07) unless noted  Short Term Goal 1: Participate in 2 grooming tasks with min A; 9/28 dependent  Short Term Goal 2: Perform UE exer 10-15x each for strength and endurance by 9/30; - ongoing   Short Term Goal 3: Sit EOB x3 min with min A in prep to transfer;  ongoing  Short Term Goal 4: Participate in functional transfer with mod x2 and AD/Lift; ongoing  Patient Goals   Patient goals : Pt did not provide       Therapy Time   Individual Concurrent Group Co-treatment   Time In       1100   Time Out       1145   Minutes       45   Timed Code Treatment Minutes: [address], OTR/L

## 2022-10-03 NOTE — PROGRESS NOTES
Interval History and plan:     Very lethargic but arousable  Getting dialysis Tuesday Thursday Saturday  Electrolytes are stable  Creatinine continues to be high at 3.1    We will continue dialysis TTS for now                     Assessment :            CKD Stage IIIb with DEJAN  Creatinine 2.1 on consult  Creatinine 1.6 on 7/22  Likely due to diabetes, hypertension  Renal imaging-normal in the past      hypotension  BP: (103-112)/(65-67)  Heart Rate:  [87-88]   BP goal inpatient [phone] systolic inpatient  Pressures at the time of consult  Normally hypertensive      Acidosis  Severe  Requiring 2 vasopressors  Blood pressure okay with the 2 vasopressors but lactic is a still going up to 19 concerning for ischemia   /Possible metformin induced lactic acidosis    Bowel ischemia perforation  Needing surgery and colostomy  Post operative course complicated by fluid collection intra-abdominal    [facility] Nephrology would like to thank Shauna Gamino MD   for opportunity to serve this patient      Please call with questions at-   24 Hrs Answering service [phone] or  7 am- 5 pm via Perfect serve or cell phone  Alvaro Soliz MD          CC/reason for consult :       DEJAN     HPI :     Jordan Sorto is a 67 y.o. female presented to   the hospital on 9/14/2022 with lactic acidosis. She is known to have diabetes and on metformin to 2 days ago  Has constipation and with multiple bowel regimen has had good bowel movement yesterday following that she has syncope. EMS was called and was found to have blood pressure of 50 systolic given IV fluids brought to the emergency room blood pressure was normal initially but later developed hypotension got 3 L of fluid and now on 2 vasopressors and in ICU. She also has severe acidosis with very high lactate. CT scan was normal initially. We are consulted for DEJAN on CKD and related issues.     On CKD and also severe lactic acidosis    ROS:     Seen with- sister    RN         PMH/PSH/SH/Family History:     Past Medical History:   Diagnosis Date    Chronic kidney disease     Diabetes mellitus ([address]     Hyperlipidemia     Hypertension     Neuropathy        Past Surgical History:   Procedure Laterality Date    IR TUNNELED CATHETER PLACEMENT GREATER THAN 5 YEARS  9/27/2022    IR TUNNELED CATHETER PLACEMENT GREATER THAN 5 YEARS 9/27/2022 [facility] SPECIAL PROCEDURES    LAPAROTOMY N/A 9/15/2022    DIAGNOSTIC LAPAROSCOPY, EXPLORATORY LAPAROTOMY, SIGMOID COLECTOMY AND COLOSTOMY performed by Selin Caal MD at [facility]        reports that she has never smoked. She has never used smokeless tobacco. She reports that she does not currently use alcohol. She reports that she does not use drugs. family history is not on file.          Medication:     Current Facility-Administered Medications: heparin (porcine) injection 3,600 Units, 3,600 Units, IntraCATHeter, PRN  dextrose 50 % IV solution, 25 g, IntraVENous, Once  0.9 % sodium chloride infusion, , IntraVENous, PRN  Venelex ointment, , Topical, BID  cefTRIAXone (ROCEPHIN) 1,000 mg in dextrose 5 % 50 mL IVPB mini-bag, 1,000 mg, IntraVENous, Q24H  metronidazole (FLAGYL) 500 mg in 0.9% NaCl 100 mL IVPB premix, 500 mg, IntraVENous, Q8H  0.9 % sodium chloride infusion, , IntraVENous, PRN  diatrizoate meglumine-sodium (GASTROGRAFIN) 66-10 % solution 12 mL, 12 mL, Oral, ONCE PRN  ceFAZolin (ANCEF) 2000 mg in dextrose 5 % 100 mL IVPB, 2,000 mg, IntraVENous, Once  rosuvastatin (CRESTOR) tablet 5 mg, 5 mg, Oral, Daily  acetaminophen (TYLENOL) 160 MG/5ML solution 650 mg, 650 mg, Oral, Q4H PRN  insulin glargine (LANTUS) injection vial 20 Units, 20 Units, SubCUTAneous, Nightly  0.9 % sodium chloride bolus, 500 mL, IntraVENous, Once  insulin lispro (HUMALOG) injection vial 0-16 Units, 0-16 Units, SubCUTAneous, Q4H  prochlorperazine (COMPAZINE) injection 5 mg, 5 mg, IntraVENous, Q6H PRN  potassium chloride 20 mEq/50 mL IVPB (Central Line), 20 mEq, IntraVENous, PRN  magnesium sulfate 1000 mg in dextrose 5% 100 mL IVPB, 1,000 mg, IntraVENous, PRN  sodium chloride flush 0.9 % injection 5-40 mL, 5-40 mL, IntraVENous, PRN  0.9 % sodium chloride infusion, 25 mL, IntraVENous, PRN  ondansetron (ZOFRAN) injection 4 mg, 4 mg, IntraVENous, Q10 Min PRN  glucose chewable tablet 16 g, 4 tablet, Oral, PRN  dextrose bolus 10% 125 mL, 125 mL, IntraVENous, PRN **OR** dextrose bolus 10% 250 mL, 250 mL, IntraVENous, PRN  glucagon (rDNA) injection 1 mg, 1 mg, SubCUTAneous, PRN  dextrose 10 % infusion, , IntraVENous, Continuous PRN  sodium chloride flush 0.9 % injection 5-40 mL, 5-40 mL, IntraVENous, PRN  0.9 % sodium chloride infusion, , IntraVENous, PRN  polyethylene glycol (GLYCOLAX) packet 17 g, 17 g, Oral, Daily PRN  pantoprazole (PROTONIX) injection 40 mg, 40 mg, IntraVENous, Daily       Vitals :     Vitals:    10/03/22 1105   BP: 112/67   Pulse: 88   Resp:    Temp:    SpO2: 95%       I & O :       Intake/Output Summary (Last 24 hours) at 10/3/2022 1131  Last data filed at 10/3/2022 1059  Gross per 24 hour   Intake 200 ml   Output 660 ml   Net -460 ml          Physical Examination :     General appearance: confused,on NC  HEENT: Lips- normal, teeth- ok , oral mucosa- moist  Neck : Mass- no, appears symmetrical, JVD- not visible  Respiratory: Respiratory effort-  comfortable on oxygen, wheeze- no, crackles - no  Cardiovascular:  Ausculation- No M/R/G, Edema none  Abdomen: visible mass- no, distention- no, scar- no, tenderness- no                            hepatosplenomegaly-  No--  Musculoskeletal:  clubbing no,cyanosis- no , digital ischemia- no                           muscle strength- patient unable to co-operate     , tone - patient unable to co-operate   Skin: rashes- no , ulcers- no, induration- no, tightening - no  Psychiatric:  confused   Additional findings:         LABS:     Recent Labs     10/02/22  0551   WBC 9.6   HGB 7.1*   HCT 21.8*    Recent Labs     10/02/22  0551   *   K 3.6   CL 97*   CO2 21   BUN 37*   CREATININE 3.1*   GLUCOSE 54*

## 2022-10-03 NOTE — CARE COORDINATION
Hospital day 19: Patient on C3 care managed by IM, General Surgery, and Nephrology. Patient from home IPTA, now with SNF recs total care x2 persons. Patient with new ostomy- adding wound vac to suture line. Patient accepted at [facility] MWF 1045 am chair time- SNF arranged transport to and from SNF. Patient with poor intake. Palliative care following to assist with goals of care. SW following for [facility]. Conchita Delacruz, CANDE

## 2022-10-03 NOTE — PROGRESS NOTES
Dr Debbie Bell in to see patient. Staples removed mid abd incision line and now open 2.5 x 1  1.6 with undermining from 1200- 300 am at 3.5 cm. See new photo below. Wound cleansed with normal saline. Lesli wound prepped with barrier film, VAC drape and hydrocolloid. One white sponge lightly into undermining and one black sponge on top. Covered with VAC drape. Connected to 125 mmHg low continuous suction on [place]. Plan to change 3 times a week. Colostomy pouch changed again with convex wafer. Appliance removed. Skin cleansed with warm water and patted dry. Stoma in divot circumferentially. Paste strip placed around stoma then Convex flange placed. Bag attached and closed. Ostomy care to follow.   Call Ostomy care for problems with seal at [phone] or or call [phone] and leave message     Mid abdominal incision opened by MD today:

## 2022-10-03 NOTE — PROGRESS NOTES
Lori Hightower Wound Ostomy Continence Nurse  Follow-up Progress Note       NAME:  Rodrigo Cyr RECORD NUMBER:  [id number]  AGE:  67 y.o. GENDER:  female  :  1950  TODAY'S DATE:  10/3/2022    Subjective: Not having any pain. Not hungry. Sleepy during visit. Wound Identification:  Wound Type: Mid line surgical abdominal staple line - NEW Draining purulent creamy milky tan fluid. Acquired C3 - Deep Tissue Pressure injury. Contributing Factors: edema, diabetes, chronic pressure, decreased mobility, shear force, obesity, and decreased tissue oxygenation    Call from bed side RN, \"Stoma bag exploded\"    NEW Colostomy:    Went to OR for Exploratory lap, sigmoid colectomy and colostomy on 9/15/22 by Dr Bella Tyler. Stoma: measures 20 mm (1 3/8 inch) x 38 mm ( 1 1/2 inch). Oval, brown, protrudes but distal edge in a divot/crease from 12 pm to 1200 am.    Appliance:  Coloplast RED 2 piece convex [id number]  with drainable pouch # [id number]. Paste ring around stoma prior to application of flange. Patient Goal of Care:  [x] Wound Healing  [] Odor Control   [] Palliative Care  [] Pain Control   [] Other:     Objective: Reported poor appetite. Stool contaminated staple line. Bed side RN and PCA here cleaning patient up. /73   Pulse 77   Temp 98 °F (36.7 °C) (Oral)   Resp 16   Ht 5' 9\" (1.753 m)   Wt 179 lb 10.8 oz (81.5 kg)   SpO2 97%   BMI 26.53 kg/m²   Levar Risk Score: Levar Scale Score: 11  Assessment:  Colostomy stoma flush to skin question if the 500 am position is  as creamy purulent fluid draining from this area. Stool loose brown. Mid line staple line now with drainage creamy milky foul smelling tan fluid. See photo   Measurements:  Wound 22 Buttocks Left;Right maroon (Active)   Wound Image   22 1235   Wound Etiology Deep tissue/Injury 10/02/22 2142   Dressing Status New dressing applied;Clean;Dry; Intact 10/02/22 2142   Wound Cleansed Cleansed with saline 10/02/22 2142   Dressing/Treatment Open to air; Pharmaceutical agent (see MAR) 10/02/22 2142   Offloading for Diabetic Foot Ulcers Offloading ordered 10/02/22 2142   Dressing Change Due 10/01/22 10/02/22 2142   Wound Length (cm) 5.5 cm 09/29/22 1235   Wound Width (cm) 7 cm 09/29/22 1235   Wound Depth (cm) 0 cm 09/29/22 1235   Wound Surface Area (cm^2) 38.5 cm^2 09/29/22 1235   Wound Volume (cm^3) 0 cm^3 09/29/22 1235   Distance Tunneling (cm) 0 cm 10/02/22 2142   Tunneling Position ___ O'Clock 0 10/02/22 2142   Undermining Starts ___ O'Clock 0 10/02/22 2142   Undermining Ends___ O'Clock 0 10/02/22 2142   Undermining Maxium Distance (cm) 0 10/02/22 2142   Wound Assessment Purple/maroon 10/02/22 2142   Drainage Amount None 10/02/22 2142   Odor None 10/02/22 2142   Lesli-wound Assessment Blanchable erythema 10/02/22 2142   Margins Attached edges; Defined edges 10/02/22 2142   Number of days: 3     Incision 09/15/22 Abdomen Medial (Active)   Wound Image   10/03/22 1059   Dressing Status New dressing applied 10/03/22 1059   Dressing Change Due 10/06/22 10/03/22 1059   Incision Cleansed Cleansed with saline 10/03/22 1059   Dressing/Treatment Foam 10/03/22 1059   Incision Length (cm) 15 10/03/22 1059   Incision Width (cm) 0 cm 10/03/22 1059   Incision Depth (cm) 0 cm 10/03/22 1059   Closure Staples 10/03/22 1059   Margins Approximated 10/03/22 1059   Incision Assessment Erythema 10/03/22 1059   Drainage Amount Moderate 10/03/22 1059   Drainage Description Purulent; Other (Comment) 10/03/22 1059   Odor Mild 10/03/22 1059   Lesli-incision Assessment Intact; Blanchable erythema 10/03/22 1059   Number of days: 18     Colostomy LLQ (Active)   Stomal Appliance 2 piece 10/03/22 1059   Flange Size (inches) 2.25 Inches 10/03/22 1059   Stoma  Assessment Red;Moist;Flush 10/03/22 1059   Peristomal Assessment Other (Comment); Red 10/03/22 1059   Treatment Bag change;Site care;Stoma paste; Heat applied 10/03/22 1059   Stool Appearance Loose; Watery 10/03/22 1059   Stool Color Brown 10/03/22 1059   Stool Amount Small 10/03/22 1059   Output (mL) 50 ml 10/03/22 1059   Number of days: 17     Mid abdominal staple line + colostomy:           10/03/22 1059   Closed/Suction Drain Right Abdomen Bulb   Placement Date: 09/15/22   Present on Admission/Arrival: No  Description (optional): BROOKS Drain  Inserted by: Dr. Ivelisse Orozco Number: 1  Orientation: Right  Location: Abdomen  Drain Tube Type: Bulb  Size : 19 Fr, 6.3mm  Tube Shape: Round  [address] [place] .. Site Description Clean, dry & intact; Reddened   Dressing Status New dressing applied   Drainage Appearance Milky;Purulent; Tan   Drain Status To bulb suction        10/03/22 1059   Closed/Suction Drain Left Abdomen   Placement Date/Time: 09/29/22 1137   Present on Admission/Arrival: No  Description (optional): exodus array multipurpose drainage catheter  Inserted by: maricarmen  Tube Number: 1  Orientation: Left  Location: Abdomen  Size : 10f 25cm   Site Description Leaking at site; Reddened; Intact   Dressing Status New dressing applied   Drainage Appearance Purulent;Milky; Tan   Drain Status Open to gravity drainage       Intake/Output Summary (Last 24 hours) at 10/3/2022 1104  Last data filed at 10/3/2022 1059  Gross per 24 hour   Intake 200 ml   Output 660 ml   Net -460 ml     Response to treatment:  Well tolerated by patient. Pain Assessment:  Severity:  0 / 10  Quality of pain: N/A  Wound Pain Timing/Severity: none  Premedicated: No  Plan:   Plan of Care: Wound 09/29/22 Buttocks Left;Right maroon-Dressing/Treatment: Open to air, Pharmaceutical agent (see MAR) (venelex)    Dr Carvin Libman and Dr Suly Andres here after dressing and pouch change done. Informed MD's of drainage from mid staple line and 500 pm position of stoma. Mid abdominal staple line cleansed well with normal saline to get stool out of it. Foam dressing applied. Change every 3 days and prn drainage.   BROOKS dressing changed, site cleaned with normal saline, foam dressing applied. Perc drain dressing changed, cleansed with per protocol kit chlorhexidine and alcohol  Biopatch applied over site then Tegaderm dressing applied. Plan for Ostomy Care: No family present  Appliance removed. Skin cleansed with warm water and patted dry. Stoma in divot circumferentially. Paste strip placed around stoma then Convex flange placed. Bag attached and closed. Ostomy care to follow. Call Ostomy care for problems with seal at [phone] or or call [phone] and leave message      Stoma Care - New colostomy - Patient to empty appliance when 1/3 to 1/2 full with assistance of the staff. Cleanse inside and outside of the drain spout prior to rolling closed. Change appliance every 3-5 days or 1-2 times a week. Call for problems with seal [phone]       Ostomy Plan of Care  [x] Supplies/Instructions left in room bags, flanges, pattern, scissors, paste, powder, barrier wipes. [] Patient using home supplies  [x] Brand/supplies at bedside Coloplast    Specialty Bed Required : Yes power pro elite in place wit low air loss rental mattress in place  [x] Low Air Loss   [x] Pressure Redistribution  [] Fluid Immersion  [] Bariatric  [] Total Pressure Relief  [] Other:     Current Diet: ADULT DIET;  Dysphagia - Pureed  ADULT ORAL NUTRITION SUPPLEMENT; Breakfast, PM Snack; Standard High Calorie/High Protein Oral Supplement  ADULT ORAL NUTRITION SUPPLEMENT; Lunch, Dinner; Frozen Oral Supplement  Dietician consult:  Yes    Discharge Plan:  Placement for patient upon discharge: intermediate care facility   Patient appropriate for Outpatient [address]: Yes    Referrals:  []  following  [] [address] [employer] [facility]  [] Supplies  [] Other    Patient Teaching: patient lethargic  Written Instructions given to patient/family  Teaching provided:  [] Reviewed GI and A&P                   [] Supplies  [] Pouch emptying [x] Manipulate closure  [x] Routine Care                                 [] Comment  [] Pouch maintenance                     Level of patient/caregiver understanding able to:   [] Indicates understanding       [x] Needs reinforcement  [] Unsuccessful      [] Verbal Understanding  [] Demonstrated understanding       [x] No evidence of learning  [] Refused teaching         [] N/A       Electronically signed by Liz Granados RN, MSN, Pedro Seth on 10/3/2022 at 11:04 AM

## 2022-10-03 NOTE — PLAN OF CARE
Problem: Discharge Planning  Goal: Discharge to home or other facility with appropriate resources  Outcome: Progressing     Problem: Pain  Goal: Verbalizes/displays adequate comfort level or baseline comfort level  Outcome: Progressing  Flowsheets  Taken 10/2/2022 1936  Verbalizes/displays adequate comfort level or baseline comfort level:   Encourage patient to monitor pain and request assistance   Assess pain using appropriate pain scale   Administer analgesics based on type and severity of pain and evaluate response   Implement non-pharmacological measures as appropriate and evaluate response   Consider cultural and social influences on pain and pain management   Notify Licensed Independent Practitioner if interventions unsuccessful or patient reports new pain  Taken 10/2/2022 1903  Verbalizes/displays adequate comfort level or baseline comfort level:   Encourage patient to monitor pain and request assistance   Assess pain using appropriate pain scale   Administer analgesics based on type and severity of pain and evaluate response   Implement non-pharmacological measures as appropriate and evaluate response   Consider cultural and social influences on pain and pain management   Notify Licensed Independent Practitioner if interventions unsuccessful or patient reports new pain     Problem: Skin/Tissue Integrity  Goal: Absence of new skin breakdown  Description: 1. Monitor for areas of redness and/or skin breakdown  2. Assess vascular access sites hourly  3. Every 4-6 hours minimum:  Change oxygen saturation probe site  4. Every 4-6 hours:  If on nasal continuous positive airway pressure, respiratory therapy assess nares and determine need for appliance change or resting period.   Outcome: Progressing     Problem: Chronic Conditions and Co-morbidities  Goal: Patient's chronic conditions and co-morbidity symptoms are monitored and maintained or improved  Outcome: Progressing     Problem: Safety - Medical Restraint  Goal: Remains free of injury from restraints (Restraint for Interference with Medical Device)  Description: INTERVENTIONS:  1. Determine that other, less restrictive measures have been tried or would not be effective before applying the restraint  2. Evaluate the patient's condition at the time of restraint application  3. Inform patient/family regarding the reason for restraint  4.  Q2H: Monitor safety, psychosocial status, comfort, nutrition and hydration  Outcome: Progressing     Problem: Nutrition Deficit:  Goal: Optimize nutritional status  Outcome: Progressing     Problem: Safety - Adult  Goal: Free from fall injury  10/2/2022 2048 by Melani Hollins RN  Flowsheets (Taken 9/28/2022 1123 by Larisa Sung RN)  Free From Fall Injury: Instruct family/caregiver on patient safety     Problem: Neurosensory - Adult  Goal: Achieves stable or improved neurological status  Outcome: Progressing  Goal: Achieves maximal functionality and self care  Outcome: Progressing     Problem: Respiratory - Adult  Goal: Achieves optimal ventilation and oxygenation  Outcome: Progressing     Problem: Cardiovascular - Adult  Goal: Maintains optimal cardiac output and hemodynamic stability  Outcome: Progressing  Goal: Absence of cardiac dysrhythmias or at baseline  Outcome: Progressing     Problem: Skin/Tissue Integrity - Adult  Goal: Incisions, wounds, or drain sites healing without S/S of infection  Outcome: Progressing  Goal: Oral mucous membranes remain intact  Outcome: Progressing     Problem: Musculoskeletal - Adult  Goal: Return mobility to safest level of function  Outcome: Progressing  Goal: Return ADL status to a safe level of function  Outcome: Progressing     Problem: Gastrointestinal - Adult  Goal: Maintains adequate nutritional intake  Outcome: Progressing  Goal: Establish and maintain optimal ostomy function  Outcome: Progressing     Problem: Genitourinary - Adult  Goal: Urinary catheter remains patent  Outcome: Progressing     Problem: Metabolic/Fluid and Electrolytes - Adult  Goal: Electrolytes maintained within normal limits  Outcome: Progressing  Goal: Hemodynamic stability and optimal renal function maintained  Outcome: Progressing  Goal: Glucose maintained within prescribed range  Outcome: Progressing     Problem: ABCDS Injury Assessment  Goal: Absence of physical injury  Outcome: Progressing

## 2022-10-03 NOTE — PROGRESS NOTES
Speech Language Pathology  Facility/Department: [facility] C3 TELE/MED SURG/ONC  Dysphagia Daily Treatment Note      Recommendations:   Continue pureed diet (IDDSI 4) with thin liquids (IDDSI 0), meds whole or crushed in puree, total feed assist, strict aspiration precautions, only feed when most alert  If pt has overt s/s of aspiration or change in respiratory status, recommend downgrade to NPO pending re-assessment by ST      NAME: Zander Alvarez  : 1950  MRN: [id number]    Patient Diagnosis(es):   Patient Active Problem List    Diagnosis Date Noted    Colon perforation ([address] 2022    Acute respiratory failure with hypoxia ([address] 2022    Acute encephalopathy 2022    Ischemic colitis ([address] 2022    S/P exploratory laparotomy 2022    Acute postoperative pulmonary insufficiency ([address] 2022    DKA (diabetic ketoacidosis) ([address] 2022    Hypotension 2022    Syncope 2022    Hyponatremia 2022    DEJAN (acute kidney injury) ([address] 2022    Abdominal pain 2022    Enteritis 2022    Septic shock ([address] 2022    Elevated troponin 2022    Leukocytosis 2022    Pyuria 2022    Lactic acidosis 2022    DM (diabetes mellitus), secondary uncontrolled 2022     Allergies: Allergies   Allergen Reactions    Latex     Lovastatin      Indigestion and Feels bad  Indigestion and Feels bad      Penicillins      Subjective: Pt was seen as her aide was finishing feeding her. Limited PO accepted per her aide. Pt was re-elevated to a near 90 degree position. With encouragement and education about the need to eat to prevent atrophy and allow for possible later diet advancement, the pt accepted more PO trials. Pain: no c/o pain    Current Diet: ADULT DIET;  Dysphagia - Pureed  ADULT ORAL NUTRITION SUPPLEMENT; Breakfast, PM Snack; Standard High Calorie/High Protein Oral Supplement  ADULT ORAL NUTRITION SUPPLEMENT; Lunch, Dinner; Frozen Oral Supplement    Diet Tolerance:  Pt grossly tolerating pureed diet. However, her aide reported that the pt had some coughing when given liquids during her breakfast.    P.O. Trials: Thin   X  X 3 sips of water via cup  X 6 sips of water via straw. Nectar / Mildly Thick       Honey / Moderately Thick       Pudding / Extremely Thick       Puree   X  X 2 bites of applesauce     Solid   x X 3 bites of jello     Dysphagia Treatment and Impressions: The pt was seen this AM for dysphagia treatment. Her RN approved for the therapist to see this patient. The was awake and able to answer basic questions. With encouragement, she accepted trials of water, jello and applesauce. The pt tolerated all PO trials without any s/s of aspiration. Vocal quality remained dry and consistent. O2 sats and RR remained consistent around 97% and 20. Swallow onset was mildly delayed but adequate. Limited mastication with the given trials of jello. With cues to chew, the pt presented with more of a vertical chewing pattern. The pt would only accept x 3 bites of the jello. The pt eventually refused to take any further PO trials. She does not demonstrate adequate mastication for advancement beyond puree at this time. Recommendations:   Continue pureed diet (IDDSI 4) with thin liquids (IDDSI 0), meds whole or crushed in puree, total feed assist, strict aspiration precautions, only feed when most alert  If pt has overt s/s of aspiration or change in respiratory status, recommend downgrade to NPO pending re-assessment by ST    Could consider alt means of nutrition via PEG to aid in nutrition. Per dietitians note, similar recs are noted. Dysphagia Goals:  Timeframe for Long-term Goals: 7 days, 10/3/22  Goal 1: The pt will tolerate safest and least restrictive diet without clinical s/s of aspiration or change in respiratory status.    Recommendations:   Continue pureed diet (IDDSI 4) with thin liquids (IDDSI 0), meds whole or crushed in puree, total feed assist, strict aspiration precautions, only feed when most alert  If pt has overt s/s of aspiration or change in respiratory status, recommend downgrade to NPO pending re-assessment by ST : Ongoing, progressing. Short-term Goals  Timeframe for Short-term Goals: 5 days, 10/1/22  Goal 1: The patient will tolerate recommended diet without observed clinical signs of aspiration. 10/3/2022 : Goal partially addressed, see above. Goal 2: The patient/caregiver will demonstrate understanding of compensatory strategies for improved swallowing safety. 10/3/2022 : Goal addressed, see above. Needs continued reinforcement    Goal 3: The patient will tolerate repeat bedside swallowing evaluation when able. 10/3/2022 : Goal met. 09/28     4) The patient will tolerate instrumental swallowing procedure. 10/3/2022 : Will cont to monitor for need. Speech/Language/Cog Goals: n/a      Patient/Family/Caregiver Education:  Education was given re: purpose of treatment and need for the to improve PO intake. Compensatory Strategies:   HOB 90* and 30\" after meals; small bites/sips; alternate solids/liquids every 3-5 bites; oral care after every meal; total assist feed    Plan:    Continued Dysphagia treatment with goals per plan of care. Discharge Recommendations: per PT/OT recs    If pt discharges from hospital prior to Speech/Swallowing discharge, this note serves as tx and discharge summary. Total Treatment Time / Charges     Time in Time out Total Time / units   Cognitive Tx         Speech Tx      Dysphagia Tx 0909 0920 11 min / 1 unit     Signature:   Kavita Stover [address]  #[id number]  Speech-Language Pathologist  Phone: [phone]  Speech Desk: [phone]

## 2022-10-03 NOTE — PROGRESS NOTES
PALLIATIVE MEDICINE PROGRESS NOTE     Patient name:Sierra Willis    OGT:[id number] MGL:[birth year]  Room/Bed:[facility]20322-01    LOS: 19 days        ASSESSMENT/RECOMMENDATIONS     67 y.o. female with  ischemic colitis, s/p colostomy, DEJAN on CKD now requiring iHD, dysphagia and protein-calorie malnutrition    Symptom Management:  Goals of Care -Decision makers want to remain aggressive with medical care at this time. Family's expectation is that patient is going to have a very rough road ahead, with some possible lingering health issues that family is happy to accommodate for, and she will be able to eventually return home to have a meaningful life with her family. They feel patient is showing some progress so they want to remain aggressive. They would like PEG placement since her nutrition is a major concern for them, but would like to wait a few more days if possible to see how much she can improve without one. Want patient to remain a full code. Patient's sister feels comfort care is more appropriate but does not want to get in the way of patient's  and daughters. Palliative will continue to follow this admission as [address] conversations will need to be slow and ongoing with family. Dysphagia - due to weakness and encephalopathy. On pureed diet and family assisting with feeding. Family hopeful that dysphagia will improve. Aware of the risks with dysphagia as it pertains to her overall prognosis. Protein-calorie malnutrition - Albumin 2.3, total protein 6.0. BMI 28. Unintentional weight loss of 20 lbs in past 2 months (per family, she was afraid of eating most foods because of the misunderstanding of her diabetic and kidney diet restrictions). Family feels if nutrition status can improve she has a very good shot at recovery and are likely to consent to PEG    Patient/Family Goals of Care :    10/3/22    Spoke with patient's sister at length at the bedside.   She does not really feel like patient has improved much (or any) and is concerned patient is suffering. When their mother went to a nursing home, patient said to her sister she would never want to live like that. Her sister thinks dialysis will be permament and patient will probably never be able to live any kind of meaningful or enjoyable life. She doesn't feel like a feeding tube is going to do anything more than prolong her suffering. If it was up to her she would send her to hospice, but she says its not her decision so she just has to let patient's family make the decisions. She is well aware that patient's spouse and daughters want to remain aggressive. She doesn't want to get in their way. If patient wakes up more, she is not sure if patient would say these things herself about not wanting to continue. She thinks she would probably not say them in front of her children and would continue to want be aggressive medically for them. We talked about setting some limits/expectations with the daughters before the PEG is placed that if there is no improvement after a few weeks of TF, then we should consider shifting goals too comfort care. She is not sure if that will work with them or not. Attempted to speak with patient directly today but she falls to sleep mid-sentence and I am unable to maintain a conversation. She denies any pain or needs. Spoke with  Jersey Saenz by phone. He feels like patient has done a little better with her eating and her mental status. He thinks one of the big problems for her is that she hates the food at the hospital.  They are trying to bring in other foods that she might like. He is headed to the grocery store now to buy the cranberry pineapple juice she wants. He feels like she is much more awake compared to Friday. These improvements are encouraging to him. He wants to remain aggressive. He hopes she keeps this improval up. He spoke with his family and patient should remain a full code.     Jersey Saenz does share with me that he has pretty deep financial concerns about how they are going to pay these hospital bills or put patient in a nursing home. He is agreeable to me mentioning the concern to social work to see what resources we can help him obtain. He will be at the hospital later today to visit patient and bring her her juice. 9/30/22     Met with patient, spouse, daughter and patient's sister in law. Patient opens eyes to name and does answer questions appropriately, but she is very lethargic and quickly drifts back to sleep. Had extensive conversation with spouse, daughter and KRISTINE at the bedside. Spouse states he and patient never really discussed topics like advanced directives. They never talked about her feelings around dialysis (with her CKD). He is not really sure how she would feel about these things. Patient has never really been in the hospital before. This is the sickest she has ever been. He and his daughter are very hopeful that this is a temporary situation and patient will bounce back with nutrition and time. They are hopeful the colostomy will be reversed, the dialysis will only be temporary and the SNF will only be temporary- if needed at all. They are very motivated by the improvement in her mental status and that she has taken several bites of apple sauce and jello today. They will attempt to get some restaurant food she likes and puree it at home and bring it in to see if that will entice her. We talked about her need for nutrition and talked at length about a PEG tube. They believe they will go that route if they have to, but they hope they don't. They understand that they will need to make a decision about the PEG very soon and understand the need for nutrition. Discussed potential SNF and even LTC.  will make accommodations to their mobile home for patient if needed as the goal will be to get her home.   They intend on doing dialysis as long as its needed, but hope it won't be permament. They are hopeful that patient can recover enough strength and function that  could drive her to dialysis. The expectation is that patient is going to have a very rough road ahead, with some possible lingering health issues that family is happy to accommodate for, and will be able to eventually return home to a meaningful life with her family. Patient enjoys crossword puzzles and watching westerns on TV. She worked part time  in [employer] at [employer]. She had been becoming more fatigued lately and losing weight. Family suspects the new PCP has her on too much medicine and this is what was causing the recent decline. Concerned about all of her diabetic medication she was given. Discussed CODE STATUS: Explained that although in some cases it does save lives, CPR (cardiopulmonary resuscitation) frequently is not successful or does not benefit those who receive it, especially elderly people or those with serious medical conditions. Even if revived, the person can be left with painful injuries, or in a debilitated state, or with brain damage resulting from oxygen deprivation. Resuscitation can involve such things as drugs, forcefully pressing on the chest, giving electric shocks to restart the heart or placing a tube down the nose or throat to provide artificial breathing. People with terminal illnesses or other serious health conditions may prefer not to be resuscitated. Spouse elects full code. Disposition/Discharge Plan :    - pending medical course  - update SW about 's financial concerns    Advance Directives:  Code status:  Full Code  Decision Maker: Spouse, Erin Bernal      Palliative Performance Scale:     [] 60%  Amb reduced; Sig dz. Can't do hobbies/housework; Intake normal or reduced, Occasional assist; LOC full/confusion   [] 50%  Mainly sit/lie; Extensive disease. Mainly assist, Intake normal or reduced;  Occasional assist; LOC full/confusion   [] 40%  Mainly in bed; Extensive disease; Mainly assist; Intake normal or reduced; Occasional assist; LOC full/confusion   [] 30%  Bed bound, Extensive disease; Total care; Intake reduced; LOC full/confusion   [x] 20%  Bed bound; Extensive disease; Total care; Intake minimal; Drowsy/coma   [] 10%  Bed bound; Extensive disease; Total care; Mouth care only; Drowsy/coma   []  0%   Death      Case Discussed with:  patient, family, floor RN, case management, attending MD, General surgery    Thank you for allowing us to participate in the care of this patient. SUBJECTIVE     Chief Complaint: abdominal pain    Last 24 hours:   Wound vac to be placed    ROS:  Review of Systems   Unable to perform ROS: Mental status change           Patient unable to complete full ROS due to current cognitive status. Information that is obtained from nursing and chart           OBJECTIVE   /67   Pulse 88   Temp 97.4 °F (36.3 °C) (Axillary)   Resp 16   Ht 5' 9\" (1.753 m)   Wt 179 lb 10.8 oz (81.5 kg)   SpO2 95%   BMI 26.53 kg/m²   I/O last 3 completed shifts: In: 200 [P.O.:200]  Out: 1350 [Drains:275; KMPYW:0185]  I/O this shift:  In: 30 [P.O.:30]  Out: 50 [Stool:50]      Physical Exam    Physical Exam  Constitutional:       General: She is not in acute distress. Appearance: She is ill-appearing. Comments: Lying on her side, sleeping   HENT:      Head: Normocephalic and atraumatic. Nose: Nose normal. No congestion or rhinorrhea. Mouth/Throat:      Mouth: Mucous membranes are dry. Pharynx: No oropharyngeal exudate or posterior oropharyngeal erythema. Eyes:      General: No scleral icterus. Right eye: No discharge. Left eye: No discharge. Extraocular Movements: Extraocular movements intact. Conjunctiva/sclera: Conjunctivae normal.      Pupils: Pupils are equal, round, and reactive to light. Cardiovascular:      Rate and Rhythm: Normal rate.       Pulses: Normal pulses. Pulmonary:      Effort: Pulmonary effort is normal. No respiratory distress. Breath sounds: No stridor. No wheezing. Abdominal:      Palpations: Abdomen is soft. Musculoskeletal:         General: Normal range of motion. Cervical back: Normal range of motion and neck supple. Skin:     General: Skin is warm and dry. Capillary Refill: Capillary refill takes less than 2 seconds.    Neurological:      Comments: Alert to self, situation   Psychiatric:      Comments: calm            Total time: 100 minutes  >50% of time spent counseling patient at bedside or POA/family member if applicable , reviewing information and discussing care, coordinating with care team       Signed By: Electronically signed by BRAEDEN Blancas CNP on 10/3/2022 at 12:48 PM   Palliative Medicine   [phone]    October 3, 2022

## 2022-10-03 NOTE — PROGRESS NOTES
Pt AOx4, VSS, shift assessment completed and charted. Pt lethargic this shift, but awakens to voice and stimulus. Pt denying pain except for when we turn her, there is some discomfort. Pt is a q2 turn and check and change, will pillow support. Pt LLQ colostomy bag changed at start of shift by daughter, and emptied PRN. R ABD BROOKS drain emptied PRN with tan, malodorous drainage. L ABD drain emptied PRN. Midline incision c/d/i and MARCUS with approximated staples. Pt remains anuric, received HID yesterday. Pt having issues with hypoglycemia these past couple of days. Tonight, BS was 324, lantus held r/t hypoglycemia complications, but SSI administered per orders, and pt will remain a q4 BS check. Pt having poor PO intake, per staff and daughter, and remains on a pureed diet. Pt denying further needs, and was in stable condition when this RN left the room. Bed is low, locked, alarmed, and call light/bedside table within reach. All care per orders, will continue to monitor throughout this shift.  Electronically signed by Galen Chang RN on 10/2/2022 at 11:18 PM

## 2022-10-03 NOTE — PROGRESS NOTES
Per bed side RN, Wound/ostomy leaking again. MD plans to open suture line. Negative pressure wound therapy machine ordered.

## 2022-10-03 NOTE — PROGRESS NOTES
Shift assessment completed and charted. VSS. A&OX4. Q2 turn. Wound vac in place, dry at this time, with ostomy. Auric noted. Venelex applied to coccyx. Poor PO intake at this time, offering food and drink as pt tolerates. Bed alarm on. Bed locked and in lowest position. Call light within reach. Pt denies any other needs at this time. Will continue to monitor.

## 2022-10-04 LAB
A/G RATIO: 0.6 (ref 1.1–2.2)
ALBUMIN SERPL-MCNC: 2 G/DL (ref 3.4–5)
ALP BLD-CCNC: 86 U/L (ref 40–129)
ALT SERPL-CCNC: <5 U/L (ref 10–40)
ANAEROBIC CULTURE: ABNORMAL
ANION GAP SERPL CALCULATED.3IONS-SCNC: 17 MMOL/L (ref 3–16)
AST SERPL-CCNC: 13 U/L (ref 15–37)
BASOPHILS ABSOLUTE: 0 K/UL (ref 0–0.2)
BASOPHILS RELATIVE PERCENT: 0.3 %
BILIRUB SERPL-MCNC: 0.3 MG/DL (ref 0–1)
BUN BLDV-MCNC: 56 MG/DL (ref 7–20)
CALCIUM SERPL-MCNC: 8 MG/DL (ref 8.3–10.6)
CHLORIDE BLD-SCNC: 98 MMOL/L (ref 99–110)
CO2: 17 MMOL/L (ref 21–32)
CREAT SERPL-MCNC: 4.7 MG/DL (ref 0.6–1.2)
EOSINOPHILS ABSOLUTE: 0.1 K/UL (ref 0–0.6)
EOSINOPHILS RELATIVE PERCENT: 0.7 %
GFR AFRICAN AMERICAN: 11
GFR NON-AFRICAN AMERICAN: 9
GLUCOSE BLD-MCNC: 108 MG/DL (ref 70–99)
GLUCOSE BLD-MCNC: 146 MG/DL (ref 70–99)
GLUCOSE BLD-MCNC: 154 MG/DL (ref 70–99)
GLUCOSE BLD-MCNC: 164 MG/DL (ref 70–99)
GLUCOSE BLD-MCNC: 165 MG/DL (ref 70–99)
GLUCOSE BLD-MCNC: 200 MG/DL (ref 70–99)
GRAM STAIN RESULT: ABNORMAL
HCT VFR BLD CALC: 22.5 % (ref 36–48)
HEMOGLOBIN: 7.2 G/DL (ref 12–16)
LYMPHOCYTES ABSOLUTE: 1.1 K/UL (ref 1–5.1)
LYMPHOCYTES RELATIVE PERCENT: 11.1 %
MCH RBC QN AUTO: 27.1 PG (ref 26–34)
MCHC RBC AUTO-ENTMCNC: 32 G/DL (ref 31–36)
MCV RBC AUTO: 84.8 FL (ref 80–100)
MONOCYTES ABSOLUTE: 1 K/UL (ref 0–1.3)
MONOCYTES RELATIVE PERCENT: 9.9 %
NEUTROPHILS ABSOLUTE: 7.7 K/UL (ref 1.7–7.7)
NEUTROPHILS RELATIVE PERCENT: 78 %
ORGANISM: ABNORMAL
ORGANISM: ABNORMAL
PDW BLD-RTO: 15.3 % (ref 12.4–15.4)
PERFORMED ON: ABNORMAL
PLATELET # BLD: 212 K/UL (ref 135–450)
PMV BLD AUTO: 7.9 FL (ref 5–10.5)
POTASSIUM SERPL-SCNC: 3.5 MMOL/L (ref 3.5–5.1)
RBC # BLD: 2.65 M/UL (ref 4–5.2)
SODIUM BLD-SCNC: 132 MMOL/L (ref 136–145)
TOTAL PROTEIN: 5.3 G/DL (ref 6.4–8.2)
WBC # BLD: 9.9 K/UL (ref 4–11)
WOUND/ABSCESS: ABNORMAL

## 2022-10-04 PROCEDURE — 2500000003 HC RX 250 WO HCPCS: Performed by: STUDENT IN AN ORGANIZED HEALTH CARE EDUCATION/TRAINING PROGRAM

## 2022-10-04 PROCEDURE — 36415 COLL VENOUS BLD VENIPUNCTURE: CPT

## 2022-10-04 PROCEDURE — 2580000003 HC RX 258: Performed by: STUDENT IN AN ORGANIZED HEALTH CARE EDUCATION/TRAINING PROGRAM

## 2022-10-04 PROCEDURE — 6370000000 HC RX 637 (ALT 250 FOR IP): Performed by: STUDENT IN AN ORGANIZED HEALTH CARE EDUCATION/TRAINING PROGRAM

## 2022-10-04 PROCEDURE — 92526 ORAL FUNCTION THERAPY: CPT

## 2022-10-04 PROCEDURE — 90935 HEMODIALYSIS ONE EVALUATION: CPT

## 2022-10-04 PROCEDURE — 97110 THERAPEUTIC EXERCISES: CPT

## 2022-10-04 PROCEDURE — 85025 COMPLETE CBC W/AUTO DIFF WBC: CPT

## 2022-10-04 PROCEDURE — C9113 INJ PANTOPRAZOLE SODIUM, VIA: HCPCS | Performed by: INTERNAL MEDICINE

## 2022-10-04 PROCEDURE — 2580000003 HC RX 258: Performed by: INTERNAL MEDICINE

## 2022-10-04 PROCEDURE — 6360000002 HC RX W HCPCS: Performed by: INTERNAL MEDICINE

## 2022-10-04 PROCEDURE — 6370000000 HC RX 637 (ALT 250 FOR IP): Performed by: INTERNAL MEDICINE

## 2022-10-04 PROCEDURE — 6370000000 HC RX 637 (ALT 250 FOR IP): Performed by: NURSE PRACTITIONER

## 2022-10-04 PROCEDURE — 99024 POSTOP FOLLOW-UP VISIT: CPT | Performed by: SURGERY

## 2022-10-04 PROCEDURE — 6360000002 HC RX W HCPCS: Performed by: STUDENT IN AN ORGANIZED HEALTH CARE EDUCATION/TRAINING PROGRAM

## 2022-10-04 PROCEDURE — 1200000000 HC SEMI PRIVATE

## 2022-10-04 PROCEDURE — 97530 THERAPEUTIC ACTIVITIES: CPT

## 2022-10-04 PROCEDURE — 80053 COMPREHEN METABOLIC PANEL: CPT

## 2022-10-04 RX ADMIN — Medication 10 ML: at 11:52

## 2022-10-04 RX ADMIN — PANTOPRAZOLE SODIUM 40 MG: 40 INJECTION, POWDER, FOR SOLUTION INTRAVENOUS at 11:52

## 2022-10-04 RX ADMIN — CEFTRIAXONE SODIUM 1000 MG: 1 INJECTION, POWDER, FOR SOLUTION INTRAMUSCULAR; INTRAVENOUS at 15:39

## 2022-10-04 RX ADMIN — CASTOR OIL AND BALSAM, PERU: 788; 87 OINTMENT TOPICAL at 19:45

## 2022-10-04 RX ADMIN — METRONIDAZOLE 500 MG: 500 INJECTION, SOLUTION INTRAVENOUS at 02:57

## 2022-10-04 RX ADMIN — ACETAMINOPHEN 650 MG: 650 SOLUTION ORAL at 13:56

## 2022-10-04 RX ADMIN — CASTOR OIL AND BALSAM, PERU: 788; 87 OINTMENT TOPICAL at 11:53

## 2022-10-04 RX ADMIN — METRONIDAZOLE 500 MG: 500 INJECTION, SOLUTION INTRAVENOUS at 11:55

## 2022-10-04 RX ADMIN — METRONIDAZOLE 500 MG: 500 INJECTION, SOLUTION INTRAVENOUS at 19:53

## 2022-10-04 RX ADMIN — ROSUVASTATIN CALCIUM 5 MG: 10 TABLET, FILM COATED ORAL at 11:53

## 2022-10-04 RX ADMIN — TRAMADOL HYDROCHLORIDE 50 MG: 50 TABLET ORAL at 19:45

## 2022-10-04 ASSESSMENT — PAIN DESCRIPTION - LOCATION: LOCATION: ABDOMEN;BACK

## 2022-10-04 ASSESSMENT — PAIN SCALES - GENERAL: PAINLEVEL_OUTOF10: 5

## 2022-10-04 NOTE — PROGRESS NOTES
[facility] GENERAL SURGERY    Surgery Progress Note           POD #  19    PATIENT NAME: Harsha Marx     TODAY'S DATE: 10/4/2022    INTERVAL HISTORY:    Pt  more alert, interactive today, c/o back pain, and feeling a bit hungry. OBJECTIVE:   VITALS:  /74   Pulse 81   Temp 97.4 °F (36.3 °C) (Oral)   Resp 16   Ht 5' 9\" (1.753 m)   Wt 179 lb 10.8 oz (81.5 kg)   SpO2 97%   BMI 26.53 kg/m²     INTAKE/OUTPUT:    I/O last 3 completed shifts: In: 36 [P.O.:290; I.V.:417]  Out: 705 [Drains:105; Stool:600]  I/O this shift:  In: -   Out: 200 [Stool:200]              CONSTITUTIONAL:  fatigued and alert  LUNGS:     ABDOMEN:    , soft, non-distended, non-tender   INCISION: clean, dry, Vac in place to midline; BROOKS remains w/ purulent, foul-smelling output    Data:  CBC:   Recent Labs     10/02/22  0551 10/04/22  0458   WBC 9.6 9.9   HGB 7.1* 7.2*   HCT 21.8* 22.5*    212     BMP:    Recent Labs     10/02/22  0551 10/04/22  0458   * 132*   K 3.6 3.5   CL 97* 98*   CO2 21 17*   BUN 37* 56*   CREATININE 3.1* 4.7*   GLUCOSE 54* 146*     Hepatic:   Recent Labs     10/04/22  0458   AST 13*   ALT <5*   BILITOT 0.3   ALKPHOS 86     Mag:    No results for input(s): MG in the last 72 hours. Phos:   No results for input(s): PHOS in the last 72 hours. INR: No results for input(s): INR in the last 72 hours. Radiology Review:       ASSESSMENT AND PLAN:  67 y.o. female status post  Avitia's procedure with subsequent percutaneous drain placed for intra-abdominal fluid collection    - cont to encourage PO intake. Palliative care notes appreciated. Family still considering PEG if she fails to improve PO intake sufficiently. - if pt improves her overall strength, mobility, may eventually try and study her rectal stump with a fluoroscopic study and clarify if she has a disrupted/dehisced stump to explain the BROOKS drainage.         Electronically signed by Celestina Dakins, MD

## 2022-10-04 NOTE — PROGRESS NOTES
Shift assessment completed and charted. VSS. A&OX4. Q2 turn. Wound vac in place. Ostomy with output noted and charted. Bed alarm on. Midline. DTI, venelex applied. Bed locked and in lowest position. Call light within reach. Pt denies any other needs at this time. Will continue to monitor.

## 2022-10-04 NOTE — PROGRESS NOTES
Treatment time: 3 hrs     Net UF: 0.7 kg     Pre weight: UTO   Post weight: UTO   EDW: TBD     Access used: RTDC   Access function: Well     Medications or blood products given: None    Regular outpatient schedule: TBD     Summary of response to treatment: Pt tolerated tx fair; BP was soft most of tx    Copy of dialysis treatment record placed in chart, to be scanned into EMR.

## 2022-10-04 NOTE — PROGRESS NOTES
Speech Language Pathology  Facility/Department: [facility] C3 TELE/MED SURG/ONC  Dysphagia Daily Treatment Note      Recommendations:   Continue pureed diet (IDDSI 4) with thin liquids (IDDSI 0), meds whole or crushed in puree, total feed assist, strict aspiration precautions, only feed when most alert  If pt has overt s/s of aspiration or change in respiratory status, recommend downgrade to NPO pending re-assessment by ST  Noted palliative care following, discussing possible PEG placement for supplemental nutrition. Pt continues with poor PO intake per chart. NAME: Elonda Cushing  : 1950  MRN: [id number]    Patient Diagnosis(es):   Patient Active Problem List    Diagnosis Date Noted    Colon perforation ([address] 2022    Acute respiratory failure with hypoxia ([address] 2022    Acute encephalopathy 2022    Ischemic colitis ([address] 2022    S/P exploratory laparotomy 2022    Acute postoperative pulmonary insufficiency ([address] 2022    DKA (diabetic ketoacidosis) ([address] 2022    Hypotension 2022    Syncope 2022    Hyponatremia 2022    DEJAN (acute kidney injury) ([address] 2022    Abdominal pain 2022    Enteritis 2022    Septic shock ([address] 2022    Elevated troponin 2022    Leukocytosis 2022    Pyuria 2022    Lactic acidosis 2022    DM (diabetes mellitus), secondary uncontrolled 2022     Allergies: Allergies   Allergen Reactions    Latex     Lovastatin      Indigestion and Feels bad  Indigestion and Feels bad      Penicillins      Subjective: Pt seen upright in bed, although leaning to R side despite attempts at repositioning. Pt initially sleeping although easily woke to SLP's voice. RN OK'd SLP entry and therapy. Pt's  at bedside. Pain: no c/o pain    Current Diet: ADULT DIET;  Dysphagia - Pureed  ADULT ORAL NUTRITION SUPPLEMENT; Breakfast, PM Snack; Standard High Calorie/High Protein Oral Supplement  ADULT ORAL NUTRITION SUPPLEMENT; Lunch, Dinner; Frozen Oral Supplement    Diet Tolerance:  Pt tolerating current diet without s/s of aspiration per chart. P.O. Trials: Thin   X  Straw x3   Nectar / Mildly Thick       Honey / Moderately Thick       Pudding / Extremely Thick       Puree        Solid   x X4 bites of jello     Dysphagia Treatment and Impressions:  RN reported pt has had intermittent coughing with PO intake, continued poor PO intake. Pt on RA, O2 sats 96% and RR 16-20/min. Pt's  at bedside reported pt has had reduced PO intake despite encouragement, bringing in food items from home (I.e. zamzam iced tea, smoothie). Pt agreeable to bites of jello, sips of TL via straw. No clinical s/s of aspiration throughout. Adequate oral clearance of jello, pt denied globus sensation post-swallow. Total feed assistance required. Continue current diet. ST to continue to follow. Pt and pt's  denied concerns/questions at this time. Recommendations:   Continue pureed diet (IDDSI 4) with thin liquids (IDDSI 0), meds whole or crushed in puree, total feed assist, strict aspiration precautions, only feed when most alert  If pt has overt s/s of aspiration or change in respiratory status, recommend downgrade to NPO pending re-assessment by ST  Noted palliative care following, discussing possible PEG placement for supplemental nutrition. Pt continues with poor PO intake per chart. Dysphagia Goals:  Timeframe for Long-term Goals: 7 days, 10/3/22  Goal 1: The pt will tolerate safest and least restrictive diet without clinical s/s of aspiration or change in respiratory status. 10/4/2022: ongoing, continue current diet       Short-term Goals  Timeframe for Short-term Goals: 5 days, 10/1/22  Goal 1: The patient will tolerate recommended diet without observed clinical signs of aspiration. 10/4/2022 : Goal addressed, see above.    Goal 2: The patient/caregiver will demonstrate understanding of compensatory strategies for improved swallowing safety. 10/4/2022 : Goal addressed, see above. Needs continued reinforcement  Goal met. 09/28   4) The patient will tolerate instrumental swallowing procedure. 10/4/2022 : Will cont to monitor for need. Speech/Language/Cog Goals: n/a      Patient/Family/Caregiver Education:  SLP re: role of ST, rationale for PO trials, aspiration precautions. Pt and pt's  verbalized understanding. Compensatory Strategies:   HOB 90* and 30\" after meals; small bites/sips; alternate solids/liquids every 3-5 bites; oral care after every meal; total assist feed    Plan:    Continued Dysphagia treatment with goals per plan of care. Discharge Recommendations: per PT/OT recs    If pt discharges from hospital prior to Speech/Swallowing discharge, this note serves as tx and discharge summary.      Total Treatment Time / Charges     Time in Time out Total Time / units   Cognitive Tx         Speech Tx      Dysphagia Tx 9360 1506 16 min / 1 unit     Signature:  Dawna Melendez M.S. [id number] [facility]  Speech-language pathologist  [id number]

## 2022-10-04 NOTE — PROGRESS NOTES
Occupational Therapy  Facility/Department: [facility] C3 TELE/MED SURG/ONC  Treatment Note    Name: Franco   : 1950  MRN: [id number]  Date of Service: 10/4/2022    Discharge Recommendations:  [address]          Patient Diagnosis(es): The primary encounter diagnosis was Lactic acidosis. Diagnoses of Generalized abdominal pain, Non-intractable vomiting with nausea, unspecified vomiting type, Acute cystitis with hematuria, Septicemia ([address] Perforated bowel ([address] Stercoral colitis, and DEJAN (acute kidney injury) ([facility] were also pertinent to this visit. Past Medical History:  has a past medical history of Chronic kidney disease, Diabetes mellitus ([facility] Hyperlipidemia, Hypertension, and Neuropathy. Past Surgical History:  has a past surgical history that includes laparotomy (N/A, 9/15/2022) and IR TUNNELED CVC PLACE WO SQ PORT/PUMP > 5 YEARS (2022). Treatment Diagnosis: deconditioning      Assessment   Performance deficits / Impairments: Decreased functional mobility ; Decreased ADL status; Decreased safe awareness;Decreased cognition;Decreased balance;Decreased coordination;Decreased endurance;Decreased ROM; Decreased strength;Decreased fine motor control  Assessment: OT eval and tx completed. Pt admitted for enteritis and sepsis. She is s/p sigmoid colectomy with colostomy 9/15/22. Pt was extubated 22. Prior to onset, pt lived with family and was independent, working part-time. She presently requires total assist for BADLs. Pt unable to tolerate bed mobility today d/t low alertness. minimal  verbalizations,; pt able  to follow simple 1 step commands; tolerated 10 reps BUE  distal AROM exercises.  good social/family support; continues to benefit from skilled OT services  REQUIRES OT FOLLOW-UP: Yes  Activity Tolerance  Activity Tolerance: Patient limited by fatigue  Activity Tolerance Comments: semi-herzog's on RA:  BP = 136.78, HR =78, 97 % O 2 sats;        Plan Occupational Therapy Plan  Times Per Week: 2-3x/ week  Current Treatment Recommendations: ROM, Positioning, Cognitive reorientation, Balance training, Patient/Caregiver education & training, Self-Care / ADL, Strengthening, Functional mobility training, Endurance training, Pain management, Safety education & training, Equipment evaluation, education, & procurement, Cognitive/Perceptual training     Restrictions  Restrictions/Precautions  Restrictions/Precautions: Up as Tolerated, Fall Risk, Modified Diet, Aspiration Risk, General Precautions  Position Activity Restriction  Other position/activity restrictions: colostomy, R IJ, BROOKS drain, telemetry. No BP or IV sticks to RUE. Aspiration precautions with puree diet,.wound vac    Subjective   General  Chart Reviewed: Yes  Patient assessed for rehabilitation services?: Yes  Additional Pertinent Hx: Extubated 9/21/22  Family / Caregiver Present: Yes (sister & spouse)  Referring Practitioner: Kenneth Basurto  Diagnosis: enteritis, sepsis, s/p sigmoid colectomy with colostomy 9/15  Subjective  Subjective: c/o back pain in semi-herzog's, unable to rate or localize, RN notified  General Comment  Comments: RN cleared pt for OT; pt  resting in bed, easily awakens      Objective             Observation/Palpation  Observation: bruising LUE  Edema: mild edema RUE,  Safety Devices  Type of Devices: All fall risk precautions in place; Bed alarm in place;Call light within reach; Left in bed;Nurse notified; All rosina prominences offloaded; Heels elevated for pressure relief        AROM: Generally decreased, functional  Strength: Grossly decreased, non-functional  Coordination: Grossly decreased, non-functional  ADL  Feeding: Maximum assistance; Beverage management (in high herzog's)  Grooming: Setup (in high herzog's to wash face)        Bed mobility  Rolling to Left: Dependent/Total  Rolling to Right: Dependent/Total  Scooting: Dependent/Total (of 2 to reposition in supine to HOB)  Transfers  Transfer Comments: JIHAN due lethargy, generalized weakness     Cognition  Overall Cognitive Status: Exceptions (lethargic, but cooperative)  Arousal/Alertness: Delayed responses to stimuli  Following Commands: Follows one step commands with repetition  Attention Span: Difficulty attending to directions  Memory: Decreased recall of biographical Information;Decreased long term memory;Decreased recall of recent events  Safety Judgement: Decreased awareness of need for assistance  Insights: Decreased awareness of deficits  Initiation: Requires cues for some  Sequencing: Requires cues for some  Cognition Comment: following simple commands  Orientation  Overall Orientation Status: Impaired  Orientation Level: Oriented to person;Oriented to place;Oriented to situation;Disoriented to time               Exercise Treatment: AROM distal BUE & mod AAROM shoulder flexion to 90*  Hand flex/ext: x  10  Reps  Wrist flex/ext:  X  10 Reps  Elbow flex/ext:  x   10 Reps  Forearm sup/pron:  x  10  Reps  Shld flex/ext:  x   10 Reps mod AAROM to 90*           Education Given To: Patient; Family  Education Provided: Role of Therapy;Plan of Care;Precautions  Education Provided Comments: AROM BUE, rolling right and left, orientation,long term memory,  positioning to prevent pressure wounds and facilitate comfort. Education Method: Verbal  Barriers to Learning: Cognition  Education Outcome: Continued education needed; Unable to verbalize                AM-PAC Score        AM-[facility] In[facility] Daily Activity Raw Score: 8 (10/04/22 1418)  AM-PAC Inpatient ADL T-Scale Score : 22.86 (10/04/22 1418)  ADL Inpatient CMS 0-100% Score: 85.69 (10/04/22 1418)  ADL Inpatient CMS G-Code Modifier : CM (10/04/22 1418)    Goals  Short Term Goals  Time Frame for Short Term Goals: 2 weeks (10/07) unless noted  Short Term Goal 1: Participate in 2 grooming tasks with min A; 10/04 set up for washing face in bed;   Short Term Goal 2: Perform UE exer 10-15x each for strength and endurance by 9/30; 10/04 tolerated 10 reps BUE AROM elbow-->hand, mod AAROM 10 reps shoulder flexion to 90*  Short Term Goal 3: Sit EOB x3 min with min A in prep to transfer; 10/04 Not appropriate due to lethargy  Short Term Goal 4: Participate in functional transfer with mod x2 and AD/Lift; 10/04 JIHAN due to lethargy/c/o back pain  Patient Goals   Patient goals : Pt did not provide       Therapy Time   Individual Concurrent Group Co-treatment   Time In [facility]         Time Out 1355         Minutes 43 [facility]

## 2022-10-04 NOTE — CARE COORDINATION
Hospital day 20: Patient on C3 care managed by IM, General Surgery, and Nephrology. Patient from home IPTA. Patient with SNF recs  max assist x2 persons accepted at the [facility]. Patient with new HD outpatient set up at [phone] [facility] chair time, The [facility] has arranged transportation. Patient will need rapid COVID to admit. Patient with wound vac in place, BROOKS draining. Patient with poor PO intake, on going discussion of PEG vs no PEG. Writer spoke with spouse via phone RITCHIE application left in room with instruction. Writer also made referral to financial counselor for [address], [address]: Spoke with Dtr via phone verbalized concerns with cost, edu financial referral made and RITCHIE application left in room for father/Patient spouse to complete. CANDE Sifuentes

## 2022-10-04 NOTE — PROGRESS NOTES
Interval History and plan:     Mental status - better today  Has edema   On room air  Blood pressure is stable  Acidotic and creatinine 4.7  WBC is normal  Seen during dialysis  Will try  1 L off if tolerated                       Assessment :            CKD Stage IIIb with DEJAN  Creatinine 2.1 on consult  Creatinine 1.6 on 7/22  Likely due to diabetes, hypertension  Renal imaging-normal in the past      hypotension  BP: (126)/(70)  Heart Rate:  [79]   BP goal inpatient [id number] systolic inpatient  Pressures at the time of consult  Normally hypertensive      Acidosis  Severe  Requiring 2 vasopressors  Blood pressure okay with the 2 vasopressors but lactic is a still going up to 19 concerning for ischemia   /Possible metformin induced lactic acidosis    Bowel ischemia perforation  Needing surgery and colostomy  Post operative course complicated by fluid collection intra-abdominal    [facility] Nephrology would like to thank Corina Galdamez MD   for opportunity to serve this patient      Please call with questions at-   24 Hrs Answering service [phone] or  7 am- 5 pm via Perfect serve or cell phone  Lucinda Rooney MD          CC/reason for consult :       DEJAN     HPI :     Connor Tarango is a 67 y.o. female presented to   the hospital on 9/14/2022 with lactic acidosis. She is known to have diabetes and on metformin to 2 days ago  Has constipation and with multiple bowel regimen has had good bowel movement yesterday following that she has syncope. EMS was called and was found to have blood pressure of 50 systolic given IV fluids brought to the emergency room blood pressure was normal initially but later developed hypotension got 3 L of fluid and now on 2 vasopressors and in ICU. She also has severe acidosis with very high lactate. CT scan was normal initially. We are consulted for DEJAN on CKD and related issues.     On CKD and also severe lactic acidosis    ROS:     Seen with- sister    RN PMH/PSH/SH/Family History:     Past Medical History:   Diagnosis Date    Chronic kidney disease     Diabetes mellitus ([facility]     Hyperlipidemia     Hypertension     Neuropathy        Past Surgical History:   Procedure Laterality Date    IR TUNNELED CATHETER PLACEMENT GREATER THAN 5 YEARS  9/27/2022    IR TUNNELED CATHETER PLACEMENT GREATER THAN 5 YEARS 9/27/2022 [facility] SPECIAL PROCEDURES    LAPAROTOMY N/A 9/15/2022    DIAGNOSTIC LAPAROSCOPY, EXPLORATORY LAPAROTOMY, SIGMOID COLECTOMY AND COLOSTOMY performed by Jayy Abdul MD at [facility]        reports that she has never smoked. She has never used smokeless tobacco. She reports that she does not currently use alcohol. She reports that she does not use drugs. family history is not on file.          Medication:     Current Facility-Administered Medications: traMADol (ULTRAM) tablet 50 mg, 50 mg, Oral, Q6H PRN  heparin (porcine) injection 3,600 Units, 3,600 Units, IntraCATHeter, PRN  dextrose 50 % IV solution, 25 g, IntraVENous, Once  0.9 % sodium chloride infusion, , IntraVENous, PRN  Venelex ointment, , Topical, BID  cefTRIAXone (ROCEPHIN) 1,000 mg in dextrose 5 % 50 mL IVPB mini-bag, 1,000 mg, IntraVENous, Q24H  metronidazole (FLAGYL) 500 mg in 0.9% NaCl 100 mL IVPB premix, 500 mg, IntraVENous, Q8H  0.9 % sodium chloride infusion, , IntraVENous, PRN  diatrizoate meglumine-sodium (GASTROGRAFIN) 66-10 % solution 12 mL, 12 mL, Oral, ONCE PRN  ceFAZolin (ANCEF) 2000 mg in dextrose 5 % 100 mL IVPB, 2,000 mg, IntraVENous, Once  rosuvastatin (CRESTOR) tablet 5 mg, 5 mg, Oral, Daily  acetaminophen (TYLENOL) 160 MG/5ML solution 650 mg, 650 mg, Oral, Q4H PRN  insulin glargine (LANTUS) injection vial 20 Units, 20 Units, SubCUTAneous, Nightly  0.9 % sodium chloride bolus, 500 mL, IntraVENous, Once  insulin lispro (HUMALOG) injection vial 0-16 Units, 0-16 Units, SubCUTAneous, Q4H  prochlorperazine (COMPAZINE) injection 5 mg, 5 mg, IntraVENous, Q6H PRN  potassium chloride 20 mEq/50 mL IVPB (Central Line), 20 mEq, IntraVENous, PRN  magnesium sulfate 1000 mg in dextrose 5% 100 mL IVPB, 1,000 mg, IntraVENous, PRN  sodium chloride flush 0.9 % injection 5-40 mL, 5-40 mL, IntraVENous, PRN  0.9 % sodium chloride infusion, 25 mL, IntraVENous, PRN  ondansetron (ZOFRAN) injection 4 mg, 4 mg, IntraVENous, Q10 Min PRN  glucose chewable tablet 16 g, 4 tablet, Oral, PRN  dextrose bolus 10% 125 mL, 125 mL, IntraVENous, PRN **OR** dextrose bolus 10% 250 mL, 250 mL, IntraVENous, PRN  glucagon (rDNA) injection 1 mg, 1 mg, SubCUTAneous, PRN  dextrose 10 % infusion, , IntraVENous, Continuous PRN  sodium chloride flush 0.9 % injection 5-40 mL, 5-40 mL, IntraVENous, PRN  0.9 % sodium chloride infusion, , IntraVENous, PRN  polyethylene glycol (GLYCOLAX) packet 17 g, 17 g, Oral, Daily PRN  pantoprazole (PROTONIX) injection 40 mg, 40 mg, IntraVENous, Daily       Vitals :     Vitals:    10/04/22 0416   BP: 126/70   Pulse: 79   Resp: 16   Temp: 97.4 °F (36.3 °C)   SpO2: 97%       I & O :       Intake/Output Summary (Last 24 hours) at 10/4/2022 0750  Last data filed at 10/4/2022 0429  Gross per 24 hour   Intake 507 ml   Output 305 ml   Net 202 ml          Physical Examination :     General appearance: confused,on NC  HEENT: Lips- normal, teeth- ok , oral mucosa- moist  Neck : Mass- no, appears symmetrical, JVD- not visible  Respiratory: Respiratory effort-  comfortable on oxygen, wheeze- no, crackles - no  Cardiovascular:  Ausculation- No M/R/G, Edema none  Abdomen: visible mass- no, distention- no, scar- no, tenderness- no                            hepatosplenomegaly-  No--  Musculoskeletal:  clubbing no,cyanosis- no , digital ischemia- no                           muscle strength- patient unable to co-operate     , tone - patient unable to co-operate   Skin: rashes- no , ulcers- no, induration- no, tightening - no  Psychiatric:  confused   Additional findings:         LABS:     Recent Labs 10/02/22  0551 10/04/22  0458   WBC 9.6 9.9   HGB 7.1* 7.2*   HCT 21.8* 22.5*    212       Recent Labs     10/02/22  0551 10/04/22  0458   * 132*   K 3.6 3.5   CL 97* 98*   CO2 21 17*   BUN 37* 56*   CREATININE 3.1* 4.7*   GLUCOSE 54* 146*

## 2022-10-04 NOTE — PROGRESS NOTES
Comprehensive Nutrition Assessment    Type and Reason for Visit:  Reassess    Nutrition Recommendations/Plan:   Continue pureed diet  Continue Ensure and Magic Cups BID   Requires feeding assistance   Due to inadequate intake, discussion of palliative care vs nutrition support should be considered. Consult dietitian if tube feeding is desired and consistent with patient's plan of care. If tube feeds consistent with POC, recommend Nepro (renal formula) at 10 mL/hr and as tolerated, increase by 10 mL/hr q 4 hours until goal of 50 mL/hr. Recommend 30 mL H20 q 4 hours. Recommend reevaluate IV fluids at this time. Increase flush if Na+ increases greater than 145 mEq/L. Monitor closely and correct lytes (K, Phos, Mg) before progressing TF to goal d/t risk of refeeding syndrome (hx extended inadequate nutritional intake). Monitor for tolerance (bowel habits, N/V, cramping, abdominal exam findings: distended, firm, tense, guarded, discomfort). Monitor nutrition adequacy, pertinent labs, bowel habits, wt changes, and clinical progress     Malnutrition Assessment:  Malnutrition Status: Moderate malnutrition (09/22/22 1033)    Context:  Acute Illness     Findings of the 6 clinical characteristics of malnutrition:  Energy Intake:  50% or less of estimated energy requirements for 5 or more days  Weight Loss:  1% to 2% over 1 week       Nutrition Assessment:    Follow up: Pt remains nutritionally compromised AEB minimal PO intakes 0-25% of pureed/thin liquid diet. Pt sleeping during most of visit, spoke w/ family at bedside. Reports pt ate a couple bites of magic cup and chicken and drank a few sips of Ensure at lunch today. Palliative following and discussing PEG placement. D/t inadequate PO intakes recommend PEG placement if consistent with GOC. Will continue to monitor. Nutrition Related Findings:    +Ostomy 250ml output 10/3. +1 pitting BLE edema. Na 132.  Wound Type: Surgical Incision, Deep Tissue Injury Current Nutrition Intake & Therapies:    Average Meal Intake: 0%, 1-25%  Average Supplements Intake: 1-25%, 0%  ADULT DIET; Dysphagia - Pureed  ADULT ORAL NUTRITION SUPPLEMENT; Breakfast, PM Snack; Standard High Calorie/High Protein Oral Supplement  ADULT ORAL NUTRITION SUPPLEMENT; Lunch, Dinner; Frozen Oral Supplement  Current Tube Feeding (TF) Orders:  Feeding Route: PEG  Formula: Renal Formula  Schedule: Continuous  Goal TF & Flush Orders Provides: Nepro at goal rate of 50 mL/hr x20 hours to provide 1000 mL TV, 1800 kcal, 81 g protein, and 727 mL free water. +30 mL free water flush q 4 hrs. Anthropometric Measures:  Height: 5' 9\" (175.3 cm)  Ideal Body Weight (IBW): 145 lbs (66 kg)    Admission Body Weight: 194 lb (88 kg) (bed scale)  Current Body Weight: 179 lb (81.2 kg), 113.8 % IBW.  Weight Source: Bed Scale  Current BMI (kg/m2): 26.4                          BMI Categories: Normal Weight (BMI 22.0 to 24.9) age over 72    Estimated Daily Nutrient Needs:  Energy Requirements Based On: Kcal/kg (25-30)  Weight Used for Energy Requirements: Ideal  Energy (kcal/day): 4072-5857 kcal  Weight Used for Protein Requirements: Ideal (1.0-1.2 g/kg)  Protein (g/day): 66-74 g  Method Used for Fluid Requirements: 1 ml/kcal  Fluid (ml/day): 6468-7179 mL    Nutrition Diagnosis:   Inadequate oral intake related to early satiety, swallowing difficulty as evidenced by intake 0-25%, swallow study results    Nutrition Interventions:   Food and/or Nutrient Delivery: Continue Current Diet, Continue Oral Nutrition Supplement, Start Tube Feeding  Nutrition Education/Counseling: No recommendation at this time  Coordination of Nutrition Care: Continue to monitor while inpatient  Plan of Care discussed with: Family    Goals:  Previous Goal Met: No Progress toward Goal(s)  Goals: PO intake 50% or greater, within 2 days       Nutrition Monitoring and Evaluation:   Behavioral-Environmental Outcomes: None Identified  Food/Nutrient Intake Outcomes: Food and Nutrient Intake, Supplement Intake  Physical Signs/Symptoms Outcomes: Biochemical Data, Chewing or Swallowing, GI Status, Nutrition Focused Physical Findings, Weight    Discharge Planning:     Too soon to determine     Chetna Porter, [address]  Contact: [phone]

## 2022-10-04 NOTE — PROGRESS NOTES
Pt assessment completed and charted. VSS. Pt a/o x4. BROOKS drains w/ good output. Ostomy draining approprietly. Wound vac in place. Pt turned Q2 hrs. Pt is anuric. SCDs on. Bed alarm on. Bed in lowest position and wheels locked. Call light within reach. Bedside table within reach. Non-skid footwear in place. Pt denies any other needs at this time. Pt calls out appropriately. Will continue to monitor.

## 2022-10-04 NOTE — PROGRESS NOTES
Hospitalist Progress Note      PCP: Amy Quintero DO,     Date of Admission: 9/14/2022    Chief Complaint: syncope       Hospital course   Patient was admitted with a syncope. Noted to have perforated ischemic colitis. Treated for septic shock. Acute renal failure did not improve and patient became dialysis dependent. Mental status slightly better but not back to baseline after narcotics weaned down. PEG plans are currently on hold as family hoping patient's oral intake will increase. Does not seem that patient has sufficient improvement with oral intake as of today. Noted lower h/h  Ct abdomen showed fluid collection which was drained and a drain was placed tolerated well. Overall clinical improvement is not satisfactory. Noted hypoglycemia that required intervention. Subjective  This am patient is getting HD. Possible PEG tube placement by surgery pending family decision. Vitals stable.       Medications:  Reviewed    Infusion Medications    sodium chloride      sodium chloride      sodium chloride 25 mL (09/22/22 2024)    dextrose      sodium chloride 100 mL/hr at 09/21/22 2122     Scheduled Medications    dextrose  25 g IntraVENous Once    Venelex   Topical BID    cefTRIAXone (ROCEPHIN) IV  1,000 mg IntraVENous Q24H    metroNIDAZOLE  500 mg IntraVENous Q8H    ceFAZolin  2,000 mg IntraVENous Once    rosuvastatin  5 mg Oral Daily    insulin glargine  20 Units SubCUTAneous Nightly    sodium chloride  500 mL IntraVENous Once    insulin lispro  0-16 Units SubCUTAneous Q4H    pantoprazole  40 mg IntraVENous Daily     PRN Meds: traMADol, heparin (porcine), sodium chloride, sodium chloride, diatrizoate meglumine-sodium, acetaminophen, prochlorperazine, potassium chloride, magnesium sulfate, sodium chloride flush, sodium chloride, ondansetron, glucose, dextrose bolus **OR** dextrose bolus, glucagon (rDNA), dextrose, sodium chloride flush, sodium chloride, polyethylene glycol      Intake/Output Summary (Last 24 hours) at 10/4/2022 1512  Last data filed at 10/4/2022 1200  Gross per 24 hour   Intake 417 ml   Output 420 ml   Net -3 ml       Physical Exam Performed:    /74   Pulse 81   Temp 97.4 °F (36.3 °C) (Oral)   Resp 16   Ht 5' 9\" (1.753 m)   Wt 179 lb 10.8 oz (81.5 kg)   SpO2 97%   BMI 26.53 kg/m²     General appearance: No apparent distress, appears stated age. HEENT: Pupils equal, round, and reactive to light. Conjunctivae/corneas clear. Neck: Supple, with full range of motion. No jugular venous distention. Trachea midline. Respiratory:  Normal respiratory effort. Clear to auscultation, bilaterally without Rales/Wheezes/Rhonchi. Diminished throughout. Cardiovascular: Regular rate and rhythm with normal S1/S2 without murmurs, rubs or gallops. Abdomen: Soft, mild diffuse tenderness, non-distended with normal bowel sounds. Musculoskeletal: No clubbing, cyanosis. 1+ BLE pitting edema. Full range of motion without deformity. Skin: Skin color, texture, turgor normal.  Incisions c/d/I. Neurologic:  Neurovascularly intact without any focal sensory/motor deficits. Cranial nerves: II-XII intact, grossly non-focal.  Psychiatric: sleeping, arousable, oriented when awake. Able to engage in short meaningful conversation  Capillary Refill: Brisk, 3 seconds, normal   Peripheral Pulses: +2 palpable, equal bilaterally     I examined the patient today (10/04/22). Physical exam is similar to yesterday (10/01)      Labs:   Recent Labs     10/02/22  0551 10/04/22  0458   WBC 9.6 9.9   HGB 7.1* 7.2*   HCT 21.8* 22.5*    212     Recent Labs     10/02/22  0551 10/04/22  0458   * 132*   K 3.6 3.5   CL 97* 98*   CO2 21 17*   BUN 37* 56*   CREATININE 3.1* 4.7*   CALCIUM 7.6* 8.0*     Recent Labs     10/04/22  0458   AST 13*   ALT <5*   BILITOT 0.3   ALKPHOS 86     No results for input(s): INR in the last 72 hours.     No results for input(s): Kristine Webber in the last 72 hours.    Urinalysis:      Lab Results   Component Value Date/Time    NITRU Negative 09/14/2022 12:08 PM    WBCUA 21-50 09/14/2022 12:08 PM    BACTERIA Rare 09/14/2022 12:08 PM    RBCUA None seen 09/14/2022 12:08 PM    BLOODU Negative 09/14/2022 12:08 PM    SPECGRAV <=1.005 09/14/2022 12:08 PM    GLUCOSEU Negative 09/14/2022 12:08 PM       Radiology:  CT ABSCESS DRAINAGE W CATH PLACEMENT S&I   Final Result   Status post successful CT-guided placement of 10 [place] percutaneous drainage   catheter into fluid collection within the left lateral aspect of the abdomen   as described above. CT GUIDED NEEDLE PLACEMENT   Final Result   Status post successful CT-guided placement of 10 [place] percutaneous drainage   catheter into fluid collection within the left lateral aspect of the abdomen   as described above. CT ABDOMEN PELVIS W IV CONTRAST Additional Contrast? Oral   Final Result   1. Prior sigmoid colectomy with an end colostomy and rectal pouch. There are   persistent collections of fluid and air near the proximal aspect of the   rectal pouch measuring as much as 5.9 cm which raises the question of a leak   at the suture line. There are several new focal areas of fluid attenuation   that have a least partial rim enhancement and may reflect developing   abscesses with the largest in the left pericolic gutter and lateral to the   spleen measuring up to 20.9 cm.   2. Thickening of the distal descending colon suggesting colitis and   thickening of the rectal pouch which may reflect pouchitis. 3. Increased size of moderate bilateral pleural effusions with adjacent   consolidation lower lobes a could reflect associated atelectasis or pneumonia. IR TUNNELED CVC PLACE WO SQ PORT/PUMP > 5 YEARS   Final Result   Status post removal of temporary dialysis catheter followed by   fluoroscopically guided placement of right internal jugular tunneled dialysis   catheter as described above.          XR CHEST PORTABLE   Final Result   Temporary dialysis catheter overlies the cavoatrial junction. 1. Again noted are bibasilar infiltrates and pleural effusions. Similar   appearance to the prior exam.         XR CHEST PORTABLE   Final Result   Airspace opacities at the bilateral lung bases, may be related to atelectasis   versus pneumonia. Mild bilateral pleural effusions. CT HEAD WO CONTRAST   Final Result   No acute intracranial abnormality. Fluid in the mastoids, with trace mucosal thickening in the sinuses         CT ABDOMEN PELVIS W IV CONTRAST   Final Result   Ostomy is now seen in the left lower quadrant. There is wall thickening of   the colon extending to the ostomy site, which is either due to the partially   contracted state of the colon or wall thickening from underlying colitis      Scattered fluid is seen in the pelvis, with a dominant collection on the   right that contains a small amount of gas internally. In the absence of   clinical signs of infection, this collection of fluid and gas is likely   postoperative. Increased pleural effusions with adjacent consolidation at the lung bases      Nonspecific thickening of the endometrial stripe. Recommend follow-up pelvic   ultrasound after the patient's acute symptoms have resolved. Mild fat stranding surrounds the bladder. Recommend correlation with   urinalysis. Gas is also seen in the bladder         XR CHEST PORTABLE   Final Result   1. Small left pleural effusion. 2.  Hazy right lower lobe airspace opacity which could represent atelectasis   or pneumonia.          XR ABDOMEN FOR NG/OG/NE TUBE PLACEMENT   Final Result   Tip of NG tube projects in the left upper quadrant in the region of the   gastric fundus         XR CHEST PORTABLE   Final Result   Interval placement of a right-sided central venous catheter which extends   into the inferior aspect of the right atrium, approximately 5 cm beyond the   cavoatrial junction. XR CHEST PORTABLE   Final Result   Right IJ CVC catheter tip overlies the SVC at the level of the thoracic inlet. Endotracheal tube tip is 5.3 cm above the mark. Mild bibasilar airspace opacities, which could represent as atelectasis   and/or infiltrate. Possible small right pleural effusion. CT ABDOMEN PELVIS WO CONTRAST Additional Contrast? None   Final Result   1. Limited evaluation of the GI tract on this noncontrast exam.  Improved   mucosal changes of the duodenum and proximal jejunum that were described on   prior CT. 2. Severe inflammatory change in the right lower quadrant with etiology   uncertain. This could correspond to enteritis of the distal ileum and   terminal ileum. Colitis may also be considered. Infectious or inflammatory   etiologies may be favored. 3. Dense stool and diffuse mucosal thickening of the distal colon which may   represent a pattern of colitis. 4. Small right pleural effusion with airspace changes in the right lower   lobe, new from prior exam.   5. Evidence of chronic liver disease with a small amount of ascites stable. CT ABDOMEN PELVIS WO CONTRAST Additional Contrast? None   Final Result   1. Acute enteritis involving the duodenum and jejunal loops with thickening   of the loops and edema. No evidence of bowel obstruction. 2. Constipation with significant stool impaction in the rectum. 3. Mild ascites mostly around the liver and spleen. 4. Adequate position of Osorio catheter in the urinary bladder. XR CHEST PORTABLE   Final Result   Placement of a right internal jugular central venous catheter without evident   complication. The tip is at approximately the cavoatrial junction.       No acute findings in the chest.                   Assessment/Plan:    Active Hospital Problems    Diagnosis     Colon perforation ([address] [K63.1]      Priority: Medium    Acute respiratory failure with hypoxia ([address] [J96.01] Priority: Medium    Acute encephalopathy [G93.40]      Priority: Medium    Ischemic colitis ([facility] [K55.9]      Priority: Medium    S/P exploratory laparotomy [Z98.890]      Priority: Medium    Acute postoperative pulmonary insufficiency (HCC) [J95.2]      Priority: Medium    Syncope [R55]      Priority: Medium    Hyponatremia [E87.1]      Priority: Medium    Abdominal pain [R10.9]      Priority: Medium    Enteritis [K52.9]      Priority: Medium    Elevated troponin [R77.8]      Priority: Medium    DEJAN (acute kidney injury) ([facility] [N17.9]      Priority: Medium    DM (diabetes mellitus), secondary uncontrolled [LMO3569]      Priority: Medium       PLAN:    Ischemic sigmoid colitis. S/p resection, now with colostomy. Peritonitis and septic shock have resolved, completed antibiotic course. Noted increased fluid collection in the abdomen. IR placed a drain. On antibiotics. Repeat CT scan under general surgery recommendations. Hypotension. Septic shock resolved, weaned off pressor. Usually on antihypertensives. Anemia  Chronic illness. Hemoglobin remains acceptable and is being monitored. DEJAN on CKD3,   Currently dialysis dependent. HD as per nephrology    Dysphagia. Due to encephalopathy. Required NG tube feeds for a number of days. Then surgery removed the NG on 9/26 in an attempt to improve her chances of passing a swallow eval.  If she doesn't make progress toward swallowing in the next few days then family will consent to a PEG tube. Hypoglycemia  Poor oral intake. Requires interventions. Dextrose fluids are not recommended as the patient is already edematous and is end-stage renal disease with hemodialysis. We may try continuous dextrose fluids if approved by nephrology. Palliative care consult requested due to apparent poor prognosis and failure to improve. DVT Prophylaxis: SCDs  Diet: ADULT DIET;  Dysphagia - Pureed  ADULT ORAL NUTRITION SUPPLEMENT; Breakfast, PM Snack; Standard High Calorie/High Protein Oral Supplement  ADULT ORAL NUTRITION SUPPLEMENT; Lunch, Dinner; Frozen Oral Supplement  Code Status: Full Code  PT/OT Eval Status: Ordered    Dispo -at this point disposition time and location is unclear. Palliative care consulted.     Appropriate for A1 Discharge Unit: Dasha Lepe MD

## 2022-10-04 NOTE — PROGRESS NOTES
Physical Therapy  Facility/Department: [facility] C3 TELE/MED SURG/ONC  Daily Treatment Note  NAME: Tracy Barron  : 1950  MRN: [id number]    Date of Service: 10/4/2022    Discharge Recommendations:  Subacute/Skilled Nursing Facility   PT Equipment Recommendations  Equipment Needed: No  Other: TBD at [facility]    AM-PAC Mobility Inpatient   How much difficulty turning over in bed?: A Lot  How much difficulty sitting down on / standing up from a chair with arms?: Unable  How much difficulty moving from lying on back to sitting on side of bed?: Unable  How much help from another person moving to and from a bed to a chair?: Total  How much help from another person needed to walk in hospital room?: Total  How much help from another person for climbing 3-5 steps with a railing?: Total  AM-PAC Inpatient Mobility Raw Score : 7  AM-PAC Inpatient T-Scale Score : 26.42  Mobility Inpatient CMS 0-100% Score: 92.36  Mobility Inpatient CMS G-Code Modifier : CM     Patient Diagnosis(es): The primary encounter diagnosis was Lactic acidosis. Diagnoses of Generalized abdominal pain, Non-intractable vomiting with nausea, unspecified vomiting type, Acute cystitis with hematuria, Septicemia ([address] Perforated bowel ([address] Stercoral colitis, and DEJAN (acute kidney injury) ([facility] were also pertinent to this visit. Assessment   Assessment: Pt seen for bed ex following OT. Pt too lethargic to participate at EOB. recommended for con't skilled PT and SNF at D/C. Activity Tolerance: Patient limited by fatigue;Patient limited by pain; Patient limited by endurance  Equipment Needed: No  Other: TBD at [facility] Jony: 2-3 times per week  Specific Instructions for Next Treatment: Progress ther ex and mobility as tolerated  Current Treatment Recommendations: Strengthening;Balance training;Functional mobility training;Transfer training; Endurance training;Neuromuscular re-education;Gait training;Pain management; Safety education & training;Home exercise program;Patient/Caregiver education & training; Therapeutic activities; Equipment evaluation, education, & procurement;Positioning     Restrictions  Restrictions/Precautions  Restrictions/Precautions: Up as Tolerated, Fall Risk, Modified Diet, Aspiration Risk, General Precautions  Position Activity Restriction  Other position/activity restrictions: colostomy, R IJ, BROOKS drain, telemetry. No BP or IV sticks to RUE. Aspiration precautions with puree diet,.wound vac     Subjective    Subjective  Subjective: pt agreeable to PT, just had OT and is fatiuged, per OT pt too lethargic to sit up  Pain: bakc, not rated  Orientation  Overall Orientation Status: Impaired  Orientation Level: Oriented to person;Oriented to place;Oriented to situation;Disoriented to time  Cognition  Overall Cognitive Status: Exceptions (lethargic, but cooperative)  Arousal/Alertness: Delayed responses to stimuli  Following Commands: Follows one step commands with repetition  Attention Span: Difficulty attending to directions  Memory: Decreased recall of biographical Information;Decreased long term memory;Decreased recall of recent events  Safety Judgement: Decreased awareness of need for assistance  Insights: Decreased awareness of deficits  Initiation: Requires cues for some  Sequencing: Requires cues for some  Cognition Comment: following simple commands     Objective   Vitals  Vitals:    10/04/22    BP: 122/74   Pulse: 81   Resp:    Temp:    SpO2: 97%          Bed Mobility Training  Bed Mobility Training: Yes  Rolling: Maximum assistance (to reposition after ROM exs)     PT Exercises  Exercise Treatment: BLE supine exs: AP, heelsides, hip IR/ER, abd and QS,GS for 5 -10 reps with additional time and rest breaks as necessary     Safety Devices  Type of Devices: All fall risk precautions in place; Bed alarm in place;Call light within reach; Left in bed;Nurse notified; All rosina prominences offloaded; Heels elevated for pressure relief       Goals  Short Term Goals  Time Frame for Short Term Goals: 10 days, 10/03/22, unless otherwise noted  Short Term Goal 1: Pt will perform supine to sit with mod(A) x 2; 10/04 NT  Short Term Goal 2: Pt will tolerate transfer from bed to chair with mod(A) x 2; 10/04 NT  Short Term Goal 3: Pt will tolerate sitting EOB x7 minutes with mod(A) x 1; 10/04 NT  Short Term Goal 4: Pt will perform 12-15 reps of BLE exercises to improve mobility for transfers by 9/26/22 - MET 9/29/22, goal ongoing to continue to build LE ROM/strength  Patient Goals   Patient Goals : None stated by pt due to lethargy    Education  Patient Education  Education Given To: Patient  Education Provided: Role of Therapy;Plan of Care;Home Exercise Program  Education Provided Comments: Pt educated on importance of EOB activity and exercises performed this date  Education Method: Demonstration;Verbal  Barriers to Learning: Cognition  Education Outcome: Unable to demonstrate understanding;Continued education needed    Therapy Time   Individual Concurrent Group Co-treatment   Time In 1400         Time Out 1425         Minutes [id number] [id number]

## 2022-10-05 LAB
A/G RATIO: 0.7 (ref 1.1–2.2)
ACANTHOCYTES: ABNORMAL
ALBUMIN SERPL-MCNC: 2.3 G/DL (ref 3.4–5)
ALP BLD-CCNC: 95 U/L (ref 40–129)
ALT SERPL-CCNC: <5 U/L (ref 10–40)
ANION GAP SERPL CALCULATED.3IONS-SCNC: 13 MMOL/L (ref 3–16)
ANISOCYTOSIS: ABNORMAL
AST SERPL-CCNC: 12 U/L (ref 15–37)
BANDED NEUTROPHILS RELATIVE PERCENT: 48 % (ref 0–7)
BASOPHILS ABSOLUTE: 0.1 K/UL (ref 0–0.2)
BASOPHILS RELATIVE PERCENT: 1 %
BILIRUB SERPL-MCNC: 0.3 MG/DL (ref 0–1)
BUN BLDV-MCNC: 34 MG/DL (ref 7–20)
BURR CELLS: ABNORMAL
CALCIUM SERPL-MCNC: 7.8 MG/DL (ref 8.3–10.6)
CHLORIDE BLD-SCNC: 97 MMOL/L (ref 99–110)
CO2: 21 MMOL/L (ref 21–32)
CREAT SERPL-MCNC: 3.3 MG/DL (ref 0.6–1.2)
DOHLE BODIES: PRESENT
EOSINOPHILS ABSOLUTE: 0.2 K/UL (ref 0–0.6)
EOSINOPHILS RELATIVE PERCENT: 2 %
GFR AFRICAN AMERICAN: 17
GFR NON-AFRICAN AMERICAN: 14
GLUCOSE BLD-MCNC: 127 MG/DL (ref 70–99)
GLUCOSE BLD-MCNC: 135 MG/DL (ref 70–99)
GLUCOSE BLD-MCNC: 146 MG/DL (ref 70–99)
GLUCOSE BLD-MCNC: 150 MG/DL (ref 70–99)
GLUCOSE BLD-MCNC: 161 MG/DL (ref 70–99)
GLUCOSE BLD-MCNC: 201 MG/DL (ref 70–99)
HCT VFR BLD CALC: 21.2 % (ref 36–48)
HEMATOLOGY PATH CONSULT: NO
HEMOGLOBIN: 7 G/DL (ref 12–16)
HYPOCHROMIA: ABNORMAL
LYMPHOCYTES ABSOLUTE: 1.6 K/UL (ref 1–5.1)
LYMPHOCYTES RELATIVE PERCENT: 16 %
MCH RBC QN AUTO: 27.4 PG (ref 26–34)
MCHC RBC AUTO-ENTMCNC: 33.1 G/DL (ref 31–36)
MCV RBC AUTO: 83 FL (ref 80–100)
MONOCYTES ABSOLUTE: 0.4 K/UL (ref 0–1.3)
MONOCYTES RELATIVE PERCENT: 4 %
NEUTROPHILS ABSOLUTE: 7.9 K/UL (ref 1.7–7.7)
NEUTROPHILS RELATIVE PERCENT: 29 %
OVALOCYTES: ABNORMAL
PDW BLD-RTO: 14.7 % (ref 12.4–15.4)
PERFORMED ON: ABNORMAL
PLATELET # BLD: 218 K/UL (ref 135–450)
PLATELET SLIDE REVIEW: ADEQUATE
PMV BLD AUTO: 7.9 FL (ref 5–10.5)
POTASSIUM SERPL-SCNC: 3.6 MMOL/L (ref 3.5–5.1)
RBC # BLD: 2.55 M/UL (ref 4–5.2)
SLIDE REVIEW: ABNORMAL
SODIUM BLD-SCNC: 131 MMOL/L (ref 136–145)
TOTAL PROTEIN: 5.4 G/DL (ref 6.4–8.2)
TOXIC GRANULATION: PRESENT
WBC # BLD: 10.2 K/UL (ref 4–11)

## 2022-10-05 PROCEDURE — 6370000000 HC RX 637 (ALT 250 FOR IP): Performed by: INTERNAL MEDICINE

## 2022-10-05 PROCEDURE — 6360000002 HC RX W HCPCS: Performed by: INTERNAL MEDICINE

## 2022-10-05 PROCEDURE — 2500000003 HC RX 250 WO HCPCS: Performed by: STUDENT IN AN ORGANIZED HEALTH CARE EDUCATION/TRAINING PROGRAM

## 2022-10-05 PROCEDURE — 85025 COMPLETE CBC W/AUTO DIFF WBC: CPT

## 2022-10-05 PROCEDURE — 6360000002 HC RX W HCPCS: Performed by: STUDENT IN AN ORGANIZED HEALTH CARE EDUCATION/TRAINING PROGRAM

## 2022-10-05 PROCEDURE — 36415 COLL VENOUS BLD VENIPUNCTURE: CPT

## 2022-10-05 PROCEDURE — 2580000003 HC RX 258: Performed by: STUDENT IN AN ORGANIZED HEALTH CARE EDUCATION/TRAINING PROGRAM

## 2022-10-05 PROCEDURE — 99024 POSTOP FOLLOW-UP VISIT: CPT | Performed by: SURGERY

## 2022-10-05 PROCEDURE — 1200000000 HC SEMI PRIVATE

## 2022-10-05 PROCEDURE — APPSS30 APP SPLIT SHARED TIME 16-30 MINUTES: Performed by: CLINICAL NURSE SPECIALIST

## 2022-10-05 PROCEDURE — 6360000002 HC RX W HCPCS: Performed by: NURSE PRACTITIONER

## 2022-10-05 PROCEDURE — 97605 NEG PRS WND THER DME<=50SQCM: CPT

## 2022-10-05 PROCEDURE — 80053 COMPREHEN METABOLIC PANEL: CPT

## 2022-10-05 PROCEDURE — C9113 INJ PANTOPRAZOLE SODIUM, VIA: HCPCS | Performed by: INTERNAL MEDICINE

## 2022-10-05 PROCEDURE — 6370000000 HC RX 637 (ALT 250 FOR IP): Performed by: NURSE PRACTITIONER

## 2022-10-05 RX ORDER — MORPHINE SULFATE 2 MG/ML
2 INJECTION, SOLUTION INTRAMUSCULAR; INTRAVENOUS
Status: DISCONTINUED | OUTPATIENT
Start: 2022-10-05 | End: 2022-10-06 | Stop reason: HOSPADM

## 2022-10-05 RX ADMIN — CASTOR OIL AND BALSAM, PERU: 788; 87 OINTMENT TOPICAL at 20:31

## 2022-10-05 RX ADMIN — TRAMADOL HYDROCHLORIDE 50 MG: 50 TABLET ORAL at 11:36

## 2022-10-05 RX ADMIN — TRAMADOL HYDROCHLORIDE 50 MG: 50 TABLET ORAL at 17:23

## 2022-10-05 RX ADMIN — CASTOR OIL AND BALSAM, PERU: 788; 87 OINTMENT TOPICAL at 09:02

## 2022-10-05 RX ADMIN — MORPHINE SULFATE 2 MG: 2 INJECTION, SOLUTION INTRAMUSCULAR; INTRAVENOUS at 20:25

## 2022-10-05 RX ADMIN — ROSUVASTATIN CALCIUM 5 MG: 10 TABLET, FILM COATED ORAL at 09:01

## 2022-10-05 RX ADMIN — PANTOPRAZOLE SODIUM 40 MG: 40 INJECTION, POWDER, FOR SOLUTION INTRAVENOUS at 09:01

## 2022-10-05 RX ADMIN — INSULIN LISPRO 4 UNITS: 100 INJECTION, SOLUTION INTRAVENOUS; SUBCUTANEOUS at 12:14

## 2022-10-05 RX ADMIN — CEFTRIAXONE SODIUM 1000 MG: 1 INJECTION, POWDER, FOR SOLUTION INTRAMUSCULAR; INTRAVENOUS at 17:28

## 2022-10-05 RX ADMIN — METRONIDAZOLE 500 MG: 500 INJECTION, SOLUTION INTRAVENOUS at 20:30

## 2022-10-05 RX ADMIN — METRONIDAZOLE 500 MG: 500 INJECTION, SOLUTION INTRAVENOUS at 11:40

## 2022-10-05 RX ADMIN — METRONIDAZOLE 500 MG: 500 INJECTION, SOLUTION INTRAVENOUS at 02:28

## 2022-10-05 ASSESSMENT — PAIN DESCRIPTION - LOCATION
LOCATION: BACK
LOCATION: BACK
LOCATION: ABDOMEN;BACK

## 2022-10-05 ASSESSMENT — PAIN SCALES - GENERAL
PAINLEVEL_OUTOF10: 7
PAINLEVEL_OUTOF10: 8
PAINLEVEL_OUTOF10: 7
PAINLEVEL_OUTOF10: 8

## 2022-10-05 ASSESSMENT — PAIN DESCRIPTION - ORIENTATION
ORIENTATION: LOWER
ORIENTATION: LOWER

## 2022-10-05 ASSESSMENT — PAIN DESCRIPTION - DESCRIPTORS
DESCRIPTORS: ACHING;CRAMPING;DISCOMFORT
DESCRIPTORS: CRAMPING;DISCOMFORT

## 2022-10-05 NOTE — PROGRESS NOTES
[facility] GENERAL SURGERY    Surgery Progress Note           POD # 20    PATIENT NAME: Patricia Ibrahim     TODAY'S DATE: 10/5/2022    INTERVAL HISTORY:    Pt without acute events, ate a few bites of eggs and pancakes for breakfast per staff. OBJECTIVE:   VITALS:  /78   Pulse 74   Temp 98.6 °F (37 °C) (Oral)   Resp 18   Ht 5' 9\" (1.753 m)   Wt 179 lb 10.8 oz (81.5 kg)   SpO2 99%   BMI 26.53 kg/m²     INTAKE/OUTPUT:    I/O last 3 completed shifts: In: 517 [P.O.:100; I.V.:417]  Out: 770 [Drains:20; Stool:750]  No intake/output data recorded. CONSTITUTIONAL:  fatigued and alert    ABDOMEN:  soft, non-distended, froy purulent and foul smelling  INCISION: vac in place midline    Data:  CBC:   Recent Labs     10/04/22  0458 10/05/22  0515   WBC 9.9 10.2   HGB 7.2* 7.0*   HCT 22.5* 21.2*    218       BMP:    Recent Labs     10/04/22  0458 10/05/22  0515   * 131*   K 3.5 3.6   CL 98* 97*   CO2 17* 21   BUN 56* 34*   CREATININE 4.7* 3.3*   GLUCOSE 146* 146*       Hepatic:   Recent Labs     10/04/22  0458 10/05/22  0515   AST 13* 12*   ALT <5* <5*   BILITOT 0.3 0.3   ALKPHOS 86 95       Mag:    No results for input(s): MG in the last 72 hours. Phos:   No results for input(s): PHOS in the last 72 hours. INR: No results for input(s): INR in the last 72 hours. Radiology Review:       ASSESSMENT AND PLAN:  67 y.o. female status post  Avitia's procedure with subsequent percutaneous drain placed for intra-abdominal fluid collection     Encourage PO intake, ongoing family discussions regarding Gtube     - if pt improves her overall strength, mobility, may eventually try and study her rectal stump with a fluoroscopic study and clarify if she has a disrupted/dehisced stump to explain the FROY drainage. Electronically signed by BRAEDEN Jennings CNP     Patient seen and agree with above and more than half of the total time was spent by me on the encounter.      Marcell Grain Hina Cormier MD

## 2022-10-05 NOTE — PROGRESS NOTES
Perfect serve message sent to KWAN ISAACS [facility], \"Family and pt have been talking with palative about changing code statues and going hospice. pt and family have decided that pt would like to be a DNRcc, no to all, no intervention. pt wants to go hospice. Can new code statues be ordered? thank you. \", awaiting response. Addendum;new order placed.

## 2022-10-05 NOTE — PROGRESS NOTES
Pt assessment completed and charted. VSS. Pt a/o x4. Encouraged fluids and small bites of applesauce. Prn ultram given for pain. Pt turned Q2 hrs. Midline CDI. Wound vac in place. BROOKS drain x2. Colostomy in place. Bed alarm on . Bed in lowest position and wheels locked. Call light within reach. Bedside table within reach. Non-skid footwear in place. Pt denies any other needs at this time. Pt calls out appropriately. Will continue to monitor.

## 2022-10-05 NOTE — PROGRESS NOTES
PALLIATIVE MEDICINE PROGRESS NOTE     Patient name:Sierra Khan    VVA:[id number] SZY:0/86/1298  Room/Bed:[facility]2/0322-01    LOS: 21 days        ASSESSMENT/RECOMMENDATIONS     67 y.o. female with  ischemic colitis, s/p colostomy, DEJAN on CKD now requiring iHD, dysphagia and protein-calorie malnutrition    Symptom Management:  Goals of Care - is strongly considering changing goals to comfort care. They have decided against a feeding tube, but want to continue with dialysis for right now. He thinks patient is suffering. He wants to talk to daughters tonight before committing to comfort care (anticipate hospice IPU). Code status is to remain full at this time per , will likely amend to DNR CC after talking with daughters tonight. Dysphagia - due to weakness and encephalopathy. On pureed diet and family assisting with feeding. Family hopeful that dysphagia will improve. Aware of the risks with dysphagia as it pertains to her overall prognosis. Protein-calorie malnutrition - Albumin 2.3, total protein 6.0. BMI 28. Unintentional weight loss of 20 lbs in past 2 months (per family, she was afraid of eating most foods because of the misunderstanding of her diabetic and kidney diet restrictions). Family no longer wants PEG. Patient/Family Goals of Care :    10/5/22    Spoke with  at the bedside. Patient is alert, but in and out of sleep and only minimally participated. She states \"I want to go home\". Patient was reportedly telling RN earlier that she can't take this anymore.  tells me they have decided against the PEG tube. He thinks the PEG will cause more suffering and he does not want that for her. I asked about continuing dialysis and he thought they would continue with that. We discussed his/our expectations with dialysis if patient was not eating and not getting nutrition.   He understands that without nutrition and with patient as sick as she is, dialysis was likely to only cause her additional suffering and not offer any benefit. We discussed that if she stopped dialysis we would expect her to only live days to weeks. He would like to bring her home. We talked about what that might look like and he has significant concerns that he would be too overwhelmed to manage her at home. He is supposed to be starting his own bladder cancer treatments. He agrees that she would probably be better served in a nursing home or [place] [id number]. He intermittently asks me if I thought she could get better at the [facility] and going to [facility] for dialysis. I shared with him several times that I was concerned that she was even up to such a plan, let alone if she could improve. I explained that I did not want to take away his hope and I, too, was certainly hoping she would turn this around, but with everyone (including patient) concerned she was suffering, I was concerned that it might be time to consider alternate plans (comfort care/hospice).  began to sob and support offered. He says he knew this day was coming but was not prepared. He would like to talk to his daughters latasha before officially deciding to change focus to comfort. I gave him my cell phone in case they had any questions when they spoke. I discussed code status with him and recommended we re-consider her code status given their concern for her suffering. He says he understands, but does not feel he should change code status until he speaks with the daughters later tonight. 10/3/22    Spoke with patient's sister at length at the bedside. She does not really feel like patient has improved much (or any) and is concerned patient is suffering. When their mother went to a nursing home, patient said to her sister she would never want to live like that. Her sister thinks dialysis will be permament and patient will probably never be able to live any kind of meaningful or enjoyable life.   She doesn't feel like a feeding tube is going to do anything more than prolong her suffering. If it was up to her she would send her to hospice, but she says its not her decision so she just has to let patient's family make the decisions. She is well aware that patient's spouse and daughters want to remain aggressive. She doesn't want to get in their way. If patient wakes up more, she is not sure if patient would say these things herself about not wanting to continue. She thinks she would probably not say them in front of her children and would continue to want be aggressive medically for them. We talked about setting some limits/expectations with the daughters before the PEG is placed that if there is no improvement after a few weeks of TF, then we should consider shifting goals too comfort care. She is not sure if that will work with them or not. Attempted to speak with patient directly today but she falls to sleep mid-sentence and I am unable to maintain a conversation. She denies any pain or needs. Spoke with  Jim Mayer by phone. He feels like patient has done a little better with her eating and her mental status. He thinks one of the big problems for her is that she hates the food at the hospital.  They are trying to bring in other foods that she might like. He is headed to the grocery store now to buy the cranberry pineapple juice she wants. He feels like she is much more awake compared to Friday. These improvements are encouraging to him. He wants to remain aggressive. He hopes she keeps this improval up. He spoke with his family and patient should remain a full code. Jim Mayer does share with me that he has pretty deep financial concerns about how they are going to pay these hospital bills or put patient in a nursing home. He is agreeable to me mentioning the concern to social work to see what resources we can help him obtain.   He will be at the hospital later today to visit patient and bring her her juice.    9/30/22     Met with patient, spouse, daughter and patient's sister in law. Patient opens eyes to name and does answer questions appropriately, but she is very lethargic and quickly drifts back to sleep. Had extensive conversation with spouse, daughter and KRISTINE at the bedside. Spouse states he and patient never really discussed topics like advanced directives. They never talked about her feelings around dialysis (with her CKD). He is not really sure how she would feel about these things. Patient has never really been in the hospital before. This is the sickest she has ever been. He and his daughter are very hopeful that this is a temporary situation and patient will bounce back with nutrition and time. They are hopeful the colostomy will be reversed, the dialysis will only be temporary and the SNF will only be temporary- if needed at all. They are very motivated by the improvement in her mental status and that she has taken several bites of apple sauce and jello today. They will attempt to get some restaurant food she likes and puree it at home and bring it in to see if that will entice her. We talked about her need for nutrition and talked at length about a PEG tube. They believe they will go that route if they have to, but they hope they don't. They understand that they will need to make a decision about the PEG very soon and understand the need for nutrition. Discussed potential SNF and even LTC.  will make accommodations to their mobile home for patient if needed as the goal will be to get her home. They intend on doing dialysis as long as its needed, but hope it won't be permament. They are hopeful that patient can recover enough strength and function that  could drive her to dialysis.    The expectation is that patient is going to have a very rough road ahead, with some possible lingering health issues that family is happy to accommodate for, and will be able to 10%  Bed bound; Extensive disease; Total care; Mouth care only; Drowsy/coma   []  0%   Death      Case Discussed with:  patient, family, floor RN, case management, attending MD, General surgery    Thank you for allowing us to participate in the care of this patient. SUBJECTIVE     Chief Complaint: abdominal pain    Last 24 hours:   PO intake has not improved    ROS:  Review of Systems   Unable to perform ROS: Mental status change           Patient unable to complete full ROS due to current cognitive status. Information that is obtained from nursing and chart           OBJECTIVE   /68   Pulse 74   Temp 97.6 °F (36.4 °C) (Oral)   Resp 18   Ht 5' 9\" (1.753 m)   Wt 179 lb 10.8 oz (81.5 kg)   SpO2 98%   BMI 26.53 kg/m²   I/O last 3 completed shifts: In: 517 [P.O.:100; I.V.:417]  Out: 770 [Drains:20; Stool:750]  I/O this shift:  In: 600 [P.O.:600]  Out: 56 [Drains:10; Stool:480]      Physical Exam    Physical Exam  Constitutional:       General: She is not in acute distress. Appearance: She is ill-appearing. Comments: Lying on her side, sleeping   HENT:      Head: Normocephalic and atraumatic. Nose: Nose normal. No congestion or rhinorrhea. Mouth/Throat:      Mouth: Mucous membranes are dry. Pharynx: No oropharyngeal exudate or posterior oropharyngeal erythema. Eyes:      General: No scleral icterus. Right eye: No discharge. Left eye: No discharge. Extraocular Movements: Extraocular movements intact. Conjunctiva/sclera: Conjunctivae normal.      Pupils: Pupils are equal, round, and reactive to light. Cardiovascular:      Rate and Rhythm: Normal rate. Pulses: Normal pulses. Pulmonary:      Effort: Pulmonary effort is normal. No respiratory distress. Breath sounds: No stridor. No wheezing. Abdominal:      Palpations: Abdomen is soft. Musculoskeletal:         General: Normal range of motion.       Cervical back: Normal range of motion and neck supple. Skin:     General: Skin is warm and dry. Capillary Refill: Capillary refill takes less than 2 seconds.    Neurological:      Comments: Alert to self, situation   Psychiatric:      Comments: calm            Total time: 72 minutes  >50% of time spent counseling patient at bedside or POA/family member if applicable , reviewing information and discussing care, coordinating with care team       Signed By: Electronically signed by BRAEDEN Rodriguez CNP on 10/5/2022 at 3:42 PM   Palliative Medicine   [phone]    October 5, 2022

## 2022-10-05 NOTE — PROGRESS NOTES
[facility] Wound Ostomy Continence Nurse  Follow-up Progress Note       NAME:  Rodrigo Cyr RECORD NUMBER:  [id number]  AGE:  67 y.o. GENDER:  female  :  1950  TODAY'S DATE:  10/5/2022    Subjective: non verbal today. Slept through treatment. Wound Identification:  Wound Type: Mid line surgical abdominal staple line - NEW Draining purulent creamy milky tan fluid. Acquired C3 - Deep Tissue Pressure injury. Contributing Factors: edema, diabetes, chronic pressure, decreased mobility, shear force, obesity, and decreased tissue oxygenation    NEW Colostomy:    Went to OR for Exploratory lap, sigmoid colectomy and colostomy on 9/15/22 by  [address] [place] [address]. Stoma: measures 20 mm (1 3/8 inch) x 38 mm ( 1 1/2 inch). Oval, brown, protrudes but distal edge in a divot/crease from 12 pm to 1200 am.    Appliance:  Coloplast RED 2 piece convex [id number]  with drainable pouch # [id number]. Paste ring around stoma prior to application of flange. Patient Goal of Care:  [x] Wound Healing NPWT to mid abdominal incision  [] Odor Control   [] Palliative Care  [] Pain Control   [x] Other: Colostomy care. Objective:  Lethargic. Not responding. Slept during care. Daughter present and reports she had just drank an ensure and few bites of food. Reports they have made the decision to not place a feeding tube. /78   Pulse 74   Temp 98.6 °F (37 °C) (Oral)   Resp 18   Ht 5' 9\" (1.753 m)   Wt 179 lb 10.8 oz (81.5 kg)   SpO2 99%   BMI 26.53 kg/m²   Levar Risk Score: Levar Scale Score: 11  Assessment:  Mid abdominal incision with light tan purulent drainage.    Measurements:  Negative Pressure Wound Therapy Abdomen Medial (Active)   $ Standard NPWT <=50 sq cm PER TX $ Yes 10/05/22 1355   Wound Type Surgical 10/05/22 1355   Unit Type [facility] 10/05/22 1355   Dressing Type White Foam;Black Foam 10/05/22 1355   Number of pieces used 2 10/05/22 1355   Number of pieces removed 2 10/05/22 1355   Cycle Continuous 10/05/22 1355   Target Pressure (mmHg) 125 10/05/22 1355   Intensity 1 10/05/22 1355   Canister changed? No 10/05/22 1355   Dressing Status New dressing applied 10/05/22 1355   Dressing Changed Changed/New 10/05/22 1355   Drainage Amount Small 10/05/22 1355   Drainage Description Other (Comment);Purulent 10/05/22 1355   Dressing Change Due 10/07/22 10/05/22 1355   Wound Assessment Epithelialization 10/05/22 1355   Lesli-wound Assessment Blanchable erythema 10/05/22 1355   Shape oval 10/05/22 1355   Odor Mild 10/05/22 1355   Number of days: 2       Wound 09/29/22 Buttocks Left;Right maroon (Active)   Wound Image   09/29/22 1235   Wound Etiology Deep tissue/Injury 10/05/22 0900   Dressing Status Intact 10/05/22 0900   Wound Cleansed Cleansed with saline 10/05/22 0900   Dressing/Treatment Open to air; Pharmaceutical agent (see MAR) 10/05/22 0900   Offloading for Diabetic Foot Ulcers Offloading ordered 10/05/22 0900   Dressing Change Due 10/05/22 10/05/22 0900   Wound Length (cm) 5.5 cm 09/29/22 1235   Wound Width (cm) 7 cm 09/29/22 1235   Wound Depth (cm) 0 cm 09/29/22 1235   Wound Surface Area (cm^2) 38.5 cm^2 09/29/22 1235   Wound Volume (cm^3) 0 cm^3 09/29/22 1235   Distance Tunneling (cm) 0 cm 10/05/22 0900   Tunneling Position ___ O'Clock 0 10/05/22 0900   Undermining Starts ___ O'Clock 0 10/05/22 0900   Undermining Ends___ O'Clock 0 10/05/22 0900   Undermining Maxium Distance (cm) 0 10/05/22 0900   Wound Assessment Purple/maroon 10/05/22 0900   Drainage Amount None 10/05/22 0900   Odor None 10/05/22 0900   Lesli-wound Assessment Blanchable erythema 10/05/22 0900   Margins Attached edges; Defined edges 10/05/22 0900   Number of days: 6     Incision 09/15/22 Abdomen Medial (Active)   Wound Image   10/05/22 1355   Dressing Status New dressing applied 10/05/22 1355   Dressing Change Due 10/07/22 10/05/22 1355   Incision Cleansed Cleansed with saline 10/05/22 1354 Dressing/Treatment Barrier film;Hydrocolloid; Negative pressure wound therapy 10/05/22 1355   Incision Length (cm) 2.5 10/05/22 1355   Incision Width (cm) 1.8 cm 10/05/22 1355   Incision Depth (cm) 1.6 cm 10/05/22 1355   Closure Staples 10/05/22 1355   Margins Other (Comment) 10/05/22 1355   Incision Assessment Epithelialization; Other (Comment) 10/05/22 1355   Drainage Amount Small 10/05/22 1355   Drainage Description Purulent; Other (Comment) 10/05/22 1355   Odor Moderate 10/05/22 1355   Lesli-incision Assessment Intact; Blanchable erythema 10/05/22 1355   Number of days: 20     Mid abdominal incision with colostomy:      Colostomy LLQ (Active)   Stomal Appliance 2 piece 10/05/22 1355   Flange Size (inches) 2.25 Inches 10/05/22 1355   Stoma  Assessment Red;Moist;Flush 10/05/22 1355   Peristomal Assessment Other (Comment); Red 10/05/22 1355   Treatment Bag change;Site care;Stoma paste; Heat applied 10/05/22 1355   Stool Appearance Loose; Watery 10/05/22 1355   Stool Color Brown 10/05/22 1355   Stool Amount Small 10/05/22 1355   Output (mL) 100 ml 10/05/22 1355   Number of days: 19        10/05/22 1355   Closed/Suction Drain Right Abdomen Bulb   Placement Date: 09/15/22   Present on Admission/Arrival: No  Description (optional): BROOKS Drain  Inserted by: Dr. Freddy Izaguirre Number: 1  Orientation: Right  Location: Abdomen  Drain Tube Type: Bulb  Size : 19 Fr, 6.3mm  Tube Shape: Round  [address] .. Site Description Other (Comment); Reddened   Dressing Status New dressing applied   Drainage Appearance Purulent; Other (Comment)  (light tan)   Drain Status To bulb suction   Closed/Suction Drain Left Abdomen   Placement Date/Time: 09/29/22 1137   Present on Admission/Arrival: No  Description (optional): exodus array multipurpose drainage catheter  Inserted by: maricarmen  Tube Number: 1  Orientation: Left  Location: Abdomen  Size : 10f 25cm   Site Description Unable to view   Dressing Status Clean, dry & intact   Drainage Appearance Bloody; Serous   Drain Status Open to gravity drainage       Intake/Output Summary (Last 24 hours) at 10/5/2022 1414  Last data filed at 10/5/2022 1355  Gross per 24 hour   Intake 100 ml   Output 830 ml   Net -730 ml       Response to treatment:  Well tolerated by patient. Pain Assessment:  Severity:  0 / 10  Quality of pain: N/A  Wound Pain Timing/Severity: none  Premedicated: No  Plan:   Plan of Care: Wound 09/29/22 Buttocks Left;Right maroon-Dressing/Treatment: Open to air, Pharmaceutical agent (see MAR) (venelex)    Abdominal Wound cleansed with normal saline. Lesli wound prepped with barrier film, VAC drape and hydrocolloid. One white sponge lightly into undermining and one black sponge on top. Covered with VAC drape. Connected to 125 mmHg low continuous suction on [employer]Vantage. Plan to change 3 times a week. Plan for Ostomy Care:  Colostomy pouch changed with convex wafer. Appliance removed. Skin cleansed with warm water and patted dry. Stoma in divot circumferentially. Paste strip placed around open incision then Convex flange placed. Bag attached and closed. Ostomy care to follow. Call Ostomy care for problems with seal at [phone] or or call [phone] and leave message     Stoma Care - New colostomy - Patient to empty appliance when 1/3 to 1/2 full with assistance of the staff. Cleanse inside and outside of the drain spout prior to rolling closed. Change appliance every 3-5 days or 1-2 times a week. Call for problems with seal [phone]      Specialty Bed Required : Yes   [x] Low Air Loss   [x] Pressure Redistribution  [] Fluid Immersion  [] Bariatric  [] Total Pressure Relief  [] Other:     Current Diet: ADULT DIET;  Dysphagia - Pureed  ADULT ORAL NUTRITION SUPPLEMENT; Breakfast, PM Snack; Standard High Calorie/High Protein Oral Supplement  ADULT ORAL NUTRITION SUPPLEMENT; Lunch, Dinner; Frozen Oral Supplement  Dietician consult:  Yes    Discharge Plan:  Placement for patient upon discharge: intermediate care facility   Patient appropriate for Outpatient [address]: Yes  Supplies given Yes   Samples requested Yes    Referrals:  []  following  [] [facility]  [] Supplies  [] Other    Patient/Caregiver Teaching: no teaching today.   Level of patient/caregiver understanding able to:   [] Indicates understanding       [] Needs reinforcement  [] Unsuccessful      [] Verbal Understanding  [] Demonstrated understanding       [] No evidence of learning  [] Refused teaching         [x] N/A       Electronically signed by Cynthia Bradford RN, MSN, Phil Bowen on 10/5/2022 at 2:07 PM

## 2022-10-05 NOTE — PROGRESS NOTES
Interval History and plan:     Dialysis done yesterday  Very lethargic  Seeing palliative care  Possible hospital    Will follow family decision                      Assessment :            CKD Stage IIIb with DEJAN  Creatinine 2.1 on consult  Creatinine 1.6 on 7/22  Likely due to diabetes, hypertension  Renal imaging-normal in the past      hypotension  BP: (123-127)/(57-62)  Heart Rate:  [76-77]   BP goal inpatient [phone] systolic inpatient  Pressures at the time of consult  Normally hypertensive      Acidosis  Severe  Requiring 2 vasopressors  Blood pressure okay with the 2 vasopressors but lactic is a still going up to 19 concerning for ischemia   /Possible metformin induced lactic acidosis    Bowel ischemia perforation  Needing surgery and colostomy  Post operative course complicated by fluid collection intra-abdominal    [facility]ph[facility] would like to thank Yarelis Finn MD   for opportunity to serve this patient      Please call with questions at-   24 Hrs Answering service [phone] or  7 am- 5 pm via Perfect serve or cell phone  Ester Randall MD          CC/reason for consult :       DEJAN     HPI :     Zander Alvarez is a 67 y.o. female presented to   the hospital on 9/14/2022 with lactic acidosis. She is known to have diabetes and on metformin to 2 days ago  Has constipation and with multiple bowel regimen has had good bowel movement yesterday following that she has syncope. EMS was called and was found to have blood pressure of 50 systolic given IV fluids brought to the emergency room blood pressure was normal initially but later developed hypotension got 3 L of fluid and now on 2 vasopressors and in ICU. She also has severe acidosis with very high lactate. CT scan was normal initially. We are consulted for DEJAN on CKD and related issues.     On CKD and also severe lactic acidosis    ROS:     Seen with- sister    RN         PMH/PSH/SH/Family History:     Past Medical History: Diagnosis Date    Chronic kidney disease     Diabetes mellitus ([facility] [address]     Hyperlipidemia     Hypertension     Neuropathy        Past Surgical History:   Procedure Laterality Date    IR TUNNELED CATHETER PLACEMENT GREATER THAN 5 YEARS  9/27/2022    IR TUNNELED CATHETER PLACEMENT GREATER THAN 5 YEARS 9/27/2022 [facility] SPECIAL PROCEDURES    LAPAROTOMY N/A 9/15/2022    DIAGNOSTIC LAPAROSCOPY, EXPLORATORY LAPAROTOMY, SIGMOID COLECTOMY AND COLOSTOMY performed by Evangelist Balderas MD at [facility]        reports that she has never smoked. She has never used smokeless tobacco. She reports that she does not currently use alcohol. She reports that she does not use drugs. family history is not on file.          Medication:     Current Facility-Administered Medications: traMADol (ULTRAM) tablet 50 mg, 50 mg, Oral, Q6H PRN  heparin (porcine) injection 3,600 Units, 3,600 Units, IntraCATHeter, PRN  dextrose 50 % IV solution, 25 g, IntraVENous, Once  0.9 % sodium chloride infusion, , IntraVENous, PRN  Venelex ointment, , Topical, BID  cefTRIAXone (ROCEPHIN) 1,000 mg in dextrose 5 % 50 mL IVPB mini-bag, 1,000 mg, IntraVENous, Q24H  metronidazole (FLAGYL) 500 mg in 0.9% NaCl 100 mL IVPB premix, 500 mg, IntraVENous, Q8H  0.9 % sodium chloride infusion, , IntraVENous, PRN  diatrizoate meglumine-sodium (GASTROGRAFIN) 66-10 % solution 12 mL, 12 mL, Oral, ONCE PRN  ceFAZolin (ANCEF) 2000 mg in dextrose 5 % 100 mL IVPB, 2,000 mg, IntraVENous, Once  rosuvastatin (CRESTOR) tablet 5 mg, 5 mg, Oral, Daily  acetaminophen (TYLENOL) 160 MG/5ML solution 650 mg, 650 mg, Oral, Q4H PRN  insulin glargine (LANTUS) injection vial 20 Units, 20 Units, SubCUTAneous, Nightly  0.9 % sodium chloride bolus, 500 mL, IntraVENous, Once  insulin lispro (HUMALOG) injection vial 0-16 Units, 0-16 Units, SubCUTAneous, Q4H  prochlorperazine (COMPAZINE) injection 5 mg, 5 mg, IntraVENous, Q6H PRN  potassium chloride 20 mEq/50 mL IVPB (Central Line), 20 mEq, IntraVENous, PRN  magnesium sulfate 1000 mg in dextrose 5% 100 mL IVPB, 1,000 mg, IntraVENous, PRN  sodium chloride flush 0.9 % injection 5-40 mL, 5-40 mL, IntraVENous, PRN  0.9 % sodium chloride infusion, 25 mL, IntraVENous, PRN  ondansetron (ZOFRAN) injection 4 mg, 4 mg, IntraVENous, Q10 Min PRN  glucose chewable tablet 16 g, 4 tablet, Oral, PRN  dextrose bolus 10% 125 mL, 125 mL, IntraVENous, PRN **OR** dextrose bolus 10% 250 mL, 250 mL, IntraVENous, PRN  glucagon (rDNA) injection 1 mg, 1 mg, SubCUTAneous, PRN  dextrose 10 % infusion, , IntraVENous, Continuous PRN  sodium chloride flush 0.9 % injection 5-40 mL, 5-40 mL, IntraVENous, PRN  0.9 % sodium chloride infusion, , IntraVENous, PRN  polyethylene glycol (GLYCOLAX) packet 17 g, 17 g, Oral, Daily PRN  pantoprazole (PROTONIX) injection 40 mg, 40 mg, IntraVENous, Daily       Vitals :     Vitals:    10/05/22 0750   BP: 123/62   Pulse: 77   Resp: 16   Temp: 98.3 °F (36.8 °C)   SpO2: 97%       I & O :       Intake/Output Summary (Last 24 hours) at 10/5/2022 0843  Last data filed at 10/5/2022 0446  Gross per 24 hour   Intake 100 ml   Output 550 ml   Net -450 ml          Physical Examination :     General appearance: confused,on NC  HEENT: Lips- normal, teeth- ok , oral mucosa- moist  Neck : Mass- no, appears symmetrical, JVD- not visible  Respiratory: Respiratory effort-  comfortable on oxygen, wheeze- no, crackles - no  Cardiovascular:  Ausculation- No M/R/G, Edema none  Abdomen: visible mass- no, distention- no, scar- no, tenderness- no                            hepatosplenomegaly-  No--  Musculoskeletal:  clubbing no,cyanosis- no , digital ischemia- no                           muscle strength- patient unable to co-operate     , tone - patient unable to co-operate   Skin: rashes- no , ulcers- no, induration- no, tightening - no  Psychiatric:  confused   Additional findings:         LABS:     Recent Labs     10/04/22  0458 10/05/22  0515   WBC 9.9 10.2   HGB 7.2* 7.0*   HCT 22.5* 21.2*    218       Recent Labs     10/04/22  0458 10/05/22  0515   * 131*   K 3.5 3.6   CL 98* 97*   CO2 17* 21   BUN 56* 34*   CREATININE 4.7* 3.3*   GLUCOSE 146* 146*

## 2022-10-05 NOTE — CARE COORDINATION
Hospital day 21: Patient on C3 care managed by IM, General Surgery, and Nephrology. Patient from home IPTA. Patient with SNF recs accepted at [facility] no cert needed will need rapid COVID. Patient will have outpatient HD needs accepted at [facility] chair time. Per Aram Bosworth at [facility] secured transport, family has been edu ins auth obtained for this level of function. Family with financial concern provided RITCHIE application edu as well s made referral to financial  team. Patient ct with IV ATB, BROOKS oquendo and wound vac. Patient with new ostomy. Intake poor SW will ct to follow. CANDE Cloud  2827: Writer spoke RN reports per dtr leaning toward no PEG placement, Patient is on board. Writer alerted Palliative care as family struggling to have conversation with Patient Spouse.  CANDE Cloud

## 2022-10-05 NOTE — PROGRESS NOTES
Pt assessment completed and charted. VSS. Pt a/ox4. Pt reports pain 7/10 in back, pt educated on prn pain meds, meds given per mar. Spoke with pt when family wasn't here this morning about what she wanted and if she wanted the peg tube. Pt reported wanting to speak to . Pt's daughter reached out and reported that she spoke to her mother and she didn't want the PEG tube. Pt reported wanting to go home to [facility]. Palliative went in and spoke to family and pt. Pt has refused her 1400 and 1600 turn. Pt educated on the need to turn and to help with the DTI she already has. Pt still adamantly refused and wanted to be left alone. Family in room talking and very upset, family trying to make decisions on pts care and code statues. Pt denies any other needs at this time. Pt calls out appropriately. Pt is a fall risk;  -Bed in lowest position and wheels locked. -Call light within reach.   -Bedside table within reach.   -Non-skid footwear in place.  -bed check in place.

## 2022-10-05 NOTE — PLAN OF CARE
Problem: Discharge Planning  Goal: Discharge to home or other facility with appropriate resources  Outcome: Not Progressing     Problem: Pain  Goal: Verbalizes/displays adequate comfort level or baseline comfort level  10/5/2022 1454 by Betina Fairchild RN  Flowsheets (Taken 10/5/2022 1454)  Verbalizes/displays adequate comfort level or baseline comfort level:   Encourage patient to monitor pain and request assistance   Assess pain using appropriate pain scale        Problem: Skin/Tissue Integrity  Goal: Absence of new skin breakdown  Description: 1. Monitor for areas of redness and/or skin breakdown  2. Assess vascular access sites hourly  3. Every 4-6 hours minimum:  Change oxygen saturation probe site  4. Every 4-6 hours:  If on nasal continuous positive airway pressure, respiratory therapy assess nares and determine need for appliance change or resting period.   Outcome: Not Progressing

## 2022-10-06 VITALS
SYSTOLIC BLOOD PRESSURE: 153 MMHG | BODY MASS INDEX: 29 KG/M2 | DIASTOLIC BLOOD PRESSURE: 75 MMHG | HEART RATE: 78 BPM | RESPIRATION RATE: 18 BRPM | TEMPERATURE: 98.4 F | OXYGEN SATURATION: 97 % | WEIGHT: 195.77 LBS | HEIGHT: 69 IN

## 2022-10-06 LAB
A/G RATIO: 0.7 (ref 1.1–2.2)
ACANTHOCYTES: ABNORMAL
ALBUMIN SERPL-MCNC: 2.2 G/DL (ref 3.4–5)
ALP BLD-CCNC: 102 U/L (ref 40–129)
ALT SERPL-CCNC: <5 U/L (ref 10–40)
ANION GAP SERPL CALCULATED.3IONS-SCNC: 18 MMOL/L (ref 3–16)
ANISOCYTOSIS: ABNORMAL
AST SERPL-CCNC: 15 U/L (ref 15–37)
BANDED NEUTROPHILS RELATIVE PERCENT: 35 % (ref 0–7)
BASOPHILS ABSOLUTE: 0 K/UL (ref 0–0.2)
BASOPHILS RELATIVE PERCENT: 0 %
BILIRUB SERPL-MCNC: <0.2 MG/DL (ref 0–1)
BUN BLDV-MCNC: 43 MG/DL (ref 7–20)
BURR CELLS: ABNORMAL
CALCIUM SERPL-MCNC: 7.6 MG/DL (ref 8.3–10.6)
CHLORIDE BLD-SCNC: 95 MMOL/L (ref 99–110)
CO2: 18 MMOL/L (ref 21–32)
CREAT SERPL-MCNC: 4.3 MG/DL (ref 0.6–1.2)
DOHLE BODIES: PRESENT
EOSINOPHILS ABSOLUTE: 0.2 K/UL (ref 0–0.6)
EOSINOPHILS RELATIVE PERCENT: 2 %
GFR AFRICAN AMERICAN: 12
GFR NON-AFRICAN AMERICAN: 10
GLUCOSE BLD-MCNC: 111 MG/DL (ref 70–99)
GLUCOSE BLD-MCNC: 124 MG/DL (ref 70–99)
GLUCOSE BLD-MCNC: 133 MG/DL (ref 70–99)
GLUCOSE BLD-MCNC: 143 MG/DL (ref 70–99)
GLUCOSE BLD-MCNC: 169 MG/DL (ref 70–99)
HCT VFR BLD CALC: 21.2 % (ref 36–48)
HEMATOLOGY PATH CONSULT: NO
HEMOGLOBIN: 6.6 G/DL (ref 12–16)
HYPOCHROMIA: ABNORMAL
LYMPHOCYTES ABSOLUTE: 1.6 K/UL (ref 1–5.1)
LYMPHOCYTES RELATIVE PERCENT: 14 %
MCH RBC QN AUTO: 26.3 PG (ref 26–34)
MCHC RBC AUTO-ENTMCNC: 31.3 G/DL (ref 31–36)
MCV RBC AUTO: 84.1 FL (ref 80–100)
MONOCYTES ABSOLUTE: 0.4 K/UL (ref 0–1.3)
MONOCYTES RELATIVE PERCENT: 4 %
NEUTROPHILS ABSOLUTE: 9 K/UL (ref 1.7–7.7)
NEUTROPHILS RELATIVE PERCENT: 45 %
OVALOCYTES: ABNORMAL
PDW BLD-RTO: 15.3 % (ref 12.4–15.4)
PERFORMED ON: ABNORMAL
PLATELET # BLD: 252 K/UL (ref 135–450)
PLATELET SLIDE REVIEW: ADEQUATE
PMV BLD AUTO: 7.8 FL (ref 5–10.5)
POTASSIUM SERPL-SCNC: 3.5 MMOL/L (ref 3.5–5.1)
RBC # BLD: 2.52 M/UL (ref 4–5.2)
SARS-COV-2, NAAT: NOT DETECTED
SLIDE REVIEW: ABNORMAL
SODIUM BLD-SCNC: 131 MMOL/L (ref 136–145)
TOTAL PROTEIN: 5.4 G/DL (ref 6.4–8.2)
TOXIC GRANULATION: PRESENT
WBC # BLD: 11.2 K/UL (ref 4–11)

## 2022-10-06 PROCEDURE — 2500000003 HC RX 250 WO HCPCS: Performed by: STUDENT IN AN ORGANIZED HEALTH CARE EDUCATION/TRAINING PROGRAM

## 2022-10-06 PROCEDURE — 90935 HEMODIALYSIS ONE EVALUATION: CPT

## 2022-10-06 PROCEDURE — 87635 SARS-COV-2 COVID-19 AMP PRB: CPT

## 2022-10-06 PROCEDURE — 6360000002 HC RX W HCPCS: Performed by: NURSE PRACTITIONER

## 2022-10-06 PROCEDURE — 80053 COMPREHEN METABOLIC PANEL: CPT

## 2022-10-06 PROCEDURE — 36415 COLL VENOUS BLD VENIPUNCTURE: CPT

## 2022-10-06 PROCEDURE — 85025 COMPLETE CBC W/AUTO DIFF WBC: CPT

## 2022-10-06 RX ORDER — HEPARIN SODIUM 1000 [USP'U]/ML
INJECTION, SOLUTION INTRAVENOUS; SUBCUTANEOUS
Status: DISCONTINUED
Start: 2022-10-06 | End: 2022-10-06 | Stop reason: HOSPADM

## 2022-10-06 RX ADMIN — METRONIDAZOLE 500 MG: 500 INJECTION, SOLUTION INTRAVENOUS at 03:27

## 2022-10-06 RX ADMIN — MORPHINE SULFATE 2 MG: 2 INJECTION, SOLUTION INTRAMUSCULAR; INTRAVENOUS at 18:18

## 2022-10-06 RX ADMIN — METRONIDAZOLE 500 MG: 500 INJECTION, SOLUTION INTRAVENOUS at 12:02

## 2022-10-06 RX ADMIN — MORPHINE SULFATE 2 MG: 2 INJECTION, SOLUTION INTRAMUSCULAR; INTRAVENOUS at 11:45

## 2022-10-06 ASSESSMENT — PAIN DESCRIPTION - DESCRIPTORS: DESCRIPTORS: ACHING

## 2022-10-06 ASSESSMENT — PAIN DESCRIPTION - LOCATION: LOCATION: ABDOMEN

## 2022-10-06 ASSESSMENT — PAIN DESCRIPTION - ORIENTATION: ORIENTATION: ANTERIOR

## 2022-10-06 ASSESSMENT — PAIN SCALES - GENERAL: PAINLEVEL_OUTOF10: 8

## 2022-10-06 NOTE — DISCHARGE SUMMARY
Hospital Medicine Discharge Summary    Patient ID: Lurlene Gris      Patient's PCP: Mario Gaines DO, DO    Admit Date: 9/14/2022     Discharge Date:   10/6/22    Admitting Provider: No admitting provider for patient encounter. Discharge Provider: Kely Choudhary MD     Discharge Diagnoses: Active Hospital Problems    Diagnosis     Colon perforation (HCC) [K63.1]      Priority: Medium    Acute respiratory failure with hypoxia ([facility] [address] [J96.01]      Priority: Medium    Acute encephalopathy [G93.40]      Priority: Medium    Ischemic colitis ([facility] [address] [K55.9]      Priority: Medium    S/P exploratory laparotomy [Z98.890]      Priority: Medium    Acute postoperative pulmonary insufficiency (HCC) [J95.2]      Priority: Medium    Syncope [R55]      Priority: Medium    Hyponatremia [E87.1]      Priority: Medium    Abdominal pain [R10.9]      Priority: Medium    Enteritis [K52.9]      Priority: Medium    Elevated troponin [R77.8]      Priority: Medium    DEJAN (acute kidney injury) ([facility] [address] [N17.9]      Priority: Medium    DM (diabetes mellitus), secondary uncontrolled [WRS9259]      Priority: Medium       The patient was seen and examined on day of discharge and this discharge summary is in conjunction with any daily progress note from day of discharge. Hospital Course:     67 y.o. female who presented to [facility] with a several hx of constipation ultimately relieved prior to admission by large BM but subsequently had syncopal episode. Patient was found to be hypotensive and hypoglycemic, poorly responsive to initial tx. Suspicious developed for occult bowel perforation and General Surgery consulted. Patient was admitted with a syncope. Noted to have perforated ischemic colitis. Treated for septic shock. Acute renal failure did not improve and patient became dialysis dependent. Mental status slightly better but not back to baseline after narcotics weaned down.   PEG plans are currently on hold as family hoping patient's oral intake will increase. Does not seem that patient has sufficient improvement with oral intake as of today. Noted lower h/h  Ct abdomen showed fluid collection which was drained and a drain was placed tolerated well. Overall clinical improvement is not satisfactory. Noted hypoglycemia that required intervention. Ischemic sigmoid colitis. S/p resection, now with colostomy. Peritonitis and septic shock have resolved, completed antibiotic course. Noted increased fluid collection in the abdomen. IR placed a drain. On antibiotics. Repeat CT scan under general surgery recommendations. Hypotension. Septic shock resolved, weaned off pressor. Usually on antihypertensives. Anemia  Chronic illness. Hemoglobin remains acceptable and is being monitored. DEJAN on CKD3,   Currently dialysis dependent. HD as per nephrology     Dysphagia. Due to encephalopathy. Required NG tube feeds for a number of days. Then surgery removed the NG on 9/26 in an attempt to improve her chances of passing a swallow eval.  If she doesn't make progress toward swallowing in the next few days then family will consent to a PEG tube. Hypoglycemia  Poor oral intake. Requires interventions. Dextrose fluids are not recommended as the patient is already edematous and is end-stage renal disease with hemodialysis. We may try continuous dextrose fluids if approved by nephrology. Palliative care consult requested due to apparent poor prognosis and failure to improve. Patient is discharged to hospice. Physical Exam Performed:     /82   Pulse 83   Temp 97.4 °F (36.3 °C)   Resp 18   Ht 5' 9\" (1.753 m)   Wt 195 lb 12.3 oz (88.8 kg)   SpO2 99%   BMI 28.91 kg/m²       General appearance:  No apparent distress, appears stated age and cooperative. HEENT:  Normal cephalic, atraumatic without obvious deformity. Pupils equal, round, and reactive to light.   Extra ocular muscles intact. Conjunctivae/corneas clear. Neck: Supple, with full range of motion. No jugular venous distention. Trachea midline. Respiratory:  Normal respiratory effort. Clear to auscultation, bilaterally without Rales/Wheezes/Rhonchi. Cardiovascular:  Regular rate and rhythm with normal S1/S2 without murmurs, rubs or gallops. Abdomen: Soft, non-tender, non-distended with normal bowel sounds. Musculoskeletal:  No clubbing, cyanosis or edema bilaterally. Full range of motion without deformity. Skin: Skin color, texture, turgor normal.  No rashes or lesions. Neurologic:  Neurovascularly intact without any focal sensory/motor deficits. Cranial nerves: II-XII intact, grossly non-focal.  Psychiatric:  Alert and oriented, thought content appropriate, normal insight  Capillary Refill: Brisk,< 3 seconds   Peripheral Pulses: +2 palpable, equal bilaterally       Labs: For convenience and continuity at follow-up the following most recent labs are provided:      CBC:    Lab Results   Component Value Date/Time    WBC 11.2 10/06/2022 05:30 AM    HGB 6.6 10/06/2022 05:30 AM    HCT 21.2 10/06/2022 05:30 AM     10/06/2022 05:30 AM       Renal:    Lab Results   Component Value Date/Time     10/06/2022 05:30 AM    K 3.5 10/06/2022 05:30 AM    K 4.4 09/29/2022 08:31 AM    CL 95 10/06/2022 05:30 AM    CO2 18 10/06/2022 05:30 AM    BUN 43 10/06/2022 05:30 AM    CREATININE 4.3 10/06/2022 05:30 AM    CALCIUM 7.6 10/06/2022 05:30 AM    PHOS 3.1 09/26/2022 05:43 AM         Significant Diagnostic Studies    Radiology:   CT ABSCESS DRAINAGE W CATH PLACEMENT S&I   Final Result   Status post successful CT-guided placement of 10 Niuean percutaneous drainage   catheter into fluid collection within the left lateral aspect of the abdomen   as described above.          CT GUIDED NEEDLE PLACEMENT   Final Result   Status post successful CT-guided placement of 10 Niuean percutaneous drainage   catheter into fluid collection within the left lateral aspect of the abdomen   as described above. CT ABDOMEN PELVIS W IV CONTRAST Additional Contrast? Oral   Final Result   1. Prior sigmoid colectomy with an end colostomy and rectal pouch. There are   persistent collections of fluid and air near the proximal aspect of the   rectal pouch measuring as much as 5.9 cm which raises the question of a leak   at the suture line. There are several new focal areas of fluid attenuation   that have a least partial rim enhancement and may reflect developing   abscesses with the largest in the left pericolic gutter and lateral to the   spleen measuring up to 20.9 cm.   2. Thickening of the distal descending colon suggesting colitis and   thickening of the rectal pouch which may reflect pouchitis. 3. Increased size of moderate bilateral pleural effusions with adjacent   consolidation lower lobes a could reflect associated atelectasis or pneumonia. IR TUNNELED CVC PLACE WO SQ PORT/PUMP > 5 YEARS   Final Result   Status post removal of temporary dialysis catheter followed by   fluoroscopically guided placement of right internal jugular tunneled dialysis   catheter as described above. XR CHEST PORTABLE   Final Result   Temporary dialysis catheter overlies the cavoatrial junction. 1. Again noted are bibasilar infiltrates and pleural effusions. Similar   appearance to the prior exam.         XR CHEST PORTABLE   Final Result   Airspace opacities at the bilateral lung bases, may be related to atelectasis   versus pneumonia. Mild bilateral pleural effusions. CT HEAD WO CONTRAST   Final Result   No acute intracranial abnormality. Fluid in the mastoids, with trace mucosal thickening in the sinuses         CT ABDOMEN PELVIS W IV CONTRAST   Final Result   Ostomy is now seen in the left lower quadrant.   There is wall thickening of   the colon extending to the ostomy site, which is either due to the partially   contracted state of the colon or wall thickening from underlying colitis      Scattered fluid is seen in the pelvis, with a dominant collection on the   right that contains a small amount of gas internally. In the absence of   clinical signs of infection, this collection of fluid and gas is likely   postoperative. Increased pleural effusions with adjacent consolidation at the lung bases      Nonspecific thickening of the endometrial stripe. Recommend follow-up pelvic   ultrasound after the patient's acute symptoms have resolved. Mild fat stranding surrounds the bladder. Recommend correlation with   urinalysis. Gas is also seen in the bladder         XR CHEST PORTABLE   Final Result   1. Small left pleural effusion. 2.  Hazy right lower lobe airspace opacity which could represent atelectasis   or pneumonia. XR ABDOMEN FOR NG/OG/NE TUBE PLACEMENT   Final Result   Tip of NG tube projects in the left upper quadrant in the region of the   gastric fundus         XR CHEST PORTABLE   Final Result   Interval placement of a right-sided central venous catheter which extends   into the inferior aspect of the right atrium, approximately 5 cm beyond the   cavoatrial junction. XR CHEST PORTABLE   Final Result   Right IJ CVC catheter tip overlies the SVC at the level of the thoracic inlet. Endotracheal tube tip is 5.3 cm above the mark. Mild bibasilar airspace opacities, which could represent as atelectasis   and/or infiltrate. Possible small right pleural effusion. CT ABDOMEN PELVIS WO CONTRAST Additional Contrast? None   Final Result   1. Limited evaluation of the GI tract on this noncontrast exam.  Improved   mucosal changes of the duodenum and proximal jejunum that were described on   prior CT. 2. Severe inflammatory change in the right lower quadrant with etiology   uncertain. This could correspond to enteritis of the distal ileum and   terminal ileum.   Colitis may also be considered. Infectious or inflammatory   etiologies may be favored. 3. Dense stool and diffuse mucosal thickening of the distal colon which may   represent a pattern of colitis. 4. Small right pleural effusion with airspace changes in the right lower   lobe, new from prior exam.   5. Evidence of chronic liver disease with a small amount of ascites stable. CT ABDOMEN PELVIS WO CONTRAST Additional Contrast? None   Final Result   1. Acute enteritis involving the duodenum and jejunal loops with thickening   of the loops and edema. No evidence of bowel obstruction. 2. Constipation with significant stool impaction in the rectum. 3. Mild ascites mostly around the liver and spleen. 4. Adequate position of Osorio catheter in the urinary bladder. XR CHEST PORTABLE   Final Result   Placement of a right internal jugular central venous catheter without evident   complication. The tip is at approximately the cavoatrial junction.       No acute findings in the chest.                Consults:     IP CONSULT TO HOSPITALIST  IP CONSULT TO NEPHROLOGY  IP CONSULT TO CRITICAL CARE  IP CONSULT TO PHARMACY  IP CONSULT TO GENERAL SURGERY  IP CONSULT TO SOCIAL WORK  IP CONSULT TO DIETITIAN  IP CONSULT TO INTERVENTIONAL RADIOLOGY  PHARMACY TO DOSE MEDICATION  IP CONSULT TO PALLIATIVE CARE  IP CONSULT TO HOSPICE    Disposition:  Hospice     Condition at Discharge: Terminal    Discharge Instructions/Follow-up:      Code Status:  DNR-CC     Activity: activity as tolerated    Diet: regular diet      Discharge Medications:     Current Discharge Medication List             Details   insulin glargine (LANTUS SOLOSTAR) 100 UNIT/ML injection pen Inject 15 Units into the skin nightly INJECT 15 UNITS SUBCUTANEOUSLY AT BEDTIME (MAY USE UP TO 50 UNITS DAILY IF DIRECTED BY DOCTOR)      metFORMIN (GLUCOPHAGE) 850 MG tablet Take 850 mg by mouth 2 times daily (with meals)      gabapentin (NEURONTIN) 100 MG capsule TAKE 2 CAPSULES BY MOUTH THREE TIMES DAILY      lisinopril (PRINIVIL;ZESTRIL) 40 MG tablet Take 1 tablet by mouth once daily      Dulaglutide (TRULICITY) 3 CT/7.1GT SOPN Inject 3 mg into the skin every 7 days      chlorthalidone (HYGROTON) 25 MG tablet TAKE 1 TABLET BY MOUTH ONCE DAILY      furosemide (LASIX) 40 MG tablet Take 20 mg by mouth See Admin Instructions TAKE 1/2 (ONE-HALF) TABLET BY MOUTH ONCE DAILY ON MONDAY, WEDNESDAY, AND FRIDAY AS NEEDED FOR SWELLING      glyBURIDE (DIABETA) 5 MG tablet TAKE 2 TABLETS BY MOUTH TWICE DAILY WITH MEALS             Time Spent on discharge is more than 30 minutes in the examination, evaluation, counseling and review of medications and discharge plan. Signed: Abdiel Lal MD   10/6/2022      Thank you Nisha Ceballos DO, DO for the opportunity to be involved in this patient's care. If you have any questions or concerns, please feel free to contact me at [phone].

## 2022-10-06 NOTE — PROGRESS NOTES
Nico Garcia CNP messaged regarding IV pain medication at family and pt request.      Awaiting response.      Electronically signed by Carissa Szymanski RN on 10/5/2022 at 8:07 PM

## 2022-10-06 NOTE — PROGRESS NOTES
PALLIATIVE MEDICINE PROGRESS NOTE     Patient name:Sierra Siddiqui    P:[id number] BDP:[id number]  Room/Bed:[facility]/0322-01    LOS: 22 days        ASSESSMENT/RECOMMENDATIONS     67 y.o. female with  ischemic colitis, s/p colostomy, DEJAN on CKD now requiring iHD, dysphagia and protein-calorie malnutrition    Symptom Management:  Goals of Care -Family has shifted goals to comfort. Would like to use hospice IPU. Code status changed to DNR CC.  [facility] [place] meeting today  Dysphagia - due to weakness and encephalopathy. On pureed diet and family assisting with feeding. Family hopeful that dysphagia will improve. Aware of the risks with dysphagia as it pertains to her overall prognosis. Protein-calorie malnutrition - Albumin 2.3, total protein 6.0. BMI 28. Unintentional weight loss of 20 lbs in past 2 months (per family, she was afraid of eating most foods because of the misunderstanding of her diabetic and kidney diet restrictions). Family no longer wants PEG. Patient/Family Goals of Care :    10/6/22    Spoke with daughter Ada Wilson. She and her sister spent the  night with patient at the hospital last night. Patient told them she wants to be done and wants \"to go home to the [place]\". They want to honor this wish for her. They would like to use [facility]. They feel like an inpatient unit it the best choice because patient's spouse is overwhelmed right now and would not be able to handle her at home. They would like me to call [address] [place] [address] for them. 10/5/22    Spoke with  at the bedside. Patient is alert, but in and out of sleep and only minimally participated. She states \"I want to go home\". Patient was reportedly telling RN earlier that she can't take this anymore.  tells me they have decided against the PEG tube. He thinks the PEG will cause more suffering and he does not want that for her. I asked about continuing dialysis and he thought they would continue with that.   We discussed his/our expectations with dialysis if patient was not eating and not getting nutrition. He understands that without nutrition and with patient as sick as she is, dialysis was likely to only cause her additional suffering and not offer any benefit. We discussed that if she stopped dialysis we would expect her to only live days to weeks. He would like to bring her home. We talked about what that might look like and he has significant concerns that he would be too overwhelmed to manage her at home. He is supposed to be starting his own bladder cancer treatments. He agrees that she would probably be better served in a nursing home or [place] [id number]. He intermittently asks me if I thought she could get better at the [facility] and going to [place] for dialysis. I shared with him several times that I was concerned that she was even up to such a plan, let alone if she could improve. I explained that I did not want to take away his hope and I, too, was certainly hoping she would turn this around, but with everyone (including patient) concerned she was suffering, I was concerned that it might be time to consider alternate plans (comfort care/hospice).  began to sob and support offered. He says he knew this day was coming but was not prepared. He would like to talk to his daughters latasha before officially deciding to change focus to comfort. I gave him my cell phone in case they had any questions when they spoke. I discussed code status with him and recommended we re-consider her code status given their concern for her suffering. He says he understands, but does not feel he should change code status until he speaks with the daughters later tonight. 10/3/22    Spoke with patient's sister at length at the bedside. She does not really feel like patient has improved much (or any) and is concerned patient is suffering.   When their mother went to a nursing home, patient said to her sister she would never want to live like that. Her sister thinks dialysis will be permament and patient will probably never be able to live any kind of meaningful or enjoyable life. She doesn't feel like a feeding tube is going to do anything more than prolong her suffering. If it was up to her she would send her to hospice, but she says its not her decision so she just has to let patient's family make the decisions. She is well aware that patient's spouse and daughters want to remain aggressive. She doesn't want to get in their way. If patient wakes up more, she is not sure if patient would say these things herself about not wanting to continue. She thinks she would probably not say them in front of her children and would continue to want be aggressive medically for them. We talked about setting some limits/expectations with the daughters before the PEG is placed that if there is no improvement after a few weeks of TF, then we should consider shifting goals too comfort care. She is not sure if that will work with them or not. Attempted to speak with patient directly today but she falls to sleep mid-sentence and I am unable to maintain a conversation. She denies any pain or needs. Spoke with  Toyin Weinstein by phone. He feels like patient has done a little better with her eating and her mental status. He thinks one of the big problems for her is that she hates the food at the hospital.  They are trying to bring in other foods that she might like. He is headed to the grocery store now to buy the cranberry pineapple juice she wants. He feels like she is much more awake compared to Friday. These improvements are encouraging to him. He wants to remain aggressive. He hopes she keeps this improval up. He spoke with his family and patient should remain a full code. Toyin Weinstein does share with me that he has pretty deep financial concerns about how they are going to pay these hospital bills or put patient in a nursing home.   He is agreeable to me mentioning the concern to social work to see what resources we can help him obtain. He will be at the hospital later today to visit patient and bring her her juice. 9/30/22     Met with patient, spouse, daughter and patient's sister in law. Patient opens eyes to name and does answer questions appropriately, but she is very lethargic and quickly drifts back to sleep. Had extensive conversation with spouse, daughter and KRISTINE at the bedside. Spouse states he and patient never really discussed topics like advanced directives. They never talked about her feelings around dialysis (with her CKD). He is not really sure how she would feel about these things. Patient has never really been in the hospital before. This is the sickest she has ever been. He and his daughter are very hopeful that this is a temporary situation and patient will bounce back with nutrition and time. They are hopeful the colostomy will be reversed, the dialysis will only be temporary and the SNF will only be temporary- if needed at all. They are very motivated by the improvement in her mental status and that she has taken several bites of apple sauce and jello today. They will attempt to get some restaurant food she likes and puree it at home and bring it in to see if that will entice her. We talked about her need for nutrition and talked at length about a PEG tube. They believe they will go that route if they have to, but they hope they don't. They understand that they will need to make a decision about the PEG very soon and understand the need for nutrition. Discussed potential SNF and even LTC.  will make accommodations to their mobile home for patient if needed as the goal will be to get her home. They intend on doing dialysis as long as its needed, but hope it won't be permament. They are hopeful that patient can recover enough strength and function that  could drive her to dialysis.    The expectation is that patient is going to have a very rough road ahead, with some possible lingering health issues that family is happy to accommodate for, and will be able to eventually return home to a meaningful life with her family. Patient enjoys crossword puzzles and watching westerns on TV. She worked part time  in [employer] at [employer]. She had been becoming more fatigued lately and losing weight. Family suspects the new PCP has her on too much medicine and this is what was causing the recent decline. Concerned about all of her diabetic medication she was given. Discussed CODE STATUS: Explained that although in some cases it does save lives, CPR (cardiopulmonary resuscitation) frequently is not successful or does not benefit those who receive it, especially elderly people or those with serious medical conditions. Even if revived, the person can be left with painful injuries, or in a debilitated state, or with brain damage resulting from oxygen deprivation. Resuscitation can involve such things as drugs, forcefully pressing on the chest, giving electric shocks to restart the heart or placing a tube down the nose or throat to provide artificial breathing. People with terminal illnesses or other serious health conditions may prefer not to be resuscitated. Spouse elects full code. Disposition/Discharge Plan :    - referral made to [facility]    Advance Directives:  Code status:  DNR-CC    Decision Maker: Spouse, Dirk Machadoffer [facility]      Palliative Performance Scale:     [] 60%  Amb reduced; Sig dz. Can't do hobbies/housework; Intake normal or reduced, Occasional assist; LOC full/confusion   [] 50%  Mainly sit/lie; Extensive disease. Mainly assist, Intake normal or reduced; Occasional assist; LOC full/confusion   [] 40%  Mainly in bed; Extensive disease; Mainly assist; Intake normal or reduced; Occasional assist; LOC full/confusion   [] 30%  Bed bound, Extensive disease; Total care;  Intake reduced; LOC full/confusion   [] 20%  Bed bound; Extensive disease; Total care; Intake minimal; Drowsy/coma   [x] 10%  Bed bound; Extensive disease; Total care; Mouth care only; Drowsy/coma   []  0%   Death      Case Discussed with:  patient, family, floor RN, case management, attending MD    Thank you for allowing us to participate in the care of this patient. SUBJECTIVE     Chief Complaint: abdominal pain    Last 24 hours:   Family has decided on comfort care    ROS:  Review of Systems   Unable to perform ROS: Mental status change           Patient unable to complete full ROS due to current cognitive status. Information that is obtained from nursing and chart           OBJECTIVE   BP (!) 128/58   Pulse 76   Temp 97.6 °F (36.4 °C)   Resp 18   Ht 5' 9\" (1.753 m)   Wt 196 lb 13.9 oz (89.3 kg)   SpO2 98%   BMI 29.07 kg/m²   I/O last 3 completed shifts: In: 600 [P.O.:600]  Out: 870 [Drains:40; Stool:830]  No intake/output data recorded. Physical Exam    Physical Exam  Constitutional:       General: She is not in acute distress. Appearance: She is ill-appearing. Comments: Lying on her side, sleeping   HENT:      Head: Normocephalic and atraumatic. Nose: Nose normal. No congestion or rhinorrhea. Mouth/Throat:      Mouth: Mucous membranes are dry. Pharynx: No oropharyngeal exudate or posterior oropharyngeal erythema. Eyes:      General: No scleral icterus. Right eye: No discharge. Left eye: No discharge. Extraocular Movements: Extraocular movements intact. Conjunctiva/sclera: Conjunctivae normal.      Pupils: Pupils are equal, round, and reactive to light. Cardiovascular:      Rate and Rhythm: Normal rate. Pulses: Normal pulses. Pulmonary:      Effort: Pulmonary effort is normal. No respiratory distress. Breath sounds: No stridor. No wheezing. Abdominal:      Palpations: Abdomen is soft.    Musculoskeletal:         General: Normal range of motion. Cervical back: Normal range of motion and neck supple. Skin:     General: Skin is warm and dry. Capillary Refill: Capillary refill takes less than 2 seconds.    Neurological:      Comments: Alert to self, situation   Psychiatric:      Comments: calm            Total time: 45 minutes  >50% of time spent counseling patient at bedside or POA/family member if applicable , reviewing information and discussing care, coordinating with care team       Signed By: Electronically signed by BRAEDEN High CNP on 10/6/2022 at 9:27 AM   Palliative Medicine   [phone]    October 6, 2022

## 2022-10-06 NOTE — PROGRESS NOTES
Hospitalist Progress Note      PCP: Susan Hernandez DO, DO    Date of Admission: 9/14/2022    Chief Complaint: syncope       Hospital course   Patient was admitted with a syncope. Noted to have perforated ischemic colitis. Treated for septic shock. Acute renal failure did not improve and patient became dialysis dependent. Mental status slightly better but not back to baseline after narcotics weaned down. PEG plans are currently on hold as family hoping patient's oral intake will increase. Does not seem that patient has sufficient improvement with oral intake as of today. Noted lower h/h  Ct abdomen showed fluid collection which was drained and a drain was placed tolerated well. Overall clinical improvement is not satisfactory. Noted hypoglycemia that required intervention. Subjective  No new issues. Possible PEG tube placement by surgery pending family decision. Vitals stable.       Medications:  Reviewed    Infusion Medications    sodium chloride      sodium chloride      sodium chloride 25 mL (09/22/22 2024)    dextrose      sodium chloride 100 mL/hr at 09/21/22 2122     Scheduled Medications    dextrose  25 g IntraVENous Once    Venelex   Topical BID    cefTRIAXone (ROCEPHIN) IV  1,000 mg IntraVENous Q24H    metroNIDAZOLE  500 mg IntraVENous Q8H    ceFAZolin  2,000 mg IntraVENous Once    rosuvastatin  5 mg Oral Daily    insulin glargine  20 Units SubCUTAneous Nightly    sodium chloride  500 mL IntraVENous Once    insulin lispro  0-16 Units SubCUTAneous Q4H    pantoprazole  40 mg IntraVENous Daily     PRN Meds: morphine, traMADol, heparin (porcine), sodium chloride, sodium chloride, diatrizoate meglumine-sodium, acetaminophen, prochlorperazine, potassium chloride, magnesium sulfate, sodium chloride flush, sodium chloride, ondansetron, glucose, dextrose bolus **OR** dextrose bolus, glucagon (rDNA), dextrose, sodium chloride flush, sodium chloride, polyethylene glycol      Intake/Output Summary (Last 24 hours) at 10/5/2022 4566  Last data filed at 10/5/2022 2022  Gross per 24 hour   Intake 600 ml   Output 570 ml   Net 30 ml       Physical Exam Performed:    /69   Pulse 76   Temp 98.2 °F (36.8 °C) (Axillary)   Resp 17   Ht 5' 9\" (1.753 m)   Wt 179 lb 10.8 oz (81.5 kg)   SpO2 98%   BMI 26.53 kg/m²     General appearance: No apparent distress, appears stated age. HEENT: Pupils equal, round, and reactive to light. Conjunctivae/corneas clear. Neck: Supple, with full range of motion. No jugular venous distention. Trachea midline. Respiratory:  Normal respiratory effort. Clear to auscultation, bilaterally without Rales/Wheezes/Rhonchi. Diminished throughout. Cardiovascular: Regular rate and rhythm with normal S1/S2 without murmurs, rubs or gallops. Abdomen: Soft, mild diffuse tenderness, non-distended with normal bowel sounds. Musculoskeletal: No clubbing, cyanosis. 1+ BLE pitting edema. Full range of motion without deformity. Skin: Skin color, texture, turgor normal.  Incisions c/d/I. Neurologic:  Neurovascularly intact without any focal sensory/motor deficits. Cranial nerves: II-XII intact, grossly non-focal.  Psychiatric: sleeping, arousable, oriented when awake. Able to engage in short meaningful conversation  Capillary Refill: Brisk, 3 seconds, normal   Peripheral Pulses: +2 palpable, equal bilaterally     I examined the patient today (10/05/22). Physical exam is similar to yesterday (10/01)      Labs:   Recent Labs     10/04/22  0458 10/05/22  0515   WBC 9.9 10.2   HGB 7.2* 7.0*   HCT 22.5* 21.2*    218     Recent Labs     10/04/22  0458 10/05/22  0515   * 131*   K 3.5 3.6   CL 98* 97*   CO2 17* 21   BUN 56* 34*   CREATININE 4.7* 3.3*   CALCIUM 8.0* 7.8*     Recent Labs     10/04/22  0458 10/05/22  0515   AST 13* 12*   ALT <5* <5*   BILITOT 0.3 0.3   ALKPHOS 86 95     No results for input(s): INR in the last 72 hours.     No results for input(s): [place] Davidson in the last 72 hours. Urinalysis:      Lab Results   Component Value Date/Time    NITRU Negative 09/14/2022 12:08 PM    WBCUA 21-50 09/14/2022 12:08 PM    BACTERIA Rare 09/14/2022 12:08 PM    RBCUA None seen 09/14/2022 12:08 PM    BLOODU Negative 09/14/2022 12:08 PM    SPECGRAV <=1.005 09/14/2022 12:08 PM    GLUCOSEU Negative 09/14/2022 12:08 PM       Radiology:  CT ABSCESS DRAINAGE W CATH PLACEMENT S&I   Final Result   Status post successful CT-guided placement of 10 Uzbek percutaneous drainage   catheter into fluid collection within the left lateral aspect of the abdomen   as described above. CT GUIDED NEEDLE PLACEMENT   Final Result   Status post successful CT-guided placement of 10 Uzbek percutaneous drainage   catheter into fluid collection within the left lateral aspect of the abdomen   as described above. CT ABDOMEN PELVIS W IV CONTRAST Additional Contrast? Oral   Final Result   1. Prior sigmoid colectomy with an end colostomy and rectal pouch. There are   persistent collections of fluid and air near the proximal aspect of the   rectal pouch measuring as much as 5.9 cm which raises the question of a leak   at the suture line. There are several new focal areas of fluid attenuation   that have a least partial rim enhancement and may reflect developing   abscesses with the largest in the left pericolic gutter and lateral to the   spleen measuring up to 20.9 cm.   2. Thickening of the distal descending colon suggesting colitis and   thickening of the rectal pouch which may reflect pouchitis. 3. Increased size of moderate bilateral pleural effusions with adjacent   consolidation lower lobes a could reflect associated atelectasis or pneumonia. IR TUNNELED CVC PLACE WO SQ PORT/PUMP > 5 YEARS   Final Result   Status post removal of temporary dialysis catheter followed by   fluoroscopically guided placement of right internal jugular tunneled dialysis   catheter as described above. XR CHEST PORTABLE   Final Result   Temporary dialysis catheter overlies the cavoatrial junction. 1. Again noted are bibasilar infiltrates and pleural effusions. Similar   appearance to the prior exam.         XR CHEST PORTABLE   Final Result   Airspace opacities at the bilateral lung bases, may be related to atelectasis   versus pneumonia. Mild bilateral pleural effusions. CT HEAD WO CONTRAST   Final Result   No acute intracranial abnormality. Fluid in the mastoids, with trace mucosal thickening in the sinuses         CT ABDOMEN PELVIS W IV CONTRAST   Final Result   Ostomy is now seen in the left lower quadrant. There is wall thickening of   the colon extending to the ostomy site, which is either due to the partially   contracted state of the colon or wall thickening from underlying colitis      Scattered fluid is seen in the pelvis, with a dominant collection on the   right that contains a small amount of gas internally. In the absence of   clinical signs of infection, this collection of fluid and gas is likely   postoperative. Increased pleural effusions with adjacent consolidation at the lung bases      Nonspecific thickening of the endometrial stripe. Recommend follow-up pelvic   ultrasound after the patient's acute symptoms have resolved. Mild fat stranding surrounds the bladder. Recommend correlation with   urinalysis. Gas is also seen in the bladder         XR CHEST PORTABLE   Final Result   1. Small left pleural effusion. 2.  Hazy right lower lobe airspace opacity which could represent atelectasis   or pneumonia.          XR ABDOMEN FOR NG/OG/NE TUBE PLACEMENT   Final Result   Tip of NG tube projects in the left upper quadrant in the region of the   gastric fundus         XR CHEST PORTABLE   Final Result   Interval placement of a right-sided central venous catheter which extends   into the inferior aspect of the right atrium, approximately 5 cm beyond the cavoatrial junction. XR CHEST PORTABLE   Final Result   Right IJ CVC catheter tip overlies the SVC at the level of the thoracic inlet. Endotracheal tube tip is 5.3 cm above the mark. Mild bibasilar airspace opacities, which could represent as atelectasis   and/or infiltrate. Possible small right pleural effusion. CT ABDOMEN PELVIS WO CONTRAST Additional Contrast? None   Final Result   1. Limited evaluation of the GI tract on this noncontrast exam.  Improved   mucosal changes of the duodenum and proximal jejunum that were described on   prior CT. 2. Severe inflammatory change in the right lower quadrant with etiology   uncertain. This could correspond to enteritis of the distal ileum and   terminal ileum. Colitis may also be considered. Infectious or inflammatory   etiologies may be favored. 3. Dense stool and diffuse mucosal thickening of the distal colon which may   represent a pattern of colitis. 4. Small right pleural effusion with airspace changes in the right lower   lobe, new from prior exam.   5. Evidence of chronic liver disease with a small amount of ascites stable. CT ABDOMEN PELVIS WO CONTRAST Additional Contrast? None   Final Result   1. Acute enteritis involving the duodenum and jejunal loops with thickening   of the loops and edema. No evidence of bowel obstruction. 2. Constipation with significant stool impaction in the rectum. 3. Mild ascites mostly around the liver and spleen. 4. Adequate position of Osorio catheter in the urinary bladder. XR CHEST PORTABLE   Final Result   Placement of a right internal jugular central venous catheter without evident   complication. The tip is at approximately the cavoatrial junction.       No acute findings in the chest.                   Assessment/Plan:    Active Hospital Problems    Diagnosis     Colon perforation ([address] [K63.1]      Priority: Medium    Acute respiratory failure with hypoxia ([address] [J96.01]      Priority: Medium    Acute encephalopathy [G93.40]      Priority: Medium    Ischemic colitis ([facility] [K55.9]      Priority: Medium    S/P exploratory laparotomy [Z98.890]      Priority: Medium    Acute postoperative pulmonary insufficiency (HCC) [J95.2]      Priority: Medium    Syncope [R55]      Priority: Medium    Hyponatremia [E87.1]      Priority: Medium    Abdominal pain [R10.9]      Priority: Medium    Enteritis [K52.9]      Priority: Medium    Elevated troponin [R77.8]      Priority: Medium    DEJAN (acute kidney injury) ([facility] [N17.9]      Priority: Medium    DM (diabetes mellitus), secondary uncontrolled [GQT2972]      Priority: Medium       PLAN:    Ischemic sigmoid colitis. S/p resection, now with colostomy. Peritonitis and septic shock have resolved, completed antibiotic course. Noted increased fluid collection in the abdomen. IR placed a drain. On antibiotics. Repeat CT scan under general surgery recommendations. Hypotension. Septic shock resolved, weaned off pressor. Usually on antihypertensives. Anemia  Chronic illness. Hemoglobin remains acceptable and is being monitored. DEJAN on CKD3,   Currently dialysis dependent. HD as per nephrology    Dysphagia. Due to encephalopathy. Required NG tube feeds for a number of days. Then surgery removed the NG on 9/26 in an attempt to improve her chances of passing a swallow eval.  If she doesn't make progress toward swallowing in the next few days then family will consent to a PEG tube. Hypoglycemia  Poor oral intake. Requires interventions. Dextrose fluids are not recommended as the patient is already edematous and is end-stage renal disease with hemodialysis. We may try continuous dextrose fluids if approved by nephrology. Palliative care consult requested due to apparent poor prognosis and failure to improve. DVT Prophylaxis: SCDs  Diet: ADULT DIET;  Dysphagia - Pureed  ADULT ORAL NUTRITION SUPPLEMENT; Breakfast, PM Snack; Standard High Calorie/High Protein Oral Supplement  ADULT ORAL NUTRITION SUPPLEMENT; Lunch, Dinner; Frozen Oral Supplement  Code Status: Limited  PT/OT Eval Status: Ordered    Dispo -at this point disposition time and location is unclear. Palliative care consulted.     Appropriate for A1 Discharge Unit: Dasha Rosado MD

## 2022-10-06 NOTE — PROGRESS NOTES
Hospice of [address] [place] [address] held with family and patient to discuss [facility] philosophy and levels of care. Educational brochure provided for the family and patient to review. All contact numbers provided     Family wants EIP[facility] There is a bed available. Patient will be transported via [address] between [id number]. I have updated Guera gusman RN, [facility] NP and Betina Avila .     Any questions please call [phone]    Thank you for the referral  Maida AGARWAL, [facility]  [phone]

## 2022-10-06 NOTE — CARE COORDINATION
Case Management Discharge Summary- Hospice Discharge    Destination:   [address] [facility] [address] of [facility] name and contact: Hospice of [address] arrangements are completed by the Hospice nurse. [address] [place] [address] Dr BARNES signed and placed in discharge packet AND patient's hard chart. (This is required for DNR patients.)    Signed [facility]OC/AVS faxed to above agency/facility. Transportation arrangements per [employer]. Transport agency name and  time: [facility] 1800    Transport form completed and signed by:  Writer   Transport form placed with discharge packet: with chart     Notified Family: Bedside     Patient's RN notified of [place] RN [id number]    Note:    Discharging nurse to complete nursing portion of ECOC, reconcile AVS, place final copy with patient's discharge packet. RN to ensure signed prescriptions are sent home with patient or the facility as per nursing protocol.   CANDE Morley

## 2022-10-06 NOTE — ACP (ADVANCE CARE PLANNING)
Advance Care Planning     General Advance Care Planning (ACP) Conversation    Date of Conversation: 9/14/2022  Conducted with:  Healthcare Decision Maker: Named in Advance Directive or Healthcare Power of  (name)       Homer Odom and daughter Chon Gomez:    Primary Decision Maker: Evelyn Wilkins - Spouse - [phone]  Click here to complete Healthcare Decision Makers including selection of the Healthcare Decision Maker Relationship (ie \"Primary\"). Today we documented Decision Maker(s) consistent with Legal Next of Kin hierarchy. Content/Action Overview: Has ACP document(s) on file - reflects the patient's care preferences  Reviewed DNR/DNI and patient elects DNR order - completed portable DNR form & placed order  treatment goals, benefit/burden of treatment options, ventilation preferences, hospitalization preferences, resuscitation preferences, end of life care preferences (vegetative state/imminent death), and hospice care    Spoke with daughter Alan Schultz. She confirms patient is supposed to be a DNR CC/DNI and they want her to go to hospice IPU today.   They will meet with hospice this morning    Length of Voluntary ACP Conversation in minutes:  <16 minutes (Non-Billable)    Yamil Mane, APRN - CNP

## 2022-10-06 NOTE — PROGRESS NOTES
Call from dialysis RN that NPWT machine alarming occlusion. Spoke to bed side RN Janina Vasquez and palliative care RN Karen Li, family have changed code status to DNR, [facility] and plan to discuss possible hospice today. Abd dressing since occlusion alarm ringing will need to be changed. Negative pressure wound dressing (NPWT) removed. Placed moist to moist dressing in wound bed, covered with dry dressing, 1/2 abdominal pad and Medipore tape. If not admitted to hospice, will replace NPWT later today, if hospice will keep moist to moist dressings in place. No need to change Colostomy appliance at this time (pouch remains secure). WOCN to follow up later today with out come of hospice meeting. 10/06/22 0951   Negative Pressure Wound Therapy  (NPWT) Abdomen Medial   Placement Date/Time: 10/03/22 1229   Present on Admission/Arrival: No  Location: Abdomen  Wound Location Orientation: Medial   Number of pieces removed 2   Dressing Status Moist to moist dressing placed in wound bed. (removed as alarm ringing occluded)   Incision 09/15/22 Abdomen Medial   Date First Assessed: 09/15/22   Present on Hospital Admission: No  Location: Abdomen  Incision Location Orientation: Medial   Dressing Status New dressing applied   Dressing Change Due 10/07/22   Incision Cleansed Cleansed with saline   Dressing/Treatment Moist to moist;Dry dressing;ABD pad;Tape/Soft cloth adhesive tape   Closure Staples   Incision Assessment Epithelialization   Drainage Amount Small   Drainage Description Brown;Purulent; Thick   Odor Moderate   Lesli-incision Assessment Dry/flaky; Intact; Blanchable erythema

## 2022-10-06 NOTE — PROGRESS NOTES
Treatment time: 3 hours  Net UF: 500 ml     Pre weight: 89.3 kg  Post weight:88.8 kg    Access used: RTDC    Access function: well with  ml/min     Medications or blood products given: heparin dwells     Regular outpatient schedule: TTS     Summary of response to treatment: Patient tolerated treatment well and without any complications. Patient remained stable throughout entire treatment.       10/06/22 0730 10/06/22 1040   Treatment   Time On 0736  --    Time Off  --  1036   Treatment Goal 1000  --    Vital Signs   BP (!) 128/58 (!) 120/58   Temp 97.6 °F (36.4 °C) 97.6 °F (36.4 °C)   Heart Rate 76 81   Resp 18 18   Weight 196 lb 13.9 oz (89.3 kg) 195 lb 12.3 oz (88.8 kg)

## 2022-10-06 NOTE — CARE COORDINATION
Palliative care made referral to [address] [place] [address]. Writer placed call to [facility] and [email or web address] to update on change in plan of care, stop holding spot. SW will follow. CANDE Curran

## 2022-10-14 PROBLEM — R77.8 ELEVATED TROPONIN: Status: RESOLVED | Noted: 2022-09-14 | Resolved: 2022-10-14
